# Patient Record
Sex: FEMALE | Race: BLACK OR AFRICAN AMERICAN | Employment: OTHER
[De-identification: names, ages, dates, MRNs, and addresses within clinical notes are randomized per-mention and may not be internally consistent; named-entity substitution may affect disease eponyms.]

---

## 2017-01-16 ENCOUNTER — OFFICE VISIT (OUTPATIENT)
Dept: ORTHOPEDIC SURGERY | Facility: CLINIC | Age: 72
End: 2017-01-16

## 2017-01-16 VITALS — BODY MASS INDEX: 57.66 KG/M2 | HEIGHT: 59 IN | WEIGHT: 286 LBS

## 2017-01-16 DIAGNOSIS — M65.4 DE QUERVAIN'S TENOSYNOVITIS, RIGHT: Primary | ICD-10-CM

## 2017-01-16 PROCEDURE — 99213 OFFICE O/P EST LOW 20 MIN: CPT | Performed by: ORTHOPAEDIC SURGERY

## 2017-01-18 ENCOUNTER — OFFICE VISIT (OUTPATIENT)
Dept: PODIATRY | Facility: CLINIC | Age: 72
End: 2017-01-18

## 2017-01-18 VITALS
HEART RATE: 85 BPM | BODY MASS INDEX: 57.45 KG/M2 | DIASTOLIC BLOOD PRESSURE: 77 MMHG | HEIGHT: 59 IN | WEIGHT: 285 LBS | SYSTOLIC BLOOD PRESSURE: 142 MMHG

## 2017-01-18 DIAGNOSIS — M79.671 PAIN IN BOTH FEET: ICD-10-CM

## 2017-01-18 DIAGNOSIS — E11.9 TYPE 2 DIABETES MELLITUS WITHOUT COMPLICATION, WITHOUT LONG-TERM CURRENT USE OF INSULIN (HCC): Primary | ICD-10-CM

## 2017-01-18 DIAGNOSIS — M20.10 HAV (HALLUX ABDUCTO VALGUS), UNSPECIFIED LATERALITY: ICD-10-CM

## 2017-01-18 DIAGNOSIS — B35.1 ONYCHOMYCOSIS: ICD-10-CM

## 2017-01-18 DIAGNOSIS — M79.672 PAIN IN BOTH FEET: ICD-10-CM

## 2017-01-18 DIAGNOSIS — M20.5X1 HALLUX LIMITUS OF RIGHT FOOT: ICD-10-CM

## 2017-01-18 DIAGNOSIS — M20.5X9 HALLUX LIMITUS, UNSPECIFIED LATERALITY: ICD-10-CM

## 2017-01-18 PROCEDURE — 99213 OFFICE O/P EST LOW 20 MIN: CPT | Performed by: PODIATRIST

## 2017-01-18 PROCEDURE — 11721 DEBRIDE NAIL 6 OR MORE: CPT | Performed by: PODIATRIST

## 2017-02-10 ENCOUNTER — OFFICE VISIT (OUTPATIENT)
Dept: INTERNAL MEDICINE | Facility: CLINIC | Age: 72
End: 2017-02-10

## 2017-02-10 VITALS
HEIGHT: 59 IN | BODY MASS INDEX: 58.06 KG/M2 | SYSTOLIC BLOOD PRESSURE: 148 MMHG | WEIGHT: 288 LBS | DIASTOLIC BLOOD PRESSURE: 84 MMHG | TEMPERATURE: 98.1 F

## 2017-02-10 DIAGNOSIS — E66.01 MORBID OBESITY DUE TO EXCESS CALORIES (HCC): ICD-10-CM

## 2017-02-10 DIAGNOSIS — K21.9 GASTROESOPHAGEAL REFLUX DISEASE, ESOPHAGITIS PRESENCE NOT SPECIFIED: ICD-10-CM

## 2017-02-10 DIAGNOSIS — N18.30 TYPE 2 DIABETES MELLITUS WITH STAGE 3 CHRONIC KIDNEY DISEASE, WITHOUT LONG-TERM CURRENT USE OF INSULIN (HCC): Primary | ICD-10-CM

## 2017-02-10 DIAGNOSIS — E11.22 TYPE 2 DIABETES MELLITUS WITH STAGE 3 CHRONIC KIDNEY DISEASE, WITHOUT LONG-TERM CURRENT USE OF INSULIN (HCC): Primary | ICD-10-CM

## 2017-02-10 DIAGNOSIS — E11.9 TYPE 2 DIABETES MELLITUS WITHOUT COMPLICATION, WITHOUT LONG-TERM CURRENT USE OF INSULIN (HCC): ICD-10-CM

## 2017-02-10 DIAGNOSIS — I10 ESSENTIAL HYPERTENSION: ICD-10-CM

## 2017-02-10 DIAGNOSIS — M10.9 GOUT WITHOUT TOPHUS: ICD-10-CM

## 2017-02-10 DIAGNOSIS — G56.01 CARPAL TUNNEL SYNDROME OF RIGHT WRIST: ICD-10-CM

## 2017-02-10 LAB — HBA1C MFR BLD: 7.2 %

## 2017-02-10 PROCEDURE — 83036 HEMOGLOBIN GLYCOSYLATED A1C: CPT | Performed by: HOSPITALIST

## 2017-02-10 PROCEDURE — 99214 OFFICE O/P EST MOD 30 MIN: CPT | Performed by: HOSPITALIST

## 2017-02-10 RX ORDER — FERROUS SULFATE 325(65) MG
325 TABLET ORAL
Qty: 28 TABLET | Refills: 11 | Status: SHIPPED | OUTPATIENT
Start: 2017-02-10 | End: 2018-02-12 | Stop reason: SDUPTHER

## 2017-02-10 RX ORDER — LISINOPRIL 20 MG/1
20 TABLET ORAL DAILY
Qty: 28 TABLET | Refills: 11 | Status: SHIPPED | OUTPATIENT
Start: 2017-02-10 | End: 2018-02-12 | Stop reason: SDUPTHER

## 2017-02-10 RX ORDER — ASPIRIN 325 MG
325 TABLET, DELAYED RELEASE (ENTERIC COATED) ORAL DAILY
Qty: 28 TABLET | Refills: 11 | Status: SHIPPED | OUTPATIENT
Start: 2017-02-10 | End: 2017-06-02 | Stop reason: ALTCHOICE

## 2017-02-10 RX ORDER — OMEPRAZOLE 40 MG/1
40 CAPSULE, DELAYED RELEASE ORAL DAILY
Qty: 28 CAPSULE | Refills: 11 | Status: SHIPPED | OUTPATIENT
Start: 2017-02-10 | End: 2017-07-10

## 2017-02-10 RX ORDER — ALLOPURINOL 100 MG/1
100 TABLET ORAL EVERY EVENING
Qty: 28 TABLET | Refills: 11 | Status: SHIPPED | OUTPATIENT
Start: 2017-02-10 | End: 2018-02-12 | Stop reason: SDUPTHER

## 2017-02-10 RX ORDER — AMLODIPINE BESYLATE 10 MG/1
10 TABLET ORAL DAILY
Qty: 28 TABLET | Refills: 11 | Status: SHIPPED | OUTPATIENT
Start: 2017-02-10 | End: 2018-02-12 | Stop reason: SDUPTHER

## 2017-02-10 ASSESSMENT — PATIENT HEALTH QUESTIONNAIRE - PHQ9
SUM OF ALL RESPONSES TO PHQ QUESTIONS 1-9: 0
2. FEELING DOWN, DEPRESSED OR HOPELESS: 0
1. LITTLE INTEREST OR PLEASURE IN DOING THINGS: 0
SUM OF ALL RESPONSES TO PHQ9 QUESTIONS 1 & 2: 0

## 2017-02-13 RX ORDER — GABAPENTIN 600 MG/1
TABLET ORAL
Qty: 84 TABLET | Refills: 5 | Status: SHIPPED | OUTPATIENT
Start: 2017-02-13 | End: 2017-07-24 | Stop reason: SDUPTHER

## 2017-02-13 RX ORDER — PRAVASTATIN SODIUM 40 MG
TABLET ORAL
Qty: 28 TABLET | Refills: 5 | Status: SHIPPED | OUTPATIENT
Start: 2017-02-13 | End: 2017-07-24 | Stop reason: SDUPTHER

## 2017-02-13 RX ORDER — BLOOD SUGAR DIAGNOSTIC
STRIP MISCELLANEOUS
Qty: 100 STRIP | Refills: 5 | Status: SHIPPED | OUTPATIENT
Start: 2017-02-13 | End: 2018-08-20 | Stop reason: SDUPTHER

## 2017-02-13 RX ORDER — GLIMEPIRIDE 2 MG/1
TABLET ORAL
Qty: 56 TABLET | Refills: 5 | Status: SHIPPED | OUTPATIENT
Start: 2017-02-13 | End: 2017-07-24 | Stop reason: SDUPTHER

## 2017-02-13 RX ORDER — DEXTROSE 4 G
TABLET,CHEWABLE ORAL
Qty: 100 EACH | Refills: 5 | Status: SHIPPED | OUTPATIENT
Start: 2017-02-13 | End: 2018-08-23

## 2017-02-13 RX ORDER — CARVEDILOL 12.5 MG/1
TABLET ORAL
Qty: 56 TABLET | Refills: 5 | Status: SHIPPED | OUTPATIENT
Start: 2017-02-13 | End: 2017-07-24 | Stop reason: SDUPTHER

## 2017-02-13 RX ORDER — FUROSEMIDE 20 MG/1
TABLET ORAL
Qty: 28 TABLET | Refills: 5 | Status: SHIPPED | OUTPATIENT
Start: 2017-02-13 | End: 2017-07-24 | Stop reason: SDUPTHER

## 2017-02-14 ENCOUNTER — HOSPITAL ENCOUNTER (OUTPATIENT)
Dept: PAIN MANAGEMENT | Age: 72
Discharge: HOME | End: 2017-02-14
Attending: ANESTHESIOLOGY | Admitting: ANESTHESIOLOGY
Payer: MEDICAID

## 2017-02-14 VITALS
TEMPERATURE: 98.3 F | SYSTOLIC BLOOD PRESSURE: 170 MMHG | HEART RATE: 72 BPM | WEIGHT: 288 LBS | BODY MASS INDEX: 58.06 KG/M2 | DIASTOLIC BLOOD PRESSURE: 72 MMHG | RESPIRATION RATE: 16 BRPM | HEIGHT: 59 IN

## 2017-02-14 PROCEDURE — 99214 OFFICE O/P EST MOD 30 MIN: CPT

## 2017-02-14 RX ORDER — OXYCODONE HYDROCHLORIDE 5 MG/1
5 TABLET ORAL EVERY 8 HOURS PRN
Qty: 90 TABLET | Refills: 0 | Status: SHIPPED | OUTPATIENT
Start: 2017-02-16 | End: 2017-03-18

## 2017-02-14 RX ORDER — POLYETHYLENE GLYCOL 3350 17 G/17G
17 POWDER, FOR SOLUTION ORAL DAILY
Qty: 510 G | Refills: 0 | Status: SHIPPED | OUTPATIENT
Start: 2017-02-14 | End: 2017-06-29 | Stop reason: SDUPTHER

## 2017-02-14 ASSESSMENT — PAIN DESCRIPTION - ORIENTATION: ORIENTATION: RIGHT;LEFT

## 2017-02-14 ASSESSMENT — PAIN DESCRIPTION - PROGRESSION: CLINICAL_PROGRESSION: NOT CHANGED

## 2017-02-14 ASSESSMENT — PAIN DESCRIPTION - FREQUENCY: FREQUENCY: INTERMITTENT

## 2017-02-14 ASSESSMENT — PAIN DESCRIPTION - PAIN TYPE: TYPE: CHRONIC PAIN

## 2017-02-14 ASSESSMENT — PAIN DESCRIPTION - ONSET: ONSET: ON-GOING

## 2017-02-14 ASSESSMENT — PAIN SCALES - GENERAL: PAINLEVEL_OUTOF10: 8

## 2017-02-14 ASSESSMENT — PAIN DESCRIPTION - DESCRIPTORS: DESCRIPTORS: ACHING;THROBBING

## 2017-02-15 ENCOUNTER — TELEPHONE (OUTPATIENT)
Dept: INTERNAL MEDICINE | Facility: CLINIC | Age: 72
End: 2017-02-15

## 2017-02-23 ENCOUNTER — HOSPITAL ENCOUNTER (OUTPATIENT)
Dept: NON INVASIVE DIAGNOSTICS | Age: 72
Discharge: HOME | End: 2017-02-23
Payer: MEDICAID

## 2017-02-23 DIAGNOSIS — N18.30 TYPE 2 DIABETES MELLITUS WITH STAGE 3 CHRONIC KIDNEY DISEASE, WITHOUT LONG-TERM CURRENT USE OF INSULIN (HCC): ICD-10-CM

## 2017-02-23 DIAGNOSIS — G56.01 CARPAL TUNNEL SYNDROME OF RIGHT WRIST: ICD-10-CM

## 2017-02-23 DIAGNOSIS — E11.22 TYPE 2 DIABETES MELLITUS WITH STAGE 3 CHRONIC KIDNEY DISEASE, WITHOUT LONG-TERM CURRENT USE OF INSULIN (HCC): ICD-10-CM

## 2017-02-23 DIAGNOSIS — I10 ESSENTIAL HYPERTENSION: ICD-10-CM

## 2017-02-23 DIAGNOSIS — E11.9 TYPE 2 DIABETES MELLITUS WITHOUT COMPLICATION, WITHOUT LONG-TERM CURRENT USE OF INSULIN (HCC): ICD-10-CM

## 2017-02-23 DIAGNOSIS — K21.9 GASTROESOPHAGEAL REFLUX DISEASE, ESOPHAGITIS PRESENCE NOT SPECIFIED: ICD-10-CM

## 2017-02-23 DIAGNOSIS — E66.01 MORBID OBESITY DUE TO EXCESS CALORIES (HCC): ICD-10-CM

## 2017-02-23 DIAGNOSIS — M10.9 GOUT WITHOUT TOPHUS: ICD-10-CM

## 2017-02-23 LAB
LV EF: 55 %
LVEF MODALITY: NORMAL

## 2017-02-23 PROCEDURE — 93005 ELECTROCARDIOGRAM TRACING: CPT

## 2017-02-23 PROCEDURE — 93306 TTE W/DOPPLER COMPLETE: CPT

## 2017-03-03 LAB
EKG ATRIAL RATE: 64 BPM
EKG P AXIS: 45 DEGREES
EKG P-R INTERVAL: 188 MS
EKG Q-T INTERVAL: 424 MS
EKG QRS DURATION: 76 MS
EKG QTC CALCULATION (BAZETT): 437 MS
EKG R AXIS: -20 DEGREES
EKG T AXIS: 8 DEGREES
EKG VENTRICULAR RATE: 64 BPM

## 2017-03-14 ENCOUNTER — HOSPITAL ENCOUNTER (OUTPATIENT)
Dept: PAIN MANAGEMENT | Age: 72
Discharge: HOME | End: 2017-03-14
Payer: MEDICAID

## 2017-03-14 VITALS
SYSTOLIC BLOOD PRESSURE: 137 MMHG | HEART RATE: 53 BPM | TEMPERATURE: 98.4 F | RESPIRATION RATE: 16 BRPM | DIASTOLIC BLOOD PRESSURE: 75 MMHG

## 2017-03-14 DIAGNOSIS — M19.012 PRIMARY OSTEOARTHRITIS OF BOTH SHOULDERS: ICD-10-CM

## 2017-03-14 DIAGNOSIS — M25.511 PAIN OF BOTH SHOULDER JOINTS: ICD-10-CM

## 2017-03-14 DIAGNOSIS — M25.512 PAIN OF BOTH SHOULDER JOINTS: ICD-10-CM

## 2017-03-14 DIAGNOSIS — M17.0 PRIMARY OSTEOARTHRITIS OF BOTH KNEES: Primary | ICD-10-CM

## 2017-03-14 DIAGNOSIS — M25.561 ARTHRALGIA OF BOTH LOWER LEGS: ICD-10-CM

## 2017-03-14 DIAGNOSIS — M19.011 PRIMARY OSTEOARTHRITIS OF BOTH SHOULDERS: ICD-10-CM

## 2017-03-14 DIAGNOSIS — M25.562 ARTHRALGIA OF BOTH LOWER LEGS: ICD-10-CM

## 2017-03-14 PROCEDURE — 99214 OFFICE O/P EST MOD 30 MIN: CPT

## 2017-03-14 RX ORDER — LIDOCAINE 50 MG/G
OINTMENT TOPICAL
Qty: 1 TUBE | Refills: 2 | Status: SHIPPED | OUTPATIENT
Start: 2017-03-14 | End: 2017-07-25 | Stop reason: SDUPTHER

## 2017-03-14 RX ORDER — OXYCODONE HYDROCHLORIDE 5 MG/1
5 TABLET ORAL EVERY 8 HOURS PRN
Qty: 42 TABLET | Refills: 0 | Status: SHIPPED | OUTPATIENT
Start: 2017-03-18 | End: 2017-03-14 | Stop reason: SDUPTHER

## 2017-03-14 RX ORDER — OXYCODONE HYDROCHLORIDE 5 MG/1
5 TABLET ORAL EVERY 8 HOURS PRN
Qty: 42 TABLET | Refills: 0 | Status: SHIPPED | OUTPATIENT
Start: 2017-04-01 | End: 2017-04-13 | Stop reason: SDUPTHER

## 2017-03-14 ASSESSMENT — PAIN DESCRIPTION - ONSET: ONSET: ON-GOING

## 2017-03-14 ASSESSMENT — PAIN DESCRIPTION - DESCRIPTORS: DESCRIPTORS: ACHING;THROBBING

## 2017-03-14 ASSESSMENT — PAIN DESCRIPTION - LOCATION: LOCATION: KNEE;SHOULDER

## 2017-03-14 ASSESSMENT — PAIN DESCRIPTION - PROGRESSION: CLINICAL_PROGRESSION: GRADUALLY WORSENING

## 2017-03-14 ASSESSMENT — PAIN SCALES - GENERAL: PAINLEVEL_OUTOF10: 8

## 2017-03-14 ASSESSMENT — PAIN DESCRIPTION - FREQUENCY: FREQUENCY: INTERMITTENT

## 2017-03-14 ASSESSMENT — PAIN DESCRIPTION - PAIN TYPE: TYPE: CHRONIC PAIN

## 2017-03-14 ASSESSMENT — PAIN DESCRIPTION - ORIENTATION: ORIENTATION: RIGHT;LEFT

## 2017-04-03 ENCOUNTER — OFFICE VISIT (OUTPATIENT)
Dept: ORTHOPEDIC SURGERY | Age: 72
End: 2017-04-03
Payer: MEDICAID

## 2017-04-03 VITALS — HEIGHT: 59 IN | WEIGHT: 289.24 LBS | BODY MASS INDEX: 58.31 KG/M2

## 2017-04-03 DIAGNOSIS — M65.4 DE QUERVAIN'S TENOSYNOVITIS, RIGHT: Primary | ICD-10-CM

## 2017-04-03 PROCEDURE — 20600 DRAIN/INJ JOINT/BURSA W/O US: CPT | Performed by: ORTHOPAEDIC SURGERY

## 2017-04-03 PROCEDURE — 99213 OFFICE O/P EST LOW 20 MIN: CPT | Performed by: ORTHOPAEDIC SURGERY

## 2017-04-03 RX ORDER — LIDOCAINE HYDROCHLORIDE 10 MG/ML
2 INJECTION, SOLUTION EPIDURAL; INFILTRATION; INTRACAUDAL; PERINEURAL ONCE
Status: DISCONTINUED | OUTPATIENT
Start: 2017-04-03 | End: 2017-04-03

## 2017-04-03 RX ORDER — LIDOCAINE HYDROCHLORIDE 10 MG/ML
2 INJECTION, SOLUTION INFILTRATION; PERINEURAL ONCE
Status: COMPLETED | OUTPATIENT
Start: 2017-04-03 | End: 2017-04-03

## 2017-04-03 RX ORDER — METHYLPREDNISOLONE ACETATE 80 MG/ML
80 INJECTION, SUSPENSION INTRA-ARTICULAR; INTRALESIONAL; INTRAMUSCULAR; SOFT TISSUE ONCE
Status: COMPLETED | OUTPATIENT
Start: 2017-04-03 | End: 2017-04-03

## 2017-04-03 RX ORDER — METHYLPREDNISOLONE ACETATE 40 MG/ML
40 INJECTION, SUSPENSION INTRA-ARTICULAR; INTRALESIONAL; INTRAMUSCULAR; SOFT TISSUE ONCE
Status: COMPLETED | OUTPATIENT
Start: 2017-04-03 | End: 2017-04-03

## 2017-04-03 RX ADMIN — LIDOCAINE HYDROCHLORIDE 2 ML: 10 INJECTION, SOLUTION INFILTRATION; PERINEURAL at 16:34

## 2017-04-03 RX ADMIN — METHYLPREDNISOLONE ACETATE 80 MG: 80 INJECTION, SUSPENSION INTRA-ARTICULAR; INTRALESIONAL; INTRAMUSCULAR; SOFT TISSUE at 16:15

## 2017-04-03 RX ADMIN — METHYLPREDNISOLONE ACETATE 40 MG: 40 INJECTION, SUSPENSION INTRA-ARTICULAR; INTRALESIONAL; INTRAMUSCULAR; SOFT TISSUE at 16:32

## 2017-04-13 ENCOUNTER — HOSPITAL ENCOUNTER (OUTPATIENT)
Dept: PAIN MANAGEMENT | Age: 72
Discharge: HOME | End: 2017-04-13
Payer: MEDICAID

## 2017-04-13 VITALS
WEIGHT: 289 LBS | HEIGHT: 59 IN | DIASTOLIC BLOOD PRESSURE: 75 MMHG | HEART RATE: 70 BPM | TEMPERATURE: 98.1 F | BODY MASS INDEX: 58.26 KG/M2 | RESPIRATION RATE: 16 BRPM | SYSTOLIC BLOOD PRESSURE: 148 MMHG

## 2017-04-13 DIAGNOSIS — M17.0 PRIMARY OSTEOARTHRITIS OF BOTH KNEES: Primary | ICD-10-CM

## 2017-04-13 PROCEDURE — 99214 OFFICE O/P EST MOD 30 MIN: CPT

## 2017-04-13 RX ORDER — OXYCODONE HYDROCHLORIDE 5 MG/1
5 TABLET ORAL EVERY 8 HOURS PRN
Qty: 42 TABLET | Refills: 0 | Status: SHIPPED | OUTPATIENT
Start: 2017-04-17 | End: 2017-05-11 | Stop reason: SDUPTHER

## 2017-04-13 ASSESSMENT — PAIN SCALES - GENERAL: PAINLEVEL_OUTOF10: 8

## 2017-04-13 ASSESSMENT — PAIN DESCRIPTION - LOCATION: LOCATION: SHOULDER;KNEE

## 2017-04-13 ASSESSMENT — PAIN DESCRIPTION - ORIENTATION: ORIENTATION: RIGHT;LEFT

## 2017-04-13 ASSESSMENT — ENCOUNTER SYMPTOMS
CONSTIPATION: 0
SHORTNESS OF BREATH: 0
COUGH: 0

## 2017-04-13 ASSESSMENT — PAIN DESCRIPTION - PAIN TYPE: TYPE: CHRONIC PAIN

## 2017-05-10 ENCOUNTER — OFFICE VISIT (OUTPATIENT)
Dept: PODIATRY | Age: 72
End: 2017-05-10
Payer: MEDICAID

## 2017-05-10 VITALS
WEIGHT: 289 LBS | BODY MASS INDEX: 58.26 KG/M2 | DIASTOLIC BLOOD PRESSURE: 82 MMHG | HEART RATE: 75 BPM | SYSTOLIC BLOOD PRESSURE: 155 MMHG | HEIGHT: 59 IN

## 2017-05-10 DIAGNOSIS — B35.1 ONYCHOMYCOSIS: ICD-10-CM

## 2017-05-10 DIAGNOSIS — M79.672 FOOT PAIN, BILATERAL: Primary | ICD-10-CM

## 2017-05-10 DIAGNOSIS — M79.671 FOOT PAIN, BILATERAL: Primary | ICD-10-CM

## 2017-05-10 PROCEDURE — 11721 DEBRIDE NAIL 6 OR MORE: CPT | Performed by: PODIATRIST

## 2017-05-10 RX ORDER — OXYCODONE HYDROCHLORIDE AND ACETAMINOPHEN 5; 325 MG/1; MG/1
1 TABLET ORAL EVERY 4 HOURS PRN
COMMUNITY
End: 2017-06-02 | Stop reason: ALTCHOICE

## 2017-05-11 ENCOUNTER — HOSPITAL ENCOUNTER (OUTPATIENT)
Dept: PAIN MANAGEMENT | Age: 72
Discharge: HOME | End: 2017-05-11
Payer: MEDICAID

## 2017-05-11 VITALS
WEIGHT: 280 LBS | SYSTOLIC BLOOD PRESSURE: 149 MMHG | HEART RATE: 73 BPM | DIASTOLIC BLOOD PRESSURE: 76 MMHG | BODY MASS INDEX: 56.45 KG/M2 | TEMPERATURE: 98.8 F | HEIGHT: 59 IN | RESPIRATION RATE: 16 BRPM

## 2017-05-11 DIAGNOSIS — M25.511 PAIN OF BOTH SHOULDER JOINTS: ICD-10-CM

## 2017-05-11 DIAGNOSIS — M17.0 PRIMARY OSTEOARTHRITIS OF BOTH KNEES: Primary | ICD-10-CM

## 2017-05-11 DIAGNOSIS — M25.512 PAIN OF BOTH SHOULDER JOINTS: ICD-10-CM

## 2017-05-11 PROCEDURE — 99214 OFFICE O/P EST MOD 30 MIN: CPT

## 2017-05-11 RX ORDER — OXYCODONE HYDROCHLORIDE 5 MG/1
5 TABLET ORAL EVERY 8 HOURS PRN
Qty: 42 TABLET | Refills: 0 | Status: SHIPPED | OUTPATIENT
Start: 2017-05-11 | End: 2017-05-24 | Stop reason: SDUPTHER

## 2017-05-11 ASSESSMENT — PAIN DESCRIPTION - ORIENTATION: ORIENTATION: RIGHT;LEFT

## 2017-05-11 ASSESSMENT — PAIN DESCRIPTION - PAIN TYPE: TYPE: CHRONIC PAIN

## 2017-05-11 ASSESSMENT — ENCOUNTER SYMPTOMS
CONSTIPATION: 0
SHORTNESS OF BREATH: 0
COUGH: 0

## 2017-05-11 ASSESSMENT — PAIN DESCRIPTION - FREQUENCY: FREQUENCY: CONTINUOUS

## 2017-05-11 ASSESSMENT — PAIN SCALES - GENERAL: PAINLEVEL_OUTOF10: 5

## 2017-05-11 ASSESSMENT — PAIN DESCRIPTION - PROGRESSION: CLINICAL_PROGRESSION: GRADUALLY WORSENING

## 2017-05-11 ASSESSMENT — PAIN DESCRIPTION - LOCATION: LOCATION: BACK;SHOULDER;KNEE

## 2017-06-02 ENCOUNTER — HOSPITAL ENCOUNTER (OUTPATIENT)
Dept: PAIN MANAGEMENT | Age: 72
Discharge: HOME | End: 2017-06-02
Payer: MEDICAID

## 2017-06-02 VITALS
DIASTOLIC BLOOD PRESSURE: 54 MMHG | WEIGHT: 280 LBS | RESPIRATION RATE: 18 BRPM | TEMPERATURE: 98.6 F | HEIGHT: 59 IN | HEART RATE: 74 BPM | SYSTOLIC BLOOD PRESSURE: 151 MMHG | BODY MASS INDEX: 56.45 KG/M2

## 2017-06-02 DIAGNOSIS — M17.0 PRIMARY OSTEOARTHRITIS OF BOTH KNEES: ICD-10-CM

## 2017-06-02 DIAGNOSIS — M25.562 ARTHRALGIA OF BOTH LOWER LEGS: ICD-10-CM

## 2017-06-02 DIAGNOSIS — M19.011 PRIMARY OSTEOARTHRITIS OF BOTH SHOULDERS: Primary | ICD-10-CM

## 2017-06-02 DIAGNOSIS — M25.561 ARTHRALGIA OF BOTH LOWER LEGS: ICD-10-CM

## 2017-06-02 DIAGNOSIS — M19.012 PRIMARY OSTEOARTHRITIS OF BOTH SHOULDERS: Primary | ICD-10-CM

## 2017-06-02 PROCEDURE — 80307 DRUG TEST PRSMV CHEM ANLYZR: CPT

## 2017-06-02 PROCEDURE — 99215 OFFICE O/P EST HI 40 MIN: CPT

## 2017-06-02 RX ORDER — OXYCODONE HYDROCHLORIDE 5 MG/1
5 TABLET ORAL EVERY 8 HOURS PRN
Qty: 42 TABLET | Refills: 0 | Status: SHIPPED | OUTPATIENT
Start: 2017-06-08 | End: 2017-06-21 | Stop reason: SDUPTHER

## 2017-06-02 ASSESSMENT — ENCOUNTER SYMPTOMS
BLURRED VISION: 0
UNUSUAL HAIR DISTRIBUTION: 0
SUSPICIOUS LESIONS: 0
EYE DISCHARGE: 0

## 2017-06-05 LAB
6-ACETYLMORPHINE, UR: NOT DETECTED
7-AMINOCLONAZEPAM, URINE: NOT DETECTED
ALPHA-OH-ALPRAZ, URINE: NOT DETECTED
ALPRAZOLAM, URINE: NOT DETECTED
AMPHETAMINES, URINE: NOT DETECTED
BARBITURATES, URINE: NOT DETECTED
BENZOYLECGONINE, UR: NOT DETECTED
BUPRENORPHINE URINE: NOT DETECTED
CARISOPRODOL, UR: NOT DETECTED
CLONAZEPAM, URINE: NOT DETECTED
CODEINE, URINE: NOT DETECTED
CREATININE URINE: 128.9 MG/DL (ref 20–400)
DIAZEPAM, URINE: NOT DETECTED
EER PAIN MGT DRUG PANEL, HIGH RES/EMIT U: NORMAL
ETHYL GLUCURONIDE UR: NOT DETECTED
FENTANYL URINE: NOT DETECTED
HYDROCODONE, URINE: NOT DETECTED
HYDROMORPHONE, URINE: NOT DETECTED
LORAZEPAM, URINE: NOT DETECTED
MARIJUANA METAB, UR: NOT DETECTED
MDA, UR: NOT DETECTED
MDEA, EVE, UR: NOT DETECTED
MDMA URINE: NOT DETECTED
MEPERIDINE METAB, UR: NOT DETECTED
METHADONE, URINE: NOT DETECTED
METHAMPHETAMINE, URINE: NOT DETECTED
METHYLPHENIDATE: NOT DETECTED
MIDAZOLAM, URINE: NOT DETECTED
MORPHINE URINE: NOT DETECTED
NORBUPRENORPHINE, URINE: NOT DETECTED
NORDIAZEPAM, URINE: NOT DETECTED
NORFENTANYL, URINE: NOT DETECTED
NORHYDROCODONE, URINE: NOT DETECTED
NOROXYCODONE, URINE: PRESENT
NOROXYMORPHONE, URINE: PRESENT
OXAZEPAM, URINE: NOT DETECTED
OXYCODONE URINE: PRESENT
OXYMORPHONE, URINE: PRESENT
PAIN MGT DRUG PANEL, HI RES, UR: NORMAL
PCP,URINE: NOT DETECTED
PHENTERMINE, UR: NOT DETECTED
PROPOXYPHENE, URINE: NOT DETECTED
TAPENTADOL, URINE: NOT DETECTED
TAPENTADOL-O-SULFATE, URINE: NOT DETECTED
TEMAZEPAM, URINE: NOT DETECTED
TRAMADOL, URINE: NOT DETECTED
ZOLPIDEM, URINE: NOT DETECTED

## 2017-06-10 RX ORDER — ALBUTEROL SULFATE 90 UG/1
2 AEROSOL, METERED RESPIRATORY (INHALATION) EVERY 6 HOURS PRN
Qty: 1 INHALER | Refills: 0 | Status: SHIPPED | OUTPATIENT
Start: 2017-06-10 | End: 2017-09-19 | Stop reason: SDUPTHER

## 2017-06-22 ENCOUNTER — HOSPITAL ENCOUNTER (OUTPATIENT)
Age: 72
Discharge: HOME | End: 2017-06-22
Payer: MEDICAID

## 2017-06-22 ENCOUNTER — HOSPITAL ENCOUNTER (OUTPATIENT)
Dept: GENERAL RADIOLOGY | Age: 72
Discharge: HOME | End: 2017-06-22
Payer: MEDICAID

## 2017-06-22 DIAGNOSIS — M25.562 ARTHRALGIA OF BOTH LOWER LEGS: ICD-10-CM

## 2017-06-22 DIAGNOSIS — M19.011 PRIMARY OSTEOARTHRITIS OF BOTH SHOULDERS: ICD-10-CM

## 2017-06-22 DIAGNOSIS — M79.671 FOOT PAIN, BILATERAL: ICD-10-CM

## 2017-06-22 DIAGNOSIS — M19.012 PRIMARY OSTEOARTHRITIS OF BOTH SHOULDERS: ICD-10-CM

## 2017-06-22 DIAGNOSIS — M17.0 PRIMARY OSTEOARTHRITIS OF BOTH KNEES: ICD-10-CM

## 2017-06-22 DIAGNOSIS — M79.672 FOOT PAIN, BILATERAL: ICD-10-CM

## 2017-06-22 DIAGNOSIS — M25.561 ARTHRALGIA OF BOTH LOWER LEGS: ICD-10-CM

## 2017-06-22 PROCEDURE — 73030 X-RAY EXAM OF SHOULDER: CPT

## 2017-06-22 PROCEDURE — 73630 X-RAY EXAM OF FOOT: CPT

## 2017-06-22 PROCEDURE — 73562 X-RAY EXAM OF KNEE 3: CPT

## 2017-06-29 ENCOUNTER — HOSPITAL ENCOUNTER (OUTPATIENT)
Dept: PAIN MANAGEMENT | Age: 72
Discharge: HOME | End: 2017-06-29
Payer: MEDICAID

## 2017-06-29 VITALS
BODY MASS INDEX: 56.45 KG/M2 | DIASTOLIC BLOOD PRESSURE: 72 MMHG | TEMPERATURE: 98 F | WEIGHT: 280 LBS | HEIGHT: 59 IN | SYSTOLIC BLOOD PRESSURE: 145 MMHG | HEART RATE: 78 BPM | RESPIRATION RATE: 18 BRPM

## 2017-06-29 DIAGNOSIS — M19.012 PRIMARY OSTEOARTHRITIS OF BOTH SHOULDERS: Primary | ICD-10-CM

## 2017-06-29 DIAGNOSIS — M17.0 PRIMARY OSTEOARTHRITIS OF BOTH KNEES: ICD-10-CM

## 2017-06-29 DIAGNOSIS — M19.011 PRIMARY OSTEOARTHRITIS OF BOTH SHOULDERS: Primary | ICD-10-CM

## 2017-06-29 PROCEDURE — 99214 OFFICE O/P EST MOD 30 MIN: CPT

## 2017-06-29 RX ORDER — OXYCODONE HYDROCHLORIDE 5 MG/1
5 TABLET ORAL EVERY 8 HOURS PRN
Qty: 87 TABLET | Refills: 0 | Status: SHIPPED | OUTPATIENT
Start: 2017-07-06 | End: 2017-07-06 | Stop reason: SDUPTHER

## 2017-06-29 RX ORDER — POLYETHYLENE GLYCOL 3350 17 G/17G
17 POWDER, FOR SOLUTION ORAL DAILY
Qty: 510 G | Refills: 2 | Status: SHIPPED | OUTPATIENT
Start: 2017-06-29 | End: 2017-07-29

## 2017-06-29 ASSESSMENT — PAIN DESCRIPTION - DESCRIPTORS: DESCRIPTORS: ACHING;BURNING;SHARP

## 2017-06-29 ASSESSMENT — ENCOUNTER SYMPTOMS
COUGH: 0
SHORTNESS OF BREATH: 0
CONSTIPATION: 1

## 2017-06-29 ASSESSMENT — PAIN SCALES - GENERAL: PAINLEVEL_OUTOF10: 8

## 2017-06-29 ASSESSMENT — PAIN DESCRIPTION - FREQUENCY: FREQUENCY: INTERMITTENT

## 2017-06-29 ASSESSMENT — PAIN DESCRIPTION - PAIN TYPE: TYPE: CHRONIC PAIN

## 2017-06-29 ASSESSMENT — PAIN DESCRIPTION - ONSET: ONSET: ON-GOING

## 2017-06-29 ASSESSMENT — PAIN DESCRIPTION - LOCATION: LOCATION: SHOULDER;KNEE

## 2017-06-29 ASSESSMENT — PAIN DESCRIPTION - PROGRESSION: CLINICAL_PROGRESSION: NOT CHANGED

## 2017-06-29 ASSESSMENT — PAIN DESCRIPTION - ORIENTATION: ORIENTATION: RIGHT;LEFT

## 2017-07-10 ENCOUNTER — HOSPITAL ENCOUNTER (OUTPATIENT)
Age: 72
Setting detail: SPECIMEN
Discharge: HOME | End: 2017-07-10
Payer: MEDICAID

## 2017-07-10 ENCOUNTER — OFFICE VISIT (OUTPATIENT)
Dept: INTERNAL MEDICINE | Age: 72
End: 2017-07-10
Payer: MEDICAID

## 2017-07-10 VITALS
OXYGEN SATURATION: 96 % | HEART RATE: 62 BPM | BODY MASS INDEX: 57.97 KG/M2 | DIASTOLIC BLOOD PRESSURE: 79 MMHG | WEIGHT: 287 LBS | SYSTOLIC BLOOD PRESSURE: 140 MMHG

## 2017-07-10 DIAGNOSIS — Z13.9 SCREENING: ICD-10-CM

## 2017-07-10 DIAGNOSIS — I10 ESSENTIAL HYPERTENSION: Primary | ICD-10-CM

## 2017-07-10 DIAGNOSIS — E11.9 TYPE 2 DIABETES MELLITUS WITHOUT COMPLICATION, WITHOUT LONG-TERM CURRENT USE OF INSULIN (HCC): ICD-10-CM

## 2017-07-10 DIAGNOSIS — E66.01 MORBID OBESITY DUE TO EXCESS CALORIES (HCC): ICD-10-CM

## 2017-07-10 LAB
CREATININE URINE: 75.5 MG/DL (ref 28–217)
MICROALBUMIN/CREAT 24H UR: <12 MG/L
MICROALBUMIN/CREAT UR-RTO: 16 MCG/MG CREAT

## 2017-07-10 PROCEDURE — 99214 OFFICE O/P EST MOD 30 MIN: CPT | Performed by: INTERNAL MEDICINE

## 2017-07-10 RX ORDER — LATANOPROST 50 UG/ML
1 SOLUTION/ DROPS OPHTHALMIC NIGHTLY
COMMUNITY
End: 2018-08-27 | Stop reason: ALTCHOICE

## 2017-07-10 RX ORDER — ASPIRIN 325 MG
325 TABLET ORAL DAILY
COMMUNITY
End: 2018-02-12

## 2017-07-25 RX ORDER — FUROSEMIDE 20 MG/1
TABLET ORAL
Qty: 28 TABLET | Refills: 5 | Status: SHIPPED | OUTPATIENT
Start: 2017-07-25 | End: 2018-01-08 | Stop reason: SDUPTHER

## 2017-07-25 RX ORDER — GLIMEPIRIDE 2 MG/1
TABLET ORAL
Qty: 56 TABLET | Refills: 5 | Status: SHIPPED | OUTPATIENT
Start: 2017-07-25 | End: 2018-01-08 | Stop reason: SDUPTHER

## 2017-07-25 RX ORDER — GABAPENTIN 600 MG/1
TABLET ORAL
Qty: 84 TABLET | Refills: 5 | Status: SHIPPED | OUTPATIENT
Start: 2017-07-25 | End: 2018-01-08 | Stop reason: SDUPTHER

## 2017-07-25 RX ORDER — CARVEDILOL 12.5 MG/1
TABLET ORAL
Qty: 56 TABLET | Refills: 5 | Status: SHIPPED | OUTPATIENT
Start: 2017-07-25 | End: 2018-01-08 | Stop reason: SDUPTHER

## 2017-07-25 RX ORDER — PRAVASTATIN SODIUM 40 MG
TABLET ORAL
Qty: 28 TABLET | Refills: 5 | Status: SHIPPED | OUTPATIENT
Start: 2017-07-25 | End: 2018-01-08 | Stop reason: SDUPTHER

## 2017-07-31 ENCOUNTER — HOSPITAL ENCOUNTER (OUTPATIENT)
Dept: PAIN MANAGEMENT | Age: 72
Discharge: HOME | End: 2017-07-31
Payer: MEDICAID

## 2017-07-31 VITALS
HEIGHT: 59 IN | TEMPERATURE: 98 F | SYSTOLIC BLOOD PRESSURE: 129 MMHG | WEIGHT: 287 LBS | HEART RATE: 74 BPM | DIASTOLIC BLOOD PRESSURE: 72 MMHG | RESPIRATION RATE: 20 BRPM | BODY MASS INDEX: 57.86 KG/M2

## 2017-07-31 PROCEDURE — 99214 OFFICE O/P EST MOD 30 MIN: CPT

## 2017-07-31 RX ORDER — OXYCODONE HYDROCHLORIDE 5 MG/1
5 TABLET ORAL EVERY 8 HOURS PRN
Qty: 90 TABLET | Refills: 0 | Status: SHIPPED | OUTPATIENT
Start: 2017-08-02 | End: 2017-08-30 | Stop reason: SDUPTHER

## 2017-07-31 ASSESSMENT — PAIN SCALES - GENERAL: PAINLEVEL_OUTOF10: 9

## 2017-07-31 ASSESSMENT — PAIN DESCRIPTION - ORIENTATION: ORIENTATION: RIGHT;LEFT

## 2017-07-31 ASSESSMENT — PAIN DESCRIPTION - PAIN TYPE: TYPE: CHRONIC PAIN

## 2017-07-31 ASSESSMENT — PAIN DESCRIPTION - DESCRIPTORS: DESCRIPTORS: ACHING;BURNING;SHARP

## 2017-07-31 ASSESSMENT — PAIN DESCRIPTION - ONSET: ONSET: GRADUAL

## 2017-07-31 ASSESSMENT — PAIN DESCRIPTION - PROGRESSION: CLINICAL_PROGRESSION: NOT CHANGED

## 2017-07-31 ASSESSMENT — PAIN DESCRIPTION - LOCATION: LOCATION: SHOULDER;KNEE

## 2017-07-31 ASSESSMENT — PAIN DESCRIPTION - FREQUENCY: FREQUENCY: INTERMITTENT

## 2017-08-14 ENCOUNTER — HOSPITAL ENCOUNTER (OUTPATIENT)
Age: 72
Discharge: HOME | End: 2017-08-14
Payer: MEDICAID

## 2017-08-14 DIAGNOSIS — N18.30 CHRONIC KIDNEY DISEASE, STAGE 3 (MODERATE): ICD-10-CM

## 2017-08-14 DIAGNOSIS — D50.9 IRON DEFICIENCY ANEMIA, UNSPECIFIED IRON DEFICIENCY ANEMIA TYPE: ICD-10-CM

## 2017-08-14 DIAGNOSIS — E55.9 VITAMIN D DEFICIENCY: ICD-10-CM

## 2017-08-14 LAB
ABSOLUTE EOS #: 0.2 K/UL (ref 0–0.4)
ABSOLUTE LYMPH #: 1.8 K/UL (ref 1–4.8)
ABSOLUTE MONO #: 0.4 K/UL (ref 0.1–1.2)
ALBUMIN SERPL-MCNC: 4.1 G/DL (ref 3.5–5.2)
ANION GAP SERPL CALCULATED.3IONS-SCNC: 15 MMOL/L (ref 9–17)
BASOPHILS # BLD: 1 %
BASOPHILS ABSOLUTE: 0 K/UL (ref 0–0.2)
BUN BLDV-MCNC: 14 MG/DL (ref 8–23)
BUN/CREAT BLD: ABNORMAL (ref 9–20)
CALCIUM SERPL-MCNC: 9.3 MG/DL (ref 8.6–10.4)
CHLORIDE BLD-SCNC: 108 MMOL/L (ref 98–107)
CO2: 24 MMOL/L (ref 20–31)
CREAT SERPL-MCNC: 1.05 MG/DL (ref 0.5–0.9)
CREATININE URINE: 89.2 MG/DL (ref 28–217)
CREATININE URINE: 89.4 MG/DL (ref 28–217)
DIFFERENTIAL TYPE: NORMAL
EOSINOPHILS RELATIVE PERCENT: 4 %
GFR AFRICAN AMERICAN: >60 ML/MIN
GFR NON-AFRICAN AMERICAN: 52 ML/MIN
GFR SERPL CREATININE-BSD FRML MDRD: ABNORMAL ML/MIN/{1.73_M2}
GFR SERPL CREATININE-BSD FRML MDRD: ABNORMAL ML/MIN/{1.73_M2}
GLUCOSE BLD-MCNC: 180 MG/DL (ref 70–99)
HCT VFR BLD CALC: 38.6 % (ref 36–46)
HEMOGLOBIN: 12.7 G/DL (ref 12–16)
LYMPHOCYTES # BLD: 34 %
MCH RBC QN AUTO: 30.9 PG (ref 26–34)
MCHC RBC AUTO-ENTMCNC: 33 G/DL (ref 31–37)
MCV RBC AUTO: 93.4 FL (ref 80–100)
MICROALBUMIN/CREAT 24H UR: <12 MG/L
MICROALBUMIN/CREAT UR-RTO: 13 MCG/MG CREAT
MONOCYTES # BLD: 8 %
PDW BLD-RTO: 14.3 % (ref 12.5–15.4)
PHOSPHORUS: 2.4 MG/DL (ref 2.6–4.5)
PLATELET # BLD: 189 K/UL (ref 140–450)
PLATELET ESTIMATE: NORMAL
PMV BLD AUTO: 10.1 FL (ref 6–12)
POTASSIUM SERPL-SCNC: 4.7 MMOL/L (ref 3.7–5.3)
PTH INTACT: 164.5 PG/ML (ref 15–65)
RBC # BLD: 4.13 M/UL (ref 4–5.2)
RBC # BLD: NORMAL 10*6/UL
SEG NEUTROPHILS: 53 %
SEGMENTED NEUTROPHILS ABSOLUTE COUNT: 2.9 K/UL (ref 1.8–7.7)
SODIUM BLD-SCNC: 147 MMOL/L (ref 135–144)
TOTAL PROTEIN, URINE: 9 MG/DL
VITAMIN D 25-HYDROXY: 23.7 NG/ML (ref 30–100)
WBC # BLD: 5.4 K/UL (ref 3.5–11)
WBC # BLD: NORMAL 10*3/UL

## 2017-08-14 PROCEDURE — 82043 UR ALBUMIN QUANTITATIVE: CPT

## 2017-08-14 PROCEDURE — 82570 ASSAY OF URINE CREATININE: CPT

## 2017-08-14 PROCEDURE — 84100 ASSAY OF PHOSPHORUS: CPT

## 2017-08-14 PROCEDURE — 83970 ASSAY OF PARATHORMONE: CPT

## 2017-08-14 PROCEDURE — 82040 ASSAY OF SERUM ALBUMIN: CPT

## 2017-08-14 PROCEDURE — 82306 VITAMIN D 25 HYDROXY: CPT

## 2017-08-14 PROCEDURE — 84156 ASSAY OF PROTEIN URINE: CPT

## 2017-08-14 PROCEDURE — 36415 COLL VENOUS BLD VENIPUNCTURE: CPT

## 2017-08-14 PROCEDURE — 85025 COMPLETE CBC W/AUTO DIFF WBC: CPT

## 2017-08-14 PROCEDURE — 80048 BASIC METABOLIC PNL TOTAL CA: CPT

## 2017-08-16 ENCOUNTER — OFFICE VISIT (OUTPATIENT)
Dept: PODIATRY | Age: 72
End: 2017-08-16
Payer: MEDICAID

## 2017-08-16 VITALS
BODY MASS INDEX: 55.24 KG/M2 | TEMPERATURE: 98.1 F | HEIGHT: 59 IN | HEART RATE: 77 BPM | DIASTOLIC BLOOD PRESSURE: 87 MMHG | SYSTOLIC BLOOD PRESSURE: 168 MMHG | WEIGHT: 274 LBS

## 2017-08-16 DIAGNOSIS — M79.671 FOOT PAIN, BILATERAL: Primary | ICD-10-CM

## 2017-08-16 DIAGNOSIS — E11.9 TYPE 2 DIABETES MELLITUS WITHOUT COMPLICATION, WITHOUT LONG-TERM CURRENT USE OF INSULIN (HCC): ICD-10-CM

## 2017-08-16 DIAGNOSIS — B35.1 ONYCHOMYCOSIS: ICD-10-CM

## 2017-08-16 DIAGNOSIS — M79.672 FOOT PAIN, BILATERAL: Primary | ICD-10-CM

## 2017-08-16 PROCEDURE — 11721 DEBRIDE NAIL 6 OR MORE: CPT | Performed by: PODIATRIST

## 2017-08-30 ENCOUNTER — HOSPITAL ENCOUNTER (OUTPATIENT)
Dept: PAIN MANAGEMENT | Age: 72
Discharge: HOME | End: 2017-08-30
Payer: MEDICAID

## 2017-08-30 VITALS — SYSTOLIC BLOOD PRESSURE: 134 MMHG | HEART RATE: 61 BPM | TEMPERATURE: 98.1 F | DIASTOLIC BLOOD PRESSURE: 63 MMHG

## 2017-08-30 DIAGNOSIS — M15.9 PRIMARY OSTEOARTHRITIS INVOLVING MULTIPLE JOINTS: Primary | ICD-10-CM

## 2017-08-30 PROCEDURE — 99213 OFFICE O/P EST LOW 20 MIN: CPT

## 2017-08-30 PROCEDURE — 99213 OFFICE O/P EST LOW 20 MIN: CPT | Performed by: NURSE PRACTITIONER

## 2017-08-30 PROCEDURE — 99214 OFFICE O/P EST MOD 30 MIN: CPT

## 2017-08-30 RX ORDER — OXYCODONE HYDROCHLORIDE 5 MG/1
5 TABLET ORAL EVERY 8 HOURS PRN
Qty: 90 TABLET | Refills: 0 | Status: SHIPPED | OUTPATIENT
Start: 2017-09-07 | End: 2017-10-06 | Stop reason: SDUPTHER

## 2017-08-30 ASSESSMENT — PAIN DESCRIPTION - FREQUENCY: FREQUENCY: INTERMITTENT

## 2017-08-30 ASSESSMENT — ENCOUNTER SYMPTOMS
CONSTIPATION: 0
SHORTNESS OF BREATH: 0
COUGH: 0

## 2017-08-30 ASSESSMENT — PAIN DESCRIPTION - DESCRIPTORS: DESCRIPTORS: ACHING;BURNING;SHARP

## 2017-08-30 ASSESSMENT — PAIN SCALES - GENERAL: PAINLEVEL_OUTOF10: 8

## 2017-08-30 ASSESSMENT — PAIN DESCRIPTION - ORIENTATION: ORIENTATION: RIGHT;LEFT

## 2017-08-30 ASSESSMENT — PAIN DESCRIPTION - LOCATION: LOCATION: KNEE;SHOULDER

## 2017-08-30 ASSESSMENT — PAIN DESCRIPTION - PAIN TYPE: TYPE: CHRONIC PAIN

## 2017-09-19 RX ORDER — MULTIVITAMIN WITH FOLIC ACID 400 MCG
TABLET ORAL
Qty: 28 TABLET | Refills: 11 | Status: SHIPPED | OUTPATIENT
Start: 2017-09-19 | End: 2018-08-17 | Stop reason: SDUPTHER

## 2017-10-05 ENCOUNTER — HOSPITAL ENCOUNTER (OUTPATIENT)
Dept: PAIN MANAGEMENT | Age: 72
Discharge: HOME | End: 2017-10-05
Payer: MEDICAID

## 2017-10-06 ENCOUNTER — HOSPITAL ENCOUNTER (OUTPATIENT)
Dept: PAIN MANAGEMENT | Age: 72
Discharge: HOME | End: 2017-10-06
Payer: MEDICAID

## 2017-10-06 VITALS
WEIGHT: 286 LBS | HEIGHT: 63 IN | TEMPERATURE: 98.2 F | BODY MASS INDEX: 50.68 KG/M2 | SYSTOLIC BLOOD PRESSURE: 149 MMHG | DIASTOLIC BLOOD PRESSURE: 78 MMHG | HEART RATE: 77 BPM

## 2017-10-06 DIAGNOSIS — M17.0 PRIMARY OSTEOARTHRITIS OF BOTH KNEES: ICD-10-CM

## 2017-10-06 DIAGNOSIS — Z51.81 MEDICATION MONITORING ENCOUNTER: ICD-10-CM

## 2017-10-06 DIAGNOSIS — M15.9 PRIMARY OSTEOARTHRITIS INVOLVING MULTIPLE JOINTS: Primary | ICD-10-CM

## 2017-10-06 DIAGNOSIS — M25.512 PAIN OF BOTH SHOULDER JOINTS: ICD-10-CM

## 2017-10-06 DIAGNOSIS — M25.511 PAIN OF BOTH SHOULDER JOINTS: ICD-10-CM

## 2017-10-06 PROCEDURE — 99214 OFFICE O/P EST MOD 30 MIN: CPT

## 2017-10-06 PROCEDURE — 99213 OFFICE O/P EST LOW 20 MIN: CPT | Performed by: NURSE PRACTITIONER

## 2017-10-06 RX ORDER — OXYCODONE HYDROCHLORIDE 5 MG/1
5 TABLET ORAL EVERY 8 HOURS PRN
Qty: 90 TABLET | Refills: 0 | Status: SHIPPED | OUTPATIENT
Start: 2017-10-07 | End: 2017-11-03 | Stop reason: SDUPTHER

## 2017-10-06 ASSESSMENT — ENCOUNTER SYMPTOMS
COUGH: 0
CONSTIPATION: 0
SHORTNESS OF BREATH: 0

## 2017-10-06 ASSESSMENT — PAIN DESCRIPTION - PROGRESSION: CLINICAL_PROGRESSION: NOT CHANGED

## 2017-10-06 ASSESSMENT — PAIN DESCRIPTION - LOCATION: LOCATION: KNEE;SHOULDER

## 2017-10-06 ASSESSMENT — PAIN DESCRIPTION - DESCRIPTORS: DESCRIPTORS: ACHING;BURNING;SHARP

## 2017-10-06 ASSESSMENT — PAIN DESCRIPTION - PAIN TYPE: TYPE: CHRONIC PAIN

## 2017-10-06 ASSESSMENT — PAIN DESCRIPTION - ORIENTATION: ORIENTATION: RIGHT;LEFT

## 2017-10-06 ASSESSMENT — PAIN DESCRIPTION - FREQUENCY: FREQUENCY: INTERMITTENT

## 2017-10-06 ASSESSMENT — PAIN SCALES - GENERAL: PAINLEVEL_OUTOF10: 8

## 2017-10-06 ASSESSMENT — PAIN DESCRIPTION - ONSET: ONSET: ON-GOING

## 2017-10-06 NOTE — PROGRESS NOTES
Patient is here today to review medication contract. Chief Complaint:  Shoulder and knee pain    [facility]    Pt  c/o chronic bilat shoulder and knee pain with no known injury. She has been a pt here since 2015 with complaints of arthritic symptoms in the shoulders and knees. She says that she has had this for some time now. She had been evaluated by Dr Corinne Holiday the past, and has had injections at other pain management at [facility]. She says that these did not help her pain. She has had aquatic PT in the past, (which only helped while in the water).  It was recommended in the past that she have shoulder surgery, but she refuses. She also does not want injections or surgery \"I am too old\". We prescribe lidocaine and oxycodone for pain relief.      HPI:   Shoulder Pain    The pain is present in the left shoulder and right shoulder. This is a chronic problem. The current episode started more than 1 year ago. There has been no history of extremity trauma. The problem occurs constantly. The problem has been unchanged. The quality of the pain is described as aching and sharp. The pain is at a severity of 8/10. The pain is moderate. Associated symptoms include an inability to bear weight, a limited range of motion and stiffness. Pertinent negatives include no fever or joint swelling. The symptoms are aggravated by activity and cold. She has tried oral narcotics, OTC ointments and heat for the symptoms. The treatment provided mild relief. Her past medical history is significant for osteoarthritis. Knee Pain    Incident onset: chronic. There was no injury mechanism. The pain is present in the right knee and left knee. The pain is at a severity of 8/10. The pain is moderate. The pain has been constant since onset. Associated symptoms include an inability to bear weight. The symptoms are aggravated by movement. She has tried acetaminophen and non-weight bearing for the symptoms. The treatment provided mild relief. Pill count: appropriate    Morphine equivalent dose as reported on OARRS:22.5    Controlled Substances Monitoring:     Attestation: The Prescription Monitoring Report for this patient was reviewed today. Redd Lei, APRN)  Documentation: Possible medication side effects, risk of tolerance and/or dependence, and alternative treatments discussed., Obtaining appropriate analgesic effect of treatment., No signs of potential drug abuse or diversion identified. Redd Lei, APRN)  Medication Contracts: Existing medication contract. Redd Lei, APRN)  Review of OARRS does not show any aberrant prescription behavior. Medication is helping the patient stay active. Patient denies any side effects and reports adequate analgesia. No sign of misuse/abuse.     Past Medical History:   Diagnosis Date    Anemia, iron deficiency     Asthma     Carpal tunnel syndrome on right 8/29/2016    Chronic kidney disease     GERD (gastroesophageal reflux disease)     Glucose intolerance (impaired glucose tolerance)     Gout     History of anemia     Normocytic    History of corneal abrasion     Left    History of dyspnea     History of pneumonia     Hyperlipidemia     Hypertension     Morbid obesity ([address]     Neck strain     Obstructive sleep apnea     on CPAP    Osteoarthritis     DJD of knees    Pain     bilateral shoulders, Lt hip    Shoulder strain     History of bilateral shoulder strain    Systemic hypertension     TIA (transient ischemic attack)     Type II or unspecified type diabetes mellitus without mention of complication, not stated as uncontrolled     Vitamin D deficiency     Wears dentures     upper and lower dentures    Wears glasses        Past Surgical History:   Procedure Laterality Date    COLONOSCOPY  10/23/15    per Dr. Milly Hood  9/21/15    empi select       Allergies   Allergen Reactions    Nsaids Other (See Comments)     Chronic kidney disease    Proair Respiclick (CALTRATE) 600-400 MG-UNIT TABS per tab, Take 1 tablet by mouth 2 times daily, Disp: , Rfl:     Albuterol (VENTOLIN IN), Inhale 2 puffs into the lungs daily as needed , Disp: , Rfl:     Alcohol Swabs (CVS ALCOHOL PREP SWABS) 70 % PADS, by Does not apply route., Disp: 100 each, Rfl: 11    Current Facility-Administered Medications:     bupivacaine (PF) (MARCAINE) 0.5 % injection 10 mg, 2 mL, Intratendinous, Once, Gerald Lebron DO    Family History   Problem Relation Age of Onset    Asthma Mother     Stroke Sister     Diabetes Sister     Other Neg Hx      Sleep disorders or narcolepsy       Social History     Social History    Marital status: Single     Spouse name: N/A    Number of children: N/A    Years of education: N/A     Occupational History    Not on file. Social History Main Topics    Smoking status: Former Smoker     Packs/day: 1.00     Years: 30.00     Types: Cigarettes     Quit date: 3/6/1990    Smokeless tobacco: Never Used    Alcohol use 0.0 oz/week     0 Standard drinks or equivalent per week      Comment: occassional    Drug use: No    Sexual activity: No     Other Topics Concern    Not on file     Social History Narrative       Review of Systems:  Review of Systems   Constitution: Negative for chills and fever. Cardiovascular: Negative for chest pain. Respiratory: Negative for cough and shortness of breath. Musculoskeletal: Positive for joint pain, muscle weakness, myalgias and stiffness. Gastrointestinal: Negative for constipation. Physical Exam:  BP (!) 149/78  Pulse 77  Temp 98.2 °F (36.8 °C) (Oral)   Ht 5' 3\" (1.6 m)  Wt 286 lb (129.7 kg)  BMI 50.66 kg/m2    Physical Exam   Constitutional: She is oriented to person, place, and time and well-developed, well-nourished, and in no distress. Obese BMI 50.66   Cardiovascular: Normal rate.     Pulmonary/Chest: Effort normal.   Musculoskeletal:        Right shoulder: She exhibits decreased range of motion, tenderness and bony tenderness. Left shoulder: She exhibits decreased range of motion, tenderness and bony tenderness. Right knee: She exhibits normal range of motion and no swelling. Left knee: She exhibits normal range of motion and no swelling. Neurological: She is alert and oriented to person, place, and time. Gait normal.   Skin: Skin is warm and dry. Psychiatric: Affect normal.       Record/Diagnostics Review:    XR left and right shoulder 2017  XR left and right knee 2017    Assessment:  Problem List Items Addressed This Visit     DJD (degenerative joint disease) of knee    Relevant Medications    oxyCODONE (ROXICODONE) 5 MG immediate release tablet (Start on 10/7/2017)    Pain in joint, shoulder region    Primary osteoarthritis involving multiple joints - Primary    Relevant Medications    oxyCODONE (ROXICODONE) 5 MG immediate release tablet (Start on 10/7/2017)      Other Visit Diagnoses     Medication monitoring encounter                Treatment Plan:  DISCUSSION: Treatment options discussed with patient and all questions answered to patient's satisfaction. TREATMENT OPTIONS:     Continue opioid therapy  Contract requirements met. Satisfactory pain management plan. Medication helps with personal goals and self care needs. Patient is stable on current regimen of meds and medication is effectively managing pain, we will continue current medications without changes. As always, we encourage daily stretching and strengthening exercises, and recommend minimizing use of pain medications unless patient cannot get through daily activities due to pain.   Follow up appointment made

## 2017-10-23 ENCOUNTER — OFFICE VISIT (OUTPATIENT)
Dept: INTERNAL MEDICINE | Age: 72
End: 2017-10-23
Payer: MEDICAID

## 2017-10-23 VITALS
OXYGEN SATURATION: 94 % | DIASTOLIC BLOOD PRESSURE: 72 MMHG | BODY MASS INDEX: 51.37 KG/M2 | WEIGHT: 290 LBS | SYSTOLIC BLOOD PRESSURE: 113 MMHG | HEART RATE: 66 BPM

## 2017-10-23 DIAGNOSIS — I10 ESSENTIAL HYPERTENSION: ICD-10-CM

## 2017-10-23 DIAGNOSIS — E11.9 TYPE 2 DIABETES MELLITUS WITHOUT COMPLICATION, WITHOUT LONG-TERM CURRENT USE OF INSULIN (HCC): Primary | ICD-10-CM

## 2017-10-23 DIAGNOSIS — G47.33 OBSTRUCTIVE SLEEP APNEA: ICD-10-CM

## 2017-10-23 DIAGNOSIS — E66.01 MORBID OBESITY (HCC): ICD-10-CM

## 2017-10-23 PROCEDURE — G8427 DOCREV CUR MEDS BY ELIG CLIN: HCPCS | Performed by: INTERNAL MEDICINE

## 2017-10-23 PROCEDURE — 1036F TOBACCO NON-USER: CPT | Performed by: INTERNAL MEDICINE

## 2017-10-23 PROCEDURE — 3017F COLORECTAL CA SCREEN DOC REV: CPT | Performed by: INTERNAL MEDICINE

## 2017-10-23 PROCEDURE — 3045F PR MOST RECENT HEMOGLOBIN A1C LEVEL 7.0-9.0%: CPT | Performed by: INTERNAL MEDICINE

## 2017-10-23 PROCEDURE — 1123F ACP DISCUSS/DSCN MKR DOCD: CPT | Performed by: INTERNAL MEDICINE

## 2017-10-23 PROCEDURE — 83036 HEMOGLOBIN GLYCOSYLATED A1C: CPT | Performed by: INTERNAL MEDICINE

## 2017-10-23 PROCEDURE — 99214 OFFICE O/P EST MOD 30 MIN: CPT | Performed by: INTERNAL MEDICINE

## 2017-10-23 PROCEDURE — 3014F SCREEN MAMMO DOC REV: CPT | Performed by: INTERNAL MEDICINE

## 2017-10-23 PROCEDURE — G8484 FLU IMMUNIZE NO ADMIN: HCPCS | Performed by: INTERNAL MEDICINE

## 2017-10-23 PROCEDURE — 4040F PNEUMOC VAC/ADMIN/RCVD: CPT | Performed by: INTERNAL MEDICINE

## 2017-10-23 PROCEDURE — G8399 PT W/DXA RESULTS DOCUMENT: HCPCS | Performed by: INTERNAL MEDICINE

## 2017-10-23 PROCEDURE — G8417 CALC BMI ABV UP PARAM F/U: HCPCS | Performed by: INTERNAL MEDICINE

## 2017-10-23 PROCEDURE — 1090F PRES/ABSN URINE INCON ASSESS: CPT | Performed by: INTERNAL MEDICINE

## 2017-10-23 ASSESSMENT — ENCOUNTER SYMPTOMS
CONSTIPATION: 1
COUGH: 1
ALLERGIC/IMMUNOLOGIC NEGATIVE: 1
EYE ITCHING: 1

## 2017-10-23 NOTE — PROGRESS NOTES
risk (1 of 2 - PCV13) 06/21/2012    Diabetic retinal exam  03/01/2017    Lipid screen  04/01/2017    Flu vaccine (1) 09/01/2017    Breast cancer screen  12/04/2017    Diabetic hemoglobin A1C test  02/10/2018    Diabetic foot exam  07/10/2018    Diabetic microalbuminuria test  08/14/2018    Colon cancer screen colonoscopy  10/23/2025    DTaP/Tdap/Td vaccine (2 - Td) 04/01/2026    Zostavax vaccine  Addressed    DEXA (modify frequency per FRAX score)  Completed         Date of patient's visit: 10/23/2017  Patient's Name:  Erica Gonzalez                   YOB: 1945        PCP:  Julio Gallagher MD    Erica Gonzalez is a 81 y.o. female who presents for   Chief Complaint   Patient presents with    Diabetes    and follow up of chronic medical problems. Patient Active Problem List   Diagnosis    Obstructive sleep apnea    Hypertension    Hyperlipidemia    Gout    Asthma    Morbid obesity ([address]    Vitamin D deficiency    GERD (gastroesophageal reflux disease)    TIA (transient ischemic attack)    Chronic kidney disease    Anemia, iron deficiency    DJD (degenerative joint disease) of knee    Osteoarthritis of shoulder    Pain of both shoulder joints    Primary osteoarthritis involving multiple joints    Pain in joint, lower leg    DM2 (diabetes mellitus, type 2) ([address]    Adjustment disorder with mixed anxiety and depressed mood    Bilateral leg edema    Extensor tenosynovitis of right wrist    Carpal tunnel syndrome on right    Medication monitoring encounter    Primary osteoarthritis of both knees       HISTORY OF PRESENT ILLNESS:    History was obtained from the patient. Treatment Adherence:   Medication compliance:  compliant all of the time  Diet compliance:  compliant all of the time  Weight trend: increasing  Current exercise: no regular exercise  Barriers: none    Diabetes Mellitus Type 2: Current symptoms/problems include none.     Home blood sugar records: patient BEDTIME 28 tablet 5    glimepiride (AMARYL) 2 MG tablet TAKE 1 TABLET TWICE DAILY(AM/PM) 56 tablet 5    carvedilol (COREG) 12.5 MG tablet TAKE 1 TABLET TWICE DAILY(AM/PM) 56 tablet 5    furosemide (LASIX) 20 MG tablet TAKE 1 TABLET EVERY MORNING 28 tablet 5    lidocaine (XYLOCAINE) 5 % ointment Apply topically as needed to  Both knees  Four times a day. - May dispense  Lidocaine Ointment/ Cream  4 % Strength 1 Tube 2    aspirin 325 MG tablet Take 325 mg by mouth daily      latanoprost (XALATAN) 0.005 % ophthalmic solution 1 drop nightly      ACCU-CHEK GREG PLUS strip TEST BLOOD SUGAR 3 TIMES DAILY AS DIRECTED 100 strip 5    GNP Lancets Super Thin MISC CHECK BLOOD SUGAR 3 TIMESDAILY AS DIRECTED 100 each 5    amLODIPine (NORVASC) 10 MG tablet Take 1 tablet by mouth daily 28 tablet 11    lisinopril (PRINIVIL;ZESTRIL) 20 MG tablet Take 1 tablet by mouth daily 28 tablet 11    ferrous sulfate 325 (65 FE) MG tablet Take 1 tablet by mouth Daily with supper 28 tablet 11    allopurinol (ZYLOPRIM) 100 MG tablet Take 1 tablet by mouth every evening 28 tablet 11    calcium carbonate-vitamin D (CALTRATE) 600-400 MG-UNIT TABS per tab Take 1 tablet by mouth 2 times daily      Albuterol (VENTOLIN IN) Inhale 2 puffs into the lungs daily as needed       Alcohol Swabs (CVS ALCOHOL PREP SWABS) 70 % PADS by Does not apply route.  100 each 11     Current Facility-Administered Medications   Medication Dose Route Frequency Provider Last Rate Last Dose    bupivacaine (PF) (MARCAINE) 0.5 % injection 10 mg  2 mL Intratendinous Once Lajoyce Saltness, DO           Patient Care Team:  Adele Dominguez MD as PCP - General (Internal Medicine)  Adele Dominguez MD as Referring Physician (Internal Medicine)  Adele Dominguez MD as Referring Physician (Internal Medicine)  Tiffany Thornton MD as Consulting Physician (Pulmonology)    PAST MEDICAL AND SURGICAL HISTORY:      Past Medical History:   Diagnosis Date    Anemia, iron deficiency     Asthma     Carpal tunnel syndrome on right 8/29/2016    Chronic kidney disease     GERD (gastroesophageal reflux disease)     Glucose intolerance (impaired glucose tolerance)     Gout     History of anemia     Normocytic    History of corneal abrasion     Left    History of dyspnea     History of pneumonia     Hyperlipidemia     Hypertension     Morbid obesity ([address]     Neck strain     Obstructive sleep apnea     on CPAP    Osteoarthritis     DJD of knees    Pain     bilateral shoulders, Lt hip    Shoulder strain     History of bilateral shoulder strain    Systemic hypertension     TIA (transient ischemic attack)     Type II or unspecified type diabetes mellitus without mention of complication, not stated as uncontrolled     Vitamin D deficiency     Wears dentures     upper and lower dentures    Wears glasses      Past Surgical History:   Procedure Laterality Date    COLONOSCOPY  10/23/15    per Dr. Tez Carbajal  9/21/15    empi select       SOCIAL HISTORY      Social History   Substance Use Topics    Smoking status: Former Smoker     Packs/day: 1.00     Years: 30.00     Types: Cigarettes     Quit date: 3/6/1990    Smokeless tobacco: Never Used    Alcohol use 0.0 oz/week      Comment: occassional     Gregory Barnhart  reports that she quit smoking about 27 years ago. Her smoking use included Cigarettes. She has a 30.00 pack-year smoking history. She has never used smokeless tobacco.    FAMILY HISTORY:      Family History   Problem Relation Age of Onset   26 [facility] Asthma Mother     Stroke Sister     Diabetes Sister     Other Neg Hx      Sleep disorders or narcolepsy       REVIEW OF SYSTEMS:    Review of Systems   Constitutional: Negative. HENT: Negative. Eyes: Positive for itching. Respiratory: Positive for cough. Cardiovascular: Positive for leg swelling. Gastrointestinal: Positive for constipation. Endocrine: Negative. Genitourinary: Negative. Musculoskeletal: Negative. Date    ALT 9 08/09/2015    AST 19 08/09/2015    PROT 8.0 08/09/2015    BILITOT 0.32 08/09/2015    BILIDIR <0.08 03/30/2015    LABALBU 4.1 08/14/2017    LABALBU 4.2 05/29/2012     Urine Analysis: No results found for: 269[address] Rm [place]:      Health Maintenance Due   Topic Date Due    Diabetic retinal exam  03/01/2017    Lipid screen  04/01/2017       ASSESSMENT AND PLAN:      1. Type 2 diabetes mellitus without complication, without long-term current use of insulin (HCC) - improved A1C 6.6 today POCT glycosylated hemoglobin (Hb A1C)    Lipid, Fasting   2. Obstructive sleep apnea  Adherent with CPAP   3. Essential hypertension  controlled   4. Morbid obesity ([address]  The patient is asked to make an attempt to improve diet and exercise patterns to aid in medical management of this problem. rtc in 6 months       FOLLOW UP:   1. Elen Garcia received counseling on the following healthy behaviors: nutrition and exercise    2. Reviewed prior labs and health maintenance. 3.  Discussed use, benefit, and side effects of prescribed medications. Barriers to medication compliance addressed. All patient questions answered. Pt voiced understanding. 4.  Continue current medications, diet and exercise. No orders of the defined types were placed in this encounter. Completed Refills               Requested Prescriptions      No prescriptions requested or ordered in this encounter       5. Patient given educational materials - see patient instructions    6. Was a self-tracking handout given in paper form or via [employer]? Yes  If yes, see orders or list here. Orders Placed This Encounter   Procedures    Lipid, Fasting     Standing Status:   Future     Standing Expiration Date:   10/23/2018    POCT glycosylated hemoglobin (Hb A1C)       Return in about 6 months (around 4/23/2018). Patient voiced understanding and agreed to treatment plan.      This note is created with the assistance of a speech-recognition program. While intending to generate a document that actually reflects the content of the visit, the document can still have some mistakes which may not have been identified and corrected by editing.     Dr Tyra Howell MD, [address]  Associate , Department of Internal Medicine  [address] [place] [address]  Attending [address] [place] [address], [address] [place] [address]                   10/23/2017, 10:31 AM

## 2017-10-24 LAB — HBA1C MFR BLD: 6.6 %

## 2017-11-03 ENCOUNTER — HOSPITAL ENCOUNTER (OUTPATIENT)
Dept: PAIN MANAGEMENT | Age: 72
Discharge: HOME | End: 2017-11-03
Payer: MEDICAID

## 2017-11-03 VITALS
BODY MASS INDEX: 58.26 KG/M2 | SYSTOLIC BLOOD PRESSURE: 128 MMHG | HEART RATE: 73 BPM | HEIGHT: 59 IN | DIASTOLIC BLOOD PRESSURE: 72 MMHG | TEMPERATURE: 98.7 F | RESPIRATION RATE: 18 BRPM | WEIGHT: 289 LBS

## 2017-11-03 DIAGNOSIS — M17.0 PRIMARY OSTEOARTHRITIS OF BOTH KNEES: Primary | ICD-10-CM

## 2017-11-03 DIAGNOSIS — M15.9 PRIMARY OSTEOARTHRITIS INVOLVING MULTIPLE JOINTS: ICD-10-CM

## 2017-11-03 DIAGNOSIS — M19.012 PRIMARY OSTEOARTHRITIS OF BOTH SHOULDERS: ICD-10-CM

## 2017-11-03 DIAGNOSIS — Z51.81 MEDICATION MONITORING ENCOUNTER: ICD-10-CM

## 2017-11-03 DIAGNOSIS — M19.011 PRIMARY OSTEOARTHRITIS OF BOTH SHOULDERS: ICD-10-CM

## 2017-11-03 PROCEDURE — 99214 OFFICE O/P EST MOD 30 MIN: CPT

## 2017-11-03 PROCEDURE — 99213 OFFICE O/P EST LOW 20 MIN: CPT | Performed by: NURSE PRACTITIONER

## 2017-11-03 RX ORDER — OXYCODONE HYDROCHLORIDE 5 MG/1
5 TABLET ORAL EVERY 8 HOURS PRN
Qty: 90 TABLET | Refills: 0 | Status: SHIPPED | OUTPATIENT
Start: 2017-11-08 | End: 2017-12-01 | Stop reason: SDUPTHER

## 2017-11-03 RX ORDER — POLYETHYLENE GLYCOL 3350 17 G/17G
17 POWDER, FOR SOLUTION ORAL DAILY
Qty: 510 G | Refills: 0 | Status: SHIPPED | OUTPATIENT
Start: 2017-11-03 | End: 2018-09-18 | Stop reason: SDUPTHER

## 2017-11-03 RX ORDER — LIDOCAINE 50 MG/G
OINTMENT TOPICAL
Qty: 1 TUBE | Refills: 2 | Status: SHIPPED | OUTPATIENT
Start: 2017-11-03 | End: 2018-01-05 | Stop reason: SDUPTHER

## 2017-11-03 ASSESSMENT — ENCOUNTER SYMPTOMS
SHORTNESS OF BREATH: 0
COUGH: 0
CONSTIPATION: 0

## 2017-11-03 NOTE — PROGRESS NOTES
Patient is here today to review medication contract. Chief Complaint:  Shoulder and knee pain    [facility]    Pt  c/o chronic bilat shoulder and knee pain with no known injury. She has been a pt here since 2015 with complaints of arthritic symptoms in the shoulders and knees. She says that she has had this for some time now. She had been evaluated by Dr Berny Willoughby the past, and has had injections at other pain management at [facility]. She says that these did not help her pain. She has had aquatic PT in the past, (which only helped while in the water).  It was recommended in the past that she have shoulder surgery, but she refuses. She also does not want injections or surgery \"I am too old\". We prescribe lidocaine and oxycodone for pain relief    HPI:   Knee Pain    Incident onset: progressive knee pain. Incident location: knee pain per pt report since 1989. There was no injury mechanism. The pain is present in the left knee and right knee (bilateral shoulders pain). The quality of the pain is described as aching, burning, shooting and stabbing. The pain is at a severity of 8/10. The pain is severe. The pain has been worsening since onset. Associated symptoms include a loss of motion, numbness and tingling. She reports no foreign bodies present. The symptoms are aggravated by movement and weight bearing. She has tried NSAIDs, heat, acetaminophen, non-weight bearing, rest and elevation (hot shower ) for the symptoms. The treatment provided mild relief. Patient denies any new neurological symptoms. No bowel or bladder incontinence, no weakness, and no falling. Pill count: appropriate    Morphine equivalent dose as reported on OARRS:22.5    Controlled Substances Monitoring:     Attestation: The Prescription Monitoring Report for this patient was reviewed today. DAVID Kennedy)  Documentation: Possible medication side effects, risk of tolerance and/or dependence, and alternative treatments discussed. , Obtaining appropriate analgesic effect of treatment., No signs of potential drug abuse or diversion identified., Existing medication contract. DAVID Miller)Review of OARRS does not show any aberrant prescription behavior. Medication is helping the patient stay active. Patient denies any side effects and reports adequate analgesia. No sign of misuse/abuse. Past Medical History:   Diagnosis Date    Anemia, iron deficiency     Asthma     Carpal tunnel syndrome on right 8/29/2016    Chronic kidney disease     GERD (gastroesophageal reflux disease)     Glucose intolerance (impaired glucose tolerance)     Gout     History of anemia     Normocytic    History of corneal abrasion     Left    History of dyspnea     History of pneumonia     Hyperlipidemia     Hypertension     Morbid obesity ([address]     Neck strain     Obstructive sleep apnea     on CPAP    Osteoarthritis     DJD of knees    Pain     bilateral shoulders, Lt hip    Shoulder strain     History of bilateral shoulder strain    Systemic hypertension     TIA (transient ischemic attack)     Type II or unspecified type diabetes mellitus without mention of complication, not stated as uncontrolled     Vitamin D deficiency     Wears dentures     upper and lower dentures    Wears glasses        Past Surgical History:   Procedure Laterality Date    COLONOSCOPY  10/23/15    per Dr. Tanisha Jimenez  9/21/15    empi select       Allergies   Allergen Reactions    Nsaids Other (See Comments)     Chronic kidney disease    Proair Respiclick [Albuterol Sulfate]          Current Outpatient Prescriptions:     oxyCODONE (ROXICODONE) 5 MG immediate release tablet, Take 1 tablet by mouth every 8 hours as needed for Pain .  Earliest Fill Date: 10/7/17, Disp: 90 tablet, Rfl: 0    VENTOLIN  (90 Base) MCG/ACT inhaler, INHALE 2 PUFFS INTO THE LUNGS EVERY 6 HOURS AS NEEDED FOR WHEEZING, Disp: 1 Inhaler, Rfl: 0    Multiple Vitamin (MULTIVITAMIN) tablet, TAKE 1 TABLET EVERY MORNING, Disp: 28 tablet, Rfl: 11    gabapentin (NEURONTIN) 600 MG tablet, TAKE 1 TABLET 3 TIMES A DAY (AM PM & HS), Disp: 84 tablet, Rfl: 5    pravastatin (PRAVACHOL) 40 MG tablet, TAKE 1 TABLET AT BEDTIME, Disp: 28 tablet, Rfl: 5    glimepiride (AMARYL) 2 MG tablet, TAKE 1 TABLET TWICE DAILY(AM/PM), Disp: 56 tablet, Rfl: 5    carvedilol (COREG) 12.5 MG tablet, TAKE 1 TABLET TWICE DAILY(AM/PM), Disp: 56 tablet, Rfl: 5    furosemide (LASIX) 20 MG tablet, TAKE 1 TABLET EVERY MORNING, Disp: 28 tablet, Rfl: 5    lidocaine (XYLOCAINE) 5 % ointment, Apply topically as needed to  Both knees  Four times a day. - May dispense  Lidocaine Ointment/ Cream  4 % Strength, Disp: 1 Tube, Rfl: 2    aspirin 325 MG tablet, Take 325 mg by mouth daily, Disp: , Rfl:     latanoprost (XALATAN) 0.005 % ophthalmic solution, 1 drop nightly, Disp: , Rfl:     ACCU-CHEK GREG PLUS strip, TEST BLOOD SUGAR 3 TIMES DAILY AS DIRECTED, Disp: 100 strip, Rfl: 5    GNP Lancets Super Thin MISC, CHECK BLOOD SUGAR 3 TIMESDAILY AS DIRECTED, Disp: 100 each, Rfl: 5    amLODIPine (NORVASC) 10 MG tablet, Take 1 tablet by mouth daily, Disp: 28 tablet, Rfl: 11    lisinopril (PRINIVIL;ZESTRIL) 20 MG tablet, Take 1 tablet by mouth daily, Disp: 28 tablet, Rfl: 11    ferrous sulfate 325 (65 FE) MG tablet, Take 1 tablet by mouth Daily with supper, Disp: 28 tablet, Rfl: 11    allopurinol (ZYLOPRIM) 100 MG tablet, Take 1 tablet by mouth every evening, Disp: 28 tablet, Rfl: 11    calcium carbonate-vitamin D (CALTRATE) 600-400 MG-UNIT TABS per tab, Take 1 tablet by mouth 2 times daily, Disp: , Rfl:     Alcohol Swabs (CVS ALCOHOL PREP SWABS) 70 % PADS, by Does not apply route., Disp: 100 each, Rfl: 11    Current Facility-Administered Medications:     bupivacaine (PF) (MARCAINE) 0.5 % injection 10 mg, 2 mL, Intratendinous, Once, Sullivan Hilton, DO    Family History   Problem Relation Age of Onset    Asthma 2017    Assessment:  Problem List Items Addressed This Visit     DJD (degenerative joint disease) of knee - Primary    Relevant Medications    oxyCODONE (ROXICODONE) 5 MG immediate release tablet (Start on 11/8/2017)    Osteoarthritis of shoulder    Relevant Medications    oxyCODONE (ROXICODONE) 5 MG immediate release tablet (Start on 11/8/2017)    Primary osteoarthritis involving multiple joints    Relevant Medications    oxyCODONE (ROXICODONE) 5 MG immediate release tablet (Start on 11/8/2017)    Medication monitoring encounter      Other Visit Diagnoses    None. Treatment Plan:  DISCUSSION: Treatment options discussed with patient and all questions answered to patient's satisfaction. TREATMENT OPTIONS:     Continue opioid therapy. Script written for oxycodone  Contract requirements met. Satisfactory pain management plan. Medication helps with personal goals and self care needs. Patient is stable on current regimen of meds and medication is effectively managing pain, we will continue current medications without changes. As always, we encourage daily stretching and strengthening exercises, and recommend minimizing use of pain medications unless patient cannot get through daily activities due to pain.   Follow up appointment made for 4 weeks

## 2017-12-01 ENCOUNTER — HOSPITAL ENCOUNTER (OUTPATIENT)
Dept: PAIN MANAGEMENT | Age: 72
Discharge: HOME | End: 2017-12-01
Payer: MEDICAID

## 2017-12-01 VITALS
SYSTOLIC BLOOD PRESSURE: 147 MMHG | RESPIRATION RATE: 18 BRPM | BODY MASS INDEX: 58.26 KG/M2 | WEIGHT: 289 LBS | TEMPERATURE: 98.2 F | HEART RATE: 79 BPM | HEIGHT: 59 IN | DIASTOLIC BLOOD PRESSURE: 67 MMHG

## 2017-12-01 PROCEDURE — 99214 OFFICE O/P EST MOD 30 MIN: CPT

## 2017-12-01 PROCEDURE — 80307 DRUG TEST PRSMV CHEM ANLYZR: CPT

## 2017-12-01 PROCEDURE — 99214 OFFICE O/P EST MOD 30 MIN: CPT | Performed by: PAIN MEDICINE

## 2017-12-01 RX ORDER — OXYCODONE HYDROCHLORIDE 5 MG/1
5 TABLET ORAL EVERY 8 HOURS PRN
Qty: 90 TABLET | Refills: 0 | Status: SHIPPED | OUTPATIENT
Start: 2017-12-08 | End: 2018-01-05 | Stop reason: SDUPTHER

## 2017-12-01 ASSESSMENT — ENCOUNTER SYMPTOMS
HEMOPTYSIS: 0
EYE DISCHARGE: 0
SUSPICIOUS LESIONS: 0

## 2017-12-01 NOTE — PROGRESS NOTES
bilateral shoulder strain    Systemic hypertension     TIA (transient ischemic attack)     Type II or unspecified type diabetes mellitus without mention of complication, not stated as uncontrolled     Vitamin D deficiency     Wears dentures     upper and lower dentures    Wears glasses        Past Surgical History:   Procedure Laterality Date    COLONOSCOPY  10/23/15    per Dr. Breanna Garland  9/21/15    empi select       Allergies   Allergen Reactions    Nsaids Other (See Comments)     Chronic kidney disease    Proair Respiclick [Albuterol Sulfate]          Current Outpatient Prescriptions:     oxyCODONE (ROXICODONE) 5 MG immediate release tablet, Take 1 tablet by mouth every 8 hours as needed for Pain . Earliest Fill Date: 11/8/17, Disp: 90 tablet, Rfl: 0    lidocaine (XYLOCAINE) 5 % ointment, Apply topically as needed to  Both knees  Four times a day. - May dispense  Lidocaine Ointment/ Cream  4 % Strength, Disp: 1 Tube, Rfl: 2    polyethylene glycol (MIRALAX) powder, Take 17 g by mouth daily, Disp: 510 g, Rfl: 0    VENTOLIN  (90 Base) MCG/ACT inhaler, INHALE 2 PUFFS INTO THE LUNGS EVERY 6 HOURS AS NEEDED FOR WHEEZING, Disp: 1 Inhaler, Rfl: 0    Multiple Vitamin (MULTIVITAMIN) tablet, TAKE 1 TABLET EVERY MORNING, Disp: 28 tablet, Rfl: 11    gabapentin (NEURONTIN) 600 MG tablet, TAKE 1 TABLET 3 TIMES A DAY (AM PM & HS), Disp: 84 tablet, Rfl: 5    pravastatin (PRAVACHOL) 40 MG tablet, TAKE 1 TABLET AT BEDTIME, Disp: 28 tablet, Rfl: 5    glimepiride (AMARYL) 2 MG tablet, TAKE 1 TABLET TWICE DAILY(AM/PM), Disp: 56 tablet, Rfl: 5    carvedilol (COREG) 12.5 MG tablet, TAKE 1 TABLET TWICE DAILY(AM/PM), Disp: 56 tablet, Rfl: 5    furosemide (LASIX) 20 MG tablet, TAKE 1 TABLET EVERY MORNING, Disp: 28 tablet, Rfl: 5    aspirin 325 MG tablet, Take 325 mg by mouth daily, Disp: , Rfl:     latanoprost (XALATAN) 0.005 % ophthalmic solution, 1 drop nightly, Disp: , Rfl:     ACCU-CHEK GREG PLUS strip, TEST BLOOD SUGAR 3 TIMES DAILY AS DIRECTED, Disp: 100 strip, Rfl: 5    GNP Lancets Super Thin MISC, CHECK BLOOD SUGAR 3 TIMESDAILY AS DIRECTED, Disp: 100 each, Rfl: 5    amLODIPine (NORVASC) 10 MG tablet, Take 1 tablet by mouth daily, Disp: 28 tablet, Rfl: 11    lisinopril (PRINIVIL;ZESTRIL) 20 MG tablet, Take 1 tablet by mouth daily, Disp: 28 tablet, Rfl: 11    ferrous sulfate 325 (65 FE) MG tablet, Take 1 tablet by mouth Daily with supper, Disp: 28 tablet, Rfl: 11    allopurinol (ZYLOPRIM) 100 MG tablet, Take 1 tablet by mouth every evening, Disp: 28 tablet, Rfl: 11    calcium carbonate-vitamin D (CALTRATE) 600-400 MG-UNIT TABS per tab, Take 1 tablet by mouth 2 times daily, Disp: , Rfl:     Alcohol Swabs ([employer] ALCOHOL PREP SWABS) 70 % PADS, by Does not apply route., Disp: 100 each, Rfl: 11    Current Facility-Administered Medications:     bupivacaine (PF) (MARCAINE) 0.5 % injection 10 mg, 2 mL, Intratendinous, Once, Oswald Astorga,     Family History   Problem Relation Age of Onset    Asthma Mother     Stroke Sister     Diabetes Sister     Other Neg Hx      Sleep disorders or narcolepsy       Social History     Social History    Marital status: Single     Spouse name: N/A    Number of children: N/A    Years of education: N/A     Occupational History    Not on file. Social History Main Topics    Smoking status: Former Smoker     Packs/day: 1.00     Years: 30.00     Types: Cigarettes     Quit date: 3/6/1990    Smokeless tobacco: Never Used    Alcohol use 0.0 oz/week      Comment: occassional    Drug use: No    Sexual activity: No     Other Topics Concern    Not on file     Social History Narrative    No narrative on file       Review of Systems:  Review of Systems   Constitution: Negative for chills. Eyes: Negative for discharge. Respiratory: Negative for hemoptysis. Skin: Negative for suspicious lesions.        Physical Exam:  BP (!) 147/67   Pulse 79   Temp 98.2 °F (36.8 °C) (Oral)   Resp 18   Ht 4' 11\" (1.499 m)   Wt 289 lb (131.1 kg)   BMI 58.37 kg/m²     Physical Exam   Constitutional: She is well-developed, well-nourished, and in no distress. Nursing note and vitals reviewed. Record/Diagnostics Review:    As above, I did review the imaging    Assessment:  Bilateral shoulder pain  Bilateral knee pain  Chronic opioid therapy      Treatment Plan:  DISCUSSION: Treatment options discussed with patient and all questions answered to patient's satisfaction. Discussed the rules and regulations surrounding prescription of opioids and compliance at length. Failure to follow the rules and regulation will result in tapering and discontinuation of medications. Also discussed at length safety and security of RX and medications. Due to the high risk nature of this patient's pain medication close monitoring is required. Medication risk and benefits discussed. OARRS Review: Reviewed and acceptable for medications prescribed. TREATMENT OPTIONS:     Continue current medication management, has been stable and compliant. Imaging with severe degenerative changes. Helps with the quality of life. But feels it is not as effective as it once was. We'll try compounding cream.  Affected area. Also discussed dry needling with physical therapy. Discussed repeat joint injections or possible Synvisc injections in the knee. May also benefit from diagnostic genicular blocks in the future. At this point she would like to hold off on this. She is not interested in surgical re-evaluation.         Laith Khoury M.D.

## 2017-12-05 LAB
6-ACETYLMORPHINE, UR: NOT DETECTED
7-AMINOCLONAZEPAM, URINE: NOT DETECTED
ALPHA-OH-ALPRAZ, URINE: NOT DETECTED
ALPRAZOLAM, URINE: NOT DETECTED
AMPHETAMINES, URINE: NOT DETECTED
BARBITURATES, URINE: NOT DETECTED
BENZOYLECGONINE, UR: NOT DETECTED
BUPRENORPHINE URINE: NOT DETECTED
CARISOPRODOL, UR: NOT DETECTED
CLONAZEPAM, URINE: NOT DETECTED
CODEINE, URINE: NOT DETECTED
CREATININE URINE: 201.3 MG/DL (ref 20–400)
DIAZEPAM, URINE: NOT DETECTED
EER PAIN MGT DRUG PANEL, HIGH RES/EMIT U: NORMAL
ETHYL GLUCURONIDE UR: NOT DETECTED
FENTANYL URINE: NOT DETECTED
HYDROCODONE, URINE: NOT DETECTED
HYDROMORPHONE, URINE: NOT DETECTED
LORAZEPAM, URINE: NOT DETECTED
MARIJUANA METAB, UR: NOT DETECTED
MDA, UR: NOT DETECTED
MDEA, EVE, UR: NOT DETECTED
MDMA URINE: NOT DETECTED
MEPERIDINE METAB, UR: NOT DETECTED
METHADONE, URINE: NOT DETECTED
METHAMPHETAMINE, URINE: NOT DETECTED
METHYLPHENIDATE: NOT DETECTED
MIDAZOLAM, URINE: NOT DETECTED
MORPHINE URINE: NOT DETECTED
NORBUPRENORPHINE, URINE: NOT DETECTED
NORDIAZEPAM, URINE: NOT DETECTED
NORFENTANYL, URINE: NOT DETECTED
NORHYDROCODONE, URINE: NOT DETECTED
NOROXYCODONE, URINE: PRESENT
NOROXYMORPHONE, URINE: NOT DETECTED
OXAZEPAM, URINE: NOT DETECTED
OXYCODONE URINE: PRESENT
OXYMORPHONE, URINE: PRESENT
PAIN MGT DRUG PANEL, HI RES, UR: NORMAL
PCP,URINE: NOT DETECTED
PHENTERMINE, UR: NOT DETECTED
PROPOXYPHENE, URINE: NOT DETECTED
TAPENTADOL, URINE: NOT DETECTED
TAPENTADOL-O-SULFATE, URINE: NOT DETECTED
TEMAZEPAM, URINE: NOT DETECTED
TRAMADOL, URINE: NOT DETECTED
ZOLPIDEM, URINE: NOT DETECTED

## 2018-01-05 ENCOUNTER — HOSPITAL ENCOUNTER (OUTPATIENT)
Dept: PAIN MANAGEMENT | Age: 73
Discharge: HOME | End: 2018-01-05
Payer: MEDICAID

## 2018-01-05 VITALS
TEMPERATURE: 98.6 F | DIASTOLIC BLOOD PRESSURE: 58 MMHG | RESPIRATION RATE: 18 BRPM | HEART RATE: 65 BPM | SYSTOLIC BLOOD PRESSURE: 153 MMHG

## 2018-01-05 DIAGNOSIS — M25.512 PAIN OF BOTH SHOULDER JOINTS: ICD-10-CM

## 2018-01-05 DIAGNOSIS — M17.0 PRIMARY OSTEOARTHRITIS OF BOTH KNEES: Primary | ICD-10-CM

## 2018-01-05 DIAGNOSIS — M25.511 PAIN OF BOTH SHOULDER JOINTS: ICD-10-CM

## 2018-01-05 DIAGNOSIS — Z51.81 MEDICATION MONITORING ENCOUNTER: ICD-10-CM

## 2018-01-05 PROCEDURE — 99213 OFFICE O/P EST LOW 20 MIN: CPT | Performed by: NURSE PRACTITIONER

## 2018-01-05 PROCEDURE — 99214 OFFICE O/P EST MOD 30 MIN: CPT

## 2018-01-05 RX ORDER — OXYCODONE HYDROCHLORIDE 5 MG/1
5 TABLET ORAL EVERY 8 HOURS PRN
Qty: 90 TABLET | Refills: 0 | Status: SHIPPED | OUTPATIENT
Start: 2018-01-08 | End: 2018-02-08 | Stop reason: SDUPTHER

## 2018-01-05 RX ORDER — LIDOCAINE 50 MG/G
OINTMENT TOPICAL
Qty: 1 TUBE | Refills: 2 | Status: SHIPPED | OUTPATIENT
Start: 2018-01-05 | End: 2018-01-05 | Stop reason: ALTCHOICE

## 2018-01-05 ASSESSMENT — ENCOUNTER SYMPTOMS
COUGH: 0
SHORTNESS OF BREATH: 0
CONSTIPATION: 0

## 2018-01-05 NOTE — PROGRESS NOTES
mild relief. Patient denies any new neurological symptoms. No bowel or bladder incontinence, no weakness, and no falling. Pill count: appropriate     Morphine equivalent: 22.5    Controlled Substances Monitoring:     Attestation: The Prescription Monitoring Report for this patient was reviewed today. (Jenelle Dudley CNP)  Documentation: Possible medication side effects, risk of tolerance and/or dependence, and alternative treatments discussed., No signs of potential drug abuse or diversion identified.  Jenelle Dudley CNP)  Medication Contracts: Existing medication contract. (Jenelle Dudley CNP)    Past Medical History:   Diagnosis Date    Anemia, iron deficiency     Asthma     Carpal tunnel syndrome on right 8/29/2016    Chronic kidney disease     GERD (gastroesophageal reflux disease)     Glucose intolerance (impaired glucose tolerance)     Gout     History of anemia     Normocytic    History of corneal abrasion     Left    History of dyspnea     History of pneumonia     Hyperlipidemia     Hypertension     Morbid obesity ([address]     Neck strain     Obstructive sleep apnea     on CPAP    Osteoarthritis     DJD of knees    Pain     bilateral shoulders, Lt hip    Shoulder strain     History of bilateral shoulder strain    Systemic hypertension     TIA (transient ischemic attack)     Type II or unspecified type diabetes mellitus without mention of complication, not stated as uncontrolled     Vitamin D deficiency     Wears dentures     upper and lower dentures    Wears glasses        Past Surgical History:   Procedure Laterality Date    COLONOSCOPY  10/23/15    per Dr. Concetta Alcala  9/21/15    empi select       Allergies   Allergen Reactions    Nsaids Other (See Comments)     Chronic kidney disease    Proair Respiclick [Albuterol Sulfate]          Current Outpatient Prescriptions:     oxyCODONE (ROXICODONE) 5 MG immediate release tablet, Take 1 tablet motion. Tenderness found. Neurological: She is alert and oriented to person, place, and time. Skin: Skin is warm and dry. Psychiatric: Affect normal.       Record/Diagnostics Review:    XR shoulders 2017 -     Right shoulder:     1. Severe degenerative changes and remodeling of the right glenohumeral  joint.  1.1 cm possible large chronically fractured osteophyte versus loose  body projecting over the axillary pouch.  Diffuse osteopenia. 2. No acute fracture or gross dislocation. Left shoulder:     1. Severe degenerative changes and remodeling of the left glenohumeral joint. Diffuse osteopenia. 2. No acute fracture or gross dislocation. XR knees 2017 -     Right knee:     1. Tricompartmental osteoarthrosis.  Severe narrowing of the medial  compartment.  Diffuse osteopenia.  Probable underlying CPPD. 2. No acute osseous abnormality. Left knee:     1. Small joint effusion. 2. Tricompartmental osteoarthrosis with moderate to severe narrowing of the  medial compartment.  Diffuse osteopenia.  Suspected CPPD. 3. No acute osseous abnormality. Assessment:  Problem List Items Addressed This Visit     Pain of both shoulder joints    Medication monitoring encounter    Primary osteoarthritis of both knees - Primary    Relevant Medications    oxyCODONE (ROXICODONE) 5 MG immediate release tablet (Start on 1/8/2018)    lidocaine (XYLOCAINE) 5 % ointment      Other Visit Diagnoses    None. Treatment Plan:  DISCUSSION: Treatment options discussed with patient and all questions answered to patient's satisfaction. TREATMENT OPTIONS:   Continue current medication  Will start compounding cream today  Not interested in joint injections or diagnostic genicular block  Contract compliance requirements are met. Satisfactory pain management plan. Medications help with personal goals and self-care needs. Follow up appointment scheduled.

## 2018-01-08 RX ORDER — GABAPENTIN 600 MG/1
TABLET ORAL
Qty: 84 TABLET | Refills: 5 | Status: SHIPPED | OUTPATIENT
Start: 2018-01-08 | End: 2018-03-26

## 2018-01-08 RX ORDER — PRAVASTATIN SODIUM 40 MG
TABLET ORAL
Qty: 28 TABLET | Refills: 5 | Status: SHIPPED | OUTPATIENT
Start: 2018-01-08 | End: 2018-06-25 | Stop reason: SDUPTHER

## 2018-01-08 RX ORDER — GLIMEPIRIDE 2 MG/1
TABLET ORAL
Qty: 56 TABLET | Refills: 5 | Status: SHIPPED | OUTPATIENT
Start: 2018-01-08 | End: 2018-06-25 | Stop reason: SDUPTHER

## 2018-01-08 RX ORDER — FUROSEMIDE 20 MG/1
TABLET ORAL
Qty: 28 TABLET | Refills: 5 | Status: SHIPPED | OUTPATIENT
Start: 2018-01-08 | End: 2018-06-25 | Stop reason: SDUPTHER

## 2018-01-08 RX ORDER — CARVEDILOL 12.5 MG/1
TABLET ORAL
Qty: 56 TABLET | Refills: 5 | Status: SHIPPED | OUTPATIENT
Start: 2018-01-08 | End: 2018-06-25 | Stop reason: SDUPTHER

## 2018-02-05 ENCOUNTER — HOSPITAL ENCOUNTER (OUTPATIENT)
Dept: PAIN MANAGEMENT | Age: 73
Discharge: HOME | End: 2018-02-05
Payer: MEDICAID

## 2018-02-08 ENCOUNTER — HOSPITAL ENCOUNTER (OUTPATIENT)
Dept: PAIN MANAGEMENT | Age: 73
Discharge: HOME | End: 2018-02-08
Payer: MEDICAID

## 2018-02-08 VITALS
HEIGHT: 59 IN | BODY MASS INDEX: 57.45 KG/M2 | HEART RATE: 69 BPM | RESPIRATION RATE: 22 BRPM | TEMPERATURE: 98.3 F | WEIGHT: 285 LBS | SYSTOLIC BLOOD PRESSURE: 131 MMHG | DIASTOLIC BLOOD PRESSURE: 68 MMHG

## 2018-02-08 DIAGNOSIS — M19.012 PRIMARY OSTEOARTHRITIS OF BOTH SHOULDERS: Primary | ICD-10-CM

## 2018-02-08 DIAGNOSIS — Z51.81 MEDICATION MONITORING ENCOUNTER: ICD-10-CM

## 2018-02-08 DIAGNOSIS — M25.512 PAIN OF BOTH SHOULDER JOINTS: ICD-10-CM

## 2018-02-08 DIAGNOSIS — M25.511 PAIN OF BOTH SHOULDER JOINTS: ICD-10-CM

## 2018-02-08 DIAGNOSIS — M19.011 PRIMARY OSTEOARTHRITIS OF BOTH SHOULDERS: Primary | ICD-10-CM

## 2018-02-08 DIAGNOSIS — M17.0 PRIMARY OSTEOARTHRITIS OF BOTH KNEES: ICD-10-CM

## 2018-02-08 PROCEDURE — 99214 OFFICE O/P EST MOD 30 MIN: CPT

## 2018-02-08 PROCEDURE — 99213 OFFICE O/P EST LOW 20 MIN: CPT | Performed by: NURSE PRACTITIONER

## 2018-02-08 RX ORDER — OXYCODONE HYDROCHLORIDE 5 MG/1
5 TABLET ORAL EVERY 8 HOURS PRN
Qty: 90 TABLET | Refills: 0 | Status: SHIPPED | OUTPATIENT
Start: 2018-02-08 | End: 2018-03-08 | Stop reason: SDUPTHER

## 2018-02-08 ASSESSMENT — ENCOUNTER SYMPTOMS
SHORTNESS OF BREATH: 0
COUGH: 0
CONSTIPATION: 0

## 2018-02-08 NOTE — PROGRESS NOTES
Patient is here today to review medication contract. Chief Complaint:  bilat knee and shoulder pain    [facility]    Pt  c/o chronic bilat shoulder and knee pain with no known injury. She has been a pt here since 2015 with complaints of arthritic symptoms in the shoulders and knees. She says that she has had this for some time now. She had been evaluated by Dr Shabnam Kaplan the past, and has had injections at other pain management at [facility]. She says that these did not help her pain. She has had aquatic PT in the past, (which only helped while in the water).  It was recommended in the past that she have shoulder surgery, but she refuses. She also does not want injections or surgery \"I am too old\". We prescribe oxycodone and compounding cream for pain relief - which is helping with her pain. Diagnostic genicular block and synvisc discussed at previous appointments - patient is not interested. HPI: Knee Pain    The incident occurred more than 1 week ago. There was no injury mechanism (progressive pain). The pain is present in the left knee and right knee (bilateral shoulders ). The quality of the pain is described as aching, burning, cramping, shooting and stabbing (varies depending on weather and activity ). The pain is at a severity of 6/10. The pain is severe. The pain has been constant since onset. Associated symptoms include an inability to bear weight, a loss of motion and muscle weakness. Associated symptoms comments: Has to walk with cane and can only go short distances. . The symptoms are aggravated by movement and weight bearing. She has tried elevation, rest, non-weight bearing and heat (compound cream ) for the symptoms. The treatment provided mild relief. Shoulder Pain    The pain is present in the right shoulder and left shoulder. This is a chronic problem. The problem occurs constantly. The problem has been unchanged. The pain is at a severity of 6/10.  Associated symptoms include an inability to bear dentures     upper and lower dentures    Wears glasses        Past Surgical History:   Procedure Laterality Date    COLONOSCOPY  10/23/15    per Dr. Geura Luke  9/21/15    empi select       Allergies   Allergen Reactions    Nsaids Other (See Comments)     Chronic kidney disease    Proair Respiclick [Albuterol Sulfate]          Current Outpatient Prescriptions:     furosemide (LASIX) 20 MG tablet, TAKE 1 TABLET EVERY MORNING, Disp: 28 tablet, Rfl: 5    carvedilol (COREG) 12.5 MG tablet, TAKE 1 TABLET TWICE DAILY(AM/PM), Disp: 56 tablet, Rfl: 5    gabapentin (NEURONTIN) 600 MG tablet, TAKE 1 TABLET 3 TIMES A DAY (AM PM & HS), Disp: 84 tablet, Rfl: 5    glimepiride (AMARYL) 2 MG tablet, TAKE 1 TABLET TWICE DAILY(AM/PM), Disp: 56 tablet, Rfl: 5    pravastatin (PRAVACHOL) 40 MG tablet, TAKE 1 TABLET AT BEDTIME, Disp: 28 tablet, Rfl: 5    VENTOLIN  (90 Base) MCG/ACT inhaler, INHALE 2 PUFFS INTO THE LUNGS EVERY 6 HOURS AS NEEDED FOR WHEEZING, Disp: 1 Inhaler, Rfl: 0    Multiple Vitamin (MULTIVITAMIN) tablet, TAKE 1 TABLET EVERY MORNING, Disp: 28 tablet, Rfl: 11    aspirin 325 MG tablet, Take 325 mg by mouth daily, Disp: , Rfl:     latanoprost (XALATAN) 0.005 % ophthalmic solution, 1 drop nightly, Disp: , Rfl:     ACCU-CHEK GREG PLUS strip, TEST BLOOD SUGAR 3 TIMES DAILY AS DIRECTED, Disp: 100 strip, Rfl: 5    GNP Lancets Super Thin MISC, CHECK BLOOD SUGAR 3 TIMESDAILY AS DIRECTED, Disp: 100 each, Rfl: 5    amLODIPine (NORVASC) 10 MG tablet, Take 1 tablet by mouth daily, Disp: 28 tablet, Rfl: 11    lisinopril (PRINIVIL;ZESTRIL) 20 MG tablet, Take 1 tablet by mouth daily, Disp: 28 tablet, Rfl: 11    ferrous sulfate 325 (65 FE) MG tablet, Take 1 tablet by mouth Daily with supper, Disp: 28 tablet, Rfl: 11    allopurinol (ZYLOPRIM) 100 MG tablet, Take 1 tablet by mouth every evening, Disp: 28 tablet, Rfl: 11    calcium carbonate-vitamin D (CALTRATE) 600-400 MG-UNIT TABS per tab, Take 1

## 2018-02-12 DIAGNOSIS — E11.9 TYPE 2 DIABETES MELLITUS WITHOUT COMPLICATION, WITHOUT LONG-TERM CURRENT USE OF INSULIN (HCC): ICD-10-CM

## 2018-02-12 DIAGNOSIS — K21.9 GASTROESOPHAGEAL REFLUX DISEASE, ESOPHAGITIS PRESENCE NOT SPECIFIED: ICD-10-CM

## 2018-02-12 DIAGNOSIS — I10 ESSENTIAL HYPERTENSION: ICD-10-CM

## 2018-02-12 DIAGNOSIS — M10.9 GOUT WITHOUT TOPHUS: ICD-10-CM

## 2018-02-12 RX ORDER — FERROUS SULFATE 325(65) MG
TABLET ORAL
Qty: 28 TABLET | Refills: 11 | Status: SHIPPED | OUTPATIENT
Start: 2018-02-12 | End: 2019-01-07 | Stop reason: SDUPTHER

## 2018-02-12 RX ORDER — ALLOPURINOL 100 MG/1
TABLET ORAL
Qty: 28 TABLET | Refills: 11 | Status: SHIPPED | OUTPATIENT
Start: 2018-02-12 | End: 2019-01-07 | Stop reason: SDUPTHER

## 2018-02-12 RX ORDER — OMEPRAZOLE 40 MG/1
CAPSULE, DELAYED RELEASE ORAL
Qty: 28 CAPSULE | Refills: 11 | Status: SHIPPED | OUTPATIENT
Start: 2018-02-12 | End: 2018-03-26

## 2018-02-12 RX ORDER — ASPIRIN 325 MG
TABLET, DELAYED RELEASE (ENTERIC COATED) ORAL
Qty: 28 TABLET | Refills: 11 | Status: SHIPPED | OUTPATIENT
Start: 2018-02-12 | End: 2019-01-18 | Stop reason: SDUPTHER

## 2018-02-12 RX ORDER — AMLODIPINE BESYLATE 10 MG/1
TABLET ORAL
Qty: 28 TABLET | Refills: 11 | Status: SHIPPED | OUTPATIENT
Start: 2018-02-12 | End: 2019-01-07 | Stop reason: SDUPTHER

## 2018-02-12 RX ORDER — LISINOPRIL 20 MG/1
TABLET ORAL
Qty: 28 TABLET | Refills: 11 | Status: SHIPPED | OUTPATIENT
Start: 2018-02-12 | End: 2019-01-07 | Stop reason: SDUPTHER

## 2018-02-12 NOTE — TELEPHONE ENCOUNTER
Next Visit Date:  Future Appointments  Date Time Provider Husam Bowden   3/7/2018 2:40 PM Shad Pendleton CNP [facility]   4/26/2018 12:40 PM Yamil Metzger MD [id number]     Refill request for prilosec, lisinopril, amlodipine   Health Maintenance   Topic Date Due    Hepatitis C screen  1945    Pneumococcal low/med risk (1 of 2 - PCV13) 06/21/2012    Diabetic retinal exam  03/01/2017    Lipid screen  04/01/2017    Flu vaccine (1) 09/01/2017    Breast cancer screen  12/04/2017    Diabetic foot exam  07/10/2018    Diabetic microalbuminuria test  08/14/2018    Potassium monitoring  08/14/2018    Creatinine monitoring  08/14/2018    A1C test (Diabetic or Prediabetic)  10/24/2018    Colon cancer screen colonoscopy  10/23/2025    DTaP/Tdap/Td vaccine (2 - Td) 04/01/2026    Zostavax vaccine  Addressed    DEXA (modify frequency per FRAX score)  Completed       Hemoglobin A1C (%)   Date Value   10/24/2017 6.6   02/10/2017 7.2   04/01/2016 6.5             ( goal A1C is < 7)   Microalb/Crt.  Ratio (mcg/mg creat)   Date Value   08/14/2017 13     LDL Cholesterol (mg/dL)   Date Value   04/01/2016 101       (goal LDL is <100)   AST (U/L)   Date Value   08/09/2015 19     ALT (U/L)   Date Value   08/09/2015 9     BUN (mg/dL)   Date Value   08/14/2017 14     BP Readings from Last 3 Encounters:   02/08/18 131/68   01/05/18 (!) 153/58   12/01/17 (!) 147/67          (goal 120/80)    All Future Testing planned in [facility]  Lab Frequency Next Occurrence   Hemoglobin A1C Once 73/70/1716   Basic Metabolic Panel Once 09/79/7391   Uric Acid Once 06/26/2018   Hepatitis C Antibody Once 19/82/0554   Basic Metabolic Panel Once 58/27/8699   CBC Auto Differential Once 01/15/2018   Creatinine, Random Urine Once 01/15/2018   Protein, urine, random Once 01/15/2018   Lipid, Fasting Once 04/30/2018               Patient Active Problem List:     Obstructive sleep apnea     Hypertension     Hyperlipidemia Gout     Asthma     Morbid obesity ([facility] [address]     Vitamin D deficiency     GERD (gastroesophageal reflux disease)     TIA (transient ischemic attack)     Chronic kidney disease     Anemia, iron deficiency     DJD (degenerative joint disease) of knee     Primary osteoarthritis of both shoulders     Pain of both shoulder joints     Primary osteoarthritis involving multiple joints     Pain in joint, lower leg     DM2 (diabetes mellitus, type 2) ([facility]     Adjustment disorder with mixed anxiety and depressed mood     Bilateral leg edema     Extensor tenosynovitis of right wrist     Carpal tunnel syndrome on right     Medication monitoring encounter     Primary osteoarthritis of both knees

## 2018-02-12 NOTE — TELEPHONE ENCOUNTER
Morbid obesity ([facility] [address]     Vitamin D deficiency     GERD (gastroesophageal reflux disease)     TIA (transient ischemic attack)     Chronic kidney disease     Anemia, iron deficiency     DJD (degenerative joint disease) of knee     Primary osteoarthritis of both shoulders     Pain of both shoulder joints     Primary osteoarthritis involving multiple joints     Pain in joint, lower leg     DM2 (diabetes mellitus, type 2) ([facility])     Adjustment disorder with mixed anxiety and depressed mood     Bilateral leg edema     Extensor tenosynovitis of right wrist     Carpal tunnel syndrome on right     Medication monitoring encounter     Primary osteoarthritis of both knees

## 2018-03-08 ENCOUNTER — APPOINTMENT (OUTPATIENT)
Dept: GENERAL RADIOLOGY | Age: 73
End: 2018-03-08
Payer: MEDICAID

## 2018-03-08 ENCOUNTER — HOSPITAL ENCOUNTER (OUTPATIENT)
Dept: PAIN MANAGEMENT | Age: 73
Discharge: HOME | End: 2018-03-08
Payer: MEDICAID

## 2018-03-08 ENCOUNTER — HOSPITAL ENCOUNTER (EMERGENCY)
Age: 73
Discharge: HOME | End: 2018-03-08
Attending: EMERGENCY MEDICINE
Payer: MEDICAID

## 2018-03-08 VITALS
WEIGHT: 283 LBS | RESPIRATION RATE: 9 BRPM | DIASTOLIC BLOOD PRESSURE: 91 MMHG | BODY MASS INDEX: 57.05 KG/M2 | OXYGEN SATURATION: 97 % | HEART RATE: 69 BPM | HEIGHT: 59 IN | SYSTOLIC BLOOD PRESSURE: 144 MMHG | TEMPERATURE: 98.1 F

## 2018-03-08 VITALS
TEMPERATURE: 98.2 F | HEART RATE: 74 BPM | SYSTOLIC BLOOD PRESSURE: 177 MMHG | DIASTOLIC BLOOD PRESSURE: 84 MMHG | RESPIRATION RATE: 18 BRPM

## 2018-03-08 DIAGNOSIS — Z51.81 MEDICATION MONITORING ENCOUNTER: ICD-10-CM

## 2018-03-08 DIAGNOSIS — R42 DIZZINESS: ICD-10-CM

## 2018-03-08 DIAGNOSIS — M25.511 RIGHT SHOULDER PAIN, UNSPECIFIED CHRONICITY: Primary | ICD-10-CM

## 2018-03-08 DIAGNOSIS — M17.0 PRIMARY OSTEOARTHRITIS OF BOTH KNEES: ICD-10-CM

## 2018-03-08 DIAGNOSIS — M19.012 PRIMARY OSTEOARTHRITIS OF BOTH SHOULDERS: Primary | ICD-10-CM

## 2018-03-08 DIAGNOSIS — M19.011 PRIMARY OSTEOARTHRITIS OF BOTH SHOULDERS: Primary | ICD-10-CM

## 2018-03-08 LAB
EKG ATRIAL RATE: 88 BPM
EKG P AXIS: 69 DEGREES
EKG P-R INTERVAL: 194 MS
EKG Q-T INTERVAL: 334 MS
EKG QRS DURATION: 50 MS
EKG QTC CALCULATION (BAZETT): 404 MS
EKG R AXIS: -21 DEGREES
EKG T AXIS: 42 DEGREES
EKG VENTRICULAR RATE: 88 BPM
POC TROPONIN I: 0 NG/ML (ref 0–0.1)
POC TROPONIN INTERP: NORMAL

## 2018-03-08 PROCEDURE — 72040 X-RAY EXAM NECK SPINE 2-3 VW: CPT

## 2018-03-08 PROCEDURE — 99213 OFFICE O/P EST LOW 20 MIN: CPT | Performed by: NURSE PRACTITIONER

## 2018-03-08 PROCEDURE — 99284 EMERGENCY DEPT VISIT MOD MDM: CPT

## 2018-03-08 PROCEDURE — 93005 ELECTROCARDIOGRAM TRACING: CPT

## 2018-03-08 PROCEDURE — 99214 OFFICE O/P EST MOD 30 MIN: CPT

## 2018-03-08 PROCEDURE — 84484 ASSAY OF TROPONIN QUANT: CPT

## 2018-03-08 PROCEDURE — 6360000002 HC RX W HCPCS: Performed by: STUDENT IN AN ORGANIZED HEALTH CARE EDUCATION/TRAINING PROGRAM

## 2018-03-08 PROCEDURE — 96372 THER/PROPH/DIAG INJ SC/IM: CPT

## 2018-03-08 RX ORDER — OXYCODONE HYDROCHLORIDE 5 MG/1
5 TABLET ORAL EVERY 8 HOURS PRN
Qty: 90 TABLET | Refills: 0 | Status: SHIPPED | OUTPATIENT
Start: 2018-03-13 | End: 2018-04-09 | Stop reason: SDUPTHER

## 2018-03-08 RX ORDER — ORPHENADRINE CITRATE 30 MG/ML
60 INJECTION INTRAMUSCULAR; INTRAVENOUS ONCE
Status: COMPLETED | OUTPATIENT
Start: 2018-03-08 | End: 2018-03-08

## 2018-03-08 RX ADMIN — ORPHENADRINE CITRATE 60 MG: 30 INJECTION INTRAMUSCULAR; INTRAVENOUS at 17:26

## 2018-03-08 ASSESSMENT — ENCOUNTER SYMPTOMS
ABDOMINAL PAIN: 0
PHOTOPHOBIA: 0
SHORTNESS OF BREATH: 0
CONSTIPATION: 0
COUGH: 0
BACK PAIN: 1
SHORTNESS OF BREATH: 0

## 2018-03-08 ASSESSMENT — PAIN SCALES - GENERAL: PAINLEVEL_OUTOF10: 7

## 2018-03-08 NOTE — ED PROVIDER NOTES
I performed a history and physical examination of the patient and discussed management with the resident. I reviewed the residents note and agree with the documented findings and plan of care. Any areas of disagreement are noted on the chart. I was personally present for the key portions of any procedures. I have documented in the chart those procedures where I was not present during the key portions. I have reviewed the emergency nurses triage note. I agree with the chief complaint, past medical history, past surgical history, allergies, medications, social and family history as documented unless otherwise noted below. Documentation of the HPI, Physical Exam and Medical Decision Making performed by medical students or scribes is based on my personal performance of the HPI, PE and MDM. For Phys Assistant/ Nurse Practitioner cases/documentation I have personally evaluated this patient and have completed at least one if not all key elements of the E/M (history, physical exam, and MDM). I find the patient's history and physical exam are consistent with the NP/PA documentation. I agree with the care provided, treatment rendered, disposition and followup plan. Additional findings are as noted. Doug Martini. Darryn Emery MD  Attending Emergency  Physician          EKG Interpretation    Interpreted by me    Rhythm: normal sinus   Rate: normal  Axis: normal  Ectopy: none  Conduction: normal  ST Segments: no acute change  T Waves: no acute change  Q Waves: q waves noted in leads 3 and avf. Poor R-wave progression. Clinical Impression: no acute changes. Findings as noted above. No significant morphologic changes noted when compared with prior tracing dated February 23, 2017. Arsenio Gilman MD  03/08/18 [id number]    TWO PRESENTING COMPLAINTS:  1. PAIN IN RIGHT TRAPEZIUS/SHOULDER/NECK FOR PAST 3-4D., WORSE RECENTLY. NOTED SX UPON AWAKENING SEV DAYS AGO. NO TRAUMA OR OBVIOUS INCITING ACTIVITY RECALLED.  NO NUMBNESS,

## 2018-03-08 NOTE — ED PROVIDER NOTES
History of corneal abrasion; History of dyspnea; History of pneumonia; Hyperlipidemia; Hypertension; Morbid obesity ([address] Neck strain; Obstructive sleep apnea; Osteoarthritis; Pain; Shoulder strain; Systemic hypertension; TIA (transient ischemic attack); Type II or unspecified type diabetes mellitus without mention of complication, not stated as uncontrolled; Vitamin D deficiency; Wears dentures; and Wears glasses. has a past surgical history that includes Nerve Block (9/21/15) and Colonoscopy (10/23/15). Social History     Social History    Marital status: Single     Spouse name: N/A    Number of children: N/A    Years of education: N/A     Occupational History    Not on file. Social History Main Topics    Smoking status: Former Smoker     Packs/day: 1.00     Years: 30.00     Types: Cigarettes     Quit date: 3/6/1990    Smokeless tobacco: Never Used    Alcohol use 0.0 oz/week      Comment: occassional    Drug use: No    Sexual activity: No     Other Topics Concern    Not on file     Social History Narrative    No narrative on file       Family History   Problem Relation Age of Onset    Asthma Mother     Stroke Sister     Diabetes Sister     Other Neg Hx      Sleep disorders or narcolepsy       Allergies:  Nsaids and Proair respiclick [albuterol sulfate]    Home Medications:  Prior to Admission medications    Medication Sig Start Date End Date Taking? Authorizing Provider   oxyCODONE (ROXICODONE) 5 MG immediate release tablet Take 1 tablet by mouth every 8 hours as needed for Pain for up to 30 days.  3/13/18 4/12/18  Christine Lev APRRYNE   ferrous sulfate 325 (65 Fe) MG tablet TAKE 1 TABLET BY MOUTH DAILY IN THE EVENING 2/12/18   Janeen Talley MD   aspirin 325 MG EC tablet TAKE 1 TABLET BY MOUTH DAILY IN THE EVENING 2/12/18   Janeen Talley MD   omeprazole (PRILOSEC) 40 MG delayed release capsule TAKE 1 CAPSULE BY MOUTH DAILY IN THE MORNING 2/12/18   Janeen Talley MD   lisinopril (PRINIVIL;ZESTRIL) 20 MG tablet TAKE 1 TABLET BY MOUTH DAILY IN THE MORNING 2/12/18   Isis Joe MD   amLODIPine (NORVASC) 10 MG tablet TAKE 1 TABLET BY MOUTH DAILY IN THE MORNING 2/12/18   Isis Joe MD   allopurinol (ZYLOPRIM) 100 MG tablet TAKE 1 TABLET BY MOUTH ATBEDTIME 2/12/18   Isis Joe MD   furosemide (LASIX) 20 MG tablet TAKE 1 TABLET EVERY MORNING 1/8/18   Isis Joe MD   carvedilol (COREG) 12.5 MG tablet TAKE 1 TABLET TWICE DAILY(AM/PM) 1/8/18   Isis Joe MD   gabapentin (NEURONTIN) 600 MG tablet TAKE 1 TABLET 3 TIMES A DAY (AM PM & HS) 1/8/18 3/8/18  Isis Joe MD   glimepiride (AMARYL) 2 MG tablet TAKE 1 TABLET TWICE DAILY(AM/PM) 1/8/18   Isis Joe MD   pravastatin (PRAVACHOL) 40 MG tablet TAKE 1 TABLET AT BEDTIME 1/8/18   Isis Joe MD   VENTOLIN  (90 Base) MCG/ACT inhaler INHALE 2 PUFFS INTO THE LUNGS EVERY 6 HOURS AS NEEDED FOR WHEEZING 9/21/17   Tierra Altman MD   Multiple Vitamin (MULTIVITAMIN) tablet TAKE 1 TABLET EVERY MORNING 9/19/17   Isis Joe MD   latanoprost (XALATAN) 0.005 % ophthalmic solution 1 drop nightly    Historical Provider, MD   ACCU-CHEK GREG PLUS strip TEST BLOOD SUGAR 3 TIMES DAILY AS DIRECTED 2/13/17   Benito Khalil MD   GNP Lancets Super Thin MISC CHECK BLOOD SUGAR 3 TIMESDAILY AS DIRECTED 2/13/17   Benito Khalil MD   calcium carbonate-vitamin D (CALTRATE) 600-400 MG-UNIT TABS per tab Take 1 tablet by mouth 2 times daily    Historical Provider, MD   Alcohol Swabs ([facility] ALCOHOL PREP SWABS) 70 % PADS by Does not apply route. 5/2/13   Xochitl Stevens MD       REVIEW OF SYSTEMS    (2-9 systems for level 4, 10 or more for level 5)      Review of Systems   Constitutional: Negative for chills and fever. HENT: Negative. Eyes: Negative for photophobia and visual disturbance. Respiratory: Negative for shortness of breath. Cardiovascular: Negative for chest pain, palpitations and leg swelling.    Gastrointestinal: encounter of 03/08/18   POCT troponin   Result Value Ref Range    POC Troponin I 0.00 0.00 - 0.10 ng/mL    POC Troponin Interp       The Troponin-I (POC) results cannot be compared to the Troponin-T results. IMPRESSION: 45-year-old female presenting with acute on chronic right shoulder pain, no history of trauma or injury. Pain worse with movement of the right upper extremity. On physical exam patient appears well-groomed, no significant tenderness over the point prominence shoulder, no edema, overlying skin abnormality. Mild hypertonic trapezius on right with tenderness to palpation. Her lungs are clear to auscultation bilaterally, patient is afebrile, is not tachycardic or tachypneic. Patient neurovascular intact right upper extremity, with normal  strength, capillary refill normal.  Neck with full range of motion, no midline tenderness. Patient not acutely dizzy, movement of position and head without reproduction of dizziness. RADIOLOGY:  Xr Cervical Spine (2-3 Views)    Result Date: 3/8/2018  EXAMINATION: FOUR VIEWS OF THE CERVICAL SPINE, 3/8/2018 5:46 pm COMPARISON: None HISTORY: ORDERING SYSTEM PROVIDED HISTORY: Neck pain, right sided TECHNOLOGIST PROVIDED HISTORY: Reason for exam:->Neck pain, right sided FINDINGS: No prevertebral soft tissue swelling. No subluxation. Moderate-severe multilevel degenerative changes are seen. Cervical spine degenerative changes. EKG  EKG Interpretation    Interpreted by me    Rhythm: normal sinus   Rate: normal  Axis: normal  Ectopy: none  Conduction: normal  ST Segments: no acute change  T Waves: no acute change    Clinical Impression: no acute changes     All EKG's are interpreted by the Emergency Department Physician who either signs or Co-signs this chart in the absence of a cardiologist.    EMERGENCY DEPARTMENT COURSE:  7:11 PM patient was updated on results of lab work and imaging prior to discharge.   Patient with severe osteoarthritis in

## 2018-03-08 NOTE — PROGRESS NOTES
 Neck strain     Obstructive sleep apnea     on CPAP    Osteoarthritis     DJD of knees    Pain     bilateral shoulders, Lt hip    Shoulder strain     History of bilateral shoulder strain    Systemic hypertension     TIA (transient ischemic attack)     Type II or unspecified type diabetes mellitus without mention of complication, not stated as uncontrolled     Vitamin D deficiency     Wears dentures     upper and lower dentures    Wears glasses        Past Surgical History:   Procedure Laterality Date    COLONOSCOPY  10/23/15    per Dr. Urmila Carmichael  9/21/15    empi select       Allergies   Allergen Reactions    Nsaids Other (See Comments)     Chronic kidney disease    Proair Respiclick [Albuterol Sulfate]          Current Outpatient Prescriptions:     ferrous sulfate 325 (65 Fe) MG tablet, TAKE 1 TABLET BY MOUTH DAILY IN THE EVENING, Disp: 28 tablet, Rfl: 11    aspirin 325 MG EC tablet, TAKE 1 TABLET BY MOUTH DAILY IN THE EVENING, Disp: 28 tablet, Rfl: 11    omeprazole (PRILOSEC) 40 MG delayed release capsule, TAKE 1 CAPSULE BY MOUTH DAILY IN THE MORNING, Disp: 28 capsule, Rfl: 11    lisinopril (PRINIVIL;ZESTRIL) 20 MG tablet, TAKE 1 TABLET BY MOUTH DAILY IN THE MORNING, Disp: 28 tablet, Rfl: 11    amLODIPine (NORVASC) 10 MG tablet, TAKE 1 TABLET BY MOUTH DAILY IN THE MORNING, Disp: 28 tablet, Rfl: 11    allopurinol (ZYLOPRIM) 100 MG tablet, TAKE 1 TABLET BY MOUTH ATBEDTIME, Disp: 28 tablet, Rfl: 11    oxyCODONE (ROXICODONE) 5 MG immediate release tablet, Take 1 tablet by mouth every 8 hours as needed for Pain for up to 30 days. , Disp: 90 tablet, Rfl: 0    furosemide (LASIX) 20 MG tablet, TAKE 1 TABLET EVERY MORNING, Disp: 28 tablet, Rfl: 5    carvedilol (COREG) 12.5 MG tablet, TAKE 1 TABLET TWICE DAILY(AM/PM), Disp: 56 tablet, Rfl: 5    gabapentin (NEURONTIN) 600 MG tablet, TAKE 1 TABLET 3 TIMES A DAY (AM PM & HS), Disp: 84 tablet, Rfl: 5    glimepiride (AMARYL) 2 MG tablet, shortness of breath. Musculoskeletal: Positive for back pain, joint pain, muscle weakness, myalgias and stiffness. Gastrointestinal: Negative for constipation. Neurological: Positive for dizziness and numbness. Physical Exam:  BP (!) 177/84   Pulse 74   Temp 98.2 °F (36.8 °C) (Oral)   Resp 18     Physical Exam   Constitutional: She is oriented to person, place, and time and well-developed, well-nourished, and in no distress. Cardiovascular: Normal rate. Pulmonary/Chest: Effort normal.   Musculoskeletal:        Right shoulder: She exhibits decreased range of motion and tenderness. Left shoulder: She exhibits decreased range of motion. Cervical back: She exhibits decreased range of motion and tenderness. Back:         Arms:  Neurological: She is alert and oriented to person, place, and time. Gait normal.   Skin: Skin is warm and dry. Psychiatric: Affect normal.       Record/Diagnostics Review:    Last gómez Dec  and was appropriate  XR shoulders 2017 -      Right shoulder:     1. Severe degenerative changes and remodeling of the right glenohumeral  joint.  1.1 cm possible large chronically fractured osteophyte versus loose  body projecting over the axillary pouch.  Diffuse osteopenia. 2. No acute fracture or gross dislocation.     Left shoulder:     1. Severe degenerative changes and remodeling of the left glenohumeral joint. Diffuse osteopenia. 2. No acute fracture or gross dislocation.     XR knees 2017 -      Right knee:     1. Tricompartmental osteoarthrosis.  Severe narrowing of the medial  compartment.  Diffuse osteopenia.  Probable underlying CPPD. 2. No acute osseous abnormality.     Left knee:     1. Small joint effusion. 2. Tricompartmental osteoarthrosis with moderate to severe narrowing of the  medial compartment.  Diffuse osteopenia.  Suspected CPPD.   3. No acute osseous abnormality.          Assessment:  Problem List Items Addressed This Visit     Primary

## 2018-03-26 ENCOUNTER — OFFICE VISIT (OUTPATIENT)
Dept: INTERNAL MEDICINE | Age: 73
End: 2018-03-26
Payer: MEDICAID

## 2018-03-26 VITALS
HEART RATE: 79 BPM | WEIGHT: 286 LBS | HEIGHT: 59 IN | BODY MASS INDEX: 57.66 KG/M2 | OXYGEN SATURATION: 93 % | DIASTOLIC BLOOD PRESSURE: 97 MMHG | SYSTOLIC BLOOD PRESSURE: 167 MMHG

## 2018-03-26 DIAGNOSIS — Z23 NEED FOR PROPHYLACTIC VACCINATION AND INOCULATION AGAINST VARICELLA: ICD-10-CM

## 2018-03-26 DIAGNOSIS — Z91.81 AT HIGH RISK FOR FALLS: Primary | ICD-10-CM

## 2018-03-26 DIAGNOSIS — E78.5 HYPERLIPIDEMIA, UNSPECIFIED HYPERLIPIDEMIA TYPE: ICD-10-CM

## 2018-03-26 DIAGNOSIS — G47.33 OBSTRUCTIVE SLEEP APNEA: ICD-10-CM

## 2018-03-26 DIAGNOSIS — E11.9 TYPE 2 DIABETES MELLITUS WITHOUT COMPLICATION, WITHOUT LONG-TERM CURRENT USE OF INSULIN (HCC): ICD-10-CM

## 2018-03-26 DIAGNOSIS — Z13.220 SCREENING FOR HYPERLIPIDEMIA: ICD-10-CM

## 2018-03-26 DIAGNOSIS — Z12.31 ENCOUNTER FOR SCREENING MAMMOGRAM FOR BREAST CANCER: ICD-10-CM

## 2018-03-26 DIAGNOSIS — H81.10 BENIGN PAROXYSMAL POSITIONAL VERTIGO, UNSPECIFIED LATERALITY: ICD-10-CM

## 2018-03-26 DIAGNOSIS — I10 ESSENTIAL HYPERTENSION: ICD-10-CM

## 2018-03-26 DIAGNOSIS — S16.1XXS STRAIN OF NECK MUSCLE, SEQUELA: ICD-10-CM

## 2018-03-26 LAB — HBA1C MFR BLD: 7.2 %

## 2018-03-26 PROCEDURE — 3045F PR MOST RECENT HEMOGLOBIN A1C LEVEL 7.0-9.0%: CPT | Performed by: INTERNAL MEDICINE

## 2018-03-26 PROCEDURE — G8427 DOCREV CUR MEDS BY ELIG CLIN: HCPCS | Performed by: INTERNAL MEDICINE

## 2018-03-26 PROCEDURE — 1036F TOBACCO NON-USER: CPT | Performed by: INTERNAL MEDICINE

## 2018-03-26 PROCEDURE — 99214 OFFICE O/P EST MOD 30 MIN: CPT | Performed by: INTERNAL MEDICINE

## 2018-03-26 PROCEDURE — 1090F PRES/ABSN URINE INCON ASSESS: CPT | Performed by: INTERNAL MEDICINE

## 2018-03-26 PROCEDURE — 1123F ACP DISCUSS/DSCN MKR DOCD: CPT | Performed by: INTERNAL MEDICINE

## 2018-03-26 PROCEDURE — 4040F PNEUMOC VAC/ADMIN/RCVD: CPT | Performed by: INTERNAL MEDICINE

## 2018-03-26 PROCEDURE — G8417 CALC BMI ABV UP PARAM F/U: HCPCS | Performed by: INTERNAL MEDICINE

## 2018-03-26 PROCEDURE — G8399 PT W/DXA RESULTS DOCUMENT: HCPCS | Performed by: INTERNAL MEDICINE

## 2018-03-26 PROCEDURE — 83036 HEMOGLOBIN GLYCOSYLATED A1C: CPT | Performed by: INTERNAL MEDICINE

## 2018-03-26 PROCEDURE — 3014F SCREEN MAMMO DOC REV: CPT | Performed by: INTERNAL MEDICINE

## 2018-03-26 PROCEDURE — G8484 FLU IMMUNIZE NO ADMIN: HCPCS | Performed by: INTERNAL MEDICINE

## 2018-03-26 PROCEDURE — 3017F COLORECTAL CA SCREEN DOC REV: CPT | Performed by: INTERNAL MEDICINE

## 2018-03-26 RX ORDER — TRAMADOL HYDROCHLORIDE 50 MG/1
50 TABLET ORAL NIGHTLY PRN
Qty: 10 TABLET | Refills: 0 | Status: SHIPPED | OUTPATIENT
Start: 2018-03-26 | End: 2018-04-05

## 2018-03-26 ASSESSMENT — PATIENT HEALTH QUESTIONNAIRE - PHQ9
SUM OF ALL RESPONSES TO PHQ QUESTIONS 1-9: 0
1. LITTLE INTEREST OR PLEASURE IN DOING THINGS: 0
SUM OF ALL RESPONSES TO PHQ QUESTIONS 1-9: 0
SUM OF ALL RESPONSES TO PHQ9 QUESTIONS 1 & 2: 0
1. LITTLE INTEREST OR PLEASURE IN DOING THINGS: 0
1. LITTLE INTEREST OR PLEASURE IN DOING THINGS: 0
SUM OF ALL RESPONSES TO PHQ9 QUESTIONS 1 & 2: 0
2. FEELING DOWN, DEPRESSED OR HOPELESS: 0
SUM OF ALL RESPONSES TO PHQ9 QUESTIONS 1 & 2: 0
SUM OF ALL RESPONSES TO PHQ QUESTIONS 1-9: 0
1. LITTLE INTEREST OR PLEASURE IN DOING THINGS: 0
SUM OF ALL RESPONSES TO PHQ9 QUESTIONS 1 & 2: 0
2. FEELING DOWN, DEPRESSED OR HOPELESS: 0
SUM OF ALL RESPONSES TO PHQ QUESTIONS 1-9: 0

## 2018-03-26 ASSESSMENT — ENCOUNTER SYMPTOMS
SHORTNESS OF BREATH: 1
GASTROINTESTINAL NEGATIVE: 1
ALLERGIC/IMMUNOLOGIC NEGATIVE: 1
BACK PAIN: 1

## 2018-03-26 NOTE — PROGRESS NOTES
medication as prescribed and following a low salt diet    Hyperlipidemia- takes pravastatin and is denying any myalgia or gi upset    Gout- no flare up- takes allopurinol    Asthma- controlled on inhaler therapy    Morbid obesity ([address]- unchanged    Vitamin D deficiency-     GERD (gastroesophageal reflux disease)- takes prilosec and no symptoms reported. She has not ever attempted to wean off it    TIA (transient ischemic attack) on ASA and statin therapy    Chronic kidney disease    Anemia, iron deficiency    DJD (degenerative joint disease) of knee    Primary osteoarthritis of both shoulders    Pain of both shoulder joints    Primary osteoarthritis involving multiple joints    Pain in joint, lower leg    DM2 (diabetes mellitus, type 2) ([address]- controlled on glimiperide    Adjustment disorder with mixed anxiety and depressed mood    Bilateral leg edema    Extensor tenosynovitis of right wrist    Carpal tunnel syndrome on right    Medication monitoring encounter    Primary osteoarthritis of both knees       Patient's allergies, medications, past medical, surgical, social and family histories were reviewed and updated as appropriate. ALLERGIES      Allergies   Allergen Reactions    Nsaids Other (See Comments)     Chronic kidney disease    Proair Respiclick [Albuterol Sulfate]          MEDICATIONS:      Current Outpatient Prescriptions   Medication Sig Dispense Refill    oxyCODONE (ROXICODONE) 5 MG immediate release tablet Take 1 tablet by mouth every 8 hours as needed for Pain for up to 30 days.  90 tablet 0    ferrous sulfate 325 (65 Fe) MG tablet TAKE 1 TABLET BY MOUTH DAILY IN THE EVENING 28 tablet 11    aspirin 325 MG EC tablet TAKE 1 TABLET BY MOUTH DAILY IN THE EVENING 28 tablet 11    omeprazole (PRILOSEC) 40 MG delayed release capsule TAKE 1 CAPSULE BY MOUTH DAILY IN THE MORNING 28 capsule 11    lisinopril (PRINIVIL;ZESTRIL) 20 MG tablet TAKE 1 TABLET BY MOUTH DAILY IN THE MORNING Placed This Encounter   Medications    zoster recombinant adjuvanted vaccine (SHINGRIX) 50 MCG SUSR injection     Sig: Inject 0.5 mLs into the muscle once for 1 dose     Dispense:  1 each     Refill:  0    traMADol (ULTRAM) 50 MG tablet     Sig: Take 1 tablet by mouth nightly as needed for Pain for up to 10 days. Dispense:  10 tablet     Refill:  0          Completed Refills               Requested Prescriptions     Signed Prescriptions Disp Refills    zoster recombinant adjuvanted vaccine (SHINGRIX) 50 MCG SUSR injection 1 each 0     Sig: Inject 0.5 mLs into the muscle once for 1 dose    traMADol (ULTRAM) 50 MG tablet 10 tablet 0     Sig: Take 1 tablet by mouth nightly as needed for Pain for up to 10 days. 5. Patient given educational materials - see patient instructions    6. Was a self-tracking handout given in paper form or via [employer]? Yes  If yes, see orders or list here. Orders Placed This Encounter   Procedures    BEATRIZ Digital Screen Bilateral [id number]     Standing Status:   Future     Standing Expiration Date:   3/26/2019     Order Specific Question:   Reason for exam:     Answer:   screening    Lipid, Fasting     Standing Status:   Future     Standing Expiration Date:   3/26/2019    Comprehensive Metabolic Panel     Please get this labwork done before your next visit. Standing Status:   Future     Standing Expiration Date:   3/25/2019    POCT glycosylated hemoglobin (Hb A1C)       Return in about 2 weeks (around 4/9/2018). Patient voiced understanding and agreed to treatment plan. This note is created with the assistance of a speech-recognition program. While intending to generate a document that actually reflects the content of the visit, the document can still have some mistakes which may not have been identified and corrected by editing.     Dr Venkat Dumont MD, [address] [place] [address]  Associate , Department of Internal Medicine  [address] [place] [address]  Attending

## 2018-03-26 NOTE — PATIENT INSTRUCTIONS
Patient Education        High Blood Pressure: Care Instructions  Your Care Instructions    If your blood pressure is usually above 140/90, you have high blood pressure, or hypertension. That means the top number is 140 or higher or the bottom number is 90 or higher, or both. Despite what a lot of people think, high blood pressure usually doesn't cause headaches or make you feel dizzy or lightheaded. It usually has no symptoms. But it does increase your risk for heart attack, stroke, and kidney or eye damage. The higher your blood pressure, the more your risk increases. Your doctor will give you a goal for your blood pressure. Your goal will be based on your health and your age. An example of a goal is to keep your blood pressure below 140/90. Lifestyle changes, such as eating healthy and being active, are always important to help lower blood pressure. You might also take medicine to reach your blood pressure goal.  Follow-up care is a key part of your treatment and safety. Be sure to make and go to all appointments, and call your doctor if you are having problems. It's also a good idea to know your test results and keep a list of the medicines you take. How can you care for yourself at home? Medical treatment  · If you stop taking your medicine, your blood pressure will go back up. You may take one or more types of medicine to lower your blood pressure. Be safe with medicines. Take your medicine exactly as prescribed. Call your doctor if you think you are having a problem with your medicine. · Talk to your doctor before you start taking aspirin every day. Aspirin can help certain people lower their risk of a heart attack or stroke. But taking aspirin isn't right for everyone, because it can cause serious bleeding. · See your doctor regularly. You may need to see the doctor more often at first or until your blood pressure comes down.   · If you are taking blood pressure medicine, talk to your doctor before you take decongestants or anti-inflammatory medicine, such as ibuprofen. Some of these medicines can raise blood pressure. · Learn how to check your blood pressure at home. Lifestyle changes  · Stay at a healthy weight. This is especially important if you put on weight around the waist. Losing even 10 pounds can help you lower your blood pressure. · If your doctor recommends it, get more exercise. Walking is a good choice. Bit by bit, increase the amount you walk every day. Try for at least 30 minutes on most days of the week. You also may want to swim, bike, or do other activities. · Avoid or limit alcohol. Talk to your doctor about whether you can drink any alcohol. · Try to limit how much sodium you eat to less than 2,300 milligrams (mg) a day. Your doctor may ask you to try to eat less than 1,500 mg a day. · Eat plenty of fruits (such as bananas and oranges), vegetables, legumes, whole grains, and low-fat dairy products. · Lower the amount of saturated fat in your diet. Saturated fat is found in animal products such as milk, cheese, and meat. Limiting these foods may help you lose weight and also lower your risk for heart disease. · Do not smoke. Smoking increases your risk for heart attack and stroke. If you need help quitting, talk to your doctor about stop-smoking programs and medicines. These can increase your chances of quitting for good. When should you call for help? Call 911 anytime you think you may need emergency care. This may mean having symptoms that suggest that your blood pressure is causing a serious heart or blood vessel problem. Your blood pressure may be over 180/110. ? For example, call 911 if:  ? · You have symptoms of a heart attack. These may include:  ¨ Chest pain or pressure, or a strange feeling in the chest.  ¨ Sweating. ¨ Shortness of breath. ¨ Nausea or vomiting.   ¨ Pain, pressure, or a strange feeling in the back, neck, jaw, or upper belly or in one or both shoulders or information. Patient Education        Vertigo: Care Instructions  Your Care Instructions    Vertigo is the feeling that you or your surroundings are moving when there is no actual movement. It is often described as a feeling of spinning, whirling, falling, or tilting. Vertigo may make you vomit or feel nauseated. You may have trouble standing or walking and may lose your balance. Vertigo is often related to an inner ear problem, but it can have other more serious causes. If vertigo continues, you may need more tests to find its cause. Follow-up care is a key part of your treatment and safety. Be sure to make and go to all appointments, and call your doctor if you are having problems. It's also a good idea to know your test results and keep a list of the medicines you take. How can you care for yourself at home? · Do not lie flat on your back. Prop yourself up slightly. This may reduce the spinning feeling. Keep your eyes open. · Move slowly so that you do not fall. · If your doctor recommends medicine, take it exactly as directed. · Do not drive while you are having vertigo. Certain exercises, called Yin-Daroff exercises, can help decrease vertigo. To do Yin-Daroff exercises:  · Sit on the edge of a bed or sofa and quickly lie down on the side that causes the worst vertigo. Lie on your side with your ear down. · Stay in this position for at least 30 seconds or until the vertigo goes away. · Sit up. If this causes vertigo, wait for it to stop. · Repeat the procedure on the other side. · Repeat this 10 times. Do these exercises 2 times a day until the vertigo is gone. When should you call for help? Call 911 anytime you think you may need emergency care. For example, call if:  ? · You passed out (lost consciousness). ? · You have symptoms of a stroke.  These may include:  ¨ Sudden numbness, tingling, weakness, or loss of movement in your face, arm, or leg, especially on only one side of your [email or web address] and sign in to your [employer] account. Enter  in the [facility] box to learn more about \"Benign Paroxysmal Positional Vertigo (BPPV): Care Instructions. \"     If you do not have an account, please click on the \"Sign Up Now\" link. Current as of: May 12, 2017  Content Version: 11.5  © 5987-9073 [employer]. Care instructions adapted under license by [facility] ([facility]). If you have questions about a medical condition or this instruction, always ask your healthcare professional. [place] [address] any warranty or liability for your use of this information. Patient Education        Epley Maneuver for Vertigo: Exercises  Your Care Instructions  The Epley Maneuver is a series of movements your doctor may use to treat your vertigo. Here are the steps for the exercises. Your doctor or physical therapist will guide you through the movements. A single 10- to 15-minute session often is all that's needed. Crystal debris (canaliths) cause the vertigo. When your head is moved into different positions, the debris moves freely. This may cause your symptoms to stop. How to do the exercises  Step 1    1. You will sit on the doctor's exam table. Your legs will be out in front of you. The doctor or physical therapist will turn your head so that it is skilled nursing between looking straight ahead and looking to the side that causes the worst vertigo. 2. Without changing your head position, he or she will guide you back quickly. Your shoulders will be on the table. Your head will hang over the edge of the table. At this point, the side of your head that is causing the worst vertigo will face the floor. You'll stay in this position for 30 seconds or until your symptoms stop. Step 2    1. Then, the doctor or physical therapist will turn your head to the other side. You don't need to lift your head. The other side of your head will face the floor.  You will stay in this position for 30 seconds or until your symptoms stop. Step 3    1. The doctor or physical therapist will help you roll your body in the same direction that your head is facing. You will lie on your side. (For example, if you are looking to your right, you will roll onto your right side.) The side that causes the worst symptoms should be facing up. You'll stay in this position for another 30 seconds or until your symptoms stop. Step 4    1. The doctor or physical therapist will then help you to sit back up. Your legs will hang off the table on the same side that you were facing. Follow-up care is a key part of your treatment and safety. Be sure to make and go to all appointments, and call your doctor if you are having problems. It's also a good idea to know your test results and keep a list of the medicines you take. Where can you learn more? Go to https://[email or web address].[email or web address] org and sign in to your [employer] account. Enter U288 in the [employer] box to learn more about \"Epley Maneuver for Vertigo: Exercises. \"     If you do not have an account, please click on the \"Sign Up Now\" link. Current as of: May 12, 2017  Content Version: 11.5  © 4646-1700 [employer]. Care instructions adapted under license by [facility] ([facility]). If you have questions about a medical condition or this instruction, always ask your healthcare professional. [place] [address] any warranty or liability for your use of this information. Patient Education        Neck Strain or Sprain: Rehab Exercises  Your Care Instructions  Here are some examples of typical rehabilitation exercises for your condition. Start each exercise slowly. Ease off the exercise if you start to have pain. Your doctor or physical therapist will tell you when you can start these exercises and which ones will work best for you. How to do the exercises  Neck rotation    3.  Sit in a firm chair, or stand up

## 2018-04-09 ENCOUNTER — HOSPITAL ENCOUNTER (OUTPATIENT)
Dept: PAIN MANAGEMENT | Age: 73
Discharge: HOME | End: 2018-04-09
Payer: MEDICAID

## 2018-04-09 ENCOUNTER — HOSPITAL ENCOUNTER (OUTPATIENT)
Age: 73
Discharge: HOME | End: 2018-04-09
Payer: MEDICAID

## 2018-04-09 VITALS
DIASTOLIC BLOOD PRESSURE: 87 MMHG | TEMPERATURE: 97.6 F | SYSTOLIC BLOOD PRESSURE: 147 MMHG | HEIGHT: 59 IN | WEIGHT: 286 LBS | BODY MASS INDEX: 57.66 KG/M2 | RESPIRATION RATE: 18 BRPM | HEART RATE: 75 BPM

## 2018-04-09 DIAGNOSIS — Z13.220 SCREENING FOR HYPERLIPIDEMIA: ICD-10-CM

## 2018-04-09 DIAGNOSIS — Z51.81 MEDICATION MONITORING ENCOUNTER: ICD-10-CM

## 2018-04-09 DIAGNOSIS — I10 ESSENTIAL HYPERTENSION: ICD-10-CM

## 2018-04-09 DIAGNOSIS — M19.011 PRIMARY OSTEOARTHRITIS OF BOTH SHOULDERS: ICD-10-CM

## 2018-04-09 DIAGNOSIS — M17.0 PRIMARY OSTEOARTHRITIS OF BOTH KNEES: ICD-10-CM

## 2018-04-09 DIAGNOSIS — M19.012 PRIMARY OSTEOARTHRITIS OF BOTH SHOULDERS: ICD-10-CM

## 2018-04-09 DIAGNOSIS — E11.9 TYPE 2 DIABETES MELLITUS WITHOUT COMPLICATION, WITHOUT LONG-TERM CURRENT USE OF INSULIN (HCC): ICD-10-CM

## 2018-04-09 DIAGNOSIS — M15.9 PRIMARY OSTEOARTHRITIS INVOLVING MULTIPLE JOINTS: Primary | ICD-10-CM

## 2018-04-09 LAB
ALBUMIN SERPL-MCNC: 4.1 G/DL (ref 3.5–5.2)
ALBUMIN/GLOBULIN RATIO: 1.1 (ref 1–2.5)
ALP BLD-CCNC: 134 U/L (ref 35–104)
ALT SERPL-CCNC: 11 U/L (ref 5–33)
ANION GAP SERPL CALCULATED.3IONS-SCNC: 10 MMOL/L (ref 9–17)
AST SERPL-CCNC: 15 U/L
BILIRUB SERPL-MCNC: 0.42 MG/DL (ref 0.3–1.2)
BUN BLDV-MCNC: 17 MG/DL (ref 8–23)
BUN/CREAT BLD: ABNORMAL (ref 9–20)
CALCIUM SERPL-MCNC: 9.6 MG/DL (ref 8.6–10.4)
CHLORIDE BLD-SCNC: 102 MMOL/L (ref 98–107)
CHOLESTEROL, FASTING: 196 MG/DL
CHOLESTEROL/HDL RATIO: 2.4
CO2: 27 MMOL/L (ref 20–31)
CREAT SERPL-MCNC: 0.95 MG/DL (ref 0.5–0.9)
GFR AFRICAN AMERICAN: >60 ML/MIN
GFR NON-AFRICAN AMERICAN: 58 ML/MIN
GFR SERPL CREATININE-BSD FRML MDRD: ABNORMAL ML/MIN/{1.73_M2}
GFR SERPL CREATININE-BSD FRML MDRD: ABNORMAL ML/MIN/{1.73_M2}
GLUCOSE BLD-MCNC: 184 MG/DL (ref 70–99)
HDLC SERPL-MCNC: 82 MG/DL
LDL CHOLESTEROL: 97 MG/DL (ref 0–130)
POTASSIUM SERPL-SCNC: 4.1 MMOL/L (ref 3.7–5.3)
SODIUM BLD-SCNC: 139 MMOL/L (ref 135–144)
TOTAL PROTEIN: 7.7 G/DL (ref 6.4–8.3)
TRIGLYCERIDE, FASTING: 83 MG/DL
VLDLC SERPL CALC-MCNC: NORMAL MG/DL (ref 1–30)

## 2018-04-09 PROCEDURE — 36415 COLL VENOUS BLD VENIPUNCTURE: CPT

## 2018-04-09 PROCEDURE — 99214 OFFICE O/P EST MOD 30 MIN: CPT

## 2018-04-09 PROCEDURE — 99214 OFFICE O/P EST MOD 30 MIN: CPT | Performed by: NURSE PRACTITIONER

## 2018-04-09 PROCEDURE — 80053 COMPREHEN METABOLIC PANEL: CPT

## 2018-04-09 PROCEDURE — 80061 LIPID PANEL: CPT

## 2018-04-09 RX ORDER — OXYCODONE HYDROCHLORIDE 5 MG/1
5 TABLET ORAL EVERY 8 HOURS PRN
Qty: 90 TABLET | Refills: 0 | Status: SHIPPED | OUTPATIENT
Start: 2018-04-13 | End: 2018-05-07 | Stop reason: SDUPTHER

## 2018-04-09 ASSESSMENT — ENCOUNTER SYMPTOMS
SHORTNESS OF BREATH: 0
BACK PAIN: 1
COUGH: 0
CONSTIPATION: 0

## 2018-04-23 ENCOUNTER — HOSPITAL ENCOUNTER (OUTPATIENT)
Dept: MAMMOGRAPHY | Age: 73
Discharge: HOME | End: 2018-04-25
Payer: MEDICAID

## 2018-04-23 DIAGNOSIS — Z12.31 ENCOUNTER FOR SCREENING MAMMOGRAM FOR BREAST CANCER: ICD-10-CM

## 2018-04-23 PROCEDURE — 77063 BREAST TOMOSYNTHESIS BI: CPT

## 2018-04-26 ENCOUNTER — OFFICE VISIT (OUTPATIENT)
Dept: INTERNAL MEDICINE | Age: 73
End: 2018-04-26
Payer: MEDICAID

## 2018-04-26 VITALS
OXYGEN SATURATION: 94 % | SYSTOLIC BLOOD PRESSURE: 154 MMHG | DIASTOLIC BLOOD PRESSURE: 84 MMHG | BODY MASS INDEX: 57.16 KG/M2 | HEART RATE: 66 BPM | WEIGHT: 283 LBS

## 2018-04-26 DIAGNOSIS — E66.01 MORBID OBESITY (HCC): ICD-10-CM

## 2018-04-26 DIAGNOSIS — I10 ESSENTIAL HYPERTENSION: ICD-10-CM

## 2018-04-26 DIAGNOSIS — E11.9 TYPE 2 DIABETES MELLITUS WITHOUT COMPLICATION, WITHOUT LONG-TERM CURRENT USE OF INSULIN (HCC): ICD-10-CM

## 2018-04-26 DIAGNOSIS — J45.20 MILD INTERMITTENT ASTHMA, UNSPECIFIED WHETHER COMPLICATED: ICD-10-CM

## 2018-04-26 DIAGNOSIS — Z23 NEED FOR PROPHYLACTIC VACCINATION AND INOCULATION AGAINST VARICELLA: Primary | ICD-10-CM

## 2018-04-26 DIAGNOSIS — G47.33 OBSTRUCTIVE SLEEP APNEA: ICD-10-CM

## 2018-04-26 PROCEDURE — G8399 PT W/DXA RESULTS DOCUMENT: HCPCS | Performed by: INTERNAL MEDICINE

## 2018-04-26 PROCEDURE — G8427 DOCREV CUR MEDS BY ELIG CLIN: HCPCS | Performed by: INTERNAL MEDICINE

## 2018-04-26 PROCEDURE — 1036F TOBACCO NON-USER: CPT | Performed by: INTERNAL MEDICINE

## 2018-04-26 PROCEDURE — 3017F COLORECTAL CA SCREEN DOC REV: CPT | Performed by: INTERNAL MEDICINE

## 2018-04-26 PROCEDURE — G8417 CALC BMI ABV UP PARAM F/U: HCPCS | Performed by: INTERNAL MEDICINE

## 2018-04-26 PROCEDURE — 2022F DILAT RTA XM EVC RTNOPTHY: CPT | Performed by: INTERNAL MEDICINE

## 2018-04-26 PROCEDURE — 1090F PRES/ABSN URINE INCON ASSESS: CPT | Performed by: INTERNAL MEDICINE

## 2018-04-26 PROCEDURE — 4040F PNEUMOC VAC/ADMIN/RCVD: CPT | Performed by: INTERNAL MEDICINE

## 2018-04-26 PROCEDURE — 99214 OFFICE O/P EST MOD 30 MIN: CPT | Performed by: INTERNAL MEDICINE

## 2018-04-26 PROCEDURE — 3045F PR MOST RECENT HEMOGLOBIN A1C LEVEL 7.0-9.0%: CPT | Performed by: INTERNAL MEDICINE

## 2018-04-26 PROCEDURE — 1123F ACP DISCUSS/DSCN MKR DOCD: CPT | Performed by: INTERNAL MEDICINE

## 2018-04-26 RX ORDER — CHLORAL HYDRATE 500 MG
1000 CAPSULE ORAL DAILY
Qty: 90 CAPSULE | Refills: 3 | Status: SHIPPED | OUTPATIENT
Start: 2018-04-26 | End: 2018-08-27

## 2018-04-26 RX ORDER — GABAPENTIN 600 MG/1
TABLET ORAL
COMMUNITY
Start: 2018-04-02 | End: 2018-04-26

## 2018-04-26 ASSESSMENT — ENCOUNTER SYMPTOMS
EYES NEGATIVE: 1
ALLERGIC/IMMUNOLOGIC NEGATIVE: 1
RESPIRATORY NEGATIVE: 1
BACK PAIN: 1
GASTROINTESTINAL NEGATIVE: 1

## 2018-05-07 ENCOUNTER — HOSPITAL ENCOUNTER (OUTPATIENT)
Dept: PAIN MANAGEMENT | Age: 73
Discharge: HOME | End: 2018-05-07
Payer: MEDICAID

## 2018-05-07 VITALS
TEMPERATURE: 98 F | SYSTOLIC BLOOD PRESSURE: 154 MMHG | BODY MASS INDEX: 57.05 KG/M2 | RESPIRATION RATE: 18 BRPM | HEART RATE: 74 BPM | WEIGHT: 283 LBS | DIASTOLIC BLOOD PRESSURE: 84 MMHG | HEIGHT: 59 IN

## 2018-05-07 DIAGNOSIS — M17.0 PRIMARY OSTEOARTHRITIS OF BOTH KNEES: Primary | ICD-10-CM

## 2018-05-07 DIAGNOSIS — M19.012 PRIMARY OSTEOARTHRITIS OF BOTH SHOULDERS: ICD-10-CM

## 2018-05-07 DIAGNOSIS — Z51.81 MEDICATION MONITORING ENCOUNTER: ICD-10-CM

## 2018-05-07 DIAGNOSIS — M15.9 PRIMARY OSTEOARTHRITIS INVOLVING MULTIPLE JOINTS: ICD-10-CM

## 2018-05-07 DIAGNOSIS — M19.011 PRIMARY OSTEOARTHRITIS OF BOTH SHOULDERS: ICD-10-CM

## 2018-05-07 PROCEDURE — 99213 OFFICE O/P EST LOW 20 MIN: CPT | Performed by: NURSE PRACTITIONER

## 2018-05-07 PROCEDURE — 99214 OFFICE O/P EST MOD 30 MIN: CPT

## 2018-05-07 RX ORDER — OXYCODONE HYDROCHLORIDE 5 MG/1
5 TABLET ORAL EVERY 8 HOURS PRN
Qty: 87 TABLET | Refills: 0 | Status: SHIPPED | OUTPATIENT
Start: 2018-05-12 | End: 2018-06-21 | Stop reason: SDUPTHER

## 2018-06-01 ENCOUNTER — TELEPHONE (OUTPATIENT)
Dept: INTERNAL MEDICINE | Age: 73
End: 2018-06-01

## 2018-06-21 ENCOUNTER — HOSPITAL ENCOUNTER (OUTPATIENT)
Dept: PAIN MANAGEMENT | Age: 73
Discharge: HOME | End: 2018-06-21
Payer: MEDICAID

## 2018-06-21 VITALS
TEMPERATURE: 97.8 F | HEIGHT: 59 IN | HEART RATE: 77 BPM | BODY MASS INDEX: 57.05 KG/M2 | RESPIRATION RATE: 20 BRPM | WEIGHT: 283 LBS | DIASTOLIC BLOOD PRESSURE: 65 MMHG | SYSTOLIC BLOOD PRESSURE: 137 MMHG

## 2018-06-21 DIAGNOSIS — M19.012 PRIMARY OSTEOARTHRITIS OF BOTH SHOULDERS: Primary | ICD-10-CM

## 2018-06-21 DIAGNOSIS — M17.0 PRIMARY OSTEOARTHRITIS OF BOTH KNEES: ICD-10-CM

## 2018-06-21 DIAGNOSIS — Z51.81 MEDICATION MONITORING ENCOUNTER: ICD-10-CM

## 2018-06-21 DIAGNOSIS — M19.011 PRIMARY OSTEOARTHRITIS OF BOTH SHOULDERS: Primary | ICD-10-CM

## 2018-06-21 PROCEDURE — 99214 OFFICE O/P EST MOD 30 MIN: CPT

## 2018-06-21 PROCEDURE — 99213 OFFICE O/P EST LOW 20 MIN: CPT | Performed by: NURSE PRACTITIONER

## 2018-06-21 RX ORDER — OXYCODONE HYDROCHLORIDE 5 MG/1
5 TABLET ORAL EVERY 8 HOURS PRN
Qty: 87 TABLET | Refills: 0 | Status: SHIPPED | OUTPATIENT
Start: 2018-06-21 | End: 2018-07-17 | Stop reason: SDUPTHER

## 2018-06-21 ASSESSMENT — ENCOUNTER SYMPTOMS
CONSTIPATION: 0
COUGH: 0
SHORTNESS OF BREATH: 0

## 2018-06-25 RX ORDER — CARVEDILOL 12.5 MG/1
TABLET ORAL
Qty: 56 TABLET | Refills: 5 | Status: SHIPPED | OUTPATIENT
Start: 2018-06-25 | End: 2018-12-10 | Stop reason: SDUPTHER

## 2018-06-25 RX ORDER — GLIMEPIRIDE 2 MG/1
TABLET ORAL
Qty: 56 TABLET | Refills: 5 | Status: SHIPPED | OUTPATIENT
Start: 2018-06-25 | End: 2018-12-10 | Stop reason: SDUPTHER

## 2018-06-25 RX ORDER — PRAVASTATIN SODIUM 40 MG
TABLET ORAL
Qty: 28 TABLET | Refills: 5 | Status: SHIPPED | OUTPATIENT
Start: 2018-06-25 | End: 2018-12-10 | Stop reason: SDUPTHER

## 2018-06-25 RX ORDER — FUROSEMIDE 20 MG/1
TABLET ORAL
Qty: 28 TABLET | Refills: 5 | Status: SHIPPED | OUTPATIENT
Start: 2018-06-25 | End: 2018-12-10 | Stop reason: SDUPTHER

## 2018-06-25 RX ORDER — GABAPENTIN 600 MG/1
TABLET ORAL
Qty: 84 TABLET | Refills: 5 | Status: SHIPPED | OUTPATIENT
Start: 2018-06-25 | End: 2018-07-11 | Stop reason: ALTCHOICE

## 2018-07-11 ENCOUNTER — OFFICE VISIT (OUTPATIENT)
Dept: INTERNAL MEDICINE | Age: 73
End: 2018-07-11
Payer: MEDICAID

## 2018-07-11 VITALS
SYSTOLIC BLOOD PRESSURE: 138 MMHG | DIASTOLIC BLOOD PRESSURE: 79 MMHG | HEART RATE: 85 BPM | BODY MASS INDEX: 55.64 KG/M2 | WEIGHT: 276 LBS | HEIGHT: 59 IN | RESPIRATION RATE: 12 BRPM | OXYGEN SATURATION: 96 %

## 2018-07-11 DIAGNOSIS — E11.9 TYPE 2 DIABETES MELLITUS WITHOUT COMPLICATION, WITHOUT LONG-TERM CURRENT USE OF INSULIN (HCC): ICD-10-CM

## 2018-07-11 DIAGNOSIS — R42 POSTURAL DIZZINESS: Primary | ICD-10-CM

## 2018-07-11 DIAGNOSIS — J45.20 MILD INTERMITTENT ASTHMA WITHOUT COMPLICATION: ICD-10-CM

## 2018-07-11 DIAGNOSIS — I10 ESSENTIAL HYPERTENSION: ICD-10-CM

## 2018-07-11 DIAGNOSIS — E66.01 MORBID OBESITY (HCC): ICD-10-CM

## 2018-07-11 DIAGNOSIS — G47.33 OBSTRUCTIVE SLEEP APNEA: ICD-10-CM

## 2018-07-11 LAB — HBA1C MFR BLD: 7.6 %

## 2018-07-11 PROCEDURE — 1090F PRES/ABSN URINE INCON ASSESS: CPT | Performed by: FAMILY MEDICINE

## 2018-07-11 PROCEDURE — G8399 PT W/DXA RESULTS DOCUMENT: HCPCS | Performed by: FAMILY MEDICINE

## 2018-07-11 PROCEDURE — 3017F COLORECTAL CA SCREEN DOC REV: CPT | Performed by: FAMILY MEDICINE

## 2018-07-11 PROCEDURE — 99214 OFFICE O/P EST MOD 30 MIN: CPT | Performed by: FAMILY MEDICINE

## 2018-07-11 PROCEDURE — 93880 EXTRACRANIAL BILAT STUDY: CPT | Performed by: FAMILY MEDICINE

## 2018-07-11 PROCEDURE — 83036 HEMOGLOBIN GLYCOSYLATED A1C: CPT | Performed by: FAMILY MEDICINE

## 2018-07-11 PROCEDURE — 4040F PNEUMOC VAC/ADMIN/RCVD: CPT | Performed by: FAMILY MEDICINE

## 2018-07-11 PROCEDURE — 1101F PT FALLS ASSESS-DOCD LE1/YR: CPT | Performed by: FAMILY MEDICINE

## 2018-07-11 PROCEDURE — G8427 DOCREV CUR MEDS BY ELIG CLIN: HCPCS | Performed by: FAMILY MEDICINE

## 2018-07-11 PROCEDURE — G8417 CALC BMI ABV UP PARAM F/U: HCPCS | Performed by: FAMILY MEDICINE

## 2018-07-11 PROCEDURE — 3045F PR MOST RECENT HEMOGLOBIN A1C LEVEL 7.0-9.0%: CPT | Performed by: FAMILY MEDICINE

## 2018-07-11 PROCEDURE — 2022F DILAT RTA XM EVC RTNOPTHY: CPT | Performed by: FAMILY MEDICINE

## 2018-07-11 PROCEDURE — 1123F ACP DISCUSS/DSCN MKR DOCD: CPT | Performed by: FAMILY MEDICINE

## 2018-07-11 PROCEDURE — 1036F TOBACCO NON-USER: CPT | Performed by: FAMILY MEDICINE

## 2018-07-11 RX ORDER — ALBUTEROL SULFATE 90 UG/1
2 AEROSOL, METERED RESPIRATORY (INHALATION) EVERY 6 HOURS PRN
Qty: 1 INHALER | Refills: 2 | Status: SHIPPED | OUTPATIENT
Start: 2018-07-11 | End: 2020-01-01

## 2018-07-11 ASSESSMENT — ENCOUNTER SYMPTOMS
BACK PAIN: 1
SHORTNESS OF BREATH: 1
CONSTIPATION: 0
ABDOMINAL PAIN: 0
NAUSEA: 1
DIARRHEA: 0

## 2018-07-11 NOTE — PROGRESS NOTES
[facility] PHYSICIANS  [facility] INTERNAL MEDICINE [address]  [address]   [address]  [address]  Dept: [phone]  Dept Fax: [phone]    Anju Xiong is a 72 y.o. female who presents today for her medical conditions/complaints as noted below. Anju Xiong is c/o of Dizziness (Started in May 2018 getting worse)      HPI:     She is here for follow-up of her medical problems. Patient states since May she hasn't been getting episodes of increasing shortness of breath. Also she has been getting dizzy and lightheaded with change of position, especially with sitting down and standing up. She has difficulty in ambulating. No chest pain. Patient states her blood sugars are fluctuating. HbA1c is 7.6. Patient has not been to her pulmonary physician for more than a year. She has not been using her CPAP because she has not been able to find it since she moved. She sees a pain management doctor. Blood pressure is stable. Postural blood pressures as noted. Weight is down about 7 pounds. Nonsmoker since the 90s. Denies caffeinated drinks.           Social History   Substance Use Topics    Smoking status: Former Smoker     Packs/day: 1.00     Years: 30.00     Types: Cigarettes     Quit date: 3/6/1990    Smokeless tobacco: Never Used    Alcohol use 0.0 oz/week      Comment: occassional      Current Outpatient Prescriptions   Medication Sig Dispense Refill    albuterol sulfate HFA (VENTOLIN HFA) 108 (90 Base) MCG/ACT inhaler Inhale 2 puffs into the lungs every 6 hours as needed for Wheezing 1 Inhaler 2    glimepiride (AMARYL) 2 MG tablet TAKE 1 TABLET TWICE DAILY(AM/PM) 56 tablet 5    carvedilol (COREG) 12.5 MG tablet TAKE 1 TABLET TWICE DAILY(AM/PM) 56 tablet 5    pravastatin (PRAVACHOL) 40 MG tablet TAKE 1 TABLET AT BEDTIME 28 tablet 5    furosemide (LASIX) 20 MG tablet TAKE 1 TABLET EVERY MORNING 28 tablet 5    oxyCODONE (ROXICODONE) 5 MG immediate release tablet Take 1 tablet by mouth every 8 hours as needed for Pain for up to 30 days. . 87 tablet 0    Omega-3 Fatty Acids (FISH OIL) 1000 MG CAPS Take 1 capsule by mouth daily 90 capsule 3    ferrous sulfate 325 (65 Fe) MG tablet TAKE 1 TABLET BY MOUTH DAILY IN THE EVENING 28 tablet 11    aspirin 325 MG EC tablet TAKE 1 TABLET BY MOUTH DAILY IN THE EVENING 28 tablet 11    lisinopril (PRINIVIL;ZESTRIL) 20 MG tablet TAKE 1 TABLET BY MOUTH DAILY IN THE MORNING 28 tablet 11    amLODIPine (NORVASC) 10 MG tablet TAKE 1 TABLET BY MOUTH DAILY IN THE MORNING 28 tablet 11    allopurinol (ZYLOPRIM) 100 MG tablet TAKE 1 TABLET BY MOUTH ATBEDTIME 28 tablet 11    Multiple Vitamin (MULTIVITAMIN) tablet TAKE 1 TABLET EVERY MORNING 28 tablet 11    latanoprost (XALATAN) 0.005 % ophthalmic solution 1 drop nightly      ACCU-CHEK GREG PLUS strip TEST BLOOD SUGAR 3 TIMES DAILY AS DIRECTED 100 strip 5    GNP Lancets Super Thin MISC CHECK BLOOD SUGAR 3 TIMESDAILY AS DIRECTED 100 each 5    calcium carbonate-vitamin D (CALTRATE) 600-400 MG-UNIT TABS per tab Take 1 tablet by mouth 2 times daily      Alcohol Swabs (CVS ALCOHOL PREP SWABS) 70 % PADS by Does not apply route. 100 each 11     Current Facility-Administered Medications   Medication Dose Route Frequency Provider Last Rate Last Dose    bupivacaine (PF) (MARCAINE) 0.5 % injection 10 mg  2 mL Intratendinous Once Threasa Bang, DO         Allergies   Allergen Reactions    Nsaids Other (See Comments)     Chronic kidney disease    Proair Respiclick [Albuterol Sulfate]          Subjective:      Review of Systems   Constitutional: Positive for fatigue. HENT: Negative for congestion, hearing loss and tinnitus. Respiratory: Positive for shortness of breath. Cardiovascular: Positive for leg swelling. Gastrointestinal: Positive for nausea. Negative for abdominal pain, constipation and diarrhea. Endocrine: Positive for polydipsia and polyuria.    Genitourinary: Negative for difficulty dizziness  CBC Auto Differential    Comprehensive Metabolic Panel    EKG 12 Lead    ECHO Complete 2D W Doppler W Color    12783 - MD Duplex Scan Extracranial, Renny   2. Type 2 diabetes mellitus without complication, without long-term current use of insulin (HCC)  POCT glycosylated hemoglobin (Hb A1C)   3. Obstructive sleep apnea     4. Essential hypertension     5. Mild intermittent asthma without complication  albuterol sulfate HFA (VENTOLIN HFA) 108 (90 Base) MCG/ACT inhaler   6. Morbid obesity ([address]         Plan:      Return in about 1 month (around 8/11/2018). Orders Placed This Encounter   Procedures    CBC Auto Differential     Standing Status:   Future     Standing Expiration Date:   7/11/2019    Comprehensive Metabolic Panel     Standing Status:   Future     Standing Expiration Date:   7/11/2019    POCT glycosylated hemoglobin (Hb A1C)    EKG 12 Lead     Standing Status:   Future     Standing Expiration Date:   7/11/2019     Order Specific Question:   Reason for Exam?     Answer:   Dizziness     Order Specific Question:   Reason for Exam?     Answer:   Shortness of Breath    ECHO Complete 2D W Doppler W Color     Standing Status:   Future     Standing Expiration Date:   7/11/2019     Order Specific Question:   Reason for exam:     Answer:   Dyspnea on exertion,Postural dizziness. DM2.    46963 - MD Duplex Scan Extracranial, Renny     Orders Placed This Encounter   Medications    albuterol sulfate HFA (VENTOLIN HFA) 108 (90 Base) MCG/ACT inhaler     Sig: Inhale 2 puffs into the lungs every 6 hours as needed for Wheezing     Dispense:  1 Inhaler     Refill:  2     Please have patient call our office for an appointment. Findings and/or pathophysiology discussed with patient. Plan of treatment discussed. Patient's medical problems were discussed with her. Findings were explained. Chart was evaluated. Available lab work and tests results were discussed.   Her medications are

## 2018-07-11 NOTE — PATIENT INSTRUCTIONS
Patient Education        Preventing Falls: Care Instructions  Your Care Instructions    Getting around your home safely can be a challenge if you have injuries or health problems that make it easy for you to fall. Loose rugs and furniture in walkways are among the dangers for many older people who have problems walking or who have poor eyesight. People who have conditions such as arthritis, osteoporosis, or dementia also have to be careful not to fall. You can make your home safer with a few simple measures. Follow-up care is a key part of your treatment and safety. Be sure to make and go to all appointments, and call your doctor if you are having problems. It's also a good idea to know your test results and keep a list of the medicines you take. How can you care for yourself at home? Taking care of yourself  · You may get dizzy if you do not drink enough water. To prevent dehydration, drink plenty of fluids, enough so that your urine is light yellow or clear like water. Choose water and other caffeine-free clear liquids. If you have kidney, heart, or liver disease and have to limit fluids, talk with your doctor before you increase the amount of fluids you drink. · Exercise regularly to improve your strength, muscle tone, and balance. Walk if you can. Swimming may be a good choice if you cannot walk easily. · Have your vision and hearing checked each year or any time you notice a change. If you have trouble seeing and hearing, you might not be able to avoid objects and could lose your balance. · Know the side effects of the medicines you take. Ask your doctor or pharmacist whether the medicines you take can affect your balance. Sleeping pills or sedatives can affect your balance. · Limit the amount of alcohol you drink. Alcohol can impair your balance and other senses. · Ask your doctor whether calluses or corns on your feet need to be removed.  If you wear loose-fitting shoes because of calluses or corns, that will allow you to sit while showering. · Get into a tub or shower by putting the weaker leg in first. Get out of a tub or shower with your strong side first.  · Repair loose toilet seats and consider installing a raised toilet seat to make getting on and off the toilet easier. · Keep your bathroom door unlocked while you are in the shower. Where can you learn more? Go to [email or web address] and sign in to your [employer] account. Enter [phone] [id number] [phone] in the Medefy box to learn more about \"Preventing Falls: Care Instructions. \"     If you do not have an account, please click on the \"Sign Up Now\" link. Current as of: May 12, 2017  Content Version: 11.6  © 2435-2853 [employer]. Care instructions adapted under license by [facility] ([facility]). If you have questions about a medical condition or this instruction, always ask your healthcare professional. [address] any warranty or liability for your use of this information.

## 2018-07-11 NOTE — PROGRESS NOTES
Visit Information    Have you changed or started any medications since your last visit including any over-the-counter medicines, vitamins, or herbal medicines? no   Are you having any side effects from any of your medications? -  no  Have you stopped taking any of your medications? Is so, why? -  no    Have you seen any other physician or provider since your last visit? No  Have you had any other diagnostic tests since your last visit? No  Have you been seen in the emergency room and/or had an admission to a hospital since we last saw you? No  Have you had your routine dental cleaning in the past 6 months? no    Have you activated your 3[employer]der account? If not, what are your barriers?  Yes     Patient Care Team:  Sarah Beth Younger MD as PCP - General (Internal Medicine)  Sarah Beth Younger MD as Referring Physician (Internal Medicine)  Sarah Beth Younger MD as Referring Physician (Internal Medicine)  Binta Sorto MD as Consulting Physician (Pulmonology)    Medical History Review  Past Medical, Family, and Social History reviewed and does contribute to the patient presenting condition    Health Maintenance   Topic Date Due    Diabetic retinal exam  03/01/2017    Diabetic foot exam  07/10/2018    Flu vaccine (1) 01/20/2020 (Originally 9/1/2018)    Pneumococcal low/med risk (1 of 2 - PCV13) 02/20/2020 (Originally 6/21/2012)    Hepatitis C screen  02/20/2018 (Originally 1945)    Diabetic microalbuminuria test  08/14/2018    Shingles Vaccine (2 of 2 - 2 Dose Series) 09/29/2018    A1C test (Diabetic or Prediabetic)  03/26/2019    Lipid screen  04/09/2019    Potassium monitoring  04/09/2019    Creatinine monitoring  04/09/2019    Breast cancer screen  04/23/2020    Colon cancer screen colonoscopy  10/23/2025    DTaP/Tdap/Td vaccine (2 - Td) 04/01/2026    DEXA (modify frequency per FRAX score)  Completed

## 2018-07-17 ENCOUNTER — HOSPITAL ENCOUNTER (OUTPATIENT)
Age: 73
Discharge: HOME | End: 2018-07-17
Payer: MEDICAID

## 2018-07-17 ENCOUNTER — HOSPITAL ENCOUNTER (OUTPATIENT)
Dept: PAIN MANAGEMENT | Age: 73
Discharge: HOME | End: 2018-07-17
Payer: MEDICAID

## 2018-07-17 VITALS
HEIGHT: 59 IN | SYSTOLIC BLOOD PRESSURE: 129 MMHG | TEMPERATURE: 98.6 F | BODY MASS INDEX: 57.45 KG/M2 | WEIGHT: 285 LBS | DIASTOLIC BLOOD PRESSURE: 62 MMHG | HEART RATE: 85 BPM | RESPIRATION RATE: 14 BRPM

## 2018-07-17 DIAGNOSIS — M25.512 PAIN OF BOTH SHOULDER JOINTS: ICD-10-CM

## 2018-07-17 DIAGNOSIS — M19.011 PRIMARY OSTEOARTHRITIS OF BOTH SHOULDERS: ICD-10-CM

## 2018-07-17 DIAGNOSIS — M25.511 PAIN OF BOTH SHOULDER JOINTS: ICD-10-CM

## 2018-07-17 DIAGNOSIS — M17.0 PRIMARY OSTEOARTHRITIS OF BOTH KNEES: ICD-10-CM

## 2018-07-17 DIAGNOSIS — Z51.81 MEDICATION MONITORING ENCOUNTER: Primary | ICD-10-CM

## 2018-07-17 DIAGNOSIS — Z79.891 CHRONIC USE OF OPIATE DRUGS THERAPEUTIC PURPOSES: Chronic | ICD-10-CM

## 2018-07-17 DIAGNOSIS — E66.01 MORBID OBESITY WITH BMI OF 50.0-59.9, ADULT (HCC): Chronic | ICD-10-CM

## 2018-07-17 DIAGNOSIS — M19.012 PRIMARY OSTEOARTHRITIS OF BOTH SHOULDERS: ICD-10-CM

## 2018-07-17 DIAGNOSIS — R42 POSTURAL DIZZINESS: ICD-10-CM

## 2018-07-17 LAB
ABSOLUTE EOS #: 0.3 K/UL (ref 0–0.44)
ABSOLUTE IMMATURE GRANULOCYTE: <0.03 K/UL (ref 0–0.3)
ABSOLUTE LYMPH #: 1.86 K/UL (ref 1.1–3.7)
ABSOLUTE MONO #: 0.44 K/UL (ref 0.1–1.2)
ALBUMIN SERPL-MCNC: 3.8 G/DL (ref 3.5–5.2)
ALBUMIN/GLOBULIN RATIO: 1.2 (ref 1–2.5)
ALP BLD-CCNC: 117 U/L (ref 35–104)
ALT SERPL-CCNC: 9 U/L (ref 5–33)
ANION GAP SERPL CALCULATED.3IONS-SCNC: 12 MMOL/L (ref 9–17)
AST SERPL-CCNC: 15 U/L
BASOPHILS # BLD: 1 % (ref 0–2)
BASOPHILS ABSOLUTE: 0.04 K/UL (ref 0–0.2)
BILIRUB SERPL-MCNC: 0.23 MG/DL (ref 0.3–1.2)
BUN BLDV-MCNC: 32 MG/DL (ref 8–23)
BUN/CREAT BLD: ABNORMAL (ref 9–20)
CALCIUM SERPL-MCNC: 8.8 MG/DL (ref 8.6–10.4)
CHLORIDE BLD-SCNC: 106 MMOL/L (ref 98–107)
CO2: 22 MMOL/L (ref 20–31)
CREAT SERPL-MCNC: 1.63 MG/DL (ref 0.5–0.9)
DIFFERENTIAL TYPE: ABNORMAL
EOSINOPHILS RELATIVE PERCENT: 5 % (ref 1–4)
GFR AFRICAN AMERICAN: 38 ML/MIN
GFR NON-AFRICAN AMERICAN: 31 ML/MIN
GFR SERPL CREATININE-BSD FRML MDRD: ABNORMAL ML/MIN/{1.73_M2}
GFR SERPL CREATININE-BSD FRML MDRD: ABNORMAL ML/MIN/{1.73_M2}
GLUCOSE BLD-MCNC: 163 MG/DL (ref 70–99)
HCT VFR BLD CALC: 35.4 % (ref 36.3–47.1)
HEMOGLOBIN: 11.4 G/DL (ref 11.9–15.1)
IMMATURE GRANULOCYTES: 0 %
LYMPHOCYTES # BLD: 33 % (ref 24–43)
MCH RBC QN AUTO: 30.9 PG (ref 25.2–33.5)
MCHC RBC AUTO-ENTMCNC: 32.2 G/DL (ref 28.4–34.8)
MCV RBC AUTO: 95.9 FL (ref 82.6–102.9)
MONOCYTES # BLD: 8 % (ref 3–12)
NRBC AUTOMATED: 0 PER 100 WBC
PDW BLD-RTO: 14 % (ref 11.8–14.4)
PLATELET # BLD: 194 K/UL (ref 138–453)
PLATELET ESTIMATE: ABNORMAL
PMV BLD AUTO: 11.1 FL (ref 8.1–13.5)
POTASSIUM SERPL-SCNC: 4.8 MMOL/L (ref 3.7–5.3)
RBC # BLD: 3.69 M/UL (ref 3.95–5.11)
RBC # BLD: ABNORMAL 10*6/UL
SEG NEUTROPHILS: 53 % (ref 36–65)
SEGMENTED NEUTROPHILS ABSOLUTE COUNT: 2.93 K/UL (ref 1.5–8.1)
SODIUM BLD-SCNC: 140 MMOL/L (ref 135–144)
TOTAL PROTEIN: 7.1 G/DL (ref 6.4–8.3)
WBC # BLD: 5.6 K/UL (ref 3.5–11.3)
WBC # BLD: ABNORMAL 10*3/UL

## 2018-07-17 PROCEDURE — 99215 OFFICE O/P EST HI 40 MIN: CPT

## 2018-07-17 PROCEDURE — 36415 COLL VENOUS BLD VENIPUNCTURE: CPT

## 2018-07-17 PROCEDURE — 80053 COMPREHEN METABOLIC PANEL: CPT

## 2018-07-17 PROCEDURE — 85025 COMPLETE CBC W/AUTO DIFF WBC: CPT

## 2018-07-17 PROCEDURE — 99214 OFFICE O/P EST MOD 30 MIN: CPT | Performed by: ANESTHESIOLOGY

## 2018-07-17 PROCEDURE — 80307 DRUG TEST PRSMV CHEM ANLYZR: CPT

## 2018-07-17 RX ORDER — OXYCODONE HYDROCHLORIDE 5 MG/1
5 TABLET ORAL EVERY 8 HOURS PRN
Qty: 90 TABLET | Refills: 0 | Status: SHIPPED | OUTPATIENT
Start: 2018-07-20 | End: 2018-08-21 | Stop reason: SDUPTHER

## 2018-07-17 RX ORDER — OXYCODONE HYDROCHLORIDE 5 MG/1
5 CAPSULE ORAL 3 TIMES DAILY PRN
COMMUNITY
End: 2018-08-07 | Stop reason: HOSPADM

## 2018-07-17 ASSESSMENT — ENCOUNTER SYMPTOMS
HEARTBURN: 0
COUGH: 0

## 2018-07-17 ASSESSMENT — PAIN SCALES - GENERAL: PAINLEVEL_OUTOF10: 8

## 2018-07-17 ASSESSMENT — PAIN DESCRIPTION - ORIENTATION: ORIENTATION: LEFT;RIGHT

## 2018-07-17 ASSESSMENT — PAIN DESCRIPTION - LOCATION: LOCATION: KNEE;SHOULDER

## 2018-07-17 ASSESSMENT — PAIN DESCRIPTION - DESCRIPTORS: DESCRIPTORS: ACHING;BURNING;CONSTANT;STABBING

## 2018-07-17 ASSESSMENT — PAIN DESCRIPTION - FREQUENCY: FREQUENCY: CONTINUOUS

## 2018-07-17 ASSESSMENT — PAIN DESCRIPTION - PAIN TYPE: TYPE: CHRONIC PAIN

## 2018-07-17 NOTE — PROGRESS NOTES
[facility]  Patient Pain Assessment  / Follow Up contract update  No att. providers found    Primary Care Physician: Joseluis Weiss MD    Chief complaint:   Chief Complaint   Patient presents with    Knee Pain   . HISTORY OF PRESENT ILLNESS:  Jett Montes is 72 y.o. female with    - Chronic bilateral shoulder pain onset several years ago progressively worsened over years describes the pain as a constant aching throbbing pain sensation with average intensity 7-8/10. Recent imaging if shown severe glenohumeral joint arthritis. In past she had shoulder injection which have been helpful. Knee Pain        Chronic knee pain affecting both knees right equals left onset many years ago symptoms progressively worsened over time diagnosed with tricompartmental knee osteoarthritis in past had knee injections which have been helpful    - Opioid medication management  Currently taking oxycodone 5 mg 3 times a day for pain management, denies any side effect, takes the medication as prescribed, no sign or symptoms of any aberrancy  Patient finds the medication little bit helpful to manage her daily pain but her average pain score is 8/10. Current Pain Assessment  Pain Assessment  Pain Assessment: 0-10  Pain Level: 8  Pain Type: Chronic pain  Pain Location: Knee, Shoulder  Pain Orientation: Left, Right  Pain Descriptors: Aching, Burning, Constant, Stabbing  Pain Frequency: Continuous  Pain Intervention(s): Medication (see eMar), Cold applied, Heat applied, Rest        Associated Symptoms  1 Associated symptoms: weakness  2. Red Flags:    Chills No              Weight Loss :No              Loss of Bladder Control :No              Loss of Bowel Control: No  3. BMI  57.7      Previous management history:  1. Previous diagnostic workup: X-ray of knee 6/2017 and shoulder 6/2017   2.  Previous non interventional treatments tried:                  Physical Therapy: Yes years ago Chiropractic Treatments: No  3. Previous Medications tried:  - NSAID's : Yes  - Neurontin: Yes took off didn't help   - Lyrica :No  - Trycyclic antidepressant: no  - Cymbalta: No  - Opioids (Ultram / Vicodin / Percocet / Morphine / Dilaudid / Oramorph/ Fentanyl etc.):Yes  4. Previous Interventional pain procedures tried: years ago to knees  didn't help   5. Legal Issues related to pain complaint:NA  6. Last UDS :today   7. Was it as anticipated?:No          Past Medical History      Diagnosis Date    Anemia, iron deficiency     Asthma     Carpal tunnel syndrome on right 8/29/2016    Chronic kidney disease     GERD (gastroesophageal reflux disease)     Glucose intolerance (impaired glucose tolerance)     Gout     History of anemia     Normocytic    History of corneal abrasion     Left    History of dyspnea     History of pneumonia     Hyperlipidemia     Hypertension     Morbid obesity ([address]     Neck strain     Obstructive sleep apnea     on CPAP    Osteoarthritis     DJD of knees    Pain     bilateral shoulders, Lt hip    Shoulder strain     History of bilateral shoulder strain    Systemic hypertension     TIA (transient ischemic attack)     Type II or unspecified type diabetes mellitus without mention of complication, not stated as uncontrolled     Vitamin D deficiency     Wears dentures     upper and lower dentures    Wears glasses        Surgical History  Past Surgical History:   Procedure Laterality Date    COLONOSCOPY  10/23/15    per Dr. Geoffrey Kaur  9/21/15    empi select       Medications  Current Outpatient Prescriptions   Medication Sig Dispense Refill    [START ON 7/20/2018] oxyCODONE (ROXICODONE) 5 MG immediate release tablet Take 1 tablet by mouth every 8 hours as needed for Pain for up to 30 days. Lisa Palacio Date: 7/20/18 90 tablet 0    oxyCODONE 5 MG capsule Take 5 mg by mouth 3 times daily as needed for Pain. .      albuterol sulfate HFA (VENTOLIN HFA) 108 (90 Base) MCG/ACT inhaler Inhale 2 puffs into the lungs every 6 hours as needed for Wheezing 1 Inhaler 2    glimepiride (AMARYL) 2 MG tablet TAKE 1 TABLET TWICE DAILY(AM/PM) 56 tablet 5    carvedilol (COREG) 12.5 MG tablet TAKE 1 TABLET TWICE DAILY(AM/PM) 56 tablet 5    pravastatin (PRAVACHOL) 40 MG tablet TAKE 1 TABLET AT BEDTIME 28 tablet 5    furosemide (LASIX) 20 MG tablet TAKE 1 TABLET EVERY MORNING 28 tablet 5    Omega-3 Fatty Acids (FISH OIL) 1000 MG CAPS Take 1 capsule by mouth daily 90 capsule 3    ferrous sulfate 325 (65 Fe) MG tablet TAKE 1 TABLET BY MOUTH DAILY IN THE EVENING 28 tablet 11    aspirin 325 MG EC tablet TAKE 1 TABLET BY MOUTH DAILY IN THE EVENING 28 tablet 11    lisinopril (PRINIVIL;ZESTRIL) 20 MG tablet TAKE 1 TABLET BY MOUTH DAILY IN THE MORNING 28 tablet 11    amLODIPine (NORVASC) 10 MG tablet TAKE 1 TABLET BY MOUTH DAILY IN THE MORNING 28 tablet 11    allopurinol (ZYLOPRIM) 100 MG tablet TAKE 1 TABLET BY MOUTH ATBEDTIME 28 tablet 11    Multiple Vitamin (MULTIVITAMIN) tablet TAKE 1 TABLET EVERY MORNING 28 tablet 11    latanoprost (XALATAN) 0.005 % ophthalmic solution 1 drop nightly      ACCU-CHEK GREG PLUS strip TEST BLOOD SUGAR 3 TIMES DAILY AS DIRECTED 100 strip 5    GNP Lancets Super Thin MISC CHECK BLOOD SUGAR 3 TIMESDAILY AS DIRECTED 100 each 5    calcium carbonate-vitamin D (CALTRATE) 600-400 MG-UNIT TABS per tab Take 1 tablet by mouth 2 times daily      Alcohol Swabs (CVS ALCOHOL PREP SWABS) 70 % PADS by Does not apply route. 100 each 11     Current Facility-Administered Medications   Medication Dose Route Frequency Provider Last Rate Last Dose    bupivacaine (PF) (MARCAINE) 0.5 % injection 10 mg  2 mL Intratendinous Once Delfino Gobble, DO           Allergies  Nsaids and Proair respiclick [albuterol sulfate]    Family History  family history includes Asthma in her mother; Diabetes in her sister; Stroke in her sister.       Social History  Social History

## 2018-07-23 LAB
6-ACETYLMORPHINE, UR: NOT DETECTED
7-AMINOCLONAZEPAM, URINE: NOT DETECTED
ALPHA-OH-ALPRAZ, URINE: NOT DETECTED
ALPRAZOLAM, URINE: NOT DETECTED
AMPHETAMINES, URINE: NOT DETECTED
BARBITURATES, URINE: NOT DETECTED
BENZOYLECGONINE, UR: NOT DETECTED
BUPRENORPHINE URINE: NOT DETECTED
CARISOPRODOL, UR: NOT DETECTED
CLONAZEPAM, URINE: NOT DETECTED
CODEINE, URINE: NOT DETECTED
CREATININE URINE: 247.5 MG/DL (ref 20–400)
DIAZEPAM, URINE: NOT DETECTED
EER PAIN MGT DRUG PANEL, HIGH RES/EMIT U: NORMAL
ETHYL GLUCURONIDE UR: NOT DETECTED
FENTANYL URINE: NOT DETECTED
HYDROCODONE, URINE: NOT DETECTED
HYDROMORPHONE, URINE: NOT DETECTED
LORAZEPAM, URINE: NOT DETECTED
MARIJUANA METAB, UR: NOT DETECTED
MDA, UR: NOT DETECTED
MDEA, EVE, UR: NOT DETECTED
MDMA URINE: NOT DETECTED
MEPERIDINE METAB, UR: NOT DETECTED
METHADONE, URINE: NOT DETECTED
METHAMPHETAMINE, URINE: NOT DETECTED
METHYLPHENIDATE: NOT DETECTED
MIDAZOLAM, URINE: NOT DETECTED
MORPHINE URINE: NOT DETECTED
NORBUPRENORPHINE, URINE: NOT DETECTED
NORDIAZEPAM, URINE: NOT DETECTED
NORFENTANYL, URINE: NOT DETECTED
NORHYDROCODONE, URINE: NOT DETECTED
NOROXYCODONE, URINE: PRESENT
NOROXYMORPHONE, URINE: PRESENT
OXAZEPAM, URINE: NOT DETECTED
OXYCODONE URINE: PRESENT
OXYMORPHONE, URINE: PRESENT
PAIN MGT DRUG PANEL, HI RES, UR: NORMAL
PCP,URINE: NOT DETECTED
PHENTERMINE, UR: NOT DETECTED
PROPOXYPHENE, URINE: NOT DETECTED
TAPENTADOL, URINE: NOT DETECTED
TAPENTADOL-O-SULFATE, URINE: NOT DETECTED
TEMAZEPAM, URINE: NOT DETECTED
TRAMADOL, URINE: NOT DETECTED
ZOLPIDEM, URINE: NOT DETECTED

## 2018-08-02 ENCOUNTER — TELEPHONE (OUTPATIENT)
Dept: INTERNAL MEDICINE | Age: 73
End: 2018-08-02

## 2018-08-02 DIAGNOSIS — E11.9 TYPE 2 DIABETES MELLITUS WITHOUT COMPLICATION, WITHOUT LONG-TERM CURRENT USE OF INSULIN (HCC): Primary | ICD-10-CM

## 2018-08-02 NOTE — TELEPHONE ENCOUNTER
PT stated that she lost her glucometer and would like to know if another one can be called in to her pharmacy on file.  Please advise!!!

## 2018-08-07 ENCOUNTER — HOSPITAL ENCOUNTER (OUTPATIENT)
Dept: PAIN MANAGEMENT | Age: 73
Discharge: HOME | End: 2018-08-07
Payer: MEDICAID

## 2018-08-07 VITALS
TEMPERATURE: 98 F | SYSTOLIC BLOOD PRESSURE: 151 MMHG | HEART RATE: 69 BPM | WEIGHT: 285 LBS | RESPIRATION RATE: 16 BRPM | BODY MASS INDEX: 57.45 KG/M2 | HEIGHT: 59 IN | DIASTOLIC BLOOD PRESSURE: 80 MMHG | OXYGEN SATURATION: 94 %

## 2018-08-07 DIAGNOSIS — M75.51 BURSITIS OF BOTH SHOULDERS: ICD-10-CM

## 2018-08-07 DIAGNOSIS — Z79.891 CHRONIC USE OF OPIATE DRUGS THERAPEUTIC PURPOSES: ICD-10-CM

## 2018-08-07 DIAGNOSIS — M75.52 BURSITIS OF BOTH SHOULDERS: ICD-10-CM

## 2018-08-07 DIAGNOSIS — E66.01 MORBID OBESITY WITH BMI OF 50.0-59.9, ADULT (HCC): ICD-10-CM

## 2018-08-07 LAB — GLUCOSE BLD-MCNC: 200 MG/DL (ref 65–105)

## 2018-08-07 PROCEDURE — 20610 DRAIN/INJ JOINT/BURSA W/O US: CPT | Performed by: ANESTHESIOLOGY

## 2018-08-07 PROCEDURE — 6360000002 HC RX W HCPCS: Performed by: ANESTHESIOLOGY

## 2018-08-07 PROCEDURE — 77002 NEEDLE LOCALIZATION BY XRAY: CPT | Performed by: ANESTHESIOLOGY

## 2018-08-07 PROCEDURE — 82947 ASSAY GLUCOSE BLOOD QUANT: CPT

## 2018-08-07 PROCEDURE — 77002 NEEDLE LOCALIZATION BY XRAY: CPT

## 2018-08-07 PROCEDURE — 20610 DRAIN/INJ JOINT/BURSA W/O US: CPT

## 2018-08-07 PROCEDURE — 99152 MOD SED SAME PHYS/QHP 5/>YRS: CPT | Performed by: ANESTHESIOLOGY

## 2018-08-07 RX ORDER — FENTANYL CITRATE 50 UG/ML
INJECTION, SOLUTION INTRAMUSCULAR; INTRAVENOUS
Status: COMPLETED | OUTPATIENT
Start: 2018-08-07 | End: 2018-08-07

## 2018-08-07 RX ADMIN — FENTANYL CITRATE 25 MCG: 50 INJECTION, SOLUTION INTRAMUSCULAR; INTRAVENOUS at 12:00

## 2018-08-07 ASSESSMENT — PAIN SCALES - GENERAL: PAINLEVEL_OUTOF10: 0

## 2018-08-07 ASSESSMENT — PAIN DESCRIPTION - DESCRIPTORS: DESCRIPTORS: ACHING;CONSTANT

## 2018-08-07 ASSESSMENT — PAIN - FUNCTIONAL ASSESSMENT: PAIN_FUNCTIONAL_ASSESSMENT: 0-10

## 2018-08-07 NOTE — H&P
Office note dated July 17, 2018 in [facility] with all required elements of H&P    Patient seen preop, chart reviewed, AAO x 3, in NAD, VSS,. No changes in medical history since last evaluation. Risk / Benefits explained to patient, patient agree to proceed with plan.   ASA 3  MP 3

## 2018-08-15 ENCOUNTER — HOSPITAL ENCOUNTER (OUTPATIENT)
Age: 73
Discharge: HOME | End: 2018-08-15
Payer: MEDICAID

## 2018-08-15 ENCOUNTER — OFFICE VISIT (OUTPATIENT)
Dept: INTERNAL MEDICINE | Age: 73
End: 2018-08-15
Payer: MEDICAID

## 2018-08-15 VITALS
HEIGHT: 59 IN | SYSTOLIC BLOOD PRESSURE: 151 MMHG | WEIGHT: 271 LBS | HEART RATE: 76 BPM | DIASTOLIC BLOOD PRESSURE: 81 MMHG | OXYGEN SATURATION: 93 % | BODY MASS INDEX: 54.63 KG/M2

## 2018-08-15 DIAGNOSIS — J45.20 MILD INTERMITTENT ASTHMA WITHOUT COMPLICATION: ICD-10-CM

## 2018-08-15 DIAGNOSIS — M19.012 PRIMARY OSTEOARTHRITIS OF BOTH SHOULDERS: ICD-10-CM

## 2018-08-15 DIAGNOSIS — R42 POSTURAL DIZZINESS: ICD-10-CM

## 2018-08-15 DIAGNOSIS — N18.30 STAGE 3 CHRONIC KIDNEY DISEASE (HCC): ICD-10-CM

## 2018-08-15 DIAGNOSIS — D50.9 IRON DEFICIENCY ANEMIA, UNSPECIFIED IRON DEFICIENCY ANEMIA TYPE: ICD-10-CM

## 2018-08-15 DIAGNOSIS — M19.011 PRIMARY OSTEOARTHRITIS OF BOTH SHOULDERS: ICD-10-CM

## 2018-08-15 DIAGNOSIS — E11.9 TYPE 2 DIABETES MELLITUS WITHOUT COMPLICATION, WITHOUT LONG-TERM CURRENT USE OF INSULIN (HCC): Primary | ICD-10-CM

## 2018-08-15 DIAGNOSIS — M17.0 PRIMARY OSTEOARTHRITIS OF BOTH KNEES: ICD-10-CM

## 2018-08-15 LAB
EKG ATRIAL RATE: 62 BPM
EKG P AXIS: 49 DEGREES
EKG P-R INTERVAL: 140 MS
EKG Q-T INTERVAL: 440 MS
EKG QRS DURATION: 80 MS
EKG QTC CALCULATION (BAZETT): 446 MS
EKG R AXIS: -18 DEGREES
EKG T AXIS: 19 DEGREES
EKG VENTRICULAR RATE: 62 BPM

## 2018-08-15 PROCEDURE — G8417 CALC BMI ABV UP PARAM F/U: HCPCS | Performed by: FAMILY MEDICINE

## 2018-08-15 PROCEDURE — 2022F DILAT RTA XM EVC RTNOPTHY: CPT | Performed by: FAMILY MEDICINE

## 2018-08-15 PROCEDURE — G8399 PT W/DXA RESULTS DOCUMENT: HCPCS | Performed by: FAMILY MEDICINE

## 2018-08-15 PROCEDURE — 1101F PT FALLS ASSESS-DOCD LE1/YR: CPT | Performed by: FAMILY MEDICINE

## 2018-08-15 PROCEDURE — 1036F TOBACCO NON-USER: CPT | Performed by: FAMILY MEDICINE

## 2018-08-15 PROCEDURE — 3045F PR MOST RECENT HEMOGLOBIN A1C LEVEL 7.0-9.0%: CPT | Performed by: FAMILY MEDICINE

## 2018-08-15 PROCEDURE — 99213 OFFICE O/P EST LOW 20 MIN: CPT | Performed by: FAMILY MEDICINE

## 2018-08-15 PROCEDURE — 1123F ACP DISCUSS/DSCN MKR DOCD: CPT | Performed by: FAMILY MEDICINE

## 2018-08-15 PROCEDURE — G8427 DOCREV CUR MEDS BY ELIG CLIN: HCPCS | Performed by: FAMILY MEDICINE

## 2018-08-15 PROCEDURE — 3017F COLORECTAL CA SCREEN DOC REV: CPT | Performed by: FAMILY MEDICINE

## 2018-08-15 PROCEDURE — 4040F PNEUMOC VAC/ADMIN/RCVD: CPT | Performed by: FAMILY MEDICINE

## 2018-08-15 PROCEDURE — 93005 ELECTROCARDIOGRAM TRACING: CPT

## 2018-08-15 PROCEDURE — 1090F PRES/ABSN URINE INCON ASSESS: CPT | Performed by: FAMILY MEDICINE

## 2018-08-15 ASSESSMENT — ENCOUNTER SYMPTOMS
BACK PAIN: 0
ABDOMINAL PAIN: 0
TROUBLE SWALLOWING: 0
NAUSEA: 0
CHEST TIGHTNESS: 0
COUGH: 0
SORE THROAT: 0
CONSTIPATION: 0
RHINORRHEA: 0
DIARRHEA: 0
SHORTNESS OF BREATH: 0

## 2018-08-15 NOTE — PROGRESS NOTES
TAKE 1 TABLET BY MOUTH ATBEDTIME 28 tablet 11    Multiple Vitamin (MULTIVITAMIN) tablet TAKE 1 TABLET EVERY MORNING 28 tablet 11    latanoprost (XALATAN) 0.005 % ophthalmic solution 1 drop nightly      ACCU-CHEK GREG PLUS strip TEST BLOOD SUGAR 3 TIMES DAILY AS DIRECTED 100 strip 5    GNP Lancets Super Thin MISC CHECK BLOOD SUGAR 3 TIMESDAILY AS DIRECTED 100 each 5    calcium carbonate-vitamin D (CALTRATE) 600-400 MG-UNIT TABS per tab Take 1 tablet by mouth 2 times daily      Alcohol Swabs (CVS ALCOHOL PREP SWABS) 70 % PADS by Does not apply route. 100 each 11     Current Facility-Administered Medications   Medication Dose Route Frequency Provider Last Rate Last Dose    bupivacaine (PF) (MARCAINE) 0.5 % injection 10 mg  2 mL Intratendinous Once Uriel Santinoata, DO         Allergies   Allergen Reactions    Nsaids Other (See Comments)     Chronic kidney disease    Proair Respiclick [Albuterol Sulfate]          Subjective:      Review of Systems   Constitutional: Positive for fatigue. Negative for chills, diaphoresis and fever. HENT: Negative for congestion, ear pain, rhinorrhea, sore throat and trouble swallowing. Eyes: Negative for visual disturbance. Respiratory: Negative for cough, chest tightness and shortness of breath. Cardiovascular: Negative for chest pain, palpitations and leg swelling. Gastrointestinal: Negative for abdominal pain, constipation, diarrhea and nausea. Endocrine: Negative for polydipsia and polyuria. Genitourinary: Negative for difficulty urinating, dysuria and hematuria. Musculoskeletal: Positive for arthralgias and gait problem. Negative for back pain and neck pain. Skin: Negative for rash. Neurological: Positive for dizziness and headaches. Negative for light-headedness. Psychiatric/Behavioral: Positive for sleep disturbance. Negative for dysphoric mood. The patient is not nervous/anxious.         Objective:     Physical Exam   Constitutional: She is Stage 3 chronic kidney disease     6. Primary osteoarthritis of both shoulders         Plan:      Return in about 2 months (around 10/15/2018). Orders Placed This Encounter   Procedures    Microalbumin, Ur     Standing Status:   Future     Standing Expiration Date:   8/15/2019    DME Order for Glucometer as OP     You must complete the order parameters below and add the medical necessity documentation for this DME in a separate note. Home blood glucose monitor    Diagnosis: Uncontrolled DM      Testing frequency: Check blood sugars and record one times daily    Duration: purchase     Orders Placed This Encounter   Medications    metFORMIN (GLUCOPHAGE) 500 MG tablet     Sig: Take 1 tablet by mouth 2 times daily (with meals)     Dispense:  60 tablet     Refill:  3         Findings and/or pathophysiology discussed with patient. Plan of treatment discussed. Patient's medical problems were discussed with her. Findings were explained. Chart was evaluated. Available lab work and tests results were discussed. Her medications were reviewed. Lab work shows increasing serum creatinine level. Patient advised to call her nephrologist for her appointment. Also patient lost her CPAP machine when she moved to her new residence. Patient has appointment with her pulmonary physician. She will need further evaluation of her sleep problem. Her lab work was discussed with her. EKG report was noted. Patient advised to get her 2-D echo done. Patient will follow-up with her pain management. Health maintenance was discussed. Weight management was discussed. Diet and activity were discussed. 1.  Nasir Haas received counseling on the following healthy behaviors: nutrition and exercise  2. Patient given educational materials - see patient instructions  3. Was a self-tracking handout given in paper form or via 24 Quant? No  If yes, see orders or list here.   4.  Discussed use, benefit, and side effects of prescribed medications. Barriers to medication compliance addressed. All patient questions answered. Pt voiced understanding. 5.  Reviewed prior labs and health maintenance  6. Continue current medications, diet and exercise.     Completed Refills   Requested Prescriptions     Signed Prescriptions Disp Refills    metFORMIN (GLUCOPHAGE) 500 MG tablet 60 tablet 3     Sig: Take 1 tablet by mouth 2 times daily (with meals)     Electronically signed by Becky Francisco MD on 8/15/2018 at 12:56 PM

## 2018-08-15 NOTE — PATIENT INSTRUCTIONS
irregular heartbeat.     · You have symptoms of a stroke. These may include:  ¨ Sudden numbness, tingling, weakness, or loss of movement in your face, arm, or leg, especially on only one side of your body. ¨ Sudden vision changes. ¨ Sudden trouble speaking. ¨ Sudden confusion or trouble understanding simple statements. ¨ Sudden problems with walking or balance. ¨ A sudden, severe headache that is different from past headaches.     · You have severe back or belly pain.    Do not wait until your blood pressure comes down on its own. Get help right away.   Call your doctor now or seek immediate care if:    · Your blood pressure is much higher than normal (such as 180/110 or higher), but you don't have symptoms.     · You think high blood pressure is causing symptoms, such as:  ¨ Severe headache. ¨ Blurry vision.    Watch closely for changes in your health, and be sure to contact your doctor if:    · Your blood pressure measures 140/90 or higher at least 2 times. That means the top number is 140 or higher or the bottom number is 90 or higher, or both.     · You think you may be having side effects from your blood pressure medicine.     · Your blood pressure is usually normal, but it goes above normal at least 2 times. Where can you learn more? Go to https://Velo Media.Way2Pay. org and sign in to your [employer] account. Enter G503 in the MakeGamesWithUs box to learn more about \"High Blood Pressure: Care Instructions. \"     If you do not have an account, please click on the \"Sign Up Now\" link. Current as of: December 6, 2017  Content Version: 11.7  © 6605-8861 [employer]. Care instructions adapted under license by [facility] ([facility]). If you have questions about a medical condition or this instruction, always ask your healthcare professional. [place] [address] any warranty or liability for your use of this information.

## 2018-08-20 RX ORDER — MULTIVITAMIN WITH FOLIC ACID 400 MCG
TABLET ORAL
Qty: 28 TABLET | Refills: 11 | Status: SHIPPED | OUTPATIENT
Start: 2018-08-20 | End: 2019-07-22 | Stop reason: SDUPTHER

## 2018-08-20 NOTE — TELEPHONE ENCOUNTER
Health Maintenance   Topic Date Due    Diabetic retinal exam  03/01/2017    Diabetic foot exam  07/10/2018    Flu vaccine (1) 01/20/2020 (Originally 9/1/2018)    Pneumococcal low/med risk (1 of 2 - PCV13) 02/20/2020 (Originally 6/21/2012)    Hepatitis C screen  02/20/2020 (Originally 1945)    Shingles Vaccine (2 of 2 - 2 Dose Series) 09/29/2018    Lipid screen  04/09/2019    A1C test (Diabetic or Prediabetic)  07/11/2019    Potassium monitoring  07/17/2019    Creatinine monitoring  07/17/2019    Breast cancer screen  04/23/2020    Colon cancer screen colonoscopy  10/23/2025    DTaP/Tdap/Td vaccine (2 - Td) 04/01/2026    DEXA (modify frequency per FRAX score)  Completed             (applicable per patient's age: Cancer Screenings, Depression Screening, Fall Risk Screening, Immunizations)    Hemoglobin A1C (%)   Date Value   07/11/2018 7.6   03/26/2018 7.2   10/24/2017 6.6     Microalb/Crt.  Ratio (mcg/mg creat)   Date Value   08/14/2017 13     LDL Cholesterol (mg/dL)   Date Value   04/09/2018 97     AST (U/L)   Date Value   07/17/2018 15     ALT (U/L)   Date Value   07/17/2018 9     BUN (mg/dL)   Date Value   07/17/2018 32 (H)      (goal A1C is < 7)   (goal LDL is <100) need 30-50% reduction from baseline     BP Readings from Last 3 Encounters:   08/15/18 (!) 151/81   08/07/18 (!) 151/80   07/17/18 129/62    (goal /80)      All Future Testing planned in [facility]:  Lab Frequency Next Occurrence   ECHO Complete 2D W Doppler W Color Once 01/11/2019   Pain Management Drug Screen Once 07/17/2018   IR ARTHR/ASP/INJ MAJOR JT/BURSA W US Once 07/17/2018   CA ARTHROCENTESIS ASPIR&/INJ MAJOR JT/BURSA W/O US Once 07/18/2018   Microalbumin, Ur Once 09/14/2018       Next Visit Date:  Future Appointments  Date Time Provider Husam Bowden   8/21/2018 1:00 PM Desma Lofts, APRN - CNP [facility]   8/23/2018 10:15 AM Binta Sorto MD Resp Spec [id number]   8/27/2018 1:20 PM Viviana Hector

## 2018-08-21 ENCOUNTER — HOSPITAL ENCOUNTER (OUTPATIENT)
Dept: PAIN MANAGEMENT | Age: 73
Discharge: HOME | End: 2018-08-21
Payer: MEDICAID

## 2018-08-21 VITALS
RESPIRATION RATE: 14 BRPM | HEART RATE: 90 BPM | TEMPERATURE: 98.4 F | SYSTOLIC BLOOD PRESSURE: 190 MMHG | DIASTOLIC BLOOD PRESSURE: 83 MMHG

## 2018-08-21 DIAGNOSIS — M17.0 PRIMARY OSTEOARTHRITIS OF BOTH KNEES: ICD-10-CM

## 2018-08-21 DIAGNOSIS — M19.011 PRIMARY OSTEOARTHRITIS OF BOTH SHOULDERS: Primary | Chronic | ICD-10-CM

## 2018-08-21 DIAGNOSIS — Z79.891 CHRONIC USE OF OPIATE DRUGS THERAPEUTIC PURPOSES: Chronic | ICD-10-CM

## 2018-08-21 DIAGNOSIS — M19.012 PRIMARY OSTEOARTHRITIS OF BOTH SHOULDERS: Primary | Chronic | ICD-10-CM

## 2018-08-21 DIAGNOSIS — Z51.81 MEDICATION MONITORING ENCOUNTER: ICD-10-CM

## 2018-08-21 PROCEDURE — 99213 OFFICE O/P EST LOW 20 MIN: CPT | Performed by: NURSE PRACTITIONER

## 2018-08-21 PROCEDURE — 99214 OFFICE O/P EST MOD 30 MIN: CPT

## 2018-08-21 RX ORDER — OXYCODONE HYDROCHLORIDE 5 MG/1
5 TABLET ORAL EVERY 8 HOURS PRN
Qty: 90 TABLET | Refills: 0 | Status: SHIPPED | OUTPATIENT
Start: 2018-08-21 | End: 2018-09-18 | Stop reason: SDUPTHER

## 2018-08-21 ASSESSMENT — ENCOUNTER SYMPTOMS
BACK PAIN: 1
SHORTNESS OF BREATH: 0
COUGH: 0
CONSTIPATION: 0

## 2018-08-21 NOTE — PROGRESS NOTES
relief. Pill count: due to fill    Morphine equivalent dose as reported on OARRS:22.5    Attestation: The Prescription Monitoring Report for this patient was reviewed today. DAVID Chow CNP)  Documentation: Possible medication side effects, risk of tolerance/dependence & alternative treatments discussed., Obtaining appropriate analgesic effect of treatment., No signs of potential drug abuse or diversion identified. DAVID Chow - CNP)  Medication Contracts: Existing medication contract. DAVID Chow CNP)  Review of OARRS does not show any aberrant prescription behavior. Medication is helping the patient stay active. Patient denies any side effects and reports adequate analgesia. No sign of misuse/abuse.     Past Medical History:   Diagnosis Date    Anemia, iron deficiency     Asthma     Carpal tunnel syndrome on right 8/29/2016    Chronic kidney disease     GERD (gastroesophageal reflux disease)     Glucose intolerance (impaired glucose tolerance)     Gout     History of anemia     Normocytic    History of corneal abrasion     Left    History of dyspnea     History of pneumonia     Hyperlipidemia     Hypertension     Morbid obesity ([address]     Neck strain     Obstructive sleep apnea     on CPAP    Osteoarthritis     DJD of knees    Pain     bilateral shoulders, Lt hip    Shoulder strain     History of bilateral shoulder strain    Systemic hypertension     TIA (transient ischemic attack)     Type II or unspecified type diabetes mellitus without mention of complication, not stated as uncontrolled     Vitamin D deficiency     Wears dentures     upper and lower dentures    Wears glasses        Past Surgical History:   Procedure Laterality Date    COLONOSCOPY  10/23/15    per Dr. Marlon García  9/21/15    empi select    NERVE BLOCK Bilateral 08/07/2018    diamond shoulder injection, decadron 10 mg, lido 1%, isovue m-200, naropin 0.5%       Allergies   Allergen Reactions    Nsaids Other (See Comments)     Chronic kidney disease    Proair Respiclick [Albuterol Sulfate]          Current Outpatient Prescriptions:     Multiple Vitamin (MULTIVITAMIN) tablet, TAKE 1 TABLET EVERY MORNING, Disp: 28 tablet, Rfl: 11    blood glucose test strips (ACCU-CHEK GREG PLUS) strip, TEST BLOOD SUGAR 3 TIMES DAILY AS DIRECTED, Disp: 100 strip, Rfl: 5    metFORMIN (GLUCOPHAGE) 500 MG tablet, Take 1 tablet by mouth 2 times daily (with meals), Disp: 60 tablet, Rfl: 3    albuterol sulfate HFA (VENTOLIN HFA) 108 (90 Base) MCG/ACT inhaler, Inhale 2 puffs into the lungs every 6 hours as needed for Wheezing, Disp: 1 Inhaler, Rfl: 2    glimepiride (AMARYL) 2 MG tablet, TAKE 1 TABLET TWICE DAILY(AM/PM), Disp: 56 tablet, Rfl: 5    carvedilol (COREG) 12.5 MG tablet, TAKE 1 TABLET TWICE DAILY(AM/PM), Disp: 56 tablet, Rfl: 5    pravastatin (PRAVACHOL) 40 MG tablet, TAKE 1 TABLET AT BEDTIME, Disp: 28 tablet, Rfl: 5    furosemide (LASIX) 20 MG tablet, TAKE 1 TABLET EVERY MORNING, Disp: 28 tablet, Rfl: 5    Omega-3 Fatty Acids (FISH OIL) 1000 MG CAPS, Take 1 capsule by mouth daily, Disp: 90 capsule, Rfl: 3    ferrous sulfate 325 (65 Fe) MG tablet, TAKE 1 TABLET BY MOUTH DAILY IN THE EVENING, Disp: 28 tablet, Rfl: 11    aspirin 325 MG EC tablet, TAKE 1 TABLET BY MOUTH DAILY IN THE EVENING, Disp: 28 tablet, Rfl: 11    lisinopril (PRINIVIL;ZESTRIL) 20 MG tablet, TAKE 1 TABLET BY MOUTH DAILY IN THE MORNING, Disp: 28 tablet, Rfl: 11    amLODIPine (NORVASC) 10 MG tablet, TAKE 1 TABLET BY MOUTH DAILY IN THE MORNING, Disp: 28 tablet, Rfl: 11    allopurinol (ZYLOPRIM) 100 MG tablet, TAKE 1 TABLET BY MOUTH ATBEDTIME, Disp: 28 tablet, Rfl: 11    latanoprost (XALATAN) 0.005 % ophthalmic solution, 1 drop nightly, Disp: , Rfl:     GNP Lancets Super Thin MISC, CHECK BLOOD SUGAR 3 TIMESDAILY AS DIRECTED, Disp: 100 each, Rfl: 5    calcium carbonate-vitamin D (CALTRATE) 600-400 MG-UNIT TABS per tab, Take 1 normal.       Record/Diagnostics Review:    Last gómez Britt Mcgrath was appropriate    Cervical XR 3/2018:  FINDINGS:   No prevertebral soft tissue swelling.       No subluxation.  Moderate-severe multilevel degenerative changes are seen.           Impression   Cervical spine degenerative changes         XR shoulders 2017 -      Right shoulder:     1. Severe degenerative changes and remodeling of the right glenohumeral  joint.  1.1 cm possible large chronically fractured osteophyte versus loose  body projecting over the axillary pouch.  Diffuse osteopenia. 2. No acute fracture or gross dislocation.     Left shoulder:     1. Severe degenerative changes and remodeling of the left glenohumeral joint. Diffuse osteopenia. 2. No acute fracture or gross dislocation.     XR knees 2017 -      Right knee:     1. Tricompartmental osteoarthrosis.  Severe narrowing of the medial  compartment.  Diffuse osteopenia.  Probable underlying CPPD. 2. No acute osseous abnormality.     Left knee:     1. Small joint effusion. 2. Tricompartmental osteoarthrosis with moderate to severe narrowing of the  medial compartment.  Diffuse osteopenia.  Suspected CPPD. 3. No acute osseous abnormality      Assessment:  Problem List Items Addressed This Visit     Primary osteoarthritis of both shoulders - Primary (Chronic)    Chronic use of opiate drugs therapeutic purposes (Chronic)    Medication monitoring encounter    Primary osteoarthritis of both knees            Treatment Plan:  DISCUSSION: Treatment options discussed with patient and all questions answered to patient's satisfaction. Patient relates current medications are helping the pain. Patient reports taking pain medications as prescribed, denies obtaining medications from different sources and denies use of illegal drugs. Patient denies side effects from medications like nausea, vomiting, constipation or drowsiness.  Patient reports current activities of daily living are possible due to

## 2018-08-23 ENCOUNTER — OFFICE VISIT (OUTPATIENT)
Dept: PULMONOLOGY | Age: 73
End: 2018-08-23
Payer: MEDICAID

## 2018-08-23 VITALS
SYSTOLIC BLOOD PRESSURE: 136 MMHG | DIASTOLIC BLOOD PRESSURE: 82 MMHG | WEIGHT: 273 LBS | HEART RATE: 63 BPM | BODY MASS INDEX: 55.04 KG/M2 | RESPIRATION RATE: 14 BRPM | HEIGHT: 59 IN | TEMPERATURE: 98.2 F | OXYGEN SATURATION: 95 %

## 2018-08-23 DIAGNOSIS — G47.33 OSA ON CPAP: Primary | ICD-10-CM

## 2018-08-23 DIAGNOSIS — E11.9 TYPE 2 DIABETES MELLITUS WITHOUT COMPLICATION, WITHOUT LONG-TERM CURRENT USE OF INSULIN (HCC): ICD-10-CM

## 2018-08-23 DIAGNOSIS — E66.01 MORBID OBESITY (HCC): ICD-10-CM

## 2018-08-23 DIAGNOSIS — I10 ESSENTIAL HYPERTENSION: ICD-10-CM

## 2018-08-23 DIAGNOSIS — Z99.89 OSA ON CPAP: Primary | ICD-10-CM

## 2018-08-23 PROCEDURE — G8427 DOCREV CUR MEDS BY ELIG CLIN: HCPCS | Performed by: INTERNAL MEDICINE

## 2018-08-23 PROCEDURE — G8399 PT W/DXA RESULTS DOCUMENT: HCPCS | Performed by: INTERNAL MEDICINE

## 2018-08-23 PROCEDURE — 1036F TOBACCO NON-USER: CPT | Performed by: INTERNAL MEDICINE

## 2018-08-23 PROCEDURE — 99213 OFFICE O/P EST LOW 20 MIN: CPT | Performed by: INTERNAL MEDICINE

## 2018-08-23 PROCEDURE — 4040F PNEUMOC VAC/ADMIN/RCVD: CPT | Performed by: INTERNAL MEDICINE

## 2018-08-23 PROCEDURE — 1090F PRES/ABSN URINE INCON ASSESS: CPT | Performed by: INTERNAL MEDICINE

## 2018-08-23 PROCEDURE — 1123F ACP DISCUSS/DSCN MKR DOCD: CPT | Performed by: INTERNAL MEDICINE

## 2018-08-23 PROCEDURE — 3045F PR MOST RECENT HEMOGLOBIN A1C LEVEL 7.0-9.0%: CPT | Performed by: INTERNAL MEDICINE

## 2018-08-23 PROCEDURE — G8417 CALC BMI ABV UP PARAM F/U: HCPCS | Performed by: INTERNAL MEDICINE

## 2018-08-23 PROCEDURE — 1101F PT FALLS ASSESS-DOCD LE1/YR: CPT | Performed by: INTERNAL MEDICINE

## 2018-08-23 PROCEDURE — 3017F COLORECTAL CA SCREEN DOC REV: CPT | Performed by: INTERNAL MEDICINE

## 2018-08-23 PROCEDURE — 2022F DILAT RTA XM EVC RTNOPTHY: CPT | Performed by: INTERNAL MEDICINE

## 2018-08-23 ASSESSMENT — SLEEP AND FATIGUE QUESTIONNAIRES
HOW LIKELY ARE YOU TO NOD OFF OR FALL ASLEEP WHEN YOU ARE A PASSENGER IN A CAR FOR AN HOUR WITHOUT A BREAK: 0
ESS TOTAL SCORE: 4
HOW LIKELY ARE YOU TO NOD OFF OR FALL ASLEEP WHILE SITTING INACTIVE IN A PUBLIC PLACE: 0
HOW LIKELY ARE YOU TO NOD OFF OR FALL ASLEEP WHILE LYING DOWN TO REST IN THE AFTERNOON WHEN CIRCUMSTANCES PERMIT: 1
HOW LIKELY ARE YOU TO NOD OFF OR FALL ASLEEP WHILE SITTING AND READING: 0
HOW LIKELY ARE YOU TO NOD OFF OR FALL ASLEEP WHILE SITTING QUIETLY AFTER LUNCH WITHOUT ALCOHOL: 0
HOW LIKELY ARE YOU TO NOD OFF OR FALL ASLEEP WHILE WATCHING TV: 3
HOW LIKELY ARE YOU TO NOD OFF OR FALL ASLEEP WHILE SITTING AND TALKING TO SOMEONE: 0
HOW LIKELY ARE YOU TO NOD OFF OR FALL ASLEEP IN A CAR, WHILE STOPPED FOR A FEW MINUTES IN TRAFFIC: 0

## 2018-08-23 NOTE — PROGRESS NOTES
morbidly obese. General ROS: negative for - chills, fatigue, fever or weight loss  ENT ROS: negative for - headaches, oral lesions or sore throat  Cardiovascular ROS: no chest pain , orthopnea or pnd   Gastrointestinal ROS: no abdominal pain, change in bowel habits, or black or bloody stools  Skin - no rash   Neuro - no blurry vision , no loc . No focal weakness   msk - no jt tenderness or swelling    Vascular - no claudication , rest completed and negative   Lymphatic - complete and negative   Hematology - oncology - complete and negative   Allergy immunology - complete and negative    no burning or hematuria       LUNG CANCER SCREENING     1. CRITERIA MET    []     CT ORDERED  []      2. CRITERIA NOT MET   [x]      3. REFUSED                    []        REASON CRITERIA NOT MET     1. SMOKING LESS THAN 30 PY  []      2. AGE LESS THAN 55 or GREATER 77 YEARS  []      3. QUIT SMOKING 15 YEARS OR GREATER   [x]      4. RECENT CT WITH IN 11 MONTHS    []      5. LIFE EXPECTANCY < 5 YEARS   []      6.  SIGNS  AND SYMPTOMS OF LUNG CANCER   []           Immunization   Immunization History   Administered Date(s) Administered    Tdap (Boostrix, Adacel) 04/01/2016    Zoster Subunit (Shingrix) 03/29/2018        Pneumococcal Vaccine     [] Up to date    [x] Indicated   [] Refused  [] Contraindicated       Influenza Vaccine   [] Up to date    [x] Indicated   [] Refused  [] Contraindicated       PAST MEDICAL HISTORY:         Diagnosis Date    Anemia, iron deficiency     Asthma     Carpal tunnel syndrome on right 8/29/2016    Chronic kidney disease     GERD (gastroesophageal reflux disease)     Glucose intolerance (impaired glucose tolerance)     Gout     History of anemia     Normocytic    History of corneal abrasion     Left    History of dyspnea     History of pneumonia     Hyperlipidemia     Hypertension     Morbid obesity ([address]     Neck strain     Obstructive sleep apnea     on CPAP    Osteoarthritis DJD of knees    Pain     bilateral shoulders, Lt hip    Shoulder strain     History of bilateral shoulder strain    Systemic hypertension     TIA (transient ischemic attack)     Type II or unspecified type diabetes mellitus without mention of complication, not stated as uncontrolled     Vitamin D deficiency     Wears dentures     upper and lower dentures    Wears glasses        Family History:   Family History   Problem Relation Age of Onset    Asthma Mother     Stroke Sister     Diabetes Sister     Other Neg Hx         Sleep disorders or narcolepsy       SURGICAL HISTORY:   Past Surgical History:   Procedure Laterality Date    COLONOSCOPY  10/23/15    per Dr. Luna Arteaga  9/21/15    empi select    NERVE BLOCK Bilateral 08/07/2018    diamond shoulder injection, decadron 10 mg, lido 1%, isovue m-200, naropin 0.5%              Not in a hospital admission. Allergies   Allergen Reactions    Nsaids Other (See Comments)     Chronic kidney disease    Proair Respiclick [Albuterol Sulfate]      History   Smoking Status    Former Smoker    Packs/day: 1.00    Years: 30.00    Types: Cigarettes    Quit date: 3/6/1990   Smokeless Tobacco    Never Used     Prior to Admission medications    Medication Sig Start Date End Date Taking? Authorizing Provider   Potassium (POTASSIMIN PO) Take by mouth   Yes Historical Provider, MD   oxyCODONE (ROXICODONE) 5 MG immediate release tablet Take 1 tablet by mouth every 8 hours as needed for Pain for up to 30 days. . 8/21/18 9/20/18 Yes DAVID Amador - CNP   Multiple Vitamin (MULTIVITAMIN) tablet TAKE 1 TABLET EVERY MORNING 8/20/18  Yes Felipe Alejo MD   metFORMIN (GLUCOPHAGE) 500 MG tablet Take 1 tablet by mouth 2 times daily (with meals) 8/15/18  Yes Mena Del Rosario MD   albuterol sulfate HFA (VENTOLIN HFA) 108 (90 Base) MCG/ACT inhaler Inhale 2 puffs into the lungs every 6 hours as needed for Wheezing 7/11/18  Yes Mena Del Rosario MD   glimepiride (AMARYL) 2 MG tablet TAKE 1 TABLET TWICE DAILY(AM/PM) 6/25/18  Yes Felipe Alejo MD   carvedilol (COREG) 12.5 MG tablet TAKE 1 TABLET TWICE DAILY(AM/PM) 6/25/18  Yes Felipe Alejo MD   pravastatin (PRAVACHOL) 40 MG tablet TAKE 1 TABLET AT BEDTIME 6/25/18  Yes Felipe Alejo MD   furosemide (LASIX) 20 MG tablet TAKE 1 TABLET EVERY MORNING 6/25/18  Yes Felipe Alejo MD   Omega-3 Fatty Acids (FISH OIL) 1000 MG CAPS Take 1 capsule by mouth daily 4/26/18  Yes Felipe Alejo MD   ferrous sulfate 325 (65 Fe) MG tablet TAKE 1 TABLET BY MOUTH DAILY IN THE EVENING 2/12/18  Yes Felipe Alejo MD   aspirin 325 MG EC tablet TAKE 1 TABLET BY MOUTH DAILY IN THE EVENING 2/12/18  Yes Felipe Alejo MD   lisinopril (PRINIVIL;ZESTRIL) 20 MG tablet TAKE 1 TABLET BY MOUTH DAILY IN THE MORNING 2/12/18  Yes Felipe Alejo MD   amLODIPine (NORVASC) 10 MG tablet TAKE 1 TABLET BY MOUTH DAILY IN THE MORNING 2/12/18  Yes Felipe Alejo MD   allopurinol (ZYLOPRIM) 100 MG tablet TAKE 1 TABLET BY MOUTH ATBEDTIME 2/12/18  Yes Felipe Alejo MD   latanoprost (XALATAN) 0.005 % ophthalmic solution 1 drop nightly   Yes Historical Provider, MD   calcium carbonate-vitamin D (CALTRATE) 600-400 MG-UNIT TABS per tab Take 1 tablet by mouth 2 times daily   Yes Historical Provider, MD         Physical Exam  General Appearance:    Alert, cooperative, no distress, appears stated age, Morbid obesity    , I.e. eye examination Head:    Normocephalic, without obvious abnormality, atraumatic   No Mohit's syndrome. No jaundice. On eye examination     Nose no polyps. No sinus tenderness      Throat unremarkable        :    Neck:   Supple, symmetrical, trachea midline, no adenopathy;     thyroid:  no enlargement/tenderness/nodules; no carotid    bruit or JVD. Short obese    Back:     Symmetric, no curvature, ROM normal, no CVA tenderness   Lungs:       Normal shape and moments. Percussion note normal resonant breathing were versicolor.   No rales, rhonchi are audible

## 2018-08-27 ENCOUNTER — OFFICE VISIT (OUTPATIENT)
Dept: INTERNAL MEDICINE | Age: 73
End: 2018-08-27
Payer: MEDICAID

## 2018-08-27 VITALS
DIASTOLIC BLOOD PRESSURE: 77 MMHG | HEIGHT: 59 IN | HEART RATE: 62 BPM | WEIGHT: 272.8 LBS | RESPIRATION RATE: 12 BRPM | SYSTOLIC BLOOD PRESSURE: 137 MMHG | BODY MASS INDEX: 55 KG/M2 | OXYGEN SATURATION: 97 %

## 2018-08-27 DIAGNOSIS — E66.01 MORBID OBESITY (HCC): ICD-10-CM

## 2018-08-27 DIAGNOSIS — I10 ESSENTIAL HYPERTENSION: ICD-10-CM

## 2018-08-27 DIAGNOSIS — G47.33 OBSTRUCTIVE SLEEP APNEA: ICD-10-CM

## 2018-08-27 DIAGNOSIS — E11.9 TYPE 2 DIABETES MELLITUS WITHOUT COMPLICATION, WITHOUT LONG-TERM CURRENT USE OF INSULIN (HCC): Primary | ICD-10-CM

## 2018-08-27 DIAGNOSIS — N18.30 STAGE 3 CHRONIC KIDNEY DISEASE (HCC): ICD-10-CM

## 2018-08-27 PROBLEM — H25.10 NUCLEAR SENILE CATARACT: Status: ACTIVE | Noted: 2017-05-15

## 2018-08-27 PROCEDURE — 2022F DILAT RTA XM EVC RTNOPTHY: CPT | Performed by: INTERNAL MEDICINE

## 2018-08-27 PROCEDURE — 1036F TOBACCO NON-USER: CPT | Performed by: INTERNAL MEDICINE

## 2018-08-27 PROCEDURE — 1090F PRES/ABSN URINE INCON ASSESS: CPT | Performed by: INTERNAL MEDICINE

## 2018-08-27 PROCEDURE — G8399 PT W/DXA RESULTS DOCUMENT: HCPCS | Performed by: INTERNAL MEDICINE

## 2018-08-27 PROCEDURE — G8427 DOCREV CUR MEDS BY ELIG CLIN: HCPCS | Performed by: INTERNAL MEDICINE

## 2018-08-27 PROCEDURE — 3017F COLORECTAL CA SCREEN DOC REV: CPT | Performed by: INTERNAL MEDICINE

## 2018-08-27 PROCEDURE — 3045F PR MOST RECENT HEMOGLOBIN A1C LEVEL 7.0-9.0%: CPT | Performed by: INTERNAL MEDICINE

## 2018-08-27 PROCEDURE — G8417 CALC BMI ABV UP PARAM F/U: HCPCS | Performed by: INTERNAL MEDICINE

## 2018-08-27 PROCEDURE — 4040F PNEUMOC VAC/ADMIN/RCVD: CPT | Performed by: INTERNAL MEDICINE

## 2018-08-27 PROCEDURE — 1101F PT FALLS ASSESS-DOCD LE1/YR: CPT | Performed by: INTERNAL MEDICINE

## 2018-08-27 PROCEDURE — 99214 OFFICE O/P EST MOD 30 MIN: CPT | Performed by: INTERNAL MEDICINE

## 2018-08-27 PROCEDURE — 1123F ACP DISCUSS/DSCN MKR DOCD: CPT | Performed by: INTERNAL MEDICINE

## 2018-08-27 ASSESSMENT — ENCOUNTER SYMPTOMS
CONSTIPATION: 1
EYES NEGATIVE: 1
DIARRHEA: 1
NAUSEA: 1
WHEEZING: 1
BACK PAIN: 1
ALLERGIC/IMMUNOLOGIC NEGATIVE: 1

## 2018-08-27 NOTE — PROGRESS NOTES
[facility]   Progress Note      Visit Information    Have you changed or started any medications since your last visit including any over-the-counter medicines, vitamins, or herbal medicines? no   Are you having any side effects from any of your medications? -  no  Have you stopped taking any of your medications? Is so, why? -  no    Have you seen any other physician or provider since your last visit? Yes - Records Obtained  Have you had any other diagnostic tests since your last visit? Yes - Records Obtained  Have you been seen in the emergency room and/or had an admission to a hospital since we last saw you? No  Have you had your routine dental cleaning in the past 6 months? yes -     Have you activated your [employer] account? If not, what are your barriers?  Yes     Patient Care Team:  Hever Ervin MD as PCP - General (Internal Medicine)  Hever Ervin MD as Referring Physician (Internal Medicine)  Hever Ervin MD as Referring Physician (Internal Medicine)  Kevin Bates MD as Consulting Physician (Pulmonology)    Medical History Review  Past Medical, Family, and Social History reviewed and does contribute to the patient presenting condition    Health Maintenance   Topic Date Due    Diabetic retinal exam  03/01/2017    Diabetic foot exam  07/10/2018    Flu vaccine (1) 01/20/2020 (Originally 9/1/2018)    Pneumococcal low/med risk (1 of 2 - PCV13) 02/20/2020 (Originally 6/21/2012)    Hepatitis C screen  02/20/2020 (Originally 1945)    Shingles Vaccine (2 of 2 - 2 Dose Series) 09/29/2018    Lipid screen  04/09/2019    A1C test (Diabetic or Prediabetic)  07/11/2019    Potassium monitoring  07/17/2019    Creatinine monitoring  07/17/2019    Breast cancer screen  04/23/2020    Colon cancer screen colonoscopy  10/23/2025    DTaP/Tdap/Td vaccine (2 - Td) 04/01/2026    DEXA (modify frequency per FRAX score)  Completed       Date of patient's visit: 8/27/2018  Patient's Name:  Junior Toure                   YOB: 1945        PCP:  Maicol Velez MD    Junior Toure is a 72 y.o. female who presents for No chief complaint on file. and follow up of chronic medical problems. Patient Active Problem List   Diagnosis    Obstructive sleep apnea    Hypertension    Hyperlipidemia    Gout    Asthma    Morbid obesity ([address]    Vitamin D deficiency    GERD (gastroesophageal reflux disease)    TIA (transient ischemic attack)    Stage 3 chronic kidney disease    Anemia, iron deficiency    DJD (degenerative joint disease) of knee    Primary osteoarthritis of both shoulders    Pain of both shoulder joints    Primary osteoarthritis involving multiple joints    Pain in joint, lower leg    Type 2 diabetes mellitus without complication, without long-term current use of insulin ([facility])    Adjustment disorder with mixed anxiety and depressed mood    Bilateral leg edema    Extensor tenosynovitis of right wrist    Carpal tunnel syndrome on right    Medication monitoring encounter    Primary osteoarthritis of both knees    Postural dizziness    Mild intermittent asthma without complication    Morbid obesity with BMI of 50.0-59.9, adult ([facility] [address]    Chronic use of opiate drugs therapeutic purposes       HISTORY OF PRESENT ILLNESS:    History was obtained from the patient. Here for a routine follow up. Her diabetes is well controlled with A1C of 7.6 but she does report symptoms of neuropathy. Taking her meds as prescribed. Denies hypoglycemia. Renal function elevated on last labwork. Has CKD stage 3. No SOB complaints. Arthritic pain is stable  Her Bp is well controlled  She is scheduled for new sleep study  Patient's allergies, medications, past medical, surgical, social and family histories were reviewed and updated as appropriate.     ALLERGIES      Allergies   Allergen Reactions    Nsaids Other (See Comments)     Chronic kidney disease    Proair Respiclick [Albuterol Sulfate]          MEDICATIONS:      Current Outpatient Prescriptions   Medication Sig Dispense Refill    Potassium (POTASSIMIN PO) Take by mouth      oxyCODONE (ROXICODONE) 5 MG immediate release tablet Take 1 tablet by mouth every 8 hours as needed for Pain for up to 30 days. . 90 tablet 0    Multiple Vitamin (MULTIVITAMIN) tablet TAKE 1 TABLET EVERY MORNING 28 tablet 11    metFORMIN (GLUCOPHAGE) 500 MG tablet Take 1 tablet by mouth 2 times daily (with meals) 60 tablet 3    albuterol sulfate HFA (VENTOLIN HFA) 108 (90 Base) MCG/ACT inhaler Inhale 2 puffs into the lungs every 6 hours as needed for Wheezing 1 Inhaler 2    glimepiride (AMARYL) 2 MG tablet TAKE 1 TABLET TWICE DAILY(AM/PM) 56 tablet 5    carvedilol (COREG) 12.5 MG tablet TAKE 1 TABLET TWICE DAILY(AM/PM) 56 tablet 5    pravastatin (PRAVACHOL) 40 MG tablet TAKE 1 TABLET AT BEDTIME 28 tablet 5    furosemide (LASIX) 20 MG tablet TAKE 1 TABLET EVERY MORNING 28 tablet 5    Omega-3 Fatty Acids (FISH OIL) 1000 MG CAPS Take 1 capsule by mouth daily 90 capsule 3    ferrous sulfate 325 (65 Fe) MG tablet TAKE 1 TABLET BY MOUTH DAILY IN THE EVENING 28 tablet 11    aspirin 325 MG EC tablet TAKE 1 TABLET BY MOUTH DAILY IN THE EVENING 28 tablet 11    lisinopril (PRINIVIL;ZESTRIL) 20 MG tablet TAKE 1 TABLET BY MOUTH DAILY IN THE MORNING 28 tablet 11    amLODIPine (NORVASC) 10 MG tablet TAKE 1 TABLET BY MOUTH DAILY IN THE MORNING 28 tablet 11    allopurinol (ZYLOPRIM) 100 MG tablet TAKE 1 TABLET BY MOUTH ATBEDTIME 28 tablet 11    latanoprost (XALATAN) 0.005 % ophthalmic solution 1 drop nightly      calcium carbonate-vitamin D (CALTRATE) 600-400 MG-UNIT TABS per tab Take 1 tablet by mouth 2 times daily       Current Facility-Administered Medications   Medication Dose Route Frequency Provider Last Rate Last Dose    bupivacaine (PF) (MARCAINE) 0.5 % injection 10 mg  2 mL Intratendinous Once [facility] History   Problem Relation Age of Onset   [place] Asthma Mother     Stroke Sister     Diabetes Sister     Other Neg Hx         Sleep disorders or narcolepsy       REVIEW OF SYSTEMS:    Review of Systems   HENT: Negative. Eyes: Negative. Respiratory: Positive for wheezing. Gastrointestinal: Positive for constipation, diarrhea and nausea. Endocrine: Negative. Genitourinary: Negative. Musculoskeletal: Positive for arthralgias, back pain, neck pain and neck stiffness. Knee Pain-Bilateral    Skin: Negative. Allergic/Immunologic: Negative. Neurological: Positive for dizziness, weakness, light-headedness and headaches. Hematological: Negative. Psychiatric/Behavioral: The patient is nervous/anxious.           Diabetic Foot Exam    Pulses:  normal,   Edema: negative  Skin: normal    Sensory exam  Monofilament Sensation:  abnormal - reduced sensation below toe  (minimum of 5 random plantar locations tested, avoid callous areas - > 1 area with absence of sensation is + for neuropathy)      Plus one of the following  Pinprick:  Impaired  Proprioception:  Intact  Vibration (128 Hz):  N/A      PHYSICAL EXAM:      Vitals:    08/27/18 1324   BP: 137/77   Pulse: 62   Resp: 12   SpO2: 97%     BP Readings from Last 3 Encounters:   08/27/18 137/77   08/23/18 136/82   08/21/18 (!) 190/83       Physical Examination: General appearance - overweight  Mental status - alert, oriented to person, place, and time  Eyes - pupils equal and reactive, extraocular eye movements intact  Chest - clear to auscultation, no wheezes, rales or rhonchi, symmetric air entry  Heart - normal rate and regular rhythm  Abdomen - soft, nontender, nondistended, no masses or organomegaly  bowel sounds normal  Back exam - full range of motion, no tenderness, palpable spasm or pain on motion  Neurological - alert, oriented, normal speech, no focal findings or movement disorder noted  Musculoskeletal - no joint tenderness, deformity or

## 2018-09-06 ENCOUNTER — HOSPITAL ENCOUNTER (OUTPATIENT)
Age: 73
Discharge: HOME | End: 2018-09-06
Payer: MEDICAID

## 2018-09-06 ENCOUNTER — HOSPITAL ENCOUNTER (OUTPATIENT)
Dept: NON INVASIVE DIAGNOSTICS | Age: 73
Discharge: HOME | End: 2018-09-06
Payer: MEDICAID

## 2018-09-06 DIAGNOSIS — E11.9 TYPE 2 DIABETES MELLITUS WITHOUT COMPLICATION, WITHOUT LONG-TERM CURRENT USE OF INSULIN (HCC): ICD-10-CM

## 2018-09-06 DIAGNOSIS — R42 POSTURAL DIZZINESS: ICD-10-CM

## 2018-09-06 DIAGNOSIS — N18.30 STAGE 3 CHRONIC KIDNEY DISEASE (HCC): ICD-10-CM

## 2018-09-06 LAB
ANION GAP SERPL CALCULATED.3IONS-SCNC: 15 MMOL/L (ref 9–17)
BUN BLDV-MCNC: 17 MG/DL (ref 8–23)
BUN/CREAT BLD: 15 (ref 9–20)
CALCIUM SERPL-MCNC: 9.5 MG/DL (ref 8.6–10.4)
CHLORIDE BLD-SCNC: 103 MMOL/L (ref 98–107)
CO2: 21 MMOL/L (ref 20–31)
CREAT SERPL-MCNC: 1.16 MG/DL (ref 0.5–0.9)
CREATININE URINE: 239.1 MG/DL (ref 28–217)
GFR AFRICAN AMERICAN: 56 ML/MIN
GFR NON-AFRICAN AMERICAN: 46 ML/MIN
GFR SERPL CREATININE-BSD FRML MDRD: ABNORMAL ML/MIN/{1.73_M2}
GFR SERPL CREATININE-BSD FRML MDRD: ABNORMAL ML/MIN/{1.73_M2}
GLUCOSE BLD-MCNC: 242 MG/DL (ref 70–99)
LV EF: 65 %
LVEF MODALITY: NORMAL
MICROALBUMIN/CREAT 24H UR: 45 MG/L
MICROALBUMIN/CREAT UR-RTO: 19 MCG/MG CREAT
POTASSIUM SERPL-SCNC: 4.4 MMOL/L (ref 3.7–5.3)
SODIUM BLD-SCNC: 139 MMOL/L (ref 135–144)

## 2018-09-06 PROCEDURE — 82043 UR ALBUMIN QUANTITATIVE: CPT

## 2018-09-06 PROCEDURE — 80048 BASIC METABOLIC PNL TOTAL CA: CPT

## 2018-09-06 PROCEDURE — 36415 COLL VENOUS BLD VENIPUNCTURE: CPT

## 2018-09-06 PROCEDURE — 93306 TTE W/DOPPLER COMPLETE: CPT

## 2018-09-06 PROCEDURE — 82570 ASSAY OF URINE CREATININE: CPT

## 2018-09-18 ENCOUNTER — HOSPITAL ENCOUNTER (OUTPATIENT)
Dept: PAIN MANAGEMENT | Age: 73
Discharge: HOME | End: 2018-09-18
Payer: MEDICAID

## 2018-09-18 DIAGNOSIS — M15.9 PRIMARY OSTEOARTHRITIS INVOLVING MULTIPLE JOINTS: Primary | ICD-10-CM

## 2018-09-18 DIAGNOSIS — M19.011 PRIMARY OSTEOARTHRITIS OF BOTH SHOULDERS: ICD-10-CM

## 2018-09-18 DIAGNOSIS — Z79.891 CHRONIC USE OF OPIATE DRUGS THERAPEUTIC PURPOSES: ICD-10-CM

## 2018-09-18 DIAGNOSIS — M25.511 PAIN OF BOTH SHOULDER JOINTS: ICD-10-CM

## 2018-09-18 DIAGNOSIS — E66.01 MORBID OBESITY WITH BMI OF 50.0-59.9, ADULT (HCC): ICD-10-CM

## 2018-09-18 DIAGNOSIS — M17.0 PRIMARY OSTEOARTHRITIS OF BOTH KNEES: ICD-10-CM

## 2018-09-18 DIAGNOSIS — M19.012 PRIMARY OSTEOARTHRITIS OF BOTH SHOULDERS: ICD-10-CM

## 2018-09-18 DIAGNOSIS — Z51.81 MEDICATION MONITORING ENCOUNTER: ICD-10-CM

## 2018-09-18 DIAGNOSIS — M25.512 PAIN OF BOTH SHOULDER JOINTS: ICD-10-CM

## 2018-09-18 PROCEDURE — 99213 OFFICE O/P EST LOW 20 MIN: CPT | Performed by: NURSE PRACTITIONER

## 2018-09-18 PROCEDURE — 99214 OFFICE O/P EST MOD 30 MIN: CPT

## 2018-09-18 PROCEDURE — 99213 OFFICE O/P EST LOW 20 MIN: CPT

## 2018-09-18 RX ORDER — OXYCODONE HYDROCHLORIDE 5 MG/1
5 TABLET ORAL EVERY 8 HOURS PRN
Qty: 90 TABLET | Refills: 0 | Status: SHIPPED | OUTPATIENT
Start: 2018-09-20 | End: 2018-10-19 | Stop reason: SDUPTHER

## 2018-09-18 RX ORDER — POLYETHYLENE GLYCOL 3350 17 G/17G
17 POWDER, FOR SOLUTION ORAL DAILY
Qty: 510 G | Refills: 0 | Status: SHIPPED | OUTPATIENT
Start: 2018-09-18 | End: 2019-03-14 | Stop reason: SDUPTHER

## 2018-09-18 ASSESSMENT — ENCOUNTER SYMPTOMS
CONSTIPATION: 0
BACK PAIN: 1
COUGH: 0
SHORTNESS OF BREATH: 0

## 2018-09-18 NOTE — PROGRESS NOTES
incontinence, no weakness, and no falling. Pill count: appropriate    Morphine equivalent: 22.5    Attestation: The Prescription Monitoring Report for this patient was reviewed today. (Vincent Hernandez, APRN - CNP)  Documentation: Possible medication side effects, risk of tolerance/dependence & alternative treatments discussed., No signs of potential drug abuse or diversion identified. (Vincent Hernandez, APRN - CNP)      Past Medical History:   Diagnosis Date    Anemia, iron deficiency     Asthma     Carpal tunnel syndrome on right 8/29/2016    Chronic kidney disease     GERD (gastroesophageal reflux disease)     Glucose intolerance (impaired glucose tolerance)     Gout     History of anemia     Normocytic    History of corneal abrasion     Left    History of dyspnea     History of pneumonia     Hyperlipidemia     Hypertension     Morbid obesity ([address]     Neck strain     Obstructive sleep apnea     on CPAP    Osteoarthritis     DJD of knees    Pain     bilateral shoulders, Lt hip    Shoulder strain     History of bilateral shoulder strain    Systemic hypertension     TIA (transient ischemic attack)     Type II or unspecified type diabetes mellitus without mention of complication, not stated as uncontrolled     Vitamin D deficiency     Wears dentures     upper and lower dentures    Wears glasses        Past Surgical History:   Procedure Laterality Date    COLONOSCOPY  10/23/15    per Dr. Eva Wilcox  9/21/15    empi select    NERVE BLOCK Bilateral 08/07/2018    diamond shoulder injection, decadron 10 mg, lido 1%, isovue m-200, naropin 0.5%       Allergies   Allergen Reactions    Nsaids Other (See Comments)     Chronic kidney disease    Proair Respiclick [Albuterol Sulfate]          Current Outpatient Prescriptions:     oxyCODONE (ROXICODONE) 5 MG immediate release tablet, Take 1 tablet by mouth every 8 hours as needed for Pain for up to 30 days. ., Disp: 90 tablet, Rfl: 0    Multiple Vitamin (MULTIVITAMIN) tablet, TAKE 1 TABLET EVERY MORNING, Disp: 28 tablet, Rfl: 11    metFORMIN (GLUCOPHAGE) 500 MG tablet, Take 1 tablet by mouth 2 times daily (with meals), Disp: 60 tablet, Rfl: 3    albuterol sulfate HFA (VENTOLIN HFA) 108 (90 Base) MCG/ACT inhaler, Inhale 2 puffs into the lungs every 6 hours as needed for Wheezing, Disp: 1 Inhaler, Rfl: 2    glimepiride (AMARYL) 2 MG tablet, TAKE 1 TABLET TWICE DAILY(AM/PM), Disp: 56 tablet, Rfl: 5    carvedilol (COREG) 12.5 MG tablet, TAKE 1 TABLET TWICE DAILY(AM/PM), Disp: 56 tablet, Rfl: 5    pravastatin (PRAVACHOL) 40 MG tablet, TAKE 1 TABLET AT BEDTIME, Disp: 28 tablet, Rfl: 5    furosemide (LASIX) 20 MG tablet, TAKE 1 TABLET EVERY MORNING, Disp: 28 tablet, Rfl: 5    ferrous sulfate 325 (65 Fe) MG tablet, TAKE 1 TABLET BY MOUTH DAILY IN THE EVENING, Disp: 28 tablet, Rfl: 11    aspirin 325 MG EC tablet, TAKE 1 TABLET BY MOUTH DAILY IN THE EVENING, Disp: 28 tablet, Rfl: 11    lisinopril (PRINIVIL;ZESTRIL) 20 MG tablet, TAKE 1 TABLET BY MOUTH DAILY IN THE MORNING, Disp: 28 tablet, Rfl: 11    amLODIPine (NORVASC) 10 MG tablet, TAKE 1 TABLET BY MOUTH DAILY IN THE MORNING, Disp: 28 tablet, Rfl: 11    allopurinol (ZYLOPRIM) 100 MG tablet, TAKE 1 TABLET BY MOUTH ATBEDTIME, Disp: 28 tablet, Rfl: 11    calcium carbonate-vitamin D (CALTRATE) 600-400 MG-UNIT TABS per tab, Take 1 tablet by mouth 2 times daily, Disp: , Rfl:     Current Facility-Administered Medications:     bupivacaine (PF) (MARCAINE) 0.5 % injection 10 mg, 2 mL, Intratendinous, Once, Rosealee Lax, DO    Family History   Problem Relation Age of Onset    Asthma Mother     Stroke Sister     Diabetes Sister     Other Neg Hx         Sleep disorders or narcolepsy       Social History     Social History    Marital status: Single     Spouse name: N/A    Number of children: N/A    Years of education: N/A     Occupational History    Not on file.      Social History Main Topics    Smoking status: Former Smoker     Packs/day: 1.00     Years: 30.00     Types: Cigarettes     Quit date: 3/6/1990    Smokeless tobacco: Never Used    Alcohol use No      Comment: occassional    Drug use: No    Sexual activity: No     Other Topics Concern    Not on file     Social History Narrative    No narrative on file       Review of Systems:  Review of Systems   Constitution: Negative for chills and fever. Cardiovascular: Negative for chest pain and irregular heartbeat. Respiratory: Negative for cough and shortness of breath. Musculoskeletal: Positive for back pain, joint pain, joint swelling and stiffness. Gastrointestinal: Negative for constipation. Neurological: Negative for disturbances in coordination and loss of balance. Physical Exam:  T 98.6  /73  P 71  R 18    Physical Exam   Constitutional: She is oriented to person, place, and time and well-developed, well-nourished, and in no distress. HENT:   Head: Normocephalic. Eyes: EOM are normal.   Neck: Normal range of motion. Pulmonary/Chest: Effort normal.   Musculoskeletal: Normal range of motion. Right shoulder: She exhibits tenderness. Left shoulder: She exhibits tenderness. Right hip: She exhibits tenderness. Left hip: She exhibits tenderness. Right knee: Tenderness found. Left knee: Tenderness found. Lumbar back: She exhibits tenderness and pain. Neurological: She is alert and oriented to person, place, and time. Skin: Skin is warm and dry. Psychiatric: Affect normal.       Record/Diagnostics Review:    XR 2017 Right shoulder:      1. Severe degenerative changes and remodeling of the right glenohumeral  joint.  1.1 cm possible large chronically fractured osteophyte versus loose  body projecting over the axillary pouch.  Diffuse osteopenia. 2. No acute fracture or gross dislocation. Left shoulder:     1.  Severe degenerative changes and remodeling

## 2018-09-18 NOTE — PLAN OF CARE
Anju Xiong was seen for  a follow-up evaluation to determine level of care and assess for mental health concerns. 1. Level of Care will  remain at low (green) due to overall history of compliance and no reported history of substance use or significant mental health concerns   2. She should be scheduled for their next followup appointment in 12 months. Should any concerns arise  (short pill counts, problematic urine screen, reported mental health concerns) patient should be scheduled for a sooner check-in. Patient was informed they can request a sooner appointment if needed to which they expressed understanding. Edward Peters

## 2018-09-18 NOTE — BH NOTE
Saint Schiff was seen today for a follow-up appointment as part of [facility] for [facility]. The focus of today's session was an evaluation of mood disorder symptoms, potential for suicide and potential for chemical dependency or abuse. Diagnosis code:     Psych code: F45.42    FINDINGS:     1) The patient arrived on time for appointment and was cooperative and good eye contact. Overall affect was euthymic. The patient did demonstrate pain behaviors during the session. She was A & O x3.      2) Patient reports current pain level to be 8/10 pain . Patient reports usual pain level is 8/10. Pain is exacerbated on an ascending scale of 0-10, by physicial activity. and standing She states pain is relieved by : heat. Injections here helpful for about two weeks. 3) Patient states current mood is:  euthymic. Patient reported feelings of helplessness, hopelessness, and worthlessness \"once in a while\". Patient denied current suicidal thoughts. She does not report issues related to sleep. The patient does not report issues related to appetite. Further stated that they receive social support from family and that they find enjoyment in reading, cooking. Pain medication is  helpful. Patient reports functioning is not improved by medication. 4) Current Medications:   Prior to Admission medications    Medication Sig Start Date End Date Taking? Authorizing Provider   oxyCODONE (ROXICODONE) 5 MG immediate release tablet Take 1 tablet by mouth every 8 hours as needed for Pain for up to 30 days. . 8/21/18 9/20/18  DAVID Magdaleno - CNP   Multiple Vitamin (MULTIVITAMIN) tablet TAKE 1 TABLET EVERY MORNING 8/20/18   Flaquita Pope MD   metFORMIN (GLUCOPHAGE) 500 MG tablet Take 1 tablet by mouth 2 times daily (with meals) 8/15/18   Sharlee Lesch, MD   albuterol sulfate HFA (VENTOLIN HFA) 108 (90 Base) MCG/ACT inhaler Inhale 2 puffs into the lungs every 6 hours as needed for Wheezing 7/11/18   Chel Roca MD   glimepiride (AMARYL) 2 MG tablet TAKE 1 TABLET TWICE DAILY(AM/PM) 6/25/18   Nael Kendall MD   carvedilol (COREG) 12.5 MG tablet TAKE 1 TABLET TWICE DAILY(AM/PM) 6/25/18   Nael Kendall MD   pravastatin (PRAVACHOL) 40 MG tablet TAKE 1 TABLET AT BEDTIME 6/25/18   Nael Kendall MD   furosemide (LASIX) 20 MG tablet TAKE 1 TABLET EVERY MORNING 6/25/18   Nael Kendall MD   ferrous sulfate 325 (65 Fe) MG tablet TAKE 1 TABLET BY MOUTH DAILY IN THE EVENING 2/12/18   Nael Kendall MD   aspirin 325 MG EC tablet TAKE 1 TABLET BY MOUTH DAILY IN THE EVENING 2/12/18   Nael Kendall MD   lisinopril (PRINIVIL;ZESTRIL) 20 MG tablet TAKE 1 TABLET BY MOUTH DAILY IN THE MORNING 2/12/18   Nael Kendall MD   amLODIPine (NORVASC) 10 MG tablet TAKE 1 TABLET BY MOUTH DAILY IN THE MORNING 2/12/18   Nael Kendall MD   allopurinol (ZYLOPRIM) 100 MG tablet TAKE 1 TABLET BY MOUTH ATBEDTIME 2/12/18   Nael Kendall MD   calcium carbonate-vitamin D (CALTRATE) 600-400 MG-UNIT TABS per tab Take 1 tablet by mouth 2 times daily    Historical Provider, MD       5) Other substance Use:  Reported today: Described alcohol consumption as occurring maybe once per year. Reported that last alcohol consumption was over a year ago. Patient denied use of nicotine products, current use is: none. Caffeine use is 3+ cups per day. Patient denied current illicit drug use. Patient denied history of alcohol or drug-related issues. Patient denied history of alcohol or drug-related treatment. The patient states that they do not run out of pain medication early at the end of the month. Patient does not report a desire to take medication in higher doses or more frequently than prescribed, compulsive use, loss of control, and continued use despite harm. Review of file for past 12 months shows appropriate pill counts and REILLY.   She did have an outside fill in April 2018, which she stated she did not realize was inappropriate (and she had extra of our pills that month to offset). 6) She reports that they do not currently engage in counseling services. Further they denied a history of mental health treatment and denied a history of psychiatric hospitalization. 7) Given the information at hand and it should be noted that this evaluation is based only on the patient's self-report. She will be assigned at a care level of low (green) due to overall history of compliance and no reported history of substance use or significant mental health concerns     It should be noted that the above is provided by Pt. Self report. RECOMMENDATIONS:      1)  She should be scheduled for their next followup appointment in 12 months. Should any concerns arise  (short pill counts, problematic urine screen, reported mental health concerns) patient should be scheduled for a sooner check-in. Patient was informed they can request a sooner appointment if needed to which they expressed understanding. .                   Electronically signed by Tiff Kramer, PhD on 9/18/2018 at 10:31 AM

## 2018-10-19 ENCOUNTER — HOSPITAL ENCOUNTER (OUTPATIENT)
Dept: PAIN MANAGEMENT | Age: 73
Discharge: HOME | End: 2018-10-19
Payer: MEDICAID

## 2018-10-19 VITALS
TEMPERATURE: 98.5 F | SYSTOLIC BLOOD PRESSURE: 165 MMHG | HEART RATE: 81 BPM | DIASTOLIC BLOOD PRESSURE: 89 MMHG | RESPIRATION RATE: 18 BRPM

## 2018-10-19 DIAGNOSIS — M19.012 PRIMARY OSTEOARTHRITIS OF BOTH SHOULDERS: Chronic | ICD-10-CM

## 2018-10-19 DIAGNOSIS — E66.01 MORBID OBESITY WITH BMI OF 50.0-59.9, ADULT (HCC): Chronic | ICD-10-CM

## 2018-10-19 DIAGNOSIS — M15.9 PRIMARY OSTEOARTHRITIS INVOLVING MULTIPLE JOINTS: ICD-10-CM

## 2018-10-19 DIAGNOSIS — Z79.891 CHRONIC USE OF OPIATE DRUGS THERAPEUTIC PURPOSES: Chronic | ICD-10-CM

## 2018-10-19 DIAGNOSIS — M25.511 PAIN OF BOTH SHOULDER JOINTS: ICD-10-CM

## 2018-10-19 DIAGNOSIS — Z51.81 MEDICATION MONITORING ENCOUNTER: ICD-10-CM

## 2018-10-19 DIAGNOSIS — M19.011 PRIMARY OSTEOARTHRITIS OF BOTH SHOULDERS: Chronic | ICD-10-CM

## 2018-10-19 DIAGNOSIS — M17.0 PRIMARY OSTEOARTHRITIS OF BOTH KNEES: Primary | ICD-10-CM

## 2018-10-19 DIAGNOSIS — M25.512 PAIN OF BOTH SHOULDER JOINTS: ICD-10-CM

## 2018-10-19 PROCEDURE — 99213 OFFICE O/P EST LOW 20 MIN: CPT | Performed by: NURSE PRACTITIONER

## 2018-10-19 PROCEDURE — 99214 OFFICE O/P EST MOD 30 MIN: CPT

## 2018-10-19 RX ORDER — OXYCODONE HYDROCHLORIDE 5 MG/1
5 TABLET ORAL EVERY 8 HOURS PRN
Qty: 90 TABLET | Refills: 0 | Status: SHIPPED | OUTPATIENT
Start: 2018-10-19 | End: 2018-11-16 | Stop reason: SDUPTHER

## 2018-10-19 ASSESSMENT — PAIN DESCRIPTION - ORIENTATION: ORIENTATION: RIGHT;LEFT

## 2018-10-19 ASSESSMENT — PAIN SCALES - GENERAL: PAINLEVEL_OUTOF10: 8

## 2018-10-19 ASSESSMENT — ENCOUNTER SYMPTOMS
COUGH: 0
CONSTIPATION: 0
SHORTNESS OF BREATH: 0
BACK PAIN: 1

## 2018-10-19 ASSESSMENT — PAIN DESCRIPTION - ONSET: ONSET: ON-GOING

## 2018-10-19 ASSESSMENT — PAIN DESCRIPTION - LOCATION: LOCATION: SHOULDER;KNEE

## 2018-10-19 ASSESSMENT — PAIN DESCRIPTION - FREQUENCY: FREQUENCY: CONTINUOUS

## 2018-10-19 ASSESSMENT — PAIN DESCRIPTION - PROGRESSION: CLINICAL_PROGRESSION: NOT CHANGED

## 2018-10-19 ASSESSMENT — PAIN DESCRIPTION - DESCRIPTORS: DESCRIPTORS: CONSTANT;ACHING

## 2018-10-19 ASSESSMENT — PAIN DESCRIPTION - PAIN TYPE: TYPE: CHRONIC PAIN

## 2018-10-19 NOTE — PROGRESS NOTES
[place] or bladder incontinence, no weakness, and no falling. Pill count: appropriate    Morphine equivalent: 22.5    Controlled Substances Monitoring:     RX Monitoring 10/19/2018   Attestation The Prescription Monitoring Report for this patient was reviewed today. Documentation Possible medication side effects, risk of tolerance/dependence & alternative treatments discussed. ;No signs of potential drug abuse or diversion identified. Medication Contracts Existing medication contract.        Past Medical History:   Diagnosis Date    Anemia, iron deficiency     Asthma     Carpal tunnel syndrome on right 8/29/2016    Chronic kidney disease     GERD (gastroesophageal reflux disease)     Glucose intolerance (impaired glucose tolerance)     Gout     History of anemia     Normocytic    History of corneal abrasion     Left    History of dyspnea     History of pneumonia     Hyperlipidemia     Hypertension     Morbid obesity ([address]     Neck strain     Obstructive sleep apnea     on CPAP    Osteoarthritis     DJD of knees    Pain     bilateral shoulders, Lt hip    Shoulder strain     History of bilateral shoulder strain    Systemic hypertension     TIA (transient ischemic attack)     Type II or unspecified type diabetes mellitus without mention of complication, not stated as uncontrolled     Vitamin D deficiency     Wears dentures     upper and lower dentures    Wears glasses        Past Surgical History:   Procedure Laterality Date    COLONOSCOPY  10/23/15    per Dr. Vanna Mason  9/21/15    empi select    NERVE BLOCK Bilateral 08/07/2018    diamond shoulder injection, decadron 10 mg, lido 1%, isovue m-200, naropin 0.5%       Allergies   Allergen Reactions    Nsaids Other (See Comments)     Chronic kidney disease    Proair Respiclick [Albuterol Sulfate]          Current Outpatient Prescriptions:     oxyCODONE (ROXICODONE) 5 MG immediate release tablet, Take 1 tablet by mouth every 8

## 2018-11-16 ENCOUNTER — HOSPITAL ENCOUNTER (OUTPATIENT)
Dept: PAIN MANAGEMENT | Age: 73
Discharge: HOME | End: 2018-11-16
Payer: MEDICAID

## 2018-11-16 ENCOUNTER — TELEPHONE (OUTPATIENT)
Dept: PRIMARY CARE CLINIC | Age: 73
End: 2018-11-16

## 2018-11-16 VITALS
SYSTOLIC BLOOD PRESSURE: 146 MMHG | HEART RATE: 87 BPM | DIASTOLIC BLOOD PRESSURE: 85 MMHG | RESPIRATION RATE: 20 BRPM | TEMPERATURE: 98.9 F

## 2018-11-16 DIAGNOSIS — M19.012 PRIMARY OSTEOARTHRITIS OF BOTH SHOULDERS: Chronic | ICD-10-CM

## 2018-11-16 DIAGNOSIS — M17.0 PRIMARY OSTEOARTHRITIS OF BOTH KNEES: ICD-10-CM

## 2018-11-16 DIAGNOSIS — Z51.81 MEDICATION MONITORING ENCOUNTER: Primary | ICD-10-CM

## 2018-11-16 DIAGNOSIS — E11.9 TYPE 2 DIABETES MELLITUS WITHOUT COMPLICATION, WITHOUT LONG-TERM CURRENT USE OF INSULIN (HCC): ICD-10-CM

## 2018-11-16 DIAGNOSIS — M19.011 PRIMARY OSTEOARTHRITIS OF BOTH SHOULDERS: Chronic | ICD-10-CM

## 2018-11-16 DIAGNOSIS — Z79.891 CHRONIC USE OF OPIATE DRUGS THERAPEUTIC PURPOSES: Chronic | ICD-10-CM

## 2018-11-16 PROCEDURE — 99214 OFFICE O/P EST MOD 30 MIN: CPT

## 2018-11-16 PROCEDURE — 99213 OFFICE O/P EST LOW 20 MIN: CPT | Performed by: NURSE PRACTITIONER

## 2018-11-16 RX ORDER — OXYCODONE HYDROCHLORIDE 5 MG/1
5 TABLET ORAL EVERY 8 HOURS PRN
Qty: 90 TABLET | Refills: 0 | Status: SHIPPED | OUTPATIENT
Start: 2018-11-21 | End: 2018-12-19 | Stop reason: SDUPTHER

## 2018-11-16 ASSESSMENT — PAIN DESCRIPTION - LOCATION: LOCATION: SHOULDER;KNEE

## 2018-11-16 ASSESSMENT — PAIN DESCRIPTION - PROGRESSION: CLINICAL_PROGRESSION: NOT CHANGED

## 2018-11-16 ASSESSMENT — PAIN DESCRIPTION - ONSET: ONSET: ON-GOING

## 2018-11-16 ASSESSMENT — PAIN DESCRIPTION - PAIN TYPE: TYPE: CHRONIC PAIN

## 2018-11-16 ASSESSMENT — ENCOUNTER SYMPTOMS
SHORTNESS OF BREATH: 0
CONSTIPATION: 0
COUGH: 0
BACK PAIN: 1

## 2018-11-16 ASSESSMENT — PAIN SCALES - GENERAL: PAINLEVEL_OUTOF10: 8

## 2018-11-16 ASSESSMENT — PAIN DESCRIPTION - DESCRIPTORS: DESCRIPTORS: ACHING;CONSTANT

## 2018-11-16 ASSESSMENT — PAIN DESCRIPTION - FREQUENCY: FREQUENCY: CONTINUOUS

## 2018-11-16 ASSESSMENT — PAIN DESCRIPTION - ORIENTATION: ORIENTATION: RIGHT;LEFT

## 2018-11-16 NOTE — PROGRESS NOTES
carvedilol (COREG) 12.5 MG tablet, TAKE 1 TABLET TWICE DAILY(AM/PM), Disp: 56 tablet, Rfl: 5    pravastatin (PRAVACHOL) 40 MG tablet, TAKE 1 TABLET AT BEDTIME, Disp: 28 tablet, Rfl: 5    furosemide (LASIX) 20 MG tablet, TAKE 1 TABLET EVERY MORNING, Disp: 28 tablet, Rfl: 5    ferrous sulfate 325 (65 Fe) MG tablet, TAKE 1 TABLET BY MOUTH DAILY IN THE EVENING, Disp: 28 tablet, Rfl: 11    aspirin 325 MG EC tablet, TAKE 1 TABLET BY MOUTH DAILY IN THE EVENING, Disp: 28 tablet, Rfl: 11    lisinopril (PRINIVIL;ZESTRIL) 20 MG tablet, TAKE 1 TABLET BY MOUTH DAILY IN THE MORNING, Disp: 28 tablet, Rfl: 11    amLODIPine (NORVASC) 10 MG tablet, TAKE 1 TABLET BY MOUTH DAILY IN THE MORNING, Disp: 28 tablet, Rfl: 11    allopurinol (ZYLOPRIM) 100 MG tablet, TAKE 1 TABLET BY MOUTH ATBEDTIME, Disp: 28 tablet, Rfl: 11    calcium carbonate-vitamin D (CALTRATE) 600-400 MG-UNIT TABS per tab, Take 1 tablet by mouth 2 times daily, Disp: , Rfl:     Current Facility-Administered Medications:     bupivacaine (PF) (MARCAINE) 0.5 % injection 10 mg, 2 mL, Intratendinous, Once, Diamond Glassing, DO    Family History   Problem Relation Age of Onset    Asthma Mother     Stroke Sister     Diabetes Sister     Other Neg Hx         Sleep disorders or narcolepsy       Social History     Social History    Marital status: Single     Spouse name: N/A    Number of children: N/A    Years of education: N/A     Occupational History    Not on file. Social History Main Topics    Smoking status: Former Smoker     Packs/day: 1.00     Years: 30.00     Types: Cigarettes     Quit date: 3/6/1990    Smokeless tobacco: Never Used    Alcohol use No      Comment: occassional    Drug use: No    Sexual activity: No     Other Topics Concern    Not on file     Social History Narrative    No narrative on file       Review of Systems:  Review of Systems   Constitution: Negative for chills and fever.    Cardiovascular: Negative for chest pain and

## 2018-11-29 ENCOUNTER — TELEPHONE (OUTPATIENT)
Dept: PULMONOLOGY | Age: 73
End: 2018-11-29

## 2018-11-29 DIAGNOSIS — G47.33 OBSTRUCTIVE SLEEP APNEA: Primary | ICD-10-CM

## 2018-12-10 NOTE — TELEPHONE ENCOUNTER
without long-term current use of insulin (HCC)     Adjustment disorder with mixed anxiety and depressed mood     Bilateral leg edema     Extensor tenosynovitis of right wrist     Carpal tunnel syndrome on right     Medication monitoring encounter     Primary osteoarthritis of both knees     Postural dizziness     Mild intermittent asthma without complication     Morbid obesity with BMI of 50.0-59.9, adult (HCC)     Chronic use of opiate drugs therapeutic purposes     Nuclear senile cataract

## 2018-12-11 ENCOUNTER — HOSPITAL ENCOUNTER (OUTPATIENT)
Dept: SLEEP CENTER | Age: 73
Discharge: HOME | End: 2018-12-13
Payer: MEDICAID

## 2018-12-11 DIAGNOSIS — G47.33 OBSTRUCTIVE SLEEP APNEA: ICD-10-CM

## 2018-12-11 PROCEDURE — 95811 POLYSOM 6/>YRS CPAP 4/> PARM: CPT

## 2018-12-11 RX ORDER — CARVEDILOL 12.5 MG/1
TABLET ORAL
Qty: 56 TABLET | Refills: 5 | Status: SHIPPED | OUTPATIENT
Start: 2018-12-11 | End: 2019-05-28 | Stop reason: SDUPTHER

## 2018-12-11 RX ORDER — FUROSEMIDE 20 MG/1
TABLET ORAL
Qty: 28 TABLET | Refills: 5 | Status: SHIPPED | OUTPATIENT
Start: 2018-12-11 | End: 2019-05-28 | Stop reason: SDUPTHER

## 2018-12-11 RX ORDER — PRAVASTATIN SODIUM 40 MG
TABLET ORAL
Qty: 28 TABLET | Refills: 5 | Status: SHIPPED | OUTPATIENT
Start: 2018-12-11 | End: 2019-05-28 | Stop reason: SDUPTHER

## 2018-12-11 RX ORDER — GABAPENTIN 600 MG/1
600 TABLET ORAL 3 TIMES DAILY
Qty: 84 TABLET | Refills: 5 | Status: SHIPPED | OUTPATIENT
Start: 2018-12-11 | End: 2019-02-04 | Stop reason: ALTCHOICE

## 2018-12-11 RX ORDER — GLIMEPIRIDE 2 MG/1
TABLET ORAL
Qty: 56 TABLET | Refills: 5 | Status: SHIPPED | OUTPATIENT
Start: 2018-12-11 | End: 2019-02-04

## 2018-12-12 VITALS — RESPIRATION RATE: 16 BRPM | HEIGHT: 59 IN | WEIGHT: 273 LBS | HEART RATE: 74 BPM | BODY MASS INDEX: 55.04 KG/M2

## 2018-12-19 ENCOUNTER — HOSPITAL ENCOUNTER (OUTPATIENT)
Dept: PAIN MANAGEMENT | Age: 73
Discharge: HOME | End: 2018-12-19
Payer: MEDICAID

## 2018-12-19 VITALS
TEMPERATURE: 98 F | RESPIRATION RATE: 14 BRPM | DIASTOLIC BLOOD PRESSURE: 75 MMHG | SYSTOLIC BLOOD PRESSURE: 174 MMHG | HEART RATE: 75 BPM

## 2018-12-19 DIAGNOSIS — M25.511 PAIN OF BOTH SHOULDER JOINTS: ICD-10-CM

## 2018-12-19 DIAGNOSIS — M17.0 PRIMARY OSTEOARTHRITIS OF BOTH KNEES: ICD-10-CM

## 2018-12-19 DIAGNOSIS — M19.011 PRIMARY OSTEOARTHRITIS OF BOTH SHOULDERS: Chronic | ICD-10-CM

## 2018-12-19 DIAGNOSIS — M25.512 PAIN OF BOTH SHOULDER JOINTS: ICD-10-CM

## 2018-12-19 DIAGNOSIS — M19.012 PRIMARY OSTEOARTHRITIS OF BOTH SHOULDERS: Chronic | ICD-10-CM

## 2018-12-19 DIAGNOSIS — M15.9 PRIMARY OSTEOARTHRITIS INVOLVING MULTIPLE JOINTS: ICD-10-CM

## 2018-12-19 DIAGNOSIS — Z51.81 MEDICATION MONITORING ENCOUNTER: ICD-10-CM

## 2018-12-19 DIAGNOSIS — Z79.891 CHRONIC USE OF OPIATE DRUGS THERAPEUTIC PURPOSES: Primary | Chronic | ICD-10-CM

## 2018-12-19 PROCEDURE — 99213 OFFICE O/P EST LOW 20 MIN: CPT | Performed by: NURSE PRACTITIONER

## 2018-12-19 PROCEDURE — 99214 OFFICE O/P EST MOD 30 MIN: CPT

## 2018-12-19 RX ORDER — OXYCODONE HYDROCHLORIDE 5 MG/1
5 TABLET ORAL EVERY 8 HOURS PRN
Qty: 90 TABLET | Refills: 0 | Status: SHIPPED | OUTPATIENT
Start: 2018-12-21 | End: 2019-01-17 | Stop reason: SDUPTHER

## 2018-12-19 ASSESSMENT — ENCOUNTER SYMPTOMS
SHORTNESS OF BREATH: 0
COUGH: 0
CONSTIPATION: 0

## 2018-12-19 NOTE — PROGRESS NOTES
Patient is here today to review medication contract. Chief Complaint: joint pain    PMH: Pt c/o chronic bilat shoulder and knee pain with no known injury. She has been a pt here since 2015 with complaints of arthritic symptoms in the shoulders and knees. She says that she has had this for some time now. She had been evaluated by Dr Ute Land the past, and has had injections at other pain management at [facility]. She says that these did not help her pain. She has had aquatic PT in the past, (which only helped while in the water).  It was recommended in the past that she have shoulder surgery, but she is not interested. We prescribe oxycodone and compounding cream for pain relief - which is helping with her pain. She had bilat shoulder injections recently and reports 80-90% relief of pain and and increased ROM. Knee Pain    The incident occurred more than 1 week ago. There was no injury mechanism. The pain is present in the left knee and right knee. The quality of the pain is described as aching. The pain is at a severity of 8/10. The pain is mild. Pertinent negatives include no inability to bear weight. She reports no foreign bodies present. The symptoms are aggravated by movement. She has tried ice and elevation for the symptoms. The treatment provided mild relief. Shoulder Pain    The pain is present in the left shoulder and right shoulder. This is a chronic problem. The current episode started more than 1 year ago. There has been no history of extremity trauma. The problem occurs constantly. The problem has been gradually worsening. The quality of the pain is described as sharp and aching. The pain is at a severity of 9/10. The pain is severe. Pertinent negatives include no fever or inability to bear weight. The symptoms are aggravated by activity and standing. She has tried NSAIDS, cold and heat for the symptoms. The treatment provided mild relief.  Her past medical history is significant for furosemide (LASIX) 20 MG tablet, TAKE 1 TABLET EVERY MORNING, Disp: 28 tablet, Rfl: 5    pravastatin (PRAVACHOL) 40 MG tablet, TAKE 1 TABLET AT BEDTIME, Disp: 28 tablet, Rfl: 5    glimepiride (AMARYL) 2 MG tablet, TAKE 1 TABLET TWICE DAILY(AM/PM), Disp: 56 tablet, Rfl: 5    carvedilol (COREG) 12.5 MG tablet, TAKE 1 TABLET TWICE DAILY(AM/PM), Disp: 56 tablet, Rfl: 5    gabapentin (NEURONTIN) 600 MG tablet, Take 1 tablet by mouth 3 times daily for 30 days. ., Disp: 84 tablet, Rfl: 5    metFORMIN (GLUCOPHAGE) 500 MG tablet, Take 1 tablet by mouth 2 times daily (with meals), Disp: 60 tablet, Rfl: 3    oxyCODONE (ROXICODONE) 5 MG immediate release tablet, Take 1 tablet by mouth every 8 hours as needed for Pain for up to 30 days. ., Disp: 90 tablet, Rfl: 0    Multiple Vitamin (MULTIVITAMIN) tablet, TAKE 1 TABLET EVERY MORNING, Disp: 28 tablet, Rfl: 11    albuterol sulfate HFA (VENTOLIN HFA) 108 (90 Base) MCG/ACT inhaler, Inhale 2 puffs into the lungs every 6 hours as needed for Wheezing, Disp: 1 Inhaler, Rfl: 2    ferrous sulfate 325 (65 Fe) MG tablet, TAKE 1 TABLET BY MOUTH DAILY IN THE EVENING, Disp: 28 tablet, Rfl: 11    aspirin 325 MG EC tablet, TAKE 1 TABLET BY MOUTH DAILY IN THE EVENING, Disp: 28 tablet, Rfl: 11    lisinopril (PRINIVIL;ZESTRIL) 20 MG tablet, TAKE 1 TABLET BY MOUTH DAILY IN THE MORNING, Disp: 28 tablet, Rfl: 11    amLODIPine (NORVASC) 10 MG tablet, TAKE 1 TABLET BY MOUTH DAILY IN THE MORNING, Disp: 28 tablet, Rfl: 11    allopurinol (ZYLOPRIM) 100 MG tablet, TAKE 1 TABLET BY MOUTH ATBEDTIME, Disp: 28 tablet, Rfl: 11    calcium carbonate-vitamin D (CALTRATE) 600-400 MG-UNIT TABS per tab, Take 1 tablet by mouth 2 times daily, Disp: , Rfl:     Current Facility-Administered Medications:     bupivacaine (PF) (MARCAINE) 0.5 % injection 10 mg, 2 mL, Intratendinous, Once, Garcia Apo, DO    Family History   Problem Relation Age of Onset    Asthma Mother     Stroke Sister     Diabetes

## 2019-01-07 DIAGNOSIS — I10 ESSENTIAL HYPERTENSION: ICD-10-CM

## 2019-01-07 DIAGNOSIS — M10.9 GOUT WITHOUT TOPHUS: ICD-10-CM

## 2019-01-08 RX ORDER — ASPIRIN 81 MG/1
TABLET ORAL
Qty: 28 TABLET | Refills: 11 | Status: SHIPPED | OUTPATIENT
Start: 2019-01-08 | End: 2019-12-17 | Stop reason: SDUPTHER

## 2019-01-08 RX ORDER — LISINOPRIL 20 MG/1
TABLET ORAL
Qty: 28 TABLET | Refills: 11 | Status: SHIPPED | OUTPATIENT
Start: 2019-01-08 | End: 2019-12-17 | Stop reason: SDUPTHER

## 2019-01-08 RX ORDER — OMEPRAZOLE 40 MG/1
CAPSULE, DELAYED RELEASE ORAL
Qty: 28 CAPSULE | Refills: 11 | Status: SHIPPED | OUTPATIENT
Start: 2019-01-08 | End: 2019-12-17 | Stop reason: SDUPTHER

## 2019-01-08 RX ORDER — FERROUS SULFATE 325(65) MG
TABLET ORAL
Qty: 28 TABLET | Refills: 11 | Status: SHIPPED | OUTPATIENT
Start: 2019-01-08 | End: 2019-12-17 | Stop reason: SDUPTHER

## 2019-01-08 RX ORDER — AMLODIPINE BESYLATE 10 MG/1
TABLET ORAL
Qty: 28 TABLET | Refills: 11 | Status: SHIPPED | OUTPATIENT
Start: 2019-01-08 | End: 2019-12-17 | Stop reason: SDUPTHER

## 2019-01-08 RX ORDER — ALLOPURINOL 100 MG/1
TABLET ORAL
Qty: 28 TABLET | Refills: 11 | Status: SHIPPED | OUTPATIENT
Start: 2019-01-08 | End: 2019-12-17 | Stop reason: SDUPTHER

## 2019-01-17 ENCOUNTER — HOSPITAL ENCOUNTER (OUTPATIENT)
Dept: PAIN MANAGEMENT | Age: 74
Discharge: HOME | End: 2019-01-17
Payer: MEDICAID

## 2019-01-17 VITALS
SYSTOLIC BLOOD PRESSURE: 166 MMHG | HEIGHT: 59 IN | WEIGHT: 285 LBS | BODY MASS INDEX: 57.45 KG/M2 | RESPIRATION RATE: 18 BRPM | TEMPERATURE: 98.2 F | DIASTOLIC BLOOD PRESSURE: 96 MMHG | HEART RATE: 85 BPM

## 2019-01-17 DIAGNOSIS — M19.012 PRIMARY OSTEOARTHRITIS OF BOTH SHOULDERS: ICD-10-CM

## 2019-01-17 DIAGNOSIS — M19.011 PRIMARY OSTEOARTHRITIS OF BOTH SHOULDERS: ICD-10-CM

## 2019-01-17 DIAGNOSIS — Z79.891 CHRONIC USE OF OPIATE DRUGS THERAPEUTIC PURPOSES: ICD-10-CM

## 2019-01-17 DIAGNOSIS — E66.01 MORBID OBESITY WITH BMI OF 50.0-59.9, ADULT (HCC): ICD-10-CM

## 2019-01-17 DIAGNOSIS — M17.0 PRIMARY OSTEOARTHRITIS OF BOTH KNEES: ICD-10-CM

## 2019-01-17 DIAGNOSIS — M15.9 PRIMARY OSTEOARTHRITIS INVOLVING MULTIPLE JOINTS: Primary | ICD-10-CM

## 2019-01-17 PROCEDURE — 99213 OFFICE O/P EST LOW 20 MIN: CPT | Performed by: NURSE PRACTITIONER

## 2019-01-17 PROCEDURE — 99214 OFFICE O/P EST MOD 30 MIN: CPT

## 2019-01-17 RX ORDER — OXYCODONE HYDROCHLORIDE 5 MG/1
5 TABLET ORAL EVERY 8 HOURS PRN
Qty: 84 TABLET | Refills: 0 | Status: SHIPPED | OUTPATIENT
Start: 2019-01-20 | End: 2019-02-12 | Stop reason: SDUPTHER

## 2019-01-17 ASSESSMENT — ENCOUNTER SYMPTOMS
CONSTIPATION: 0
COUGH: 0
SHORTNESS OF BREATH: 0

## 2019-01-18 ENCOUNTER — OFFICE VISIT (OUTPATIENT)
Dept: PULMONOLOGY | Age: 74
End: 2019-01-18
Payer: MEDICAID

## 2019-01-18 VITALS
BODY MASS INDEX: 55.96 KG/M2 | HEIGHT: 59 IN | WEIGHT: 277.6 LBS | HEART RATE: 83 BPM | DIASTOLIC BLOOD PRESSURE: 86 MMHG | OXYGEN SATURATION: 96 % | SYSTOLIC BLOOD PRESSURE: 133 MMHG | RESPIRATION RATE: 14 BRPM

## 2019-01-18 DIAGNOSIS — E66.01 MORBID OBESITY WITH BMI OF 50.0-59.9, ADULT (HCC): ICD-10-CM

## 2019-01-18 DIAGNOSIS — G45.9 TIA (TRANSIENT ISCHEMIC ATTACK): ICD-10-CM

## 2019-01-18 DIAGNOSIS — I10 ESSENTIAL HYPERTENSION: ICD-10-CM

## 2019-01-18 DIAGNOSIS — R60.9 EDEMA, UNSPECIFIED TYPE: ICD-10-CM

## 2019-01-18 DIAGNOSIS — G47.33 OBSTRUCTIVE SLEEP APNEA: Primary | ICD-10-CM

## 2019-01-18 DIAGNOSIS — K21.00 GASTROESOPHAGEAL REFLUX DISEASE WITH ESOPHAGITIS: ICD-10-CM

## 2019-01-18 DIAGNOSIS — E66.01 MORBID OBESITY (HCC): ICD-10-CM

## 2019-01-18 DIAGNOSIS — E11.9 TYPE 2 DIABETES MELLITUS WITHOUT COMPLICATION, WITHOUT LONG-TERM CURRENT USE OF INSULIN (HCC): ICD-10-CM

## 2019-01-18 PROCEDURE — G8427 DOCREV CUR MEDS BY ELIG CLIN: HCPCS | Performed by: INTERNAL MEDICINE

## 2019-01-18 PROCEDURE — 4040F PNEUMOC VAC/ADMIN/RCVD: CPT | Performed by: INTERNAL MEDICINE

## 2019-01-18 PROCEDURE — 1123F ACP DISCUSS/DSCN MKR DOCD: CPT | Performed by: INTERNAL MEDICINE

## 2019-01-18 PROCEDURE — 1090F PRES/ABSN URINE INCON ASSESS: CPT | Performed by: INTERNAL MEDICINE

## 2019-01-18 PROCEDURE — 2022F DILAT RTA XM EVC RTNOPTHY: CPT | Performed by: INTERNAL MEDICINE

## 2019-01-18 PROCEDURE — 99214 OFFICE O/P EST MOD 30 MIN: CPT | Performed by: INTERNAL MEDICINE

## 2019-01-18 PROCEDURE — 1101F PT FALLS ASSESS-DOCD LE1/YR: CPT | Performed by: INTERNAL MEDICINE

## 2019-01-18 PROCEDURE — G8399 PT W/DXA RESULTS DOCUMENT: HCPCS | Performed by: INTERNAL MEDICINE

## 2019-01-18 PROCEDURE — G8484 FLU IMMUNIZE NO ADMIN: HCPCS | Performed by: INTERNAL MEDICINE

## 2019-01-18 PROCEDURE — 3017F COLORECTAL CA SCREEN DOC REV: CPT | Performed by: INTERNAL MEDICINE

## 2019-01-18 PROCEDURE — G8417 CALC BMI ABV UP PARAM F/U: HCPCS | Performed by: INTERNAL MEDICINE

## 2019-01-18 PROCEDURE — 1036F TOBACCO NON-USER: CPT | Performed by: INTERNAL MEDICINE

## 2019-01-18 PROCEDURE — 3046F HEMOGLOBIN A1C LEVEL >9.0%: CPT | Performed by: INTERNAL MEDICINE

## 2019-01-18 ASSESSMENT — SLEEP AND FATIGUE QUESTIONNAIRES
HOW LIKELY ARE YOU TO NOD OFF OR FALL ASLEEP WHEN YOU ARE A PASSENGER IN A CAR FOR AN HOUR WITHOUT A BREAK: 0
HOW LIKELY ARE YOU TO NOD OFF OR FALL ASLEEP WHILE SITTING INACTIVE IN A PUBLIC PLACE: 0
HOW LIKELY ARE YOU TO NOD OFF OR FALL ASLEEP WHILE SITTING AND READING: 2
HOW LIKELY ARE YOU TO NOD OFF OR FALL ASLEEP WHILE SITTING QUIETLY AFTER LUNCH WITHOUT ALCOHOL: 0
ESS TOTAL SCORE: 7
HOW LIKELY ARE YOU TO NOD OFF OR FALL ASLEEP WHILE WATCHING TV: 2
HOW LIKELY ARE YOU TO NOD OFF OR FALL ASLEEP WHILE LYING DOWN TO REST IN THE AFTERNOON WHEN CIRCUMSTANCES PERMIT: 3
HOW LIKELY ARE YOU TO NOD OFF OR FALL ASLEEP WHILE SITTING AND TALKING TO SOMEONE: 0
HOW LIKELY ARE YOU TO NOD OFF OR FALL ASLEEP IN A CAR, WHILE STOPPED FOR A FEW MINUTES IN TRAFFIC: 0

## 2019-01-23 LAB — STATUS: NORMAL

## 2019-02-04 ENCOUNTER — HOSPITAL ENCOUNTER (OUTPATIENT)
Age: 74
Setting detail: SPECIMEN
Discharge: HOME | End: 2019-02-04
Payer: MEDICAID

## 2019-02-04 ENCOUNTER — OFFICE VISIT (OUTPATIENT)
Dept: INTERNAL MEDICINE | Age: 74
End: 2019-02-04
Payer: MEDICAID

## 2019-02-04 VITALS
HEART RATE: 82 BPM | BODY MASS INDEX: 55.52 KG/M2 | SYSTOLIC BLOOD PRESSURE: 133 MMHG | WEIGHT: 275.4 LBS | DIASTOLIC BLOOD PRESSURE: 70 MMHG | HEIGHT: 59 IN

## 2019-02-04 DIAGNOSIS — E11.9 TYPE 2 DIABETES MELLITUS WITHOUT COMPLICATION, WITHOUT LONG-TERM CURRENT USE OF INSULIN (HCC): Primary | ICD-10-CM

## 2019-02-04 DIAGNOSIS — N64.4 BREAST PAIN IN FEMALE: ICD-10-CM

## 2019-02-04 DIAGNOSIS — N89.8 VAGINAL DISCHARGE: ICD-10-CM

## 2019-02-04 DIAGNOSIS — E66.01 MORBID OBESITY WITH BMI OF 50.0-59.9, ADULT (HCC): Chronic | ICD-10-CM

## 2019-02-04 DIAGNOSIS — I10 ESSENTIAL HYPERTENSION: ICD-10-CM

## 2019-02-04 LAB — HBA1C MFR BLD: 6.3 %

## 2019-02-04 PROCEDURE — 1036F TOBACCO NON-USER: CPT | Performed by: INTERNAL MEDICINE

## 2019-02-04 PROCEDURE — 99211 OFF/OP EST MAY X REQ PHY/QHP: CPT | Performed by: INTERNAL MEDICINE

## 2019-02-04 PROCEDURE — 83036 HEMOGLOBIN GLYCOSYLATED A1C: CPT | Performed by: INTERNAL MEDICINE

## 2019-02-04 PROCEDURE — G8417 CALC BMI ABV UP PARAM F/U: HCPCS | Performed by: INTERNAL MEDICINE

## 2019-02-04 PROCEDURE — G8427 DOCREV CUR MEDS BY ELIG CLIN: HCPCS | Performed by: INTERNAL MEDICINE

## 2019-02-04 PROCEDURE — G8399 PT W/DXA RESULTS DOCUMENT: HCPCS | Performed by: INTERNAL MEDICINE

## 2019-02-04 PROCEDURE — G8484 FLU IMMUNIZE NO ADMIN: HCPCS | Performed by: INTERNAL MEDICINE

## 2019-02-04 PROCEDURE — 99214 OFFICE O/P EST MOD 30 MIN: CPT | Performed by: INTERNAL MEDICINE

## 2019-02-04 PROCEDURE — 1123F ACP DISCUSS/DSCN MKR DOCD: CPT | Performed by: INTERNAL MEDICINE

## 2019-02-04 PROCEDURE — 3017F COLORECTAL CA SCREEN DOC REV: CPT | Performed by: INTERNAL MEDICINE

## 2019-02-04 PROCEDURE — 1090F PRES/ABSN URINE INCON ASSESS: CPT | Performed by: INTERNAL MEDICINE

## 2019-02-04 PROCEDURE — 4040F PNEUMOC VAC/ADMIN/RCVD: CPT | Performed by: INTERNAL MEDICINE

## 2019-02-04 PROCEDURE — 2022F DILAT RTA XM EVC RTNOPTHY: CPT | Performed by: INTERNAL MEDICINE

## 2019-02-04 PROCEDURE — 1101F PT FALLS ASSESS-DOCD LE1/YR: CPT | Performed by: INTERNAL MEDICINE

## 2019-02-04 PROCEDURE — 3044F HG A1C LEVEL LT 7.0%: CPT | Performed by: INTERNAL MEDICINE

## 2019-02-04 RX ORDER — BLOOD SUGAR DIAGNOSTIC
STRIP MISCELLANEOUS
COMMUNITY
Start: 2019-01-21 | End: 2019-05-16 | Stop reason: SDUPTHER

## 2019-02-04 ASSESSMENT — ENCOUNTER SYMPTOMS
EYE DISCHARGE: 1
ALLERGIC/IMMUNOLOGIC NEGATIVE: 1
WHEEZING: 1
NAUSEA: 1

## 2019-02-06 LAB
C TRACH DNA GENITAL QL NAA+PROBE: NEGATIVE
N. GONORRHOEAE DNA: NEGATIVE

## 2019-02-12 ENCOUNTER — HOSPITAL ENCOUNTER (OUTPATIENT)
Dept: PAIN MANAGEMENT | Age: 74
Discharge: HOME | End: 2019-02-12
Payer: MEDICAID

## 2019-02-12 VITALS
WEIGHT: 275 LBS | OXYGEN SATURATION: 98 % | HEIGHT: 59 IN | TEMPERATURE: 98.2 F | BODY MASS INDEX: 55.44 KG/M2 | RESPIRATION RATE: 16 BRPM | HEART RATE: 72 BPM | DIASTOLIC BLOOD PRESSURE: 66 MMHG | SYSTOLIC BLOOD PRESSURE: 145 MMHG

## 2019-02-12 DIAGNOSIS — E66.01 MORBID OBESITY (HCC): ICD-10-CM

## 2019-02-12 DIAGNOSIS — M19.012 PRIMARY OSTEOARTHRITIS OF BOTH SHOULDERS: Chronic | ICD-10-CM

## 2019-02-12 DIAGNOSIS — G47.33 OBSTRUCTIVE SLEEP APNEA: Primary | ICD-10-CM

## 2019-02-12 DIAGNOSIS — E66.01 MORBID OBESITY WITH BMI OF 50.0-59.9, ADULT (HCC): Chronic | ICD-10-CM

## 2019-02-12 DIAGNOSIS — M19.011 PRIMARY OSTEOARTHRITIS OF BOTH SHOULDERS: Chronic | ICD-10-CM

## 2019-02-12 DIAGNOSIS — M17.0 PRIMARY OSTEOARTHRITIS OF BOTH KNEES: ICD-10-CM

## 2019-02-12 DIAGNOSIS — Z79.891 CHRONIC USE OF OPIATE DRUGS THERAPEUTIC PURPOSES: Chronic | ICD-10-CM

## 2019-02-12 PROCEDURE — 99214 OFFICE O/P EST MOD 30 MIN: CPT | Performed by: ANESTHESIOLOGY

## 2019-02-12 PROCEDURE — 99214 OFFICE O/P EST MOD 30 MIN: CPT

## 2019-02-12 RX ORDER — CHLORAL HYDRATE 500 MG
1 CAPSULE ORAL DAILY
COMMUNITY
Start: 2019-02-05 | End: 2019-04-01 | Stop reason: SDUPTHER

## 2019-02-12 RX ORDER — OXYCODONE HYDROCHLORIDE 5 MG/1
5 TABLET ORAL EVERY 8 HOURS PRN
Qty: 90 TABLET | Refills: 0 | Status: SHIPPED | OUTPATIENT
Start: 2019-02-18 | End: 2019-03-14 | Stop reason: SDUPTHER

## 2019-02-12 RX ORDER — GLIMEPIRIDE 2 MG/1
2 TABLET ORAL
COMMUNITY
End: 2019-05-30 | Stop reason: ALTCHOICE

## 2019-02-12 ASSESSMENT — PAIN DESCRIPTION - DESCRIPTORS: DESCRIPTORS: ACHING;CONSTANT

## 2019-02-12 ASSESSMENT — PAIN DESCRIPTION - ORIENTATION: ORIENTATION: RIGHT;LEFT

## 2019-02-12 ASSESSMENT — ENCOUNTER SYMPTOMS
GASTROINTESTINAL NEGATIVE: 1
EYES NEGATIVE: 1

## 2019-02-12 ASSESSMENT — PAIN DESCRIPTION - PAIN TYPE: TYPE: CHRONIC PAIN

## 2019-02-12 ASSESSMENT — PAIN DESCRIPTION - FREQUENCY: FREQUENCY: CONTINUOUS

## 2019-02-12 ASSESSMENT — PAIN SCALES - GENERAL: PAINLEVEL_OUTOF10: 8

## 2019-02-12 ASSESSMENT — PAIN DESCRIPTION - ONSET: ONSET: ON-GOING

## 2019-02-12 ASSESSMENT — PAIN DESCRIPTION - PROGRESSION: CLINICAL_PROGRESSION: NOT CHANGED

## 2019-02-12 ASSESSMENT — PAIN DESCRIPTION - LOCATION: LOCATION: KNEE;SHOULDER

## 2019-02-20 ENCOUNTER — HOSPITAL ENCOUNTER (OUTPATIENT)
Dept: ULTRASOUND IMAGING | Age: 74
End: 2019-02-20
Payer: MEDICAID

## 2019-02-20 ENCOUNTER — HOSPITAL ENCOUNTER (OUTPATIENT)
Dept: MAMMOGRAPHY | Age: 74
Discharge: HOME | End: 2019-02-22
Payer: MEDICAID

## 2019-02-20 DIAGNOSIS — N64.4 BREAST PAIN IN FEMALE: ICD-10-CM

## 2019-02-20 PROCEDURE — G0279 TOMOSYNTHESIS, MAMMO: HCPCS

## 2019-03-04 RX ORDER — LANCETS 30 GAUGE
EACH MISCELLANEOUS
Qty: 100 EACH | Refills: 2 | Status: SHIPPED | OUTPATIENT
Start: 2019-03-04 | End: 2019-06-24 | Stop reason: SDUPTHER

## 2019-03-14 ENCOUNTER — HOSPITAL ENCOUNTER (OUTPATIENT)
Dept: PAIN MANAGEMENT | Age: 74
Discharge: HOME | End: 2019-03-14
Payer: MEDICAID

## 2019-03-14 VITALS
SYSTOLIC BLOOD PRESSURE: 164 MMHG | DIASTOLIC BLOOD PRESSURE: 81 MMHG | RESPIRATION RATE: 18 BRPM | TEMPERATURE: 98.4 F | HEART RATE: 73 BPM

## 2019-03-14 DIAGNOSIS — M19.012 PRIMARY OSTEOARTHRITIS OF BOTH SHOULDERS: Primary | Chronic | ICD-10-CM

## 2019-03-14 DIAGNOSIS — Z51.81 MEDICATION MONITORING ENCOUNTER: ICD-10-CM

## 2019-03-14 DIAGNOSIS — M19.011 PRIMARY OSTEOARTHRITIS OF BOTH SHOULDERS: Primary | Chronic | ICD-10-CM

## 2019-03-14 DIAGNOSIS — M15.9 PRIMARY OSTEOARTHRITIS INVOLVING MULTIPLE JOINTS: ICD-10-CM

## 2019-03-14 DIAGNOSIS — Z79.891 CHRONIC USE OF OPIATE DRUGS THERAPEUTIC PURPOSES: Chronic | ICD-10-CM

## 2019-03-14 DIAGNOSIS — M17.0 PRIMARY OSTEOARTHRITIS OF BOTH KNEES: ICD-10-CM

## 2019-03-14 PROCEDURE — 99215 OFFICE O/P EST HI 40 MIN: CPT

## 2019-03-14 PROCEDURE — 80307 DRUG TEST PRSMV CHEM ANLYZR: CPT

## 2019-03-14 PROCEDURE — 99214 OFFICE O/P EST MOD 30 MIN: CPT | Performed by: NURSE PRACTITIONER

## 2019-03-14 RX ORDER — POLYETHYLENE GLYCOL 3350 17 G/17G
17 POWDER, FOR SOLUTION ORAL DAILY
Qty: 510 G | Refills: 2 | Status: SHIPPED | OUTPATIENT
Start: 2019-03-14 | End: 2019-06-14 | Stop reason: SDUPTHER

## 2019-03-14 RX ORDER — OXYCODONE HYDROCHLORIDE 5 MG/1
5 TABLET ORAL EVERY 8 HOURS PRN
Qty: 90 TABLET | Refills: 0 | Status: SHIPPED | OUTPATIENT
Start: 2019-03-20 | End: 2019-04-16 | Stop reason: SDUPTHER

## 2019-03-14 ASSESSMENT — PAIN DESCRIPTION - FREQUENCY: FREQUENCY: CONTINUOUS

## 2019-03-14 ASSESSMENT — PAIN - FUNCTIONAL ASSESSMENT: PAIN_FUNCTIONAL_ASSESSMENT: ACTIVITIES ARE NOT PREVENTED

## 2019-03-14 ASSESSMENT — PAIN DESCRIPTION - LOCATION: LOCATION: SHOULDER;KNEE

## 2019-03-14 ASSESSMENT — PAIN DESCRIPTION - ONSET: ONSET: ON-GOING

## 2019-03-14 ASSESSMENT — PAIN SCALES - GENERAL: PAINLEVEL_OUTOF10: 8

## 2019-03-14 ASSESSMENT — PAIN DESCRIPTION - PROGRESSION: CLINICAL_PROGRESSION: NOT CHANGED

## 2019-03-14 ASSESSMENT — PAIN DESCRIPTION - PAIN TYPE: TYPE: CHRONIC PAIN

## 2019-03-14 ASSESSMENT — PAIN DESCRIPTION - ORIENTATION: ORIENTATION: RIGHT;LEFT

## 2019-03-14 ASSESSMENT — PAIN DESCRIPTION - DESCRIPTORS: DESCRIPTORS: ACHING;CONSTANT

## 2019-03-14 ASSESSMENT — ENCOUNTER SYMPTOMS: BACK PAIN: 1

## 2019-03-18 LAB
6-ACETYLMORPHINE, UR: NOT DETECTED
7-AMINOCLONAZEPAM, URINE: NOT DETECTED
ALPHA-OH-ALPRAZ, URINE: NOT DETECTED
ALPRAZOLAM, URINE: NOT DETECTED
AMPHETAMINES, URINE: NOT DETECTED
BARBITURATES, URINE: NOT DETECTED
BENZOYLECGONINE, UR: NOT DETECTED
BUPRENORPHINE URINE: NOT DETECTED
CARISOPRODOL, UR: NOT DETECTED
CLONAZEPAM, URINE: NOT DETECTED
CODEINE, URINE: NOT DETECTED
CREATININE URINE: 140.6 MG/DL (ref 20–400)
DIAZEPAM, URINE: NOT DETECTED
EER PAIN MGT DRUG PANEL, HIGH RES/EMIT U: NORMAL
ETHYL GLUCURONIDE UR: NOT DETECTED
FENTANYL URINE: NOT DETECTED
HYDROCODONE, URINE: NOT DETECTED
HYDROMORPHONE, URINE: NOT DETECTED
LORAZEPAM, URINE: NOT DETECTED
MARIJUANA METAB, UR: NOT DETECTED
MDA, UR: NOT DETECTED
MDEA, EVE, UR: NOT DETECTED
MDMA URINE: NOT DETECTED
MEPERIDINE METAB, UR: NOT DETECTED
METHADONE, URINE: NOT DETECTED
METHAMPHETAMINE, URINE: NOT DETECTED
METHYLPHENIDATE: NOT DETECTED
MIDAZOLAM, URINE: NOT DETECTED
MORPHINE URINE: NOT DETECTED
NORBUPRENORPHINE, URINE: NOT DETECTED
NORDIAZEPAM, URINE: NOT DETECTED
NORFENTANYL, URINE: NOT DETECTED
NORHYDROCODONE, URINE: NOT DETECTED
NOROXYCODONE, URINE: PRESENT
NOROXYMORPHONE, URINE: PRESENT
OXAZEPAM, URINE: NOT DETECTED
OXYCODONE URINE: PRESENT
OXYMORPHONE, URINE: NOT DETECTED
PAIN MGT DRUG PANEL, HI RES, UR: NORMAL
PCP,URINE: NOT DETECTED
PHENTERMINE, UR: NOT DETECTED
PROPOXYPHENE, URINE: NOT DETECTED
TAPENTADOL, URINE: NOT DETECTED
TAPENTADOL-O-SULFATE, URINE: NOT DETECTED
TEMAZEPAM, URINE: NOT DETECTED
TRAMADOL, URINE: NOT DETECTED
ZOLPIDEM, URINE: NOT DETECTED

## 2019-03-25 ENCOUNTER — HOSPITAL ENCOUNTER (OUTPATIENT)
Dept: PHYSICAL THERAPY | Facility: CLINIC | Age: 74
Setting detail: THERAPIES SERIES
Discharge: HOME | End: 2019-03-25
Payer: MEDICAID

## 2019-03-29 ENCOUNTER — HOSPITAL ENCOUNTER (OUTPATIENT)
Dept: PHYSICAL THERAPY | Facility: CLINIC | Age: 74
Setting detail: THERAPIES SERIES
Discharge: HOME | End: 2019-03-29
Payer: MEDICAID

## 2019-03-29 PROCEDURE — 97110 THERAPEUTIC EXERCISES: CPT

## 2019-03-29 PROCEDURE — 97161 PT EVAL LOW COMPLEX 20 MIN: CPT

## 2019-04-01 ENCOUNTER — HOSPITAL ENCOUNTER (OUTPATIENT)
Age: 74
Discharge: HOME | End: 2019-04-01
Payer: MEDICAID

## 2019-04-01 DIAGNOSIS — N18.30 CKD (CHRONIC KIDNEY DISEASE), STAGE III (HCC): ICD-10-CM

## 2019-04-01 LAB
ANION GAP SERPL CALCULATED.3IONS-SCNC: 15 MMOL/L (ref 9–17)
BUN BLDV-MCNC: 15 MG/DL (ref 8–23)
BUN/CREAT BLD: ABNORMAL (ref 9–20)
CALCIUM SERPL-MCNC: 9 MG/DL (ref 8.6–10.4)
CHLORIDE BLD-SCNC: 104 MMOL/L (ref 98–107)
CO2: 23 MMOL/L (ref 20–31)
CREAT SERPL-MCNC: 1.06 MG/DL (ref 0.5–0.9)
GFR AFRICAN AMERICAN: >60 ML/MIN
GFR NON-AFRICAN AMERICAN: 51 ML/MIN
GFR SERPL CREATININE-BSD FRML MDRD: ABNORMAL ML/MIN/{1.73_M2}
GFR SERPL CREATININE-BSD FRML MDRD: ABNORMAL ML/MIN/{1.73_M2}
GLUCOSE BLD-MCNC: 256 MG/DL (ref 70–99)
POTASSIUM SERPL-SCNC: 3.9 MMOL/L (ref 3.7–5.3)
SODIUM BLD-SCNC: 142 MMOL/L (ref 135–144)

## 2019-04-01 PROCEDURE — 36415 COLL VENOUS BLD VENIPUNCTURE: CPT

## 2019-04-01 PROCEDURE — 80048 BASIC METABOLIC PNL TOTAL CA: CPT

## 2019-04-01 RX ORDER — OMEGA-3 FATTY ACIDS/FISH OIL 300-500 MG
CAPSULE ORAL
Qty: 90 CAPSULE | Refills: 3 | Status: SHIPPED | OUTPATIENT
Start: 2019-04-01 | End: 2020-03-10

## 2019-04-01 NOTE — TELEPHONE ENCOUNTER
Escribe request for fishoil . Please escribe if appropriate      Next Visit Date:  8/5/2019     Health Maintenance   Topic Date Due    Pneumococcal 65+ years Vaccine (1 of 2 - PCV13) 06/21/2012    Diabetic retinal exam  03/01/2017    Shingles Vaccine (2 of 2) 05/24/2018    Lipid screen  04/09/2019    Flu vaccine (Season Ended) 01/20/2020 (Originally 9/1/2019)    Hepatitis C screen  02/20/2018 (Originally 1945)    Diabetic foot exam  08/27/2019    Potassium monitoring  09/06/2019    Creatinine monitoring  09/06/2019    A1C test (Diabetic or Prediabetic)  02/04/2020    Breast cancer screen  02/20/2021    Colon cancer screen colonoscopy  10/23/2025    DTaP/Tdap/Td vaccine (2 - Td) 04/01/2026    DEXA (modify frequency per FRAX score)  Completed       Hemoglobin A1C (%)   Date Value   02/04/2019 6.3   07/11/2018 7.6   03/26/2018 7.2             ( goal A1C is < 7)   Microalb/Crt.  Ratio (mcg/mg creat)   Date Value   09/06/2018 19     LDL Cholesterol (mg/dL)   Date Value   04/09/2018 97       (goal LDL is <100)   AST (U/L)   Date Value   07/17/2018 15     ALT (U/L)   Date Value   07/17/2018 9     BUN (mg/dL)   Date Value   04/01/2019 15     BP Readings from Last 3 Encounters:   04/01/19 (!) 140/80   03/14/19 (!) 164/81   02/12/19 (!) 145/66          (goal 120/80)          Patient Active Problem List:     Obstructive sleep apnea     Hypertension     Hyperlipidemia     Gout     Asthma     Vitamin D deficiency     GERD (gastroesophageal reflux disease)     TIA (transient ischemic attack)     Stage 3 chronic kidney disease (HCC)     Anemia, iron deficiency     DJD (degenerative joint disease) of knee     Primary osteoarthritis of both shoulders     Pain of both shoulder joints     Primary osteoarthritis involving multiple joints     Pain in joint, lower leg     Type 2 diabetes mellitus without complication, without long-term current use of insulin (HCC)     Adjustment disorder with mixed anxiety and depressed mood     Bilateral leg edema     Extensor tenosynovitis of right wrist     Carpal tunnel syndrome on right     Medication monitoring encounter     Primary osteoarthritis of both knees     Postural dizziness     Mild intermittent asthma without complication     Morbid obesity with BMI of 50.0-59.9, adult (HCC)     Chronic use of opiate drugs therapeutic purposes     Nuclear senile cataract

## 2019-04-03 ENCOUNTER — HOSPITAL ENCOUNTER (OUTPATIENT)
Dept: PHYSICAL THERAPY | Facility: CLINIC | Age: 74
Setting detail: THERAPIES SERIES
Discharge: HOME | End: 2019-04-03
Payer: MEDICAID

## 2019-04-03 PROCEDURE — 97110 THERAPEUTIC EXERCISES: CPT

## 2019-04-03 NOTE — FLOWSHEET NOTE
[] [facility]) [facility] &  Therapy  [address]  [phone]  F: [phone] [] [address]  [address]   [address]  P: [phone]  F: [phone] [] [place]  [address] [facility]  P: [phone]  F: [phone] [] [address]  [facility]   [address]  [place]: [phone]  F: [phone]     Physical Therapy Daily Treatment Note    Date:  4/3/2019  Patient Name:  Andrea Cazares    :  1945  MRN: [id number]  Physician: DELMI Monge                                     Insurance: [employer] Diagnosis: Primary osteoarthritis of both shoulders; primary osteoarthritis involving multiple join               Rehab Codes: M25.511, M25.522, M25.611, M25.622, R29.3, M25.561, M25.562, M25.661, M25.662, R53.1, R26.9  Onset Date:                                   Next 's appt: 19      Visit# / total visits:   Cancels/No Shows: 0/0    Subjective:    Pain:  [x] Yes  [] No Location: bilat shoulders/knees  Pain Rating: (0-10 scale) 8/10 knees 9/10 shoulders  Pain altered Tx:  [x] No  [] Yes  Action:  Comments:  Pt arrives noting no new changes since evaluation. Objective:  Modalities:   Exercises: All exercises completed bilaterally.    Exercise Reps/ Time Weight/ Level Comments   scifit 5 min  UE/LE use, slow pace         SHOULDER      Shoulder PROM 15min  Difficulty relaxing   Upper trap stretch 2x30\" ea       Scapular retraction 10x5\"       Table slide stretch 10x ea   Flex (add abd, scaption later)   Shoulder rolls  20x       Cane chest press 10x     Cane flexion 10x  Pain free range   Cane Protraction 10x            KNEES      LAQs 10x3\"  seated   Heel slides 15x ea  Seated pillow case under foot   Hip add 20x5\"  seated   Hip abd 20x  red seated HS curls 10x red seated                     Other:     Specific Instructions for next treatment:          Treatment Charges: Mins Units   []  Modalities     [x]  Ther Exercise 40 3   []  Manual Therapy     []  Ther Activities     []  Aquatics     []  Vasocompression     []  Other     Total Treatment time 40 3       Assessment: [x] Progressing toward goals. Pt with difficulty relaxing during PROM making it difficulty to complete in a pain free range despite multiple cues. Fatigues quickly during exercises requiring frequent rest breaks. [] No change. [] Other:      STG: (to be met in 12 treatments)  1. ? Pain: No greater than 2/10 pain at rest and 4/10 pain with use of BUEs/BLEs for ADLs  2. ? ROM:  a. Bilateral knee flexion AROM to 110deg without pain at end range to improve flexibility required for squatting and bending when standing  b. Bilateral shoulder PROM to the following degrees to improve joint mobility:  i. Shoulder flex/abd to 150deg  ii. Shoulder IR/ER to 60deg and 50deg respectively  3. ? Strength:   a. 5-/5 MMT with no pain reported for bilateral shoulder abd/flex for improved overhead strength  b. 5/5 MMT in bilateral knee flex/ext with no pain reported for improved weightbearing support to joints  4. ? Function:   a. Pt will be able to tolerate standing for up to 25 minutes to complete housework with only minimal pain in B knees reported  b. Pt will be able to wash hair independently without much compensation to improve ease of ADLs  5. Independent with Home Exercise Programs  6. Demonstrate Knowledge of fall prevention        Patient goals: to feel better        Pt. Education:  [x] Yes  [] No  [x] Reviewed Prior HEP/Ed  Method of Education: [x] Verbal  [] Demo  [] Written  Comprehension of Education:  [x] Verbalizes understanding. [] Demonstrates understanding. [x] Needs review. [] Demonstrates/verbalizes HEP/Ed previously given. Plan: [x] Continue per plan of care.    [] Other:       Time In:440           Time Out: 5:35    Electronically signed by:  Walter Calle PTA

## 2019-04-05 ENCOUNTER — HOSPITAL ENCOUNTER (OUTPATIENT)
Dept: PHYSICAL THERAPY | Facility: CLINIC | Age: 74
Setting detail: THERAPIES SERIES
Discharge: HOME | End: 2019-04-05
Payer: MEDICAID

## 2019-04-05 PROCEDURE — 97110 THERAPEUTIC EXERCISES: CPT

## 2019-04-05 NOTE — FLOWSHEET NOTE
ea   Flex (add abd, scaption later)   Shoulder rolls  20x       Cane chest press 10x     Cane flexion 10x  Pain free range   Cane Protraction 10x            KNEES   ALL except HS curls performed in supine on 4/5:   LAQs 15x3\"  seated   Heel slides 2x12 ea  seated   Hip add 20x5\"  seated   Hip abd 2x20 red seated   HS curls 10x red seated                     Other:     Specific Instructions for next treatment: Manual therapy          Treatment Charges: Mins Units   []  Modalities     [x]  Ther Exercise 48 4   [x]  Manual Therapy 6 --   []  Ther Activities     []  Aquatics     []  Vasocompression     []  Other     Total Treatment time 54 4       Assessment: [x] Progressing toward goals. Pt still very challenged with LE strengthening/ROM program- able to increase some exercise repetitions, no resistance progression. Again difficulty with relaxation during PROM despite various holds and osscilation/relaxation techniques prior to movement - muscle guarding limiting achievement of increasing ROM during session. Limited tolerance to manual therapy. Painful snapping present with flex/ER on R shoulder and with ER on L shoulder with progressive ROM - did not push through this. More therapeutic rest needed this date. [] No change. [x] Other: Minimal PROM improvement noted with repeated PROM this session. STG: (to be met in 12 treatments)  1. ? Pain: No greater than 2/10 pain at rest and 4/10 pain with use of BUEs/BLEs for ADLs  2. ? ROM:  a. Bilateral knee flexion AROM to 110deg without pain at end range to improve flexibility required for squatting and bending when standing  b. Bilateral shoulder PROM to the following degrees to improve joint mobility:  i. Shoulder flex/abd to 150deg  ii.  Shoulder IR/ER to 60deg and 50deg respectively  3. ? Strength:   a. 5-/5 MMT with no pain reported for bilateral shoulder abd/flex for improved overhead strength  b. 5/5 MMT in bilateral knee flex/ext with no pain reported for improved weightbearing support to joints  4. ? Function:   a. Pt will be able to tolerate standing for up to 25 minutes to complete housework with only minimal pain in B knees reported  b. Pt will be able to wash hair independently without much compensation to improve ease of ADLs  5. Independent with Home Exercise Programs  6. Demonstrate Knowledge of fall prevention        Patient goals: to feel better        Pt. Education:  [x] Yes  [] No  [x] Reviewed Prior HEP/Ed  Method of Education: [x] Verbal  [] Demo  [] Written  Comprehension of Education:  [x] Verbalizes understanding. [] Demonstrates understanding. [x] Needs review. [] Demonstrates/verbalizes HEP/Ed previously given. Plan: [x] Continue per plan of care.    [] Other:      Time In: 1:05pm         Time Out: 2:07pm    Electronically signed by:  Edvin Penn PT

## 2019-04-08 ENCOUNTER — HOSPITAL ENCOUNTER (OUTPATIENT)
Dept: PHYSICAL THERAPY | Facility: CLINIC | Age: 74
Setting detail: THERAPIES SERIES
Discharge: HOME | End: 2019-04-08
Payer: MEDICAID

## 2019-04-10 ENCOUNTER — HOSPITAL ENCOUNTER (OUTPATIENT)
Dept: PHYSICAL THERAPY | Facility: CLINIC | Age: 74
Setting detail: THERAPIES SERIES
Discharge: HOME | End: 2019-04-10
Payer: MEDICAID

## 2019-04-10 PROCEDURE — 97140 MANUAL THERAPY 1/> REGIONS: CPT

## 2019-04-10 PROCEDURE — 97110 THERAPEUTIC EXERCISES: CPT

## 2019-04-15 ENCOUNTER — HOSPITAL ENCOUNTER (OUTPATIENT)
Dept: PHYSICAL THERAPY | Facility: CLINIC | Age: 74
Setting detail: THERAPIES SERIES
Discharge: HOME | End: 2019-04-15
Payer: MEDICAID

## 2019-04-15 PROCEDURE — 97140 MANUAL THERAPY 1/> REGIONS: CPT

## 2019-04-15 PROCEDURE — 97110 THERAPEUTIC EXERCISES: CPT

## 2019-04-15 NOTE — FLOWSHEET NOTE
[] [address]  Outpatient Rehabilitation &  Therapy  [address]  [phone]  F: [phone] [x] [address]  [facility]   [address]  P: [phone]  F: [phone] [] [address]  Therapy  [address]  P: [phone]  F: [phone] [] [address]  [facility]   [address]  [facility] [phone]  F: [phone]     Physical Therapy Daily Treatment Note    Date:  4/15/2019  Patient Name:  Bing Hilton    :  1945  MRN: [id number]  Physician: DELMI Angel                                     Insurance: [employer] Diagnosis: Primary osteoarthritis of both shoulders; primary osteoarthritis involving multiple join               Rehab Codes: M25.511, M25.522, M25.611, M25.622, R29.3, M25.561, M25.562, M25.661, M25.662, R53.1, R26.9  Onset Date:                                   Next 's appt: 19  Visit# / total visits:   Cancels/No Shows: 0/0    Subjective:    Pain:  [x] Yes  [] No Location: bilat shoulders/knees  Pain Rating: (0-10 scale) 8/10 knees and shoulders  Pain altered Tx:  [] No  [x] Yes  Action: Slow progression through exercises d/t pain  Comments: Pt reports she's doing better than last session; B knee pain worse than shoulders. Objective:  Modalities:   Exercises: All exercises completed bilaterally.  Bolded completed 04/15/19     Exercise Reps/ Time Weight/ Level Comments   scifit 6 min L1 UE/LE use, slow pace         SHOULDER      Manual therapy 4 min total  AP mobs, inferior glides, UT TPR   Shoulder PROM 23 min  Improved tolerance on 4/15 so spent more time on this    Upper trap stretch 2x30\" ea       Scapular retraction 10x5\"       Table slide stretch 10x ea   Flex (add abd, scaption later)   Shoulder rolls  20x       Cane chest press 10x Cane flexion 10x  Pain free range   Cane Protraction 10x            KNEES      LAQs 2x15 ea  seated   Heel slides 2x15  ea  seated   Hip add 10x10\"  Seated; increased hold time/decreased reps on 4/15   Hip abd 2x20 red Supine   HS curls 10x red seated         Myofacial roller 8 min total  Added 4/15: to belly of quad and distal quad, peripatellar areas         Other: Slow progression through exercises d/t pain       Specific Instructions for next treatment: progressing sets of AROM      Treatment Charges: Mins Units   []  Modalities     [x]  Ther Exercise 47 3   [x]  Manual Therapy 12 1   []  Ther Activities     []  Aquatics     []  Vasocompression     []  Other     Total Treatment time 59 4       Assessment: [x] Progressing toward goals. D/t more pain in B knees this date, focused on this for treatment this date. Able to progress AROM sets for both knee ext/flexion this date with improved tolerance. Pt reports no change in symptoms, though experiences 2 painful pops in L knee with LAQs. Improved tolerance to PROM in bilateral shoulders; still significantly limited with empty endfeel (pain) - spent more time on this to attempt to increase joint mobility; 5-10deg improvement in all planes with repetition. D/t improved tolerance this date, will consider progressing sets/reps for mobility. [] No change. [x] Other: Attempted patellar mobs to see if this would improve popping reported \"behind knee cap\" per pt - very limited mobility and tender, with muscle guarding noted. Implemented myofacial roller release to this area as well as quad muscle belly bilat with little to no change in guarding noted.        STG: (to be met in 12 treatments)  1. ? Pain: No greater than 2/10 pain at rest and 4/10 pain with use of BUEs/BLEs for ADLs  2. ? ROM:  a. Bilateral knee flexion AROM to 110deg without pain at end range to improve flexibility required for squatting and bending when standing  b. Bilateral shoulder PROM to the following degrees to improve joint mobility:  i. Shoulder flex/abd to 150deg  ii. Shoulder IR/ER to 60deg and 50deg respectively  3. ? Strength:   a. 5-/5 MMT with no pain reported for bilateral shoulder abd/flex for improved overhead strength  b. 5/5 MMT in bilateral knee flex/ext with no pain reported for improved weightbearing support to joints  4. ? Function:   a. Pt will be able to tolerate standing for up to 25 minutes to complete housework with only minimal pain in B knees reported  b. Pt will be able to wash hair independently without much compensation to improve ease of ADLs  5. Independent with Home Exercise Programs  6. Demonstrate Knowledge of fall prevention        Patient goals: to feel better        Pt. Education:  [x] Yes  [] No  [x] Reviewed Prior HEP/Ed  Method of Education: [x] Verbal  [] Demo  [] Written  Comprehension of Education:  [x] Verbalizes understanding. [] Demonstrates understanding. [x] Needs review. [] Demonstrates/verbalizes HEP/Ed previously given. Plan: [x] Continue per plan of care.    [] Other:      Time In: 12:00pm     Time Out: 1:05 pm    Electronically signed by:  Kristin Nowak, PT

## 2019-04-16 ENCOUNTER — HOSPITAL ENCOUNTER (OUTPATIENT)
Dept: PAIN MANAGEMENT | Age: 74
Discharge: HOME | End: 2019-04-16
Payer: MEDICAID

## 2019-04-16 VITALS
HEART RATE: 68 BPM | RESPIRATION RATE: 14 BRPM | SYSTOLIC BLOOD PRESSURE: 143 MMHG | DIASTOLIC BLOOD PRESSURE: 83 MMHG | TEMPERATURE: 98.1 F

## 2019-04-16 DIAGNOSIS — M17.0 PRIMARY OSTEOARTHRITIS OF BOTH KNEES: ICD-10-CM

## 2019-04-16 DIAGNOSIS — E66.01 MORBID OBESITY (HCC): ICD-10-CM

## 2019-04-16 DIAGNOSIS — Z79.891 CHRONIC USE OF OPIATE DRUGS THERAPEUTIC PURPOSES: Chronic | ICD-10-CM

## 2019-04-16 DIAGNOSIS — M19.012 PRIMARY OSTEOARTHRITIS OF BOTH SHOULDERS: Primary | Chronic | ICD-10-CM

## 2019-04-16 DIAGNOSIS — M25.512 PAIN OF BOTH SHOULDER JOINTS: ICD-10-CM

## 2019-04-16 DIAGNOSIS — M19.011 PRIMARY OSTEOARTHRITIS OF BOTH SHOULDERS: Primary | Chronic | ICD-10-CM

## 2019-04-16 DIAGNOSIS — E66.01 MORBID OBESITY WITH BMI OF 50.0-59.9, ADULT (HCC): Chronic | ICD-10-CM

## 2019-04-16 DIAGNOSIS — M25.511 PAIN OF BOTH SHOULDER JOINTS: ICD-10-CM

## 2019-04-16 DIAGNOSIS — Z51.81 MEDICATION MONITORING ENCOUNTER: ICD-10-CM

## 2019-04-16 DIAGNOSIS — M15.9 PRIMARY OSTEOARTHRITIS INVOLVING MULTIPLE JOINTS: ICD-10-CM

## 2019-04-16 PROCEDURE — 99213 OFFICE O/P EST LOW 20 MIN: CPT | Performed by: NURSE PRACTITIONER

## 2019-04-16 PROCEDURE — 99214 OFFICE O/P EST MOD 30 MIN: CPT

## 2019-04-16 RX ORDER — OXYCODONE HYDROCHLORIDE 5 MG/1
5 TABLET ORAL EVERY 8 HOURS PRN
Qty: 90 TABLET | Refills: 0 | Status: SHIPPED | OUTPATIENT
Start: 2019-04-19 | End: 2019-05-17 | Stop reason: SDUPTHER

## 2019-04-16 ASSESSMENT — ENCOUNTER SYMPTOMS
CONSTIPATION: 0
COUGH: 0
BACK PAIN: 1
SHORTNESS OF BREATH: 0

## 2019-04-16 NOTE — PROGRESS NOTES
Patient is here today to review medication contract. Chief Complaint: joint pain    PMH: Pt c/o chronic bilat shoulder and knee pain with no known injury. She has been a pt here since 2015 with complaints of arthritic symptoms in the shoulders and knees. She says that she has had this for some time now. She had been evaluated by Dr Angela Haque the past, and has had injections at other pain management at [facility]. She says that these did not help her pain. She has had aquatic PT in the past, (which only helped while in the water).  It was recommended in the past that she have shoulder surgery, but she is not interested. We prescribe oxycodone and compounding cream for pain relief - which is helping with her pain. She had bilat shoulder injections  and reported 80-90% relief of pain for only about 2 weeks and and increased ROM but now feels pain is baseline but not ready to repeat at this time. At recent visit Dr Wilmer Clayton suggested pt find other options to supplement medication for pain relief. Pt started PT and states she has not noticed improvement in fives sessions but plans to complete them. Knee Pain    The incident occurred more than 1 week ago. There was no injury mechanism. The pain is present in the left knee and right knee. The quality of the pain is described as aching. The pain is at a severity of 8/10. The pain is mild. The pain has been constant since onset. Associated symptoms include muscle weakness. She reports no foreign bodies present. She has tried non-weight bearing, NSAIDs, ice and heat for the symptoms. The treatment provided mild relief. Shoulder Pain    The pain is present in the left shoulder and right shoulder. This is a chronic problem. The current episode started more than 1 year ago. There has been no history of extremity trauma. The problem occurs constantly. The problem has been gradually worsening. The quality of the pain is described as aching, pounding and sharp.  The pain is moderate. Associated symptoms include stiffness. Pertinent negatives include no fever. She has tried oral narcotics, NSAIDS, cold and heat for the symptoms. The treatment provided mild relief. Her past medical history is significant for osteoarthritis. Patient denies any new neurological symptoms. No bowel or bladder incontinence, no weakness, and no falling. Pill count: appropriate    Morphine equivalent: 22.5    Controlled Substances Monitoring:     RX Monitoring 4/16/2019   Attestation The Prescription Monitoring Report for this patient was reviewed today. Chronic Pain Routine Monitoring Possible medication side effects, risk of tolerance/dependence & alternative treatments discussed. ;Obtaining appropriate analgesic effect of treatment. ;No signs of potential drug abuse or diversion identified: otherwise, see note documentation   Chronic Pain > 80 MEDD -       Past Medical History:   Diagnosis Date    Anemia, iron deficiency     Asthma     Carpal tunnel syndrome on right 8/29/2016    Chronic kidney disease     Edema     unspecified type    GERD (gastroesophageal reflux disease)     Glucose intolerance (impaired glucose tolerance)     Gout     History of anemia     Normocytic    History of corneal abrasion     Left    History of dyspnea     History of pneumonia     Hyperlipidemia     Hypertension     Morbid obesity (HCC)     BMI of 50.0-59.9, adult    Neck strain     Obstructive sleep apnea     on CPAP    Osteoarthritis     DJD of knees    Pain     bilateral shoulders, Lt hip    Shoulder strain     History of bilateral shoulder strain    Systemic hypertension     TIA (transient ischemic attack)     Type II or unspecified type diabetes mellitus without mention of complication, not stated as uncontrolled     Vitamin D deficiency     Wears dentures     upper and lower dentures    Wears glasses        Past Surgical History:   Procedure Laterality Date    COLONOSCOPY of current or ex partner: Not on file     Emotionally abused: Not on file     Physically abused: Not on file     Forced sexual activity: Not on file   Other Topics Concern    Not on file   Social History Narrative    Not on file       Review of Systems:  Review of Systems   Constitution: Negative for chills and fever. Cardiovascular: Negative for chest pain and irregular heartbeat. Respiratory: Negative for cough and shortness of breath. Musculoskeletal: Positive for back pain, joint pain and stiffness. Gastrointestinal: Negative for constipation. Neurological: Negative for disturbances in coordination and loss of balance. Physical Exam:  BP (!) 143/83   Pulse 68   Temp 98.1 °F (36.7 °C)   Resp 14     Physical Exam   Constitutional: She is oriented to person, place, and time. HENT:   Head: Normocephalic. Eyes: EOM are normal.   Neck: Normal range of motion. Pulmonary/Chest: Effort normal.   Musculoskeletal: Normal range of motion. Right shoulder: She exhibits tenderness. Left shoulder: She exhibits tenderness. Right hip: She exhibits tenderness. Left hip: She exhibits tenderness. Neurological: She is alert and oriented to person, place, and time. Skin: Skin is warm and dry. Record/Diagnostics Review:    Last gómez 3/2019 and was appropriate     XR 2017     Right knee:     1. Tricompartmental osteoarthrosis.  Severe narrowing of the medial  compartment.  Diffuse osteopenia.  Probable underlying CPPD. 2. No acute osseous abnormality. Left knee:     1. Small joint effusion. 2. Tricompartmental osteoarthrosis with moderate to severe narrowing of the  medial compartment.  Diffuse osteopenia.  Suspected CPPD.   3. No acute osseous abnormality        Assessment:  Problem List Items Addressed This Visit     Primary osteoarthritis of both shoulders - Primary (Chronic)    Morbid obesity with BMI of 50.0-59.9, adult (HCC) (Chronic)    Chronic use of opiate drugs

## 2019-04-17 ENCOUNTER — HOSPITAL ENCOUNTER (OUTPATIENT)
Dept: PHYSICAL THERAPY | Facility: CLINIC | Age: 74
Setting detail: THERAPIES SERIES
Discharge: HOME | End: 2019-04-17
Payer: MEDICAID

## 2019-04-17 PROCEDURE — 97110 THERAPEUTIC EXERCISES: CPT

## 2019-04-17 NOTE — FLOWSHEET NOTE
AP mobs, inferior glides, UT TPR   Shoulder PROM bilat 35 min  Increased time spent d/t improvements made and improved tolerance  - SLOW PROM to avoid muscle guarding resulting in pain, lalitha. eccentric   Upper trap stretch 2x30\" ea       Scapular retraction 10x5\"       Table slide stretch 10x ea   Flex (add abd, scaption later)   Shoulder rolls  20x       Cane chest press 10x     Cane flexion 10x  Pain free range   Cane Protraction 10x            KNEES      LAQs 2x15 ea  seated   Heel slides 2x15  ea  seated   Hip add 10x10\"  Seated; increased hold time/decreased reps on 4/15   Hip abd 2x20 blueberry Seated; progressed resistance on 4/17   HS curls 10x red seated         Myofacial roller 8 min total  Added 4/15: to belly of quad and distal quad, peripatellar areas         Other: Slow progression through exercises; increased time in shoulder ROM to obtain measurements       Specific Instructions for next treatment: AAROM with cane, wall slides; reinstate scap training; sit to stands      Treatment Charges: Mins Units   []  Modalities     [x]  Ther Exercise 58 4   [x]  Manual Therapy 4 --   []  Ther Activities     []  Aquatics     []  Vasocompression     []  Other     Total Treatment time 62 4       Assessment: [x] Progressing toward goals. Pt able to tolerate increased time on stepper warm up this date - notes no painful popping when on this machine as compared to seated exercise. Still significant painful popping/catching with LLE LAQs this date. Limited knee strength/ROM progressions this date to assess for improved pt tolerance. Re: B shoulders, pt demos significantly improved PROM by ~40deg in flex/abd bilat; still with pain, but benefitted from additional time spent in PROM of bilat shoulders in last session, so focused on this again with slow PROM to avoid muscle guarding. [] No change.        [x] Other: Pt reports she may be leaving for [place] with her son for the rest of the month beginning on Friday, but isn't sure yet. Will keep next week's appointments and instructed pt to call ASAP to indicate if she won't be coming next week. STG: (to be met in 12 treatments) - Preliminary assessment on 4/17 by primary therapist:  1. ? Pain: No greater than 2/10 pain at rest and 4/10 pain with use of BUEs/BLEs for ADLs - NOT MET, still high pain levels at rest (7-8/10 typically)  2. ? ROM:  a. Bilateral knee flexion AROM to 110deg without pain at end range to improve flexibility required for squatting and bending when standing - No change, knee flexion PROM near same to eval (~100deg bilat passive, 90-95deg active)  b. Bilateral shoulder PROM to the following degrees to improve joint mobility:  i. Shoulder flex/abd to 150deg - Making progress, improvement to 135 bilat  ii. Shoulder IR/ER to 60deg and 50deg respectively - Making progress, MET with ER, R shoulder IR still lacking  3. ? Strength:   a. 5-/5 MMT with no pain reported for bilateral shoulder abd/flex for improved overhead strength  b. 5/5 MMT in bilateral knee flex/ext with no pain reported for improved weightbearing support to joints   4. ? Function:   a. Pt will be able to tolerate standing for up to 25 minutes to complete housework with only minimal pain in B knees reported - Notes she can stand about 15-20 min  b. Pt will be able to wash hair independently without much compensation to improve ease of ADLs - NOT MET  5. Independent with Home Exercise Programs   6. Demonstrate Knowledge of fall prevention        Patient goals: to feel better        Pt. Education:  [x] Yes  [] No  [x] Reviewed Prior HEP/Ed  Method of Education: [x] Verbal  [] Demo  [] Written  Comprehension of Education:  [x] Verbalizes understanding. [] Demonstrates understanding. [x] Needs review. [] Demonstrates/verbalizes HEP/Ed previously given. Plan: [x] Continue per plan of care.    [] Other:      Time In: 12:06pm     Time Out: 1:18 pm    Electronically signed by:  Tia High

## 2019-04-22 ENCOUNTER — HOSPITAL ENCOUNTER (OUTPATIENT)
Dept: PHYSICAL THERAPY | Facility: CLINIC | Age: 74
Setting detail: THERAPIES SERIES
Discharge: HOME | End: 2019-04-22
Payer: MEDICAID

## 2019-04-22 NOTE — FLOWSHEET NOTE
[] [facility] [place] [place]  [address]    [phone]  F: [phone]   [x] [address] [facility] [address]   [address]  P: [phone]  F: [phone]  [] [place]  [address] [facility]  P: [phone]  F: [phone]   [] [address]  [facility] [address]   P: [phone]  F: [phone]  [] [place] [address] [facility] [address]  [place]: [phone]   F: [phone]     Physical Therapy Cancel/No Show note    Date: 2019  Patient: Stephanie Lubin  : 1945  MRN: [id number]    Cancels/No Shows to date:     For today's appointment patient:    [x]  Cancelled    [] Rescheduled appointment    [] No-show     Reason given by patient:    []  Patient ill    []  Conflicting appointment    [] No transportation      [] Conflict with work    [x] No reason given    [] Weather related    [] Other:      Comments:        [x] Next appointment was confirmed    Electronically signed by: Shasta George PTA

## 2019-04-24 ENCOUNTER — HOSPITAL ENCOUNTER (OUTPATIENT)
Dept: PHYSICAL THERAPY | Facility: CLINIC | Age: 74
Setting detail: THERAPIES SERIES
Discharge: HOME | End: 2019-04-24
Payer: MEDICAID

## 2019-04-24 PROCEDURE — 97140 MANUAL THERAPY 1/> REGIONS: CPT

## 2019-04-24 PROCEDURE — 97110 THERAPEUTIC EXERCISES: CPT

## 2019-04-24 NOTE — FLOWSHEET NOTE
[] [facility]) [facility] &  Therapy  [address]  [phone]  F: [phone] [x] [address]  [place] [address]   [address]  P: [phone]  F: [phone] [] [address]  [address] [facility]  P: [phone]  F: [phone] [] [address]  [facility]   [address]  [place]: [phone]  F: [phone]     Physical Therapy Daily Treatment Note    Date:  2019  Patient Name:  Brooklyn Shah    :  1945  MRN: [id number]  Physician: DELMI Wiseman                                     Insurance: [employer] Diagnosis: Primary osteoarthritis of both shoulders; primary osteoarthritis involving multiple join               Rehab Codes: M25.511, M25.522, M25.611, M25.622, R29.3, M25.561, M25.562, M25.661, M25.662, R53.1, R26.9  Onset Date:                                   Next 's appt: 19  Visit# / total visits:   Cancels/No Shows: 0/0    Subjective:    Pain:  [x] Yes  [] No Location: bilat shoulders/knees  Pain Rating: (0-10 scale) 9/10 knees and shoulders L side , 8/10 knees and shoulders R side. Pain altered Tx:  [] No  [x] Yes  Action: Slow progression through exercises d/t pain  Comments: Pt reports hurting to bad to come in to previous appointment. Popping in the knees. Pt reports mid way through out treatment that her pain was so high this morning she thought about going to the ER. Reports that L side is worse. Objective:  L knee ROM: 1-90deg AROM (100deg KF PROM)  R knee ROM: 1-95deg AROM (101deg KF PROM)    L shoulder ROM:  - flex: 135deg  - abd: 135deg  - ER: 65 deg  - IR: 53 deg    R shoulder ROM:  - flex: 130deg  - abd: 130deg  - ER: 68 deg  - IR: 45 deg    Modalities:   Exercises: All exercises completed bilaterally.  Bolded completed and 4/10 pain with use of BUEs/BLEs for ADLs - NOT MET, still high pain levels at rest (7-8/10 typically)  2. ? ROM:  a. Bilateral knee flexion AROM to 110deg without pain at end range to improve flexibility required for squatting and bending when standing - No change, knee flexion PROM near same to eval (~100deg bilat passive, 90-95deg active)  b. Bilateral shoulder PROM to the following degrees to improve joint mobility:  i. Shoulder flex/abd to 150deg - Making progress, improvement to 135 bilat  ii. Shoulder IR/ER to 60deg and 50deg respectively - Making progress, MET with ER, R shoulder IR still lacking  3. ? Strength:   a. 5-/5 MMT with no pain reported for bilateral shoulder abd/flex for improved overhead strength  b. 5/5 MMT in bilateral knee flex/ext with no pain reported for improved weightbearing support to joints   4. ? Function:   a. Pt will be able to tolerate standing for up to 25 minutes to complete housework with only minimal pain in B knees reported - Notes she can stand about 15-20 min  b. Pt will be able to wash hair independently without much compensation to improve ease of ADLs - NOT MET  5. Independent with Home Exercise Programs   6. Demonstrate Knowledge of fall prevention        Patient goals: to feel better        Pt. Education:  [x] Yes  [] No  [x] Reviewed Prior HEP/Ed  Method of Education: [x] Verbal  [] Demo  [] Written  Comprehension of Education:  [x] Verbalizes understanding. [] Demonstrates understanding. [x] Needs review. [] Demonstrates/verbalizes HEP/Ed previously given. Plan: [x] Continue per plan of care.    [] Other:      Time In: 2:00 pm     Time Out: 3:02  pm    Electronically signed by:  Kim Ashley PTA

## 2019-04-30 ENCOUNTER — HOSPITAL ENCOUNTER (OUTPATIENT)
Dept: PHYSICAL THERAPY | Facility: CLINIC | Age: 74
Setting detail: THERAPIES SERIES
Discharge: HOME | End: 2019-04-30
Payer: MEDICAID

## 2019-04-30 PROCEDURE — 97110 THERAPEUTIC EXERCISES: CPT

## 2019-04-30 NOTE — FLOWSHEET NOTE
[] [address]  Outpatient Rehabilitation &  Therapy  [address]  [phone]  F: [phone] [x] [address]  [facility]   [address]  P: [phone]  F: [phone] [] [address]  Therapy  [address]  P: [phone]  F: [phone] [] [address]  [facility]   [address]  [place]: [phone]  F: [phone]     Physical Therapy Daily Treatment Note    Date:  2019  Patient Name:  Isabel Schmitz    :  1945  MRN: [id number]  Physician: DELMI Day                                     Insurance: [employer] Diagnosis: Primary osteoarthritis of both shoulders; primary osteoarthritis involving multiple join               Rehab Codes: M25.511, M25.522, M25.611, M25.622, R29.3, M25.561, M25.562, M25.661, M25.662, R53.1, R26.9  Onset Date:                                   Next 's appt: 19  Visit# / total visits: 812  Cancels/No Shows: 0/0    Subjective:    Pain:  [x] Yes  [] No Location: bilat shoulders/knees  Pain Rating: (0-10 scale) 8/10 L knee   7.5/10 right knee and both shoulders  Pain altered Tx:  [] No  [x] Yes  Action: Slow progression through exercises d/t pain  Comments: Pt arrives stating she is better today than she was last treatment. Reports that L knee is still more painful. Reports exercises are harder here than at home. Objective:  L knee ROM: 1-90deg AROM (100deg KF PROM)  R knee ROM: 1-95deg AROM (101deg KF PROM)    L shoulder ROM:  - flex: 135deg  - abd: 135deg  - ER: 65 deg  - IR: 53 deg    R shoulder ROM:  - flex: 130deg  - abd: 130deg  - ER: 68 deg  - IR: 45 deg    Modalities:   Exercises: All exercises completed bilaterally.  Bolded completed 19     Exercise Reps/ Time Weight/ Level Comments   scifit 10 min L1 UE/LE use, slow

## 2019-05-01 NOTE — TELEPHONE ENCOUNTER
E-scribing request for metFORMIN . Pt has future appt. Health Maintenance   Topic Date Due    Pneumococcal 65+ years Vaccine (1 of 2 - PCV13) 06/21/2012    Diabetic retinal exam  03/01/2017    Shingles Vaccine (2 of 2) 05/24/2018    Lipid screen  04/09/2019    Flu vaccine (Season Ended) 01/20/2020 (Originally 9/1/2019)    Hepatitis C screen  02/20/2020 (Originally 1945)    Diabetic foot exam  08/27/2019    A1C test (Diabetic or Prediabetic)  02/04/2020    Potassium monitoring  04/01/2020    Creatinine monitoring  04/01/2020    Breast cancer screen  02/20/2021    Colon cancer screen colonoscopy  10/23/2025    DTaP/Tdap/Td vaccine (2 - Td) 04/01/2026    DEXA (modify frequency per FRAX score)  Completed             (applicable per patient's age: Cancer Screenings, Depression Screening, Fall Risk Screening, Immunizations)    Hemoglobin A1C (%)   Date Value   02/04/2019 6.3   07/11/2018 7.6   03/26/2018 7.2     Microalb/Crt.  Ratio (mcg/mg creat)   Date Value   09/06/2018 19     LDL Cholesterol (mg/dL)   Date Value   04/09/2018 97     AST (U/L)   Date Value   07/17/2018 15     ALT (U/L)   Date Value   07/17/2018 9     BUN (mg/dL)   Date Value   04/01/2019 15      (goal A1C is < 7)   (goal LDL is <100) need 30-50% reduction from baseline     BP Readings from Last 3 Encounters:   04/16/19 (!) 143/83   04/01/19 (!) 140/80   03/14/19 (!) 164/81    (goal /80)      All Future Testing planned in [facility]:  Lab Frequency Next Occurrence   Pain Management Drug Screen Once 07/17/2018   IR ARTHR/ASP/INJ MAJOR JT/BURSA W US Once 07/17/2018   Sleep Study with PAP Titration Once 08/30/2018   CBC Auto Differential Once 03/25/2019   Albumin Once 88/10/4977   Basic Metabolic Panel Once 44/53/0443   Protein, urine, random Once 03/25/2019   Creatinine, Random Urine Once 03/25/2019   Microalbumin / Creatinine Urine Ratio Once 03/25/2019   PTH, Intact Once 03/25/2019   Phosphorus Once 03/25/2019   Vitamin D 25 Hydroxy Once 03/25/2019   Chlamydia Trachomatis & Neisseria gonorrhoeae (GC) by amplified detection Once 08/05/2019   Pain Management Drug Screen Once 03/14/2019   CBC Once 07/01/2019   Protein / Creatinine Ratio, Urine Once 76/99/2552   Basic Metabolic Panel Every 12 Weeks 9/13/2019       Next Visit Date:  Future Appointments   Date Time Provider Husam Dennise   5/3/2019  2:45 PM Charley Blank, PT [facility]   5/6/2019  2:00 PM Charley Blank, PT [facility]   5/8/2019  1:45 PM Rosenda Janine, PTA [facility]   5/13/2019  2:00 PM Olga Pratt, PTA [facility]   5/15/2019  2:00 PM Charley Blank, PT [facility]   5/17/2019 11:00 AM Leandra Priest, APRN - CNP [facility]   5/31/2019 10:00 AM Kendra Cisneros MD Resp Spec [facility]   7/1/2019 11:50 AM Heidi Lee MD AFL Neph Mario None   8/8/2019 12:40 PM José Zelaya MD [address] [place] [address] [address] [facility]            Patient Active Problem List:     Obstructive sleep apnea     Hypertension     Hyperlipidemia     Gout     Asthma     Morbid obesity ([facility] [address]     Vitamin D deficiency     GERD (gastroesophageal reflux disease)     TIA (transient ischemic attack)     Stage 3 chronic kidney disease (HCC)     Anemia, iron deficiency     DJD (degenerative joint disease) of knee     Primary osteoarthritis of both shoulders     Pain of both shoulder joints     Primary osteoarthritis involving multiple joints     Pain in joint, lower leg     Type 2 diabetes mellitus without complication, without long-term current use of insulin (HCC)     Adjustment disorder with mixed anxiety and depressed mood     Bilateral leg edema     Extensor tenosynovitis of right wrist     Carpal tunnel syndrome on right     Medication monitoring encounter     Primary osteoarthritis of both knees     Postural dizziness     Mild intermittent asthma without complication     Morbid obesity with BMI of 50.0-59.9, adult ([facility] [address]     Chronic use of opiate drugs therapeutic purposes     Nuclear senile cataract

## 2019-05-03 ENCOUNTER — HOSPITAL ENCOUNTER (OUTPATIENT)
Dept: PHYSICAL THERAPY | Facility: CLINIC | Age: 74
Setting detail: THERAPIES SERIES
Discharge: HOME | End: 2019-05-03
Payer: MEDICAID

## 2019-05-06 ENCOUNTER — HOSPITAL ENCOUNTER (OUTPATIENT)
Dept: PHYSICAL THERAPY | Facility: CLINIC | Age: 74
Setting detail: THERAPIES SERIES
Discharge: HOME | End: 2019-05-06
Payer: MEDICAID

## 2019-05-06 PROCEDURE — 97140 MANUAL THERAPY 1/> REGIONS: CPT

## 2019-05-06 PROCEDURE — 97110 THERAPEUTIC EXERCISES: CPT

## 2019-05-06 NOTE — FLOWSHEET NOTE
[] [address]  Outpatient Rehabilitation &  Therapy  [address]  [phone]  F: [phone] [x] [address] [facility] 36   [address]  P: [phone]  F: [phone] [] [place]  [address] [facility]  P: [phone]  F: [phone] [] [address]  [facility]   [address]  [place]: [phone]  F: [phone]     Physical Therapy Daily Treatment Note    Date:  2019  Patient Name:  Zofia Sheikh    :  1945  MRN: [id number]  Physician: DELMI Hankins                                     Insurance: [employer] Diagnosis: Primary osteoarthritis of both shoulders; primary osteoarthritis involving multiple join               Rehab Codes: M25.511, M25.522, M25.611, M25.622, R29.3, M25.561, M25.562, M25.661, M25.662, R53.1, R26.9  Onset Date:                                   Next 's appt: 19  Visit# / total visits:   Cancels/No Shows: 1/0    Subjective:    Pain:  [x] Yes  [] No Location: bilat shoulders/knees  Pain Rating: (0-10 scale) 8/10 both shoulders and knees  Pain altered Tx:  [] No  [x] Yes  Action: Slow progression through exercises d/t pain  Comments: Pt arrives reporting she had increased pain after last session, and is still very painful in bilateral shoulders/knees. Objective:  On 19: ROM            A/P ROM A/P STRENGTH  STRENGTH    LEFT RIGHT LEFT RIGHT   SHLD FLEX 130/140 130/135 4+* 4+*   SHLD ABD 95/130 95/130 4+* 4+*   SHLD ER 65 65     SHLD IR To L2/40 To L2/40            Knee flexion 101 101 5-* 5-   Knee ext 1 1 5-* 5-                Modalities:   Exercises: All exercises completed bilaterally.  Bolded completed 19     Exercise Reps/ Time Weight/ Level Comments   scifit 10 min L1 UE/LE use, slow pace  - increased time on  SHOULDER      Manual therapy 8 min total  AP mobs, inferior glides, UT TPR   Shoulder PROM bilat 22 min  Increased time spent d/t improvements made and improved tolerance  - SLOW PROM to avoid muscle guarding resulting in pain, lalitha. eccentric   Upper trap stretch 2x30\" ea       Scapular retraction 10x5\"       Table slide stretch 10x ea   Flex (add abd, scaption later)   Shoulder rolls  20x       Cane chest press 10x 2     Cane flexion 10x  Pain free range   Cane Protraction 10x     Wall slides  10x ea  Added 4/30          KNEES   Had to reduce all exercises to 1 set on 5/6 d/t increased pain   LAQs x15 ea  seated   Heel slides x15  ea  seated   Hip add 10x10\"  Seated; increased hold time/decreased reps on 4/15   Hip abd 2x20 blueberry Seated; progressed resistance on 4/17   HS curls 10x2  red Seated. Able to complete increased reps today  4/30   Sit to stand x  Elevated table, unable to complete due to pain. Myofacial roller 8 min total  Added 4/15: to belly of quad and distal quad, peripatellar areas         Other: Very slow progression through exercises with increased therapeutic rest required on 5/6 d/t pain; increased time in shoulder/knee ROM to obtain measurements       Specific Instructions for next treatment: AAROM with cane, reinstate scap training; sit to stands      Treatment Charges: Mins Units   []  Modalities     [x]  Ther Exercise 42 2   [x]  Manual Therapy 8 1   []  Ther Activities     []  Aquatics     []  Vasocompression     []  Other     Total Treatment time 50 3       Assessment: [x] Progressing toward goals. Little to no improvement in shoulder PROM/AROM values bilaterally since last assessment ~3 weeks ago. Pt reports continued HEP compliance. Demos significant muscle guarding and popping throughout PROM bilaterally despite manual and tactile cuing in attempt to reduce guarding. Still very challenged with current AROM program for knees and AAROM for shoulders, so no progressions made. Have been unable to attempt closed chain exercises for strengthening in most sessions d/t high pain levels. [] No change. [x] Other: Overall, pt has made some progress with ROM in flex/abd, but has been very limited in progress d/t muscle guarding and high pain. Will continue to progress as tolerated. STG: (to be met in 12 treatments) - Preliminary assessment on 4/17 by primary therapist:  1. ? Pain: No greater than 2/10 pain at rest and 4/10 pain with use of BUEs/BLEs for ADLs - NOT MET, still high pain levels at rest (7-8/10 typically)  2. ? ROM:  a. Bilateral knee flexion AROM to 110deg without pain at end range to improve flexibility required for squatting and bending when standing - No change, knee flexion PROM near same to eval (~100deg bilat passive, 90-95deg active)  b. Bilateral shoulder PROM to the following degrees to improve joint mobility:  i. Shoulder flex/abd to 150deg - No improvement since 4/17 assessment  ii. Shoulder IR/ER to 60deg and 50deg respectively - No improvement noted since 4/17 assessment  3. ? Strength:   a. 5-/5 MMT with no pain reported for bilateral shoulder abd/flex for improved overhead strength  b. 5/5 MMT in bilateral knee flex/ext with no pain reported for improved weightbearing support to joints   4. ? Function:   a. Pt will be able to tolerate standing for up to 25 minutes to complete housework with only minimal pain in B knees reported - Notes she can stand about 15-20 min  b. Pt will be able to wash hair independently without much compensation to improve ease of ADLs - NOT MET  5. Independent with Home Exercise Programs   6. Demonstrate Knowledge of fall prevention        Patient goals: to feel better        Pt. Education:  [x] Yes  [] No  [x] Reviewed Prior HEP/Ed  Method of Education: [x] Verbal  [] Demo  [] Written  Comprehension of Education:  [x] Verbalizes understanding. [] Demonstrates understanding. [x] Needs review.   [] Demonstrates/verbalizes HEP/Ed previously given. Plan: [x] Continue per plan of care.    [] Other:      Time In: 2:00 pm     Time Out: 3:04  pm    Electronically signed by:  Mauricio Jimenes PT

## 2019-05-08 ENCOUNTER — HOSPITAL ENCOUNTER (OUTPATIENT)
Dept: PHYSICAL THERAPY | Facility: CLINIC | Age: 74
Setting detail: THERAPIES SERIES
Discharge: HOME | End: 2019-05-08
Payer: MEDICAID

## 2019-05-08 PROCEDURE — 97140 MANUAL THERAPY 1/> REGIONS: CPT

## 2019-05-08 PROCEDURE — 97110 THERAPEUTIC EXERCISES: CPT

## 2019-05-08 NOTE — FLOWSHEET NOTE
[] [address]  Outpatient Rehabilitation &  Therapy  [address]  [phone]  F: [phone] [x] [address]  [facility]   [address]  P: [phone]  F: [phone] [] [place]  [address] [facility] [address]  P: [phone]  F: [phone] [] [address]  [facility]   [address]  [place]: [phone]  F: [phone]     Physical Therapy Daily Treatment Note    Date:  2019  Patient Name:  Slim Ceja    :  1945  MRN: [id number]  Physician: DELMI Cronin                                     Insurance: [employer] Diagnosis: Primary osteoarthritis of both shoulders; primary osteoarthritis involving multiple join               Rehab Codes: M25.511, M25.522, M25.611, M25.622, R29.3, M25.561, M25.562, M25.661, M25.662, R53.1, R26.9  Onset Date:                                   Next 's appt: 19  Visit# / total visits: 10/12  Cancels/No Shows: 1/0    Subjective:    Pain:  [x] Yes  [] No Location: bilat shoulders/knees  Pain Rating: (0-10 scale) 8.5/10 both shoulders and knees  Pain altered Tx:  [] No  [x] Yes  Action: Slow progression through exercises d/t pain  Comments: Pt arrives reporting that her shoulders are more painful than her knees today. Objective:  On 19: ROM            A/P ROM A/P STRENGTH  STRENGTH    LEFT RIGHT LEFT RIGHT   SHLD FLEX 130/140 130/135 4+* 4+*   SHLD ABD 95/130 95/130 4+* 4+*   SHLD ER 65 65     SHLD IR To L2/40 To L2/40            Knee flexion 101 101 5-* 5-   Knee ext 1 1 5-* 5-                Modalities:   Exercises: All exercises completed bilaterally.  Bolded completed 19     Exercise Reps/ Time Weight/ Level Comments   scifit 10 min L1 UE/LE use, slow pace  - increased time on          SHOULDER      Manual therapy 8 rest (7-8/10 typically)  2. ? ROM:  a. Bilateral knee flexion AROM to 110deg without pain at end range to improve flexibility required for squatting and bending when standing - No change, knee flexion PROM near same to eval (~100deg bilat passive, 90-95deg active)  b. Bilateral shoulder PROM to the following degrees to improve joint mobility:  i. Shoulder flex/abd to 150deg - No improvement since 4/17 assessment  ii. Shoulder IR/ER to 60deg and 50deg respectively - No improvement noted since 4/17 assessment  3. ? Strength:   a. 5-/5 MMT with no pain reported for bilateral shoulder abd/flex for improved overhead strength  b. 5/5 MMT in bilateral knee flex/ext with no pain reported for improved weightbearing support to joints   4. ? Function:   a. Pt will be able to tolerate standing for up to 25 minutes to complete housework with only minimal pain in B knees reported - Notes she can stand about 15-20 min  b. Pt will be able to wash hair independently without much compensation to improve ease of ADLs - NOT MET  5. Independent with Home Exercise Programs   6. Demonstrate Knowledge of fall prevention        Patient goals: to feel better        Pt. Education:  [x] Yes  [] No  [x] Reviewed Prior HEP/Ed  Method of Education: [x] Verbal  [] Demo  [] Written  Comprehension of Education:  [x] Verbalizes understanding. [] Demonstrates understanding. [x] Needs review. [] Demonstrates/verbalizes HEP/Ed previously given. Plan: [x] Continue per plan of care.    [] Other:      Time In: 145 pm     Time Out: 245  pm    Electronically signed by:  Walter Barboza PTA

## 2019-05-13 ENCOUNTER — HOSPITAL ENCOUNTER (OUTPATIENT)
Dept: PHYSICAL THERAPY | Facility: CLINIC | Age: 74
Setting detail: THERAPIES SERIES
Discharge: HOME | End: 2019-05-13
Payer: MEDICAID

## 2019-05-13 PROCEDURE — 97110 THERAPEUTIC EXERCISES: CPT

## 2019-05-13 NOTE — FLOWSHEET NOTE
[] [address]  Outpatient Rehabilitation &  Therapy  [address]  [phone]  F: [phone] [x] [address]  [address] [facility]   [address]  P: [phone]  F: [phone] [] [place]  [address]  P: [phone]  F: [phone] [] [address]  [facility]   [address]  [place]: [phone]  F: [phone]     Physical Therapy Daily Treatment Note    Date:  2019  Patient Name:  Breezy Sutherland    :  1945  MRN: [id number]  Physician: DELMI Anderson                                     Insurance: [employer] Diagnosis: Primary osteoarthritis of both shoulders; primary osteoarthritis involving multiple join               Rehab Codes: M25.511, M25.522, M25.611, M25.622, R29.3, M25.561, M25.562, M25.661, M25.662, R53.1, R26.9  Onset Date:                                   Next 's appt: 19  Visit# / total visits:   Cancels/No Shows: 1/0    Subjective:    Pain:  [x] Yes  [] No Location: bilat shoulders/knees  Pain Rating: (0-10 scale) 8/10 both shoulders and knees  Pain altered Tx:  [] No  [x] Yes  Action: Slow progression through exercises d/t pain  Comments: Pt arrives reporting in creased pain due to having to stand for a pronlonged period of time yesterday. Objective:  On 19: ROM            A/P ROM A/P STRENGTH  STRENGTH    LEFT RIGHT LEFT RIGHT   SHLD FLEX 130/140 130/135 4+* 4+*   SHLD ABD 95/130 95/130 4+* 4+*   SHLD ER 65 65     SHLD IR To L2/40 To L2/40            Knee flexion 101 101 5-* 5-   Knee ext 1 1 5-* 5-                Modalities:   Exercises: All exercises completed bilaterally.  Bolded completed 19     Exercise Reps/ Time Weight/ Level Comments   scifit 10 min L1 UE/LE use, slow pace  - increased time on          SHOULDER

## 2019-05-15 ENCOUNTER — HOSPITAL ENCOUNTER (OUTPATIENT)
Dept: PHYSICAL THERAPY | Facility: CLINIC | Age: 74
Setting detail: THERAPIES SERIES
Discharge: HOME | End: 2019-05-15
Payer: MEDICAID

## 2019-05-15 PROCEDURE — 97110 THERAPEUTIC EXERCISES: CPT

## 2019-05-15 NOTE — FLOWSHEET NOTE
[] Berenice Trevino 47  Outpatient Rehabilitation &  Therapy  [address]  [phone]  F: [phone] [x] [address]  [address] [email or web address]   [address]  P: [phone]  F: [phone] [] [address]  [address] [facility]  P: [phone]  F: [phone] [] [address] [facility]   [address]  [place]: [phone]  F: [phone]     Physical Therapy Daily Treatment Note    Date:  5/15/2019  Patient Name:  Gurinder Bates    :  1945  MRN: [id number]  Physician: DELMI Dillard                                     Insurance: [employer] Diagnosis: Primary osteoarthritis of both shoulders; primary osteoarthritis involving multiple join               Rehab Codes: M25.511, M25.522, M25.611, M25.622, R29.3, M25.561, M25.562, M25.661, M25.662, R53.1, R26.9  Onset Date:                                   Next 's appt: 19  Visit# / total visits:   Cancels/No Shows: 1/0    Subjective:    Pain:  [x] Yes  [] No Location: bilat shoulders/knees  Pain Rating: (0-10 scale) 8/10 both shoulders and knees  Pain altered Tx:  [] No  [x] Yes  Action: Slow progression through exercises d/t pain  Comments: Pt arrives reporting she is stiff and shoulders and knees are both a today. Objective:  On 19: ROM            A/P ROM A/P STRENGTH  STRENGTH    LEFT RIGHT LEFT RIGHT   SHLD FLEX 130/140 130/135 4+* 4+*   SHLD ABD 95/130 95/130 4+* 4+*   SHLD ER 65 65     SHLD IR To L2/40 To L2/40            Knee flexion 101 101 5-* 5-   Knee ext 1 1 5-* 5-                Modalities:   Exercises: All exercises completed bilaterally.  Bolded completed 05/15/19     Exercise Reps/ Time Weight/ Level Comments   scifit 10 min L1 UE/LE use, slow pace  - increased time on          SHOULDER      Manual therapy 8 min total  AP mobs, inferior glides, UT TPR   Shoulder PROM bilat 10 min  Increased time spent d/t improvements made and improved tolerance, AP mobs and inferior glides included in this  - SLOW PROM to avoid muscle guarding resulting in pain, lalitha. eccentric   Upper trap stretch 2x30\" ea       Scapular retraction 10x5\"       Table slide stretch 10x ea   Flex (add abd, scaption later)   Shoulder rolls  20x       Cane chest press 10x 2     Cane flexion 15x  Pain free range ,increased reps  5/13   Cane Protraction 15x  increased reps  5/13   Wall slides  10x ea  Added 4/30          KNEES      LAQs x15 ea  seated   Heel slides x15  ea  seated   Hip add 10x10\"  Seated; increased hold time/decreased reps on 4/15   Hip abd 2x20 blueberry Seated; progressed resistance on 4/17   HS curls 10x2  red Seated. Able to complete increased reps today  4/30 Only 15 completed on the left side 5/8   Sit to stands with cane  10  Elevated table, able to complete with cane 5/13   Myofacial roller 8 min total  Added 4/15: to belly of quad and distal quad, peripatellar areas         Other:     Specific Instructions for next treatment: AAROM with cane, reinstate scap training; sit to stands      Treatment Charges: Mins Units   []  Modalities     [x]  Ther Exercise 40 3   []  Manual Therapy     []  Ther Activities     []  Aquatics     []  Vasocompression     []  Other     Total Treatment time 40 3       Assessment: [x] Progressing toward goals: All exercises completed and reviewed this date for transition to discharge and HEP. Continues to be limited in progression and ROM due to pain. [] No change.        [] Other:        STG: (to be met in 12 treatments) - Preliminary assessment on 4/17 by primary therapist, final assessment on 5/15 by primary therapist:  1. ? Pain: No greater than 2/10 pain at rest and 4/10 pain with use of BUEs/BLEs for ADLs - NOT MET, still high pain levels at rest (7-8/10 typically)  2. ? ROM:  a. Bilateral knee flexion

## 2019-05-17 ENCOUNTER — HOSPITAL ENCOUNTER (OUTPATIENT)
Dept: PAIN MANAGEMENT | Age: 74
Discharge: HOME | End: 2019-05-17
Payer: MEDICAID

## 2019-05-17 VITALS
SYSTOLIC BLOOD PRESSURE: 136 MMHG | DIASTOLIC BLOOD PRESSURE: 61 MMHG | BODY MASS INDEX: 52.51 KG/M2 | TEMPERATURE: 98.7 F | HEART RATE: 63 BPM | HEIGHT: 59 IN

## 2019-05-17 DIAGNOSIS — Z51.81 MEDICATION MONITORING ENCOUNTER: ICD-10-CM

## 2019-05-17 DIAGNOSIS — M17.0 PRIMARY OSTEOARTHRITIS OF BOTH KNEES: ICD-10-CM

## 2019-05-17 DIAGNOSIS — M25.512 PAIN OF BOTH SHOULDER JOINTS: ICD-10-CM

## 2019-05-17 DIAGNOSIS — M19.011 PRIMARY OSTEOARTHRITIS OF BOTH SHOULDERS: ICD-10-CM

## 2019-05-17 DIAGNOSIS — E66.01 MORBID OBESITY WITH BMI OF 50.0-59.9, ADULT (HCC): ICD-10-CM

## 2019-05-17 DIAGNOSIS — M19.012 PRIMARY OSTEOARTHRITIS OF BOTH SHOULDERS: ICD-10-CM

## 2019-05-17 DIAGNOSIS — Z79.891 CHRONIC USE OF OPIATE DRUGS THERAPEUTIC PURPOSES: ICD-10-CM

## 2019-05-17 DIAGNOSIS — E66.01 MORBID OBESITY (HCC): Primary | ICD-10-CM

## 2019-05-17 DIAGNOSIS — M15.9 PRIMARY OSTEOARTHRITIS INVOLVING MULTIPLE JOINTS: ICD-10-CM

## 2019-05-17 DIAGNOSIS — M25.511 PAIN OF BOTH SHOULDER JOINTS: ICD-10-CM

## 2019-05-17 PROCEDURE — 99213 OFFICE O/P EST LOW 20 MIN: CPT

## 2019-05-17 PROCEDURE — 99213 OFFICE O/P EST LOW 20 MIN: CPT | Performed by: NURSE PRACTITIONER

## 2019-05-17 RX ORDER — OXYCODONE HYDROCHLORIDE 5 MG/1
5 TABLET ORAL EVERY 8 HOURS PRN
Qty: 90 TABLET | Refills: 0 | Status: SHIPPED | OUTPATIENT
Start: 2019-05-17 | End: 2019-06-14 | Stop reason: SDUPTHER

## 2019-05-17 ASSESSMENT — PAIN - FUNCTIONAL ASSESSMENT: PAIN_FUNCTIONAL_ASSESSMENT: PREVENTS OR INTERFERES SOME ACTIVE ACTIVITIES AND ADLS

## 2019-05-17 ASSESSMENT — ENCOUNTER SYMPTOMS
CONSTIPATION: 0
COUGH: 0
BACK PAIN: 1
SHORTNESS OF BREATH: 0

## 2019-05-17 ASSESSMENT — PAIN DESCRIPTION - PAIN TYPE: TYPE: CHRONIC PAIN

## 2019-05-17 ASSESSMENT — PAIN SCALES - GENERAL: PAINLEVEL_OUTOF10: 8

## 2019-05-17 ASSESSMENT — PAIN DESCRIPTION - DESCRIPTORS: DESCRIPTORS: ACHING;CONSTANT

## 2019-05-17 ASSESSMENT — PAIN DESCRIPTION - FREQUENCY: FREQUENCY: CONTINUOUS

## 2019-05-17 ASSESSMENT — PAIN DESCRIPTION - ORIENTATION: ORIENTATION: RIGHT;LEFT

## 2019-05-17 ASSESSMENT — PAIN DESCRIPTION - ONSET: ONSET: ON-GOING

## 2019-05-17 ASSESSMENT — PAIN DESCRIPTION - DIRECTION: RADIATING_TOWARDS: BILAT SHOULDER PAIN

## 2019-05-17 ASSESSMENT — PAIN DESCRIPTION - PROGRESSION: CLINICAL_PROGRESSION: NOT CHANGED

## 2019-05-17 ASSESSMENT — PAIN DESCRIPTION - LOCATION: LOCATION: SHOULDER;BACK

## 2019-05-17 NOTE — TELEPHONE ENCOUNTER
Request for Test Strips. Next Visit Date:  Future Appointments   Date Time Provider Husam Chavezi   5/31/2019 10:00 AM Jair Montelongo MD Resp Spec [facility]   6/14/2019 10:20 AM DAVID Trujillo - CNP [facility]   7/1/2019 11:50 AM Evita Hutchison MD AFL Neph Mario None   8/8/2019 12:40 PM Claudia Rowan MD [facility] IM [address] Maintenance   Topic Date Due    Pneumococcal 65+ years Vaccine (1 of 2 - PCV13) 06/21/2012    Diabetic retinal exam  03/01/2017    Shingles Vaccine (2 of 2) 05/24/2018    Lipid screen  04/09/2019    Flu vaccine (Season Ended) 01/20/2020 (Originally 9/1/2019)    Hepatitis C screen  02/20/2020 (Originally 1945)    Diabetic foot exam  08/27/2019    A1C test (Diabetic or Prediabetic)  02/04/2020    Potassium monitoring  04/01/2020    Creatinine monitoring  04/01/2020    Breast cancer screen  02/20/2021    Colon cancer screen colonoscopy  10/23/2025    DTaP/Tdap/Td vaccine (2 - Td) 04/01/2026    DEXA (modify frequency per FRAX score)  Completed       Hemoglobin A1C (%)   Date Value   02/04/2019 6.3   07/11/2018 7.6   03/26/2018 7.2             ( goal A1C is < 7)   Microalb/Crt.  Ratio (mcg/mg creat)   Date Value   09/06/2018 19     LDL Cholesterol (mg/dL)   Date Value   04/09/2018 97       (goal LDL is <100)   AST (U/L)   Date Value   07/17/2018 15     ALT (U/L)   Date Value   07/17/2018 9     BUN (mg/dL)   Date Value   04/01/2019 15     BP Readings from Last 3 Encounters:   05/17/19 136/61   04/16/19 (!) 143/83   04/01/19 (!) 140/80          (goal 120/80)    All Future Testing planned in [facility]  Lab Frequency Next Occurrence   Pain Management Drug Screen Once 07/17/2018   IR ARTHR/ASP/INJ MAJOR JT/BURSA W US Once 07/17/2018   Sleep Study with PAP Titration Once 08/30/2018   CBC Auto Differential Once 03/25/2019   Albumin Once 92/43/7062   Basic Metabolic Panel Once 94/52/5942   Protein, urine, random Once 03/25/2019   Creatinine, Random Urine Once 03/25/2019   Microalbumin / Creatinine Urine Ratio Once 03/25/2019   PTH, Intact Once 03/25/2019   Phosphorus Once 03/25/2019   Vitamin D 25 Hydroxy Once 03/25/2019   Chlamydia Trachomatis & Neisseria gonorrhoeae (GC) by amplified detection Once 08/05/2019   Pain Management Drug Screen Once 03/14/2019   CBC Once 07/01/2019   Protein / Creatinine Ratio, Urine Once 60/10/5270   Basic Metabolic Panel Every 12 Weeks 9/13/2019         Patient Active Problem List:     Obstructive sleep apnea     Hypertension     Hyperlipidemia     Gout     Asthma     Morbid obesity ([facility] [address]     Vitamin D deficiency     GERD (gastroesophageal reflux disease)     TIA (transient ischemic attack)     Stage 3 chronic kidney disease (HCC)     Anemia, iron deficiency     DJD (degenerative joint disease) of knee     Primary osteoarthritis of both shoulders     Pain of both shoulder joints     Primary osteoarthritis involving multiple joints     Pain in joint, lower leg     Type 2 diabetes mellitus without complication, without long-term current use of insulin (HCC)     Adjustment disorder with mixed anxiety and depressed mood     Bilateral leg edema     Extensor tenosynovitis of right wrist     Carpal tunnel syndrome on right     Medication monitoring encounter     Primary osteoarthritis of both knees     Postural dizziness     Mild intermittent asthma without complication     Morbid obesity with BMI of 50.0-59.9, adult ([address]     Chronic use of opiate drugs therapeutic purposes     Nuclear senile cataract

## 2019-05-17 NOTE — PROGRESS NOTES
Patient is here today to review medication contract. Chief Complaint: back pain, joint pain    PMH:  Pt c/o chronic bilat shoulder and knee pain with no known injury. She has been a pt here since 2015 with complaints of arthritic symptoms in the shoulders and knees. She says that she has had this for some time now. She had been evaluated by Dr Efrem Weiss the past, and has had injections at other pain management at [facility]. She says that these did not help her pain. She has had aquatic PT in the past, (which only helped while in the water).  It was recommended in the past that she have shoulder surgery, but she is not interested. We prescribe oxycodone and compounding cream for pain relief - which is helping with her pain. She had bilat shoulder injections  and reported 80-90% relief of pain for only about 2 weeks and and increased ROM but now feels pain is baseline but not ready to repeat at this time. At recent visit Dr Jeanne Paez suggested pt find other options to supplement medication for pain relief. Pt continues  PT with mild relief. Back Pain   This is a chronic problem. The current episode started more than 1 month ago. The problem occurs constantly. The problem has been gradually worsening since onset. The pain is present in the lumbar spine (bilat shoulders). The quality of the pain is described as stabbing, burning and aching. The pain does not radiate. The pain is at a severity of 8/10. The pain is moderate. The pain is the same all the time. The symptoms are aggravated by bending, sitting, twisting, standing and lying down. Stiffness is present in the morning. Pertinent negatives include no chest pain or fever. Risk factors include sedentary lifestyle and lack of exercise. She has tried NSAIDs, muscle relaxant, heat, analgesics and home exercises for the symptoms. The treatment provided no relief. Patient denies any new neurological symptoms.  No bowel or bladder incontinence, no weakness, and no falling. Pill count: appropriate    Morphine equivalent: 22.5    Controlled Substances Monitoring:     RX Monitoring 5/17/2019   Attestation The Prescription Monitoring Report for this patient was reviewed today. Chronic Pain Routine Monitoring Possible medication side effects, risk of tolerance/dependence & alternative treatments discussed. ;Obtaining appropriate analgesic effect of treatment. ;No signs of potential drug abuse or diversion identified: otherwise, see note documentation   Chronic Pain > 80 MEDD -       Past Medical History:   Diagnosis Date    Anemia, iron deficiency     Asthma     Carpal tunnel syndrome on right 8/29/2016    Chronic kidney disease     Edema     unspecified type    GERD (gastroesophageal reflux disease)     Glucose intolerance (impaired glucose tolerance)     Gout     History of anemia     Normocytic    History of corneal abrasion     Left    History of dyspnea     History of pneumonia     Hyperlipidemia     Hypertension     Morbid obesity (HCC)     BMI of 50.0-59.9, adult    Neck strain     Obstructive sleep apnea     on CPAP    Osteoarthritis     DJD of knees    Pain     bilateral shoulders, Lt hip    Shoulder strain     History of bilateral shoulder strain    Systemic hypertension     TIA (transient ischemic attack)     Type II or unspecified type diabetes mellitus without mention of complication, not stated as uncontrolled     Vitamin D deficiency     Wears dentures     upper and lower dentures    Wears glasses        Past Surgical History:   Procedure Laterality Date    COLONOSCOPY  10/23/15    per Dr. Vale Randle  9/21/15    empi select    NERVE BLOCK Bilateral 08/07/2018    diamond shoulder injection, decadron 10 mg, lido 1%, isovue m-200, naropin 0.5%       Allergies   Allergen Reactions    Nsaids Other (See Comments)     Chronic kidney disease    Proair Respiclick [Albuterol Sulfate]          Current Outpatient Medications:   

## 2019-05-20 RX ORDER — BLOOD SUGAR DIAGNOSTIC
STRIP MISCELLANEOUS
Qty: 100 EACH | Refills: 5 | Status: SHIPPED | OUTPATIENT
Start: 2019-05-20 | End: 2020-02-11

## 2019-05-28 NOTE — TELEPHONE ENCOUNTER
03/25/2019   Creatinine, Random Urine Once 03/25/2019   Microalbumin / Creatinine Urine Ratio Once 03/25/2019   PTH, Intact Once 03/25/2019   Phosphorus Once 03/25/2019   Vitamin D 25 Hydroxy Once 03/25/2019   Chlamydia Trachomatis & Neisseria gonorrhoeae (GC) by amplified detection Once 08/05/2019   Pain Management Drug Screen Once 03/14/2019   CBC Once 07/01/2019   Protein / Creatinine Ratio, Urine Once 70/95/3441   Basic Metabolic Panel Every 12 Weeks 9/13/2019               Patient Active Problem List:     Obstructive sleep apnea     Hypertension     Hyperlipidemia     Gout     Asthma     Morbid obesity ([facility] [address]     Vitamin D deficiency     GERD (gastroesophageal reflux disease)     TIA (transient ischemic attack)     Stage 3 chronic kidney disease (HCC)     Anemia, iron deficiency     DJD (degenerative joint disease) of knee     Primary osteoarthritis of both shoulders     Pain of both shoulder joints     Primary osteoarthritis involving multiple joints     Pain in joint, lower leg     Type 2 diabetes mellitus without complication, without long-term current use of insulin (HCC)     Adjustment disorder with mixed anxiety and depressed mood     Bilateral leg edema     Extensor tenosynovitis of right wrist     Carpal tunnel syndrome on right     Medication monitoring encounter     Primary osteoarthritis of both knees     Postural dizziness     Mild intermittent asthma without complication     Morbid obesity with BMI of 50.0-59.9, adult ([facility] [address]     Chronic use of opiate drugs therapeutic purposes     Nuclear senile cataract

## 2019-05-30 RX ORDER — GABAPENTIN 600 MG/1
TABLET ORAL
Qty: 84 TABLET | Refills: 5 | Status: SHIPPED | OUTPATIENT
Start: 2019-05-30 | End: 2019-08-28

## 2019-05-30 RX ORDER — CARVEDILOL 12.5 MG/1
TABLET ORAL
Qty: 56 TABLET | Refills: 5 | Status: SHIPPED | OUTPATIENT
Start: 2019-05-30 | End: 2019-11-18 | Stop reason: SDUPTHER

## 2019-05-30 RX ORDER — FUROSEMIDE 20 MG/1
TABLET ORAL
Qty: 28 TABLET | Refills: 5 | Status: SHIPPED | OUTPATIENT
Start: 2019-05-30 | End: 2019-11-18 | Stop reason: SDUPTHER

## 2019-05-30 RX ORDER — PRAVASTATIN SODIUM 40 MG
TABLET ORAL
Qty: 28 TABLET | Refills: 5 | Status: SHIPPED | OUTPATIENT
Start: 2019-05-30 | End: 2019-11-18 | Stop reason: SDUPTHER

## 2019-05-30 RX ORDER — GLIMEPIRIDE 2 MG/1
TABLET ORAL
Qty: 56 TABLET | Refills: 5 | Status: SHIPPED | OUTPATIENT
Start: 2019-05-30 | End: 2019-11-18 | Stop reason: SDUPTHER

## 2019-05-31 ENCOUNTER — OFFICE VISIT (OUTPATIENT)
Dept: PULMONOLOGY | Age: 74
End: 2019-05-31
Payer: MEDICAID

## 2019-05-31 VITALS
SYSTOLIC BLOOD PRESSURE: 135 MMHG | RESPIRATION RATE: 16 BRPM | HEIGHT: 59 IN | OXYGEN SATURATION: 98 % | HEART RATE: 76 BPM | BODY MASS INDEX: 54.03 KG/M2 | DIASTOLIC BLOOD PRESSURE: 80 MMHG | WEIGHT: 268 LBS

## 2019-05-31 DIAGNOSIS — E66.01 MORBID OBESITY WITH BMI OF 50.0-59.9, ADULT (HCC): ICD-10-CM

## 2019-05-31 DIAGNOSIS — G47.33 OBSTRUCTIVE SLEEP APNEA: Primary | ICD-10-CM

## 2019-05-31 DIAGNOSIS — I10 ESSENTIAL HYPERTENSION: ICD-10-CM

## 2019-05-31 DIAGNOSIS — M15.9 PRIMARY OSTEOARTHRITIS INVOLVING MULTIPLE JOINTS: ICD-10-CM

## 2019-05-31 DIAGNOSIS — M10.00 IDIOPATHIC GOUT, UNSPECIFIED CHRONICITY, UNSPECIFIED SITE: ICD-10-CM

## 2019-05-31 DIAGNOSIS — K21.9 GASTROESOPHAGEAL REFLUX DISEASE WITHOUT ESOPHAGITIS: ICD-10-CM

## 2019-05-31 DIAGNOSIS — E11.9 TYPE 2 DIABETES MELLITUS WITHOUT COMPLICATION, WITHOUT LONG-TERM CURRENT USE OF INSULIN (HCC): ICD-10-CM

## 2019-05-31 DIAGNOSIS — G45.9 TIA (TRANSIENT ISCHEMIC ATTACK): ICD-10-CM

## 2019-05-31 PROCEDURE — 4040F PNEUMOC VAC/ADMIN/RCVD: CPT | Performed by: INTERNAL MEDICINE

## 2019-05-31 PROCEDURE — G8427 DOCREV CUR MEDS BY ELIG CLIN: HCPCS | Performed by: INTERNAL MEDICINE

## 2019-05-31 PROCEDURE — 2022F DILAT RTA XM EVC RTNOPTHY: CPT | Performed by: INTERNAL MEDICINE

## 2019-05-31 PROCEDURE — 3017F COLORECTAL CA SCREEN DOC REV: CPT | Performed by: INTERNAL MEDICINE

## 2019-05-31 PROCEDURE — 99214 OFFICE O/P EST MOD 30 MIN: CPT | Performed by: INTERNAL MEDICINE

## 2019-05-31 PROCEDURE — 3044F HG A1C LEVEL LT 7.0%: CPT | Performed by: INTERNAL MEDICINE

## 2019-05-31 PROCEDURE — 1123F ACP DISCUSS/DSCN MKR DOCD: CPT | Performed by: INTERNAL MEDICINE

## 2019-05-31 PROCEDURE — G8417 CALC BMI ABV UP PARAM F/U: HCPCS | Performed by: INTERNAL MEDICINE

## 2019-05-31 PROCEDURE — 1090F PRES/ABSN URINE INCON ASSESS: CPT | Performed by: INTERNAL MEDICINE

## 2019-05-31 PROCEDURE — 1036F TOBACCO NON-USER: CPT | Performed by: INTERNAL MEDICINE

## 2019-05-31 PROCEDURE — G8399 PT W/DXA RESULTS DOCUMENT: HCPCS | Performed by: INTERNAL MEDICINE

## 2019-05-31 RX ORDER — ALBUTEROL SULFATE 90 UG/1
2 AEROSOL, METERED RESPIRATORY (INHALATION) 4 TIMES DAILY
Qty: 1 INHALER | Refills: 11 | Status: SHIPPED | OUTPATIENT
Start: 2019-05-31 | End: 2019-08-26

## 2019-05-31 ASSESSMENT — SLEEP AND FATIGUE QUESTIONNAIRES
HOW LIKELY ARE YOU TO NOD OFF OR FALL ASLEEP WHEN YOU ARE A PASSENGER IN A CAR FOR AN HOUR WITHOUT A BREAK: 0
HOW LIKELY ARE YOU TO NOD OFF OR FALL ASLEEP WHILE WATCHING TV: 2
HOW LIKELY ARE YOU TO NOD OFF OR FALL ASLEEP WHILE SITTING AND READING: 2
ESS TOTAL SCORE: 7
HOW LIKELY ARE YOU TO NOD OFF OR FALL ASLEEP WHILE LYING DOWN TO REST IN THE AFTERNOON WHEN CIRCUMSTANCES PERMIT: 2
HOW LIKELY ARE YOU TO NOD OFF OR FALL ASLEEP WHILE SITTING INACTIVE IN A PUBLIC PLACE: 0
HOW LIKELY ARE YOU TO NOD OFF OR FALL ASLEEP IN A CAR, WHILE STOPPED FOR A FEW MINUTES IN TRAFFIC: 0
HOW LIKELY ARE YOU TO NOD OFF OR FALL ASLEEP WHILE SITTING AND TALKING TO SOMEONE: 0
HOW LIKELY ARE YOU TO NOD OFF OR FALL ASLEEP WHILE SITTING QUIETLY AFTER LUNCH WITHOUT ALCOHOL: 1

## 2019-05-31 NOTE — PROGRESS NOTES
Bing Hilton  5/31/2019    Chief Complaint   Patient presents with    Follow-up    , YANA on CPAP      Bnig Hilton  presented for follow up for  sleep apnea syndrome that is being treated with CPAP. CPAP has been ineffective in relieving the apnea and improving her daytime symptoms. She is not sleepy, tired, fatigued during the daytime. [place] Sleepiness Scale due to the patient in the office revealed a score of 7, indicating lack of daytime sleepiness. The nonodorous and nasal stuffiness or sinusitis. He has no esophageal reflux symptoms. She has noted occasional pedal edema, which is very minimal.  She does not have any other parasomnias. Denies any morning headaches. Abigail Beatty is known to have systemic hypertension-under good control with the present therapy. Sleep Medicine 5/31/2019 1/18/2019 8/23/2018 9/9/2016   Sitting and reading 2 2 0 3   Watching TV 2 2 3 3   Sitting, inactive in a public place (e.g. a theatre or a meeting) 0 0 0 0   As a passenger in a car for an hour without a break 0 0 0 0   Lying down to rest in the afternoon when circumstances permit 2 3 1 3   Sitting and talking to someone 0 0 0 0   Sitting quietly after a lunch without alcohol 1 0 0 3   In a car, while stopped for a few minutes in traffic 0 0 0 0   Total score 7 7 4 12   An [place] Sleepiness Scale given to the patient the office revealed a score 07 indicating lack of daytime sleepiness. Patient also has hypertension-under good control with the present therapy. Her diabetes is also under good control  To continue to be morbidly obese. Need to lose weight. However, is not very successful. She is okay. He noted some mild pedal edema which could be manifestation of cor pulmonale secondary to sleep apnea syndrome. He does have a history of TIA, nose CVA. No myocardial infarction. Symptoms of reflux are under good control. Does have a gout within normal limits and acute arthritis.     Similarly, she does have osteoarthritis which is stable with present pain control medications. Review of Systems -as assistant was conducted for all other system including upper and lower extremities and no additional information was obtained. General ROS: negative for - chills, fatigue, fever or weight loss  ENT ROS: negative for - headaches, oral lesions or sore throat  Cardiovascular ROS: no chest pain , orthopnea or pnd   Gastrointestinal ROS: no abdominal pain, change in bowel habits, or black or bloody stools  Skin - no rash   Neuro - no blurry vision , no loc . No focal weakness   msk - no jt tenderness or swelling    Vascular - no claudication , rest completed and negative   Lymphatic - complete and negative   Hematology - oncology - complete and negative   Allergy immunology - complete and negative    no burning or hematuria       LUNG CANCER SCREENING     1. CRITERIA MET    []     CT ORDERED  []      2. CRITERIA NOT MET   [x]      3. REFUSED                    []        REASON CRITERIA NOT MET     1. SMOKING LESS THAN 30 PY  [x]      2. AGE LESS THAN 55 or GREATER 77 YEARS  []      3. QUIT SMOKING 15 YEARS OR GREATER   []      4. RECENT CT WITH IN 11 MONTHS    []      5. LIFE EXPECTANCY < 5 YEARS   []      6.  SIGNS  AND SYMPTOMS OF LUNG CANCER   []           Immunization   Immunization History   Administered Date(s) Administered    Tdap (Boostrix, Adacel) 04/01/2016    Zoster Subunit (Shingrix) 03/29/2018        Pneumococcal Vaccine     [] Up to date    [x] Indicated   [] Refused  [] Contraindicated       Influenza Vaccine   [x] Up to date    [] Indicated   [] Refused  [] Contraindicated       PAST MEDICAL HISTORY:         Diagnosis Date    Anemia, iron deficiency     Asthma     Carpal tunnel syndrome on right 8/29/2016    Chronic kidney disease     Edema     unspecified type    GERD (gastroesophageal reflux disease)     Glucose intolerance (impaired glucose tolerance)     Gout     History of anemia Normocytic    History of corneal abrasion     Left    History of dyspnea     History of pneumonia     Hyperlipidemia     Hypertension     Morbid obesity (HCC)     BMI of 50.0-59.9, adult    Neck strain     Obstructive sleep apnea     on CPAP    Osteoarthritis     DJD of knees    Pain     bilateral shoulders, Lt hip    Shoulder strain     History of bilateral shoulder strain    Systemic hypertension     TIA (transient ischemic attack)     Type II or unspecified type diabetes mellitus without mention of complication, not stated as uncontrolled     Vitamin D deficiency     Wears dentures     upper and lower dentures    Wears glasses        Family History:       Problem Relation Age of Onset    Asthma Mother     Stroke Sister     Diabetes Sister     Other Neg Hx         Sleep disorders or narcolepsy       SURGICAL HISTORY:   Past Surgical History:   Procedure Laterality Date    COLONOSCOPY  10/23/15    per Dr. Deny Rico  9/21/15    empi select    NERVE BLOCK Bilateral 2018    diamond shoulder injection, decadron 10 mg, lido 1%, isovue m-200, naropin 0.5%              Not in a hospital admission. Allergies   Allergen Reactions    Nsaids Other (See Comments)     Chronic kidney disease    Proair Respiclick [Albuterol Sulfate]      Social History     Tobacco Use   Smoking Status Former Smoker    Packs/day: 1.00    Years: 30.00    Pack years: 30.00    Types: Cigarettes    Start date: 1960    Last attempt to quit: 3/6/1990    Years since quittin.2   Smokeless Tobacco Never Used     Prior to Admission medications    Medication Sig Start Date End Date Taking?  Authorizing Provider   metFORMIN (GLUCOPHAGE) 500 MG tablet  19  Yes Historical Provider, MD   albuterol sulfate HFA (VENTOLIN HFA) 108 (90 Base) MCG/ACT inhaler Inhale 2 puffs into the lungs 4 times daily 19 Yes James Bryant MD   glimepiride (AMARYL) 2 MG tablet TAKE 1 TABLET TWICE DAILY(AM/PM) 5/30/19  Yes Renea Sams MD   gabapentin (NEURONTIN) 600 MG tablet TAKE 1 TABLET BY MOUTH 3 TIMES DAILY(AM/PM/BEDTIME) 5/30/19 6/29/19 Yes Renea Sams MD   pravastatin (PRAVACHOL) 40 MG tablet TAKE 1 TABLET AT BEDTIME 5/30/19  Yes Renea Sams MD   carvedilol (COREG) 12.5 MG tablet TAKE 1 TABLET TWICE DAILY(AM/PM) 5/30/19  Yes Renea Sams MD   furosemide (LASIX) 20 MG tablet TAKE 1 TABLET EVERY MORNING 5/30/19  Yes Renea Sams MD   ONETOUCH VERIO strip TEST BLOOD SUGAR 3 TIMES DAILY AS DIRECTED 5/20/19  Yes Renea Sams MD   oxyCODONE (ROXICODONE) 5 MG immediate release tablet Take 1 tablet by mouth every 8 hours as needed for Pain for up to 30 days.  5/17/19 6/16/19 Yes DAVID Sanchez - CNP   Omega-3 Fatty Acids (GNP FISH OIL) 1000 MG CAPS TAKE 1 CAPSULE BY MOUTH DAILY IN THE EVENING 4/1/19  Yes Renea Sams MD   Lancets MISC Use as directed with insulin use 3/4/19  Yes Renea Sams MD   diclofenac 1 % CREA Place onto the skin   Yes Historical Provider, MD   amLODIPine (NORVASC) 10 MG tablet TAKE 1 TABLET BY MOUTH DAILY IN THE MORNING 1/8/19  Yes Renea Sams MD   lisinopril (PRINIVIL;ZESTRIL) 20 MG tablet TAKE 1 TABLET BY MOUTH DAILY IN THE MORNING 1/8/19  Yes Renea Sams MD   FEROSUL 325 (65 Fe) MG tablet TAKE 1 TABLET BY MOUTH DAILY IN THE EVENING 1/8/19  Yes Renea Sams MD   aspirin 81 MG EC tablet TAKE 1 TABLET BY MOUTH DAILY IN THE EVENING 1/8/19  Yes Renea Sams MD   omeprazole (PRILOSEC) 40 MG delayed release capsule TAKE 1 CAPSULE BY MOUTH DAILY IN THE MORNING 1/8/19  Yes Renea Sams MD   allopurinol (ZYLOPRIM) 100 MG tablet TAKE 1 TABLET BY MOUTH AT BEDTIME 1/8/19  Yes Renea Sams MD   Multiple Vitamin (MULTIVITAMIN) tablet TAKE 1 TABLET EVERY MORNING 8/20/18  Yes Renea Sams MD   albuterol sulfate HFA (VENTOLIN HFA) 108 (90 Base) MCG/ACT inhaler Inhale 2 puffs into the lungs every 6 hours as needed for Wheezing 7/11/18  Yes Bon Coreas MD   calcium carbonate-vitamin D (CALTRATE) 600-400 MG-UNIT TABS per tab Take 1 tablet by mouth 2 times daily   Yes Historical Provider, MD         Physical Exam  General Appearance:    Alert, cooperative, no distress, appears stated age, Morbid obesity, no distress, not using accessory muscles. Has edema of the feet    Head:    Normocephalic, without obvious abnormality, atraumatic   Ear examination is normal.    Nose with no polyps. No sinus tenderness is noted. No aspirin intolerance noted. Throat examination is unremarkable. Eye examination revealed no jaundice. No Mohit's syndrome                :    Neck:   Supple, symmetrical, trachea midline, no adenopathy;     thyroid:  no enlargement/tenderness/nodules; no carotid    bruit or JVD. Neck is short and fat    Back:     Symmetric, no curvature, ROM normal, no CVA tenderness   Lungs:       AP diameter is not increased. Percussion note is diminished because of obesity. Air entry is diminished because of obesity. No rales or rhonchi are audible. No pleural friction rub is audible. Chest Wall:    No tenderness or deformity      Heart:    Regular rate and rhythm, S1 and S2 normal, no murmur, rub        or gallop no rvh                           Abdomen:                                                 Pulses:                                            Lymph nodes:                    Neurologic:                  Soft, non-tender, bowel sounds active all four quadrants,     no masses, no organomegaly         2+ and symmetric all extremities            Cervical, supraclavicular not enlarged or matted or tender      CNII-XII intact, normal strength 5/5 .   Sensation grossly normal  and reflexes normal 2+  throughout     Clubbing No  Lower ext edema No1+   [] , 2 +  [] , 3+   []  Upper ext edema No       Musculoskeletal - no joint swelling or tenderness or synovitis               /80   Pulse 76   Resp 16   Ht 4' 11\" (1.499 m)   Wt 268 lb (121.6 kg) SpO2 98% Comment: room air at rest  BMI 54.13 kg/m²     CXR  No recent chest x-ray      CT Scans  No recent echo    Echo  No recent echocardiograms        Assessment   Diagnosis Orders   1. Obstructive sleep apnea     2. Morbid obesity with BMI of 50.0-59.9, adult ([facility]     3. Idiopathic gout, unspecified chronicity, unspecified site     4. Primary osteoarthritis involving multiple joints     5. Gastroesophageal reflux disease without esophagitis     6. Essential hypertension     7. TIA (transient ischemic attack)     8. Type 2 diabetes mellitus without complication, without long-term current use of insulin ([facility]         Plan:    Patient has sleep apnea syndrome that is responding well to the use of CPAP. I advised her to continue use of CPAP as before, at least 6 hours every night. She is new pressure was also advised to use humidification along the CPAP to prevent any upper airway dryness. He doesn't feel sleepy, tired, fatigued during the daytime. She has grade 1 of her dyspnea. She was a given a refill for Ventolin to be used on an as-needed basis. Patient has no angina. No coronary artery disease. No evidence of any heart failure. Symptoms are auscultated. Reflux under good control. He have a TIA and no evidence of any stroke. Blood sugars are under good control. She'll continue the present therapy. Continue the same antihypertensive treatment as well. Thank you greatly help her to lose weight but unfortunately that may not be possible. She is known to have gout. There is no evidence of an acute arthritis, however. Patient have her Pneumovax and influenza vaccine. He is not a candidate for lung cancer screening.

## 2019-06-14 ENCOUNTER — HOSPITAL ENCOUNTER (OUTPATIENT)
Dept: PAIN MANAGEMENT | Age: 74
Discharge: HOME | End: 2019-06-14
Payer: MEDICAID

## 2019-06-14 VITALS
TEMPERATURE: 98.7 F | RESPIRATION RATE: 16 BRPM | DIASTOLIC BLOOD PRESSURE: 69 MMHG | SYSTOLIC BLOOD PRESSURE: 138 MMHG | HEART RATE: 60 BPM

## 2019-06-14 DIAGNOSIS — E66.01 MORBID OBESITY (HCC): ICD-10-CM

## 2019-06-14 DIAGNOSIS — M15.9 PRIMARY OSTEOARTHRITIS INVOLVING MULTIPLE JOINTS: ICD-10-CM

## 2019-06-14 DIAGNOSIS — M19.012 PRIMARY OSTEOARTHRITIS OF BOTH SHOULDERS: Chronic | ICD-10-CM

## 2019-06-14 DIAGNOSIS — M17.0 PRIMARY OSTEOARTHRITIS OF BOTH KNEES: ICD-10-CM

## 2019-06-14 DIAGNOSIS — M19.011 PRIMARY OSTEOARTHRITIS OF BOTH SHOULDERS: Chronic | ICD-10-CM

## 2019-06-14 DIAGNOSIS — E66.01 MORBID OBESITY WITH BMI OF 50.0-59.9, ADULT (HCC): Chronic | ICD-10-CM

## 2019-06-14 DIAGNOSIS — Z51.81 MEDICATION MONITORING ENCOUNTER: Primary | ICD-10-CM

## 2019-06-14 PROCEDURE — 99214 OFFICE O/P EST MOD 30 MIN: CPT

## 2019-06-14 PROCEDURE — 99213 OFFICE O/P EST LOW 20 MIN: CPT | Performed by: NURSE PRACTITIONER

## 2019-06-14 RX ORDER — OXYCODONE HYDROCHLORIDE 5 MG/1
5 TABLET ORAL EVERY 8 HOURS PRN
Qty: 90 TABLET | Refills: 0 | Status: SHIPPED | OUTPATIENT
Start: 2019-06-17 | End: 2019-07-19 | Stop reason: SDUPTHER

## 2019-06-14 RX ORDER — POLYETHYLENE GLYCOL 3350 17 G/17G
17 POWDER, FOR SOLUTION ORAL DAILY
Qty: 510 G | Refills: 2 | Status: SHIPPED | OUTPATIENT
Start: 2019-06-14 | End: 2020-03-10 | Stop reason: SDUPTHER

## 2019-06-14 ASSESSMENT — ENCOUNTER SYMPTOMS
SHORTNESS OF BREATH: 0
CONSTIPATION: 0
BACK PAIN: 1
COUGH: 0

## 2019-06-14 NOTE — PROGRESS NOTES
Patient is here today to review medication contract. Chief Complaint: back and shoulder pain    PMH: Pt c/o chronic bilat shoulder and knee pain with no known injury. She has been a pt here since 2015 with complaints of arthritic symptoms in the shoulders and knees. She says that she has had this for some time now. She had been evaluated by Dr Ray Soto the past, and has had injections at other pain management at [facility]. She says that these did not help her pain. She has had aquatic PT in the past, (which only helped while in the water).  It was recommended in the past that she have shoulder surgery, but she is not interested. We prescribe oxycodone and compounding cream for pain relief - which is helping with her pain. She had bilat shoulder injections  and reported 80-90% relief of pain for only about 2 weeks and and increased ROM but now feels pain is baseline but not ready to repeat at this time. At recent visit Dr Jaida Hayward suggested pt find other options to supplement medication for pain relief. Pt continues PT with mild relief. Back Pain   This is a chronic problem. The current episode started more than 1 year ago. The problem occurs intermittently. The problem is unchanged. The pain is present in the thoracic spine. The quality of the pain is described as aching. The pain radiates to the right foot, left foot, right knee and left knee. The pain is at a severity of 8/10. The pain is moderate. The pain is the same all the time. The symptoms are aggravated by bending, standing and position. Stiffness is present all day. Associated symptoms include numbness and tingling. Pertinent negatives include no chest pain or fever. Risk factors include lack of exercise, sedentary lifestyle and obesity. She has tried analgesics for the symptoms. The treatment provided mild relief. Shoulder Pain    The pain is present in the neck, left shoulder and right shoulder. This is a chronic problem.  The current episode started more than 1 year ago. There has been no history of extremity trauma. The problem occurs constantly. The problem has been unchanged. The quality of the pain is described as aching, burning and dull. The pain is at a severity of 8/10. The pain is moderate. Associated symptoms include a limited range of motion, numbness and tingling. Pertinent negatives include no fever. The symptoms are aggravated by activity and cold. She has tried oral narcotics and OTC ointments for the symptoms. The treatment provided mild relief. Her past medical history is significant for diabetes. Patient denies any new neurological symptoms. No bowel or bladder incontinence, no weakness, and no falling. Pill count: appropriate    Morphine equivalent:     Controlled Substance Monitoring:    Acute and Chronic Pain Monitoring:   RX Monitoring 6/14/2019   Attestation -   Periodic Controlled Substance Monitoring Possible medication side effects, risk of tolerance/dependence & alternative treatments discussed. ;No signs of potential drug abuse or diversion identified. ;Assessed functional status. ;Obtaining appropriate analgesic effect of treatment.    Chronic Pain > 80 MEDD -         Past Medical History:   Diagnosis Date    Anemia, iron deficiency     Asthma     Carpal tunnel syndrome on right 8/29/2016    Chronic kidney disease     Edema     unspecified type    GERD (gastroesophageal reflux disease)     Glucose intolerance (impaired glucose tolerance)     Gout     History of anemia     Normocytic    History of corneal abrasion     Left    History of dyspnea     History of pneumonia     Hyperlipidemia     Hypertension     Morbid obesity (HCC)     BMI of 50.0-59.9, adult    Neck strain     Obstructive sleep apnea     on CPAP    Osteoarthritis     DJD of knees    Pain     bilateral shoulders, Lt hip    Shoulder strain     History of bilateral shoulder strain    Systemic hypertension     TIA (transient ischemic attack)     Type II or unspecified type diabetes mellitus without mention of complication, not stated as uncontrolled     Vitamin D deficiency     Wears dentures     upper and lower dentures    Wears glasses        Past Surgical History:   Procedure Laterality Date    COLONOSCOPY  10/23/15    per Dr. Joanna Marcelino  9/21/15    empi select    NERVE BLOCK Bilateral 08/07/2018    diamond shoulder injection, decadron 10 mg, lido 1%, isovue m-200, naropin 0.5%       Allergies   Allergen Reactions    Nsaids Other (See Comments)     Chronic kidney disease    Proair Respiclick [Albuterol Sulfate]          Current Outpatient Medications:     metFORMIN (GLUCOPHAGE) 500 MG tablet, , Disp: , Rfl:     albuterol sulfate HFA (VENTOLIN HFA) 108 (90 Base) MCG/ACT inhaler, Inhale 2 puffs into the lungs 4 times daily, Disp: 1 Inhaler, Rfl: 11    glimepiride (AMARYL) 2 MG tablet, TAKE 1 TABLET TWICE DAILY(AM/PM), Disp: 56 tablet, Rfl: 5    gabapentin (NEURONTIN) 600 MG tablet, TAKE 1 TABLET BY MOUTH 3 TIMES DAILY(AM/PM/BEDTIME), Disp: 84 tablet, Rfl: 5    pravastatin (PRAVACHOL) 40 MG tablet, TAKE 1 TABLET AT BEDTIME, Disp: 28 tablet, Rfl: 5    carvedilol (COREG) 12.5 MG tablet, TAKE 1 TABLET TWICE DAILY(AM/PM), Disp: 56 tablet, Rfl: 5    furosemide (LASIX) 20 MG tablet, TAKE 1 TABLET EVERY MORNING, Disp: 28 tablet, Rfl: 5    ONETOUCH VERIO strip, TEST BLOOD SUGAR 3 TIMES DAILY AS DIRECTED, Disp: 100 each, Rfl: 5    oxyCODONE (ROXICODONE) 5 MG immediate release tablet, Take 1 tablet by mouth every 8 hours as needed for Pain for up to 30 days. , Disp: 90 tablet, Rfl: 0    Omega-3 Fatty Acids (GNP FISH OIL) 1000 MG CAPS, TAKE 1 CAPSULE BY MOUTH DAILY IN THE EVENING, Disp: 90 capsule, Rfl: 3    Lancets MISC, Use as directed with insulin use, Disp: 100 each, Rfl: 2    diclofenac 1 % CREA, Place onto the skin, Disp: , Rfl:     amLODIPine (NORVASC) 10 MG tablet, TAKE 1 TABLET BY MOUTH DAILY IN THE MORNING, Disp: 28 tablet, Rfl: 11    lisinopril (PRINIVIL;ZESTRIL) 20 MG tablet, TAKE 1 TABLET BY MOUTH DAILY IN THE MORNING, Disp: 28 tablet, Rfl: 11    FEROSUL 325 (65 Fe) MG tablet, TAKE 1 TABLET BY MOUTH DAILY IN THE EVENING, Disp: 28 tablet, Rfl: 11    aspirin 81 MG EC tablet, TAKE 1 TABLET BY MOUTH DAILY IN THE EVENING, Disp: 28 tablet, Rfl: 11    omeprazole (PRILOSEC) 40 MG delayed release capsule, TAKE 1 CAPSULE BY MOUTH DAILY IN THE MORNING, Disp: 28 capsule, Rfl: 11    allopurinol (ZYLOPRIM) 100 MG tablet, TAKE 1 TABLET BY MOUTH AT BEDTIME, Disp: 28 tablet, Rfl: 11    Multiple Vitamin (MULTIVITAMIN) tablet, TAKE 1 TABLET EVERY MORNING, Disp: 28 tablet, Rfl: 11    albuterol sulfate HFA (VENTOLIN HFA) 108 (90 Base) MCG/ACT inhaler, Inhale 2 puffs into the lungs every 6 hours as needed for Wheezing, Disp: 1 Inhaler, Rfl: 2    calcium carbonate-vitamin D (CALTRATE) 600-400 MG-UNIT TABS per tab, Take 1 tablet by mouth 2 times daily, Disp: , Rfl:     Current Facility-Administered Medications:     bupivacaine (PF) (MARCAINE) 0.5 % injection 10 mg, 2 mL, Intratendinous, Once, Glenice Hands, DO    Family History   Problem Relation Age of Onset    Asthma Mother     Stroke Sister     Diabetes Sister     Other Neg Hx         Sleep disorders or narcolepsy       Social History     Socioeconomic History    Marital status: Single     Spouse name: Not on file    Number of children: Not on file    Years of education: Not on file    Highest education level: Not on file   Occupational History    Not on file   Social Needs    Financial resource strain: Not on file    Food insecurity:     Worry: Not on file     Inability: Not on file    Transportation needs:     Medical: Not on file     Non-medical: Not on file   Tobacco Use    Smoking status: Former Smoker     Packs/day: 1.00     Years: 30.00     Pack years: 30.00     Types: Cigarettes     Start date: 1/1/1960     Last attempt to quit: 3/6/1990     Years since appropriate     XR 2017     Right knee:     1. Tricompartmental osteoarthrosis.  Severe narrowing of the medial  compartment.  Diffuse osteopenia.  Probable underlying CPPD. 2. No acute osseous abnormality. Left knee:     1. Small joint effusion. 2. Tricompartmental osteoarthrosis with moderate to severe narrowing of the  medial compartment.  Diffuse osteopenia.  Suspected CPPD. 3. No acute osseous abnormality      Assessment:  Problem List Items Addressed This Visit     Primary osteoarthritis of both shoulders (Chronic)    Morbid obesity with BMI of 50.0-59.9, adult (HCC) (Chronic)    Morbid obesity ([facility]    Primary osteoarthritis involving multiple joints    Medication monitoring encounter - Primary    Primary osteoarthritis of both knees         Treatment Plan:  Patient relates current medications are helping the pain. Patient reports taking pain medications as prescribed, denies obtaining medications from different sources and denies use of illegal drugs. Patient denies side effects from medications like nausea, vomiting, constipation or drowsiness. Patient reports current activities of daily living are possible due to medications and would like to continue them. As always, we encourage daily stretching and strengthening exercises, and recommend minimizing use of pain medications unless patient cannot get through daily activities due to pain. Contract requirements met. Continue opioid therapy.  Script written for oxycodone  Follow up appointment made for 4 weeks

## 2019-06-24 NOTE — TELEPHONE ENCOUNTER
E-scribe request for lancets. Please review and e-scribe if applicable. Next Visit Date:  8/8/2019    Health Maintenance   Topic Date Due    Pneumococcal 65+ years Vaccine (1 of 2 - PCV13) 06/21/2012    Diabetic retinal exam  03/01/2017    Shingles Vaccine (2 of 2) 05/24/2018    Lipid screen  04/09/2019    Flu vaccine (Season Ended) 01/20/2020 (Originally 9/1/2019)    Hepatitis C screen  02/20/2020 (Originally 1945)    Diabetic foot exam  08/27/2019    A1C test (Diabetic or Prediabetic)  02/04/2020    Potassium monitoring  04/01/2020    Creatinine monitoring  04/01/2020    Breast cancer screen  02/20/2021    Colon cancer screen colonoscopy  10/23/2025    DTaP/Tdap/Td vaccine (2 - Td) 04/01/2026    DEXA (modify frequency per FRAX score)  Completed             (applicable per patient's age: Cancer Screenings, Depression Screening, Fall Risk Screening, Immunizations)    Hemoglobin A1C (%)   Date Value   02/04/2019 6.3   07/11/2018 7.6   03/26/2018 7.2     Microalb/Crt.  Ratio (mcg/mg creat)   Date Value   09/06/2018 19     LDL Cholesterol (mg/dL)   Date Value   04/09/2018 97     AST (U/L)   Date Value   07/17/2018 15     ALT (U/L)   Date Value   07/17/2018 9     BUN (mg/dL)   Date Value   04/01/2019 15      (goal A1C is < 7)   (goal LDL is <100) need 30-50% reduction from baseline     BP Readings from Last 3 Encounters:   06/14/19 138/69   05/31/19 135/80   05/17/19 136/61    (goal /80)      All Future Testing planned in [facility]:  Lab Frequency Next Occurrence   Pain Management Drug Screen Once 07/17/2018   IR ARTHR/ASP/INJ MAJOR JT/BURSA W US Once 07/17/2018   Sleep Study with PAP Titration Once 08/30/2018   CBC Auto Differential Once 03/25/2019   Albumin Once 23/18/0343   Basic Metabolic Panel Once 34/30/8833   Protein, urine, random Once 03/25/2019   Creatinine, Random Urine Once 03/25/2019   Microalbumin / Creatinine Urine Ratio Once 03/25/2019   PTH, Intact Once 03/25/2019 Phosphorus Once 03/25/2019   Vitamin D 25 Hydroxy Once 03/25/2019   Chlamydia Trachomatis & Neisseria gonorrhoeae (GC) by amplified detection Once 08/05/2019   Pain Management Drug Screen Once 03/14/2019   CBC Once 07/01/2019   Protein / Creatinine Ratio, Urine Once 93/99/1661   Basic Metabolic Panel Every 12 Weeks 9/13/2019       Next Visit Date:  Future Appointments   Date Time Provider [facility]   7/1/2019 11:50 AM Iris Pierre MD AFL Neph Mario None   7/19/2019  1:00 PM Rick Velazco APRN - CNP Sylv [facility]   8/8/2019 12:40 PM Skye Martínez MD [facility] IM [address]   12/6/2019 11:00 AM Zia Triana MD Resp Spec [facility]            Patient Active Problem List:     Obstructive sleep apnea     Hypertension     Hyperlipidemia     Gout     Asthma     Morbid obesity ([facility] [address]     Vitamin D deficiency     GERD (gastroesophageal reflux disease)     TIA (transient ischemic attack)     Stage 3 chronic kidney disease (HCC)     Anemia, iron deficiency     DJD (degenerative joint disease) of knee     Primary osteoarthritis of both shoulders     Pain of both shoulder joints     Primary osteoarthritis involving multiple joints     Pain in joint, lower leg     Type 2 diabetes mellitus without complication, without long-term current use of insulin (HCC)     Adjustment disorder with mixed anxiety and depressed mood     Bilateral leg edema     Extensor tenosynovitis of right wrist     Carpal tunnel syndrome on right     Medication monitoring encounter     Primary osteoarthritis of both knees     Postural dizziness     Mild intermittent asthma without complication     Morbid obesity with BMI of 50.0-59.9, adult ([facility] [address]     Chronic use of opiate drugs therapeutic purposes     Nuclear senile cataract

## 2019-06-25 ENCOUNTER — TELEPHONE (OUTPATIENT)
Dept: INTERNAL MEDICINE | Age: 74
End: 2019-06-25

## 2019-06-25 RX ORDER — LANCETS 28 GAUGE
EACH MISCELLANEOUS
Qty: 100 EACH | Refills: 2 | Status: SHIPPED | OUTPATIENT
Start: 2019-06-25

## 2019-06-26 ENCOUNTER — HOSPITAL ENCOUNTER (OUTPATIENT)
Dept: PHYSICAL THERAPY | Facility: CLINIC | Age: 74
Setting detail: THERAPIES SERIES
Discharge: HOME | End: 2019-06-26
Payer: MEDICAID

## 2019-06-26 NOTE — DISCHARGE SUMMARY
[] [address] [address]  Outpatient Rehabilitation &  Therapy  [address]  [phone]  F: [phone] [] [address]  [facility]   [address]  P: [phone]  F: [phone] [] [address]  [address] [facility]  P: [phone]  F: [phone] [] [address]  [facility]   [address]   [facility] [place]: [phone]  F: [phone]     Physical Therapy Discharge Note    Date: 2019      Patient: Joon Pink  : 1945  MRN: [id number]: VQTTWC Nemo Cruz, APRN-CNP                                     [address] [facility] osteoarthritis of both shoulders; primary osteoarthritis involving multiple join               Rehab Codes: M25.511, M25.522, M25.611, M25.622, R29.3, M25.561, M25.562, M25.661, M25.662, R53.1, R26.9  Onset Date:                                   Next 's appt: 19  Visit# / total visits:                   Cancels/No Shows:   Date of initial visit: 3/29/19                Date of final visit: 5/15/19      Subjective:  (FROM LAST VISIT ON 5/15)  Pain:  [x] Yes  [] No          Location: bilat shoulders/knees  Pain Rating: (0-10 scale) 8/10 both shoulders and knees  Pain altered Tx:  [] No  [x] Yes  Action: Slow progression through exercises d/t pain  Comments: Pt arrives reporting she is stiff and shoulders and knees are both a today.         Objective:  Test Measurements:  On 19: ROM            A/P ROM A/P STRENGTH  STRENGTH     LEFT RIGHT LEFT RIGHT   SHLD FLEX 130/140 130/135 4+* 4+*   SHLD ABD 95/130 95/130 4+* 4+*   SHLD ER 65 65       SHLD IR To L2/40 To L2/40                   Knee flexion 101 101 5-* 5-   Knee ext 1 1 5-* 5-                    Function:   - Still with high levels of pain with weightbearing in bilateral knees  - Limited standing tolerance; improving overhead reach but limited lifting      Assessment:  [x] Progressing toward goals: Improvement in bilateral shoulder AROM and strength, though still moderate weakness noted. [x] No change. Knees continue to be very painful in weightbearing and has been very limited to very little progression of therex in closed-chain strengthening due to pain. Frequent painful popping bilaterally. D/t lack of progress made with therapy, pt to continue HEP at home to attempt for more long-term progress, however carryover to home program has been questionable despite emphasis by therapist.                            [] Other:         STG: (to be met in 12 treatments) - Preliminary assessment on 4/17 by primary therapist, final assessment on 5/15 by primary therapist:  1. ? Pain: No greater than 2/10 pain at rest and 4/10 pain with use of BUEs/BLEs for ADLs - NOT MET, still high pain levels at rest (7-8/10 typically)  2. ? ROM:  a. Bilateral knee flexion AROM to 110deg without pain at end range to improve flexibility required for squatting and bending when standing - No change, knee flexion PROM near same to eval (~100deg bilat passive, 90-95deg active)  b. Bilateral shoulder PROM to the following degrees to improve joint mobility:  i. Shoulder flex/abd to 150deg - No improvement since 4/17 assessment  ii. Shoulder IR/ER to 60deg and 50deg respectively - No improvement noted since 4/17 assessment  3. ? Strength:    a. 5-/5 MMT with no pain reported for bilateral shoulder abd/flex for improved overhead strength - Partially met: 5-/5 shoulder flex, 4+/5 shoulder abd; all with pain  b. 5/5 MMT in bilateral knee flex/ext with no pain reported for improved weightbearing support to joints   - Partially met, 5/5 in R knee flex/ext, 5-/5 on L knee   4. ? Function:   a.  Pt will be able to tolerate standing for up to 25 minutes to complete housework with only minimal pain in B knees reported - Notes she can stand about 15-20 min  b. Pt will be able to wash hair independently without much compensation to improve ease of ADLs - NOT MET  5. Independent with Home Exercise Programs - MET  6. Demonstrate Knowledge of fall prevention        Patient goals: to feel better        Treatment to Date:  [x] Therapeutic Exercise    [x] Modalities:  [] Therapeutic Activity    [] Ultrasound  [] Electrical Stimulation  [] Gait Training     [] Massage       [] Lumbar/Cervical Traction  [] Neuromuscular Re-education [x] Cold/hotpack [] Iontophoresis: 4 mg/mL  [x] Instruction in Home Exercise Program                     Dexamethasone Sodium  [] Manual Therapy             Phosphate 40-80 mAmin  [] Aquatic Therapy                   [] Vasocompression/    [] Other:             Game Ready    Discharge Status:     [] Pt recovered from conditions. Treatment goals were met. [x] Pt received maximum benefit. No further therapy indicated at this time. [x] Pt to continue exercise/home instructions independently. [] Therapy interrupted due to:    [] Pt has 2 or more no shows/cancels, is discontinued per our policy. [x] Pt has completed prescribed number of treatment sessions. [] Other:         Electronically signed by Michelle Hatfield PT on 6/26/2019 at 2:52 PM      If you have any questions or concerns, please don't hesitate to call.   Thank you for your referral.

## 2019-07-01 ENCOUNTER — HOSPITAL ENCOUNTER (OUTPATIENT)
Age: 74
Discharge: HOME | End: 2019-07-01
Payer: MEDICAID

## 2019-07-01 DIAGNOSIS — N18.30 CKD (CHRONIC KIDNEY DISEASE), STAGE III (HCC): ICD-10-CM

## 2019-07-01 LAB
CREATININE URINE: 217.3 MG/DL (ref 28–217)
HCT VFR BLD CALC: 35.7 % (ref 36.3–47.1)
HEMOGLOBIN: 11.4 G/DL (ref 11.9–15.1)
MCH RBC QN AUTO: 31 PG (ref 25.2–33.5)
MCHC RBC AUTO-ENTMCNC: 31.9 G/DL (ref 28.4–34.8)
MCV RBC AUTO: 97 FL (ref 82.6–102.9)
NRBC AUTOMATED: 0 PER 100 WBC
PDW BLD-RTO: 13.9 % (ref 11.8–14.4)
PLATELET # BLD: 208 K/UL (ref 138–453)
PMV BLD AUTO: 11.3 FL (ref 8.1–13.5)
RBC # BLD: 3.68 M/UL (ref 3.95–5.11)
TOTAL PROTEIN, URINE: 25 MG/DL
URINE TOTAL PROTEIN CREATININE RATIO: 0.12 (ref 0–0.2)
WBC # BLD: 6.7 K/UL (ref 3.5–11.3)

## 2019-07-01 PROCEDURE — 85027 COMPLETE CBC AUTOMATED: CPT

## 2019-07-01 PROCEDURE — 84156 ASSAY OF PROTEIN URINE: CPT

## 2019-07-01 PROCEDURE — 82570 ASSAY OF URINE CREATININE: CPT

## 2019-07-01 PROCEDURE — 36415 COLL VENOUS BLD VENIPUNCTURE: CPT

## 2019-07-19 ENCOUNTER — OFFICE VISIT (OUTPATIENT)
Dept: PAIN MANAGEMENT | Age: 74
End: 2019-07-19
Payer: MEDICAID

## 2019-07-19 VITALS
HEIGHT: 60 IN | DIASTOLIC BLOOD PRESSURE: 74 MMHG | HEART RATE: 66 BPM | SYSTOLIC BLOOD PRESSURE: 141 MMHG | OXYGEN SATURATION: 96 % | WEIGHT: 269.8 LBS | BODY MASS INDEX: 52.97 KG/M2

## 2019-07-19 DIAGNOSIS — M19.012 PRIMARY OSTEOARTHRITIS OF BOTH SHOULDERS: Primary | Chronic | ICD-10-CM

## 2019-07-19 DIAGNOSIS — Z79.891 ENCOUNTER FOR LONG-TERM OPIATE ANALGESIC USE: ICD-10-CM

## 2019-07-19 DIAGNOSIS — M17.0 PRIMARY OSTEOARTHRITIS OF BOTH KNEES: ICD-10-CM

## 2019-07-19 DIAGNOSIS — M19.011 PRIMARY OSTEOARTHRITIS OF BOTH SHOULDERS: Primary | Chronic | ICD-10-CM

## 2019-07-19 PROCEDURE — 99213 OFFICE O/P EST LOW 20 MIN: CPT | Performed by: NURSE PRACTITIONER

## 2019-07-19 RX ORDER — OXYCODONE HYDROCHLORIDE 5 MG/1
5 TABLET ORAL EVERY 8 HOURS PRN
Qty: 90 TABLET | Refills: 0 | Status: SHIPPED | OUTPATIENT
Start: 2019-07-19 | End: 2019-08-18

## 2019-07-19 ASSESSMENT — ENCOUNTER SYMPTOMS: RESPIRATORY NEGATIVE: 1

## 2019-07-22 RX ORDER — MULTIVITAMIN WITH FOLIC ACID 400 MCG
TABLET ORAL
Qty: 28 TABLET | Refills: 11 | Status: SHIPPED | OUTPATIENT
Start: 2019-07-22 | End: 2020-01-01

## 2019-07-22 NOTE — TELEPHONE ENCOUNTER
Request for Multiple Vitamin. Next Visit Date:  Future Appointments   Date Time Provider [facility]   8/8/2019 12:40 PM Terrence Alejandra MD [facility] IM [facility]   8/13/2019  2:40 PM Denita Bruno MD [facility]   10/7/2019  2:50 PM Tete Craig MD [facility] Neph Mario None   12/6/2019 11:00 AM Ketan Mcclain MD Resp Spec [address] Maintenance   Topic Date Due    Pneumococcal 65+ years Vaccine (1 of 2 - PCV13) 06/21/2012    Diabetic retinal exam  03/01/2017    Shingles Vaccine (2 of 2) 05/24/2018    Lipid screen  04/09/2019    Flu vaccine (1) 01/20/2020 (Originally 9/1/2019)    Hepatitis C screen  02/20/2020 (Originally 1945)    Diabetic foot exam  08/27/2019    A1C test (Diabetic or Prediabetic)  02/04/2020    Potassium monitoring  04/01/2020    Creatinine monitoring  04/01/2020    Breast cancer screen  02/20/2021    Colon cancer screen colonoscopy  10/23/2025    DTaP/Tdap/Td vaccine (2 - Td) 04/01/2026    DEXA (modify frequency per FRAX score)  Completed       Hemoglobin A1C (%)   Date Value   02/04/2019 6.3   07/11/2018 7.6   03/26/2018 7.2             ( goal A1C is < 7)   Microalb/Crt.  Ratio (mcg/mg creat)   Date Value   09/06/2018 19     LDL Cholesterol (mg/dL)   Date Value   04/09/2018 97       (goal LDL is <100)   AST (U/L)   Date Value   07/17/2018 15     ALT (U/L)   Date Value   07/17/2018 9     BUN (mg/dL)   Date Value   04/01/2019 15     BP Readings from Last 3 Encounters:   07/19/19 (!) 141/74   07/01/19 120/64   06/14/19 138/69          (goal 120/80)    All Future Testing planned in [facility]  Lab Frequency Next Occurrence   Sleep Study with PAP Titration Once 08/30/2018   CBC Auto Differential Once 03/25/2019   Albumin Once 23/68/1173   Basic Metabolic Panel Once 83/69/9624   Protein, urine, random Once 03/25/2019   Creatinine, Random Urine Once 03/25/2019   Microalbumin / Creatinine Urine Ratio Once 03/25/2019   PTH, Intact Once 03/25/2019   Phosphorus Once 03/25/2019

## 2019-08-13 ENCOUNTER — OFFICE VISIT (OUTPATIENT)
Dept: PAIN MANAGEMENT | Age: 74
End: 2019-08-13
Payer: MEDICAID

## 2019-08-13 VITALS
WEIGHT: 265.2 LBS | DIASTOLIC BLOOD PRESSURE: 78 MMHG | OXYGEN SATURATION: 97 % | TEMPERATURE: 98.3 F | BODY MASS INDEX: 52.06 KG/M2 | SYSTOLIC BLOOD PRESSURE: 151 MMHG | HEART RATE: 75 BPM | HEIGHT: 60 IN

## 2019-08-13 DIAGNOSIS — E66.01 MORBID OBESITY WITH BMI OF 50.0-59.9, ADULT (HCC): Chronic | ICD-10-CM

## 2019-08-13 DIAGNOSIS — M15.9 PRIMARY OSTEOARTHRITIS INVOLVING MULTIPLE JOINTS: Primary | ICD-10-CM

## 2019-08-13 DIAGNOSIS — Z79.891 CHRONIC USE OF OPIATE DRUG FOR THERAPEUTIC PURPOSE: Chronic | ICD-10-CM

## 2019-08-13 PROCEDURE — 4040F PNEUMOC VAC/ADMIN/RCVD: CPT | Performed by: ANESTHESIOLOGY

## 2019-08-13 PROCEDURE — G8399 PT W/DXA RESULTS DOCUMENT: HCPCS | Performed by: ANESTHESIOLOGY

## 2019-08-13 PROCEDURE — G8417 CALC BMI ABV UP PARAM F/U: HCPCS | Performed by: ANESTHESIOLOGY

## 2019-08-13 PROCEDURE — 3017F COLORECTAL CA SCREEN DOC REV: CPT | Performed by: ANESTHESIOLOGY

## 2019-08-13 PROCEDURE — 99213 OFFICE O/P EST LOW 20 MIN: CPT | Performed by: ANESTHESIOLOGY

## 2019-08-13 PROCEDURE — 1123F ACP DISCUSS/DSCN MKR DOCD: CPT | Performed by: ANESTHESIOLOGY

## 2019-08-13 PROCEDURE — 1090F PRES/ABSN URINE INCON ASSESS: CPT | Performed by: ANESTHESIOLOGY

## 2019-08-13 PROCEDURE — G8427 DOCREV CUR MEDS BY ELIG CLIN: HCPCS | Performed by: ANESTHESIOLOGY

## 2019-08-13 PROCEDURE — 1036F TOBACCO NON-USER: CPT | Performed by: ANESTHESIOLOGY

## 2019-08-13 RX ORDER — OXYCODONE HYDROCHLORIDE 5 MG/1
5 TABLET ORAL 2 TIMES DAILY PRN
Qty: 60 TABLET | Refills: 0 | Status: SHIPPED | OUTPATIENT
Start: 2019-08-19 | End: 2019-09-18 | Stop reason: SDUPTHER

## 2019-08-13 ASSESSMENT — ENCOUNTER SYMPTOMS
BACK PAIN: 1
GASTROINTESTINAL NEGATIVE: 1
RESPIRATORY NEGATIVE: 1
ALLERGIC/IMMUNOLOGIC NEGATIVE: 1

## 2019-08-13 NOTE — PROGRESS NOTES
Social connections:     Talks on phone: None     Gets together: None     Attends Jainism service: None     Active member of club or organization: None     Attends meetings of clubs or organizations: None     Relationship status: None    Intimate partner violence:     Fear of current or ex partner: None     Emotionally abused: None     Physically abused: None     Forced sexual activity: None   Other Topics Concern    None   Social History Narrative    None     Family History   Problem Relation Age of Onset    Asthma Mother     Stroke Sister     Diabetes Sister     Other Neg Hx         Sleep disorders or narcolepsy     Allergies   Allergen Reactions    Nsaids Other (See Comments)     Chronic kidney disease    Proair Respiclick [Albuterol Sulfate]      Nsaids and Proair respiclick [albuterol sulfate]   Vitals:    08/13/19 1411   BP: (!) 151/78   Pulse: 75   Temp: 98.3 °F (36.8 °C)   SpO2: 97%     Current Outpatient Medications   Medication Sig Dispense Refill    [START ON 8/19/2019] oxyCODONE (ROXICODONE) 5 MG immediate release tablet Take 1 tablet by mouth 2 times daily as needed for Pain for up to 30 days. 60 tablet 0    Multiple Vitamin (MULTIVITAMIN) tablet TAKE 1 TABLET EVERY MORNING 28 tablet 11    oxyCODONE (ROXICODONE) 5 MG immediate release tablet Take 1 tablet by mouth every 8 hours as needed for Pain for up to 30 days.  90 tablet 0    Aimsco Ultra Thin Lancets MISC USE AS DIRECTED WITH INSULIN  each 2    metFORMIN (GLUCOPHAGE) 500 MG tablet       albuterol sulfate HFA (VENTOLIN HFA) 108 (90 Base) MCG/ACT inhaler Inhale 2 puffs into the lungs 4 times daily 1 Inhaler 11    glimepiride (AMARYL) 2 MG tablet TAKE 1 TABLET TWICE DAILY(AM/PM) 56 tablet 5    pravastatin (PRAVACHOL) 40 MG tablet TAKE 1 TABLET AT BEDTIME 28 tablet 5    carvedilol (COREG) 12.5 MG tablet TAKE 1 TABLET TWICE DAILY(AM/PM) 56 tablet 5    furosemide (LASIX) 20 MG tablet TAKE 1 TABLET EVERY MORNING 28 tablet 5   

## 2019-08-21 ENCOUNTER — TELEPHONE (OUTPATIENT)
Dept: INTERNAL MEDICINE | Age: 74
End: 2019-08-21

## 2019-08-22 NOTE — TELEPHONE ENCOUNTER
PC to [facility]--informed them to hold on gabapentin and metformin. She has an appointment on 8/26 at 1 pm.  Writer asked them to wait to send out bubble pack until her medications were reviewed by the NP and then the pharmacy would be contacted. They are agreeable to plan. Writer also contacted the patient and informed her that metformin and gabapentin should be held. She was advised to keep appt 8/26 and to bring her meds with her to appointment.   Meds would be reviewed and pharmacy would be contacted with updated med list.

## 2019-08-26 ENCOUNTER — OFFICE VISIT (OUTPATIENT)
Dept: INTERNAL MEDICINE | Age: 74
End: 2019-08-26
Payer: MEDICAID

## 2019-08-26 ENCOUNTER — TELEPHONE (OUTPATIENT)
Dept: INTERNAL MEDICINE | Age: 74
End: 2019-08-26

## 2019-08-26 VITALS
HEIGHT: 60 IN | HEART RATE: 71 BPM | DIASTOLIC BLOOD PRESSURE: 91 MMHG | WEIGHT: 265 LBS | BODY MASS INDEX: 52.03 KG/M2 | SYSTOLIC BLOOD PRESSURE: 163 MMHG

## 2019-08-26 DIAGNOSIS — Z23 NEED FOR PNEUMOCOCCAL VACCINATION: ICD-10-CM

## 2019-08-26 DIAGNOSIS — Z13.220 SCREENING FOR HYPERLIPIDEMIA: ICD-10-CM

## 2019-08-26 DIAGNOSIS — I10 ESSENTIAL HYPERTENSION: ICD-10-CM

## 2019-08-26 DIAGNOSIS — E11.9 TYPE 2 DIABETES MELLITUS WITHOUT COMPLICATION, WITHOUT LONG-TERM CURRENT USE OF INSULIN (HCC): Primary | ICD-10-CM

## 2019-08-26 DIAGNOSIS — M54.2 NECK PAIN: ICD-10-CM

## 2019-08-26 DIAGNOSIS — Z23 NEED FOR SHINGLES VACCINE: ICD-10-CM

## 2019-08-26 DIAGNOSIS — N18.30 STAGE 3 CHRONIC KIDNEY DISEASE (HCC): ICD-10-CM

## 2019-08-26 LAB — HBA1C MFR BLD: 5.6 %

## 2019-08-26 PROCEDURE — 3017F COLORECTAL CA SCREEN DOC REV: CPT | Performed by: NURSE PRACTITIONER

## 2019-08-26 PROCEDURE — 1090F PRES/ABSN URINE INCON ASSESS: CPT | Performed by: NURSE PRACTITIONER

## 2019-08-26 PROCEDURE — 99215 OFFICE O/P EST HI 40 MIN: CPT | Performed by: NURSE PRACTITIONER

## 2019-08-26 PROCEDURE — 4040F PNEUMOC VAC/ADMIN/RCVD: CPT | Performed by: NURSE PRACTITIONER

## 2019-08-26 PROCEDURE — 2022F DILAT RTA XM EVC RTNOPTHY: CPT | Performed by: NURSE PRACTITIONER

## 2019-08-26 PROCEDURE — 99211 OFF/OP EST MAY X REQ PHY/QHP: CPT | Performed by: NURSE PRACTITIONER

## 2019-08-26 PROCEDURE — 1123F ACP DISCUSS/DSCN MKR DOCD: CPT | Performed by: NURSE PRACTITIONER

## 2019-08-26 PROCEDURE — 3044F HG A1C LEVEL LT 7.0%: CPT | Performed by: NURSE PRACTITIONER

## 2019-08-26 PROCEDURE — 1036F TOBACCO NON-USER: CPT | Performed by: NURSE PRACTITIONER

## 2019-08-26 PROCEDURE — G8417 CALC BMI ABV UP PARAM F/U: HCPCS | Performed by: NURSE PRACTITIONER

## 2019-08-26 PROCEDURE — 83036 HEMOGLOBIN GLYCOSYLATED A1C: CPT | Performed by: NURSE PRACTITIONER

## 2019-08-26 PROCEDURE — G8399 PT W/DXA RESULTS DOCUMENT: HCPCS | Performed by: NURSE PRACTITIONER

## 2019-08-26 PROCEDURE — G8427 DOCREV CUR MEDS BY ELIG CLIN: HCPCS | Performed by: NURSE PRACTITIONER

## 2019-08-26 ASSESSMENT — ENCOUNTER SYMPTOMS
BLURRED VISION: 0
ORTHOPNEA: 0
SHORTNESS OF BREATH: 0
PHOTOPHOBIA: 0
VISUAL CHANGE: 0
TROUBLE SWALLOWING: 0

## 2019-08-26 ASSESSMENT — PATIENT HEALTH QUESTIONNAIRE - PHQ9
SUM OF ALL RESPONSES TO PHQ QUESTIONS 1-9: 0
2. FEELING DOWN, DEPRESSED OR HOPELESS: 0
1. LITTLE INTEREST OR PLEASURE IN DOING THINGS: 0
SUM OF ALL RESPONSES TO PHQ9 QUESTIONS 1 & 2: 0
SUM OF ALL RESPONSES TO PHQ QUESTIONS 1-9: 0

## 2019-08-26 NOTE — TELEPHONE ENCOUNTER
Patient was seen in the office today 8/26/19 and her pharmacy needs clarification on what the patient is supposed to be taking. When the patient was in the office on 2/4/19 she was told to stop taking gabapentin and glimepiride by Dr. Elpidio Mendoza. The patient stated at her visit today she is not supposed to be taking gabapentin and metformin. Since her appointment on 2/4/19 Dr. Elpidio Mendoza has given refills for all of those medications. The patient's pharmacy is confused on what the patient is supposed to be getting in her bubble packs. Can you please clarify.

## 2019-08-26 NOTE — PROGRESS NOTES
(PNEUMOVAX 23) 25 MCG/0.5ML inj; Inject 0.5 mLs into the muscle once for 1 dose  Dispense: 0.5 mL; Refill: 0    6. Stage 3 chronic kidney disease ([address]      7. Essential hypertension    RV 3 months    When asked if refills were needed, patient told this provider her pharmacy takes care of requesting refills as she has her medications in pill packs. Patient then asked MA about her refills. Further investigation reveals she is possibly still taking medications previously discontinued. Requested review by care coordinator.        DAVID Ledezma - CNP

## 2019-08-28 ENCOUNTER — CARE COORDINATION (OUTPATIENT)
Dept: CARE COORDINATION | Age: 74
End: 2019-08-28

## 2019-09-16 ENCOUNTER — HOSPITAL ENCOUNTER (OUTPATIENT)
Age: 74
Discharge: HOME | End: 2019-09-16
Payer: MEDICAID

## 2019-09-16 ENCOUNTER — HOSPITAL ENCOUNTER (OUTPATIENT)
Dept: GENERAL RADIOLOGY | Age: 74
Discharge: HOME | End: 2019-09-18
Payer: MEDICAID

## 2019-09-16 ENCOUNTER — HOSPITAL ENCOUNTER (OUTPATIENT)
Age: 74
Discharge: HOME | End: 2019-09-18
Payer: MEDICAID

## 2019-09-16 DIAGNOSIS — Z13.220 SCREENING FOR HYPERLIPIDEMIA: ICD-10-CM

## 2019-09-16 DIAGNOSIS — M54.2 NECK PAIN: ICD-10-CM

## 2019-09-16 LAB
CHOLESTEROL, FASTING: 188 MG/DL
CHOLESTEROL/HDL RATIO: 2.8
HDLC SERPL-MCNC: 66 MG/DL
LDL CHOLESTEROL: 107 MG/DL (ref 0–130)
TRIGLYCERIDE, FASTING: 77 MG/DL
VLDLC SERPL CALC-MCNC: NORMAL MG/DL (ref 1–30)

## 2019-09-16 PROCEDURE — 72040 X-RAY EXAM NECK SPINE 2-3 VW: CPT

## 2019-09-16 PROCEDURE — 80061 LIPID PANEL: CPT

## 2019-09-16 PROCEDURE — 36415 COLL VENOUS BLD VENIPUNCTURE: CPT

## 2019-09-18 ENCOUNTER — OFFICE VISIT (OUTPATIENT)
Dept: PAIN MANAGEMENT | Age: 74
End: 2019-09-18
Payer: MEDICAID

## 2019-09-18 VITALS
OXYGEN SATURATION: 97 % | DIASTOLIC BLOOD PRESSURE: 81 MMHG | HEART RATE: 56 BPM | WEIGHT: 266 LBS | HEIGHT: 63 IN | BODY MASS INDEX: 47.13 KG/M2 | SYSTOLIC BLOOD PRESSURE: 171 MMHG

## 2019-09-18 DIAGNOSIS — M25.512 PAIN OF BOTH SHOULDER JOINTS: ICD-10-CM

## 2019-09-18 DIAGNOSIS — E66.01 MORBID OBESITY (HCC): ICD-10-CM

## 2019-09-18 DIAGNOSIS — Z51.81 MEDICATION MONITORING ENCOUNTER: ICD-10-CM

## 2019-09-18 DIAGNOSIS — Z79.891 CHRONIC USE OF OPIATE DRUG FOR THERAPEUTIC PURPOSE: Primary | Chronic | ICD-10-CM

## 2019-09-18 DIAGNOSIS — M17.0 PRIMARY OSTEOARTHRITIS OF BOTH KNEES: ICD-10-CM

## 2019-09-18 DIAGNOSIS — M19.011 PRIMARY OSTEOARTHRITIS OF BOTH SHOULDERS: Chronic | ICD-10-CM

## 2019-09-18 DIAGNOSIS — M25.511 PAIN OF BOTH SHOULDER JOINTS: ICD-10-CM

## 2019-09-18 DIAGNOSIS — M15.9 PRIMARY OSTEOARTHRITIS INVOLVING MULTIPLE JOINTS: ICD-10-CM

## 2019-09-18 DIAGNOSIS — M19.012 PRIMARY OSTEOARTHRITIS OF BOTH SHOULDERS: Chronic | ICD-10-CM

## 2019-09-18 DIAGNOSIS — E66.01 MORBID OBESITY WITH BMI OF 50.0-59.9, ADULT (HCC): Chronic | ICD-10-CM

## 2019-09-18 PROCEDURE — 99213 OFFICE O/P EST LOW 20 MIN: CPT | Performed by: NURSE PRACTITIONER

## 2019-09-18 RX ORDER — OXYCODONE HYDROCHLORIDE 5 MG/1
5 TABLET ORAL DAILY PRN
Qty: 30 TABLET | Refills: 0 | Status: SHIPPED | OUTPATIENT
Start: 2019-09-18 | End: 2019-10-18

## 2019-09-18 ASSESSMENT — ENCOUNTER SYMPTOMS
WHEEZING: 1
SNORING: 0
SLEEP DISTURBANCES DUE TO BREATHING: 0
HEMOPTYSIS: 0
SPUTUM PRODUCTION: 0
BACK PAIN: 0
COUGH: 1
CONSTIPATION: 0
SHORTNESS OF BREATH: 0

## 2019-09-18 NOTE — PROGRESS NOTES
Dysfunction / others) : none  Mood: good  Sleep pattern and quality: good  Activity level: good    Pill count Today:#1  Last dose taken  9/18/2019  [place]AR[place] report reviewed today: yes  ER/Hospitalizations/PCP visit related to pain since last visit:no   Any legal problems e.g. DUI etc.:No  Satisfied with current management: Yes    Opioid Contract 9/18/2019  Last Urine Dug screen dated: 314/2019          Past Medical History, Past Surgical History, Social History, Allergies and Medications reviewed and updated in EPIC as indicated    Family History reviewed and is noncontributory. Patient denies any new neurological symptoms. No bowel or bladder incontinence, no weakness, and no falling. Pill count: appropriate    Morphine equivalent: 15    Controlled Substance Monitoring:    Acute and Chronic Pain Monitoring:   RX Monitoring 9/18/2019   Attestation -   Periodic Controlled Substance Monitoring Possible medication side effects, risk of tolerance/dependence & alternative treatments discussed. ;No signs of potential drug abuse or diversion identified. ;Assessed functional status. ;Obtaining appropriate analgesic effect of treatment.    Chronic Pain > 50 MEDD -   Chronic Pain > 80 MEDD -         Past Medical History:   Diagnosis Date    Anemia, iron deficiency     Asthma     Carpal tunnel syndrome on right 8/29/2016    Chronic kidney disease     Edema     unspecified type    GERD (gastroesophageal reflux disease)     Glucose intolerance (impaired glucose tolerance)     Gout     History of anemia     Normocytic    History of corneal abrasion     Left    History of dyspnea     History of pneumonia     Hyperlipidemia     Hypertension     Morbid obesity (HCC)     BMI of 50.0-59.9, adult    Neck strain     Obstructive sleep apnea     on CPAP    Osteoarthritis     DJD of knees    Pain     bilateral shoulders, Lt hip    Shoulder strain     History of bilateral shoulder strain    Systemic hypertension     clubs or organizations: Not on file     Relationship status: Not on file    Intimate partner violence:     Fear of current or ex partner: Not on file     Emotionally abused: Not on file     Physically abused: Not on file     Forced sexual activity: Not on file   Other Topics Concern    Not on file   Social History Narrative    Not on file       Review of Systems:  Review of Systems   Constitution: Negative. Negative for chills and fever. Cardiovascular: Negative for chest pain and irregular heartbeat. Respiratory: Positive for cough and wheezing. Negative for hemoptysis, shortness of breath, sleep disturbances due to breathing, snoring and sputum production. Musculoskeletal: Positive for arthritis, joint pain, joint swelling, neck pain and stiffness. Negative for back pain, falls, gout, muscle cramps, muscle weakness and myalgias. Gastrointestinal: Negative for constipation. Neurological: Negative. Negative for disturbances in coordination and loss of balance. Physical Exam:  Ht 5' 3\" (1.6 m)   Wt 266 lb (120.7 kg)   BMI 47.12 kg/m²     Physical Exam   Constitutional: She is oriented to person, place, and time. HENT:   Head: Normocephalic. Eyes: EOM are normal.   Neck: Normal range of motion. Pulmonary/Chest: Effort normal.   Musculoskeletal: Normal range of motion. Right knee: Tenderness found. Left knee: Tenderness found. Neurological: She is alert and oriented to person, place, and time. Skin: Skin is warm and dry.        Record/Diagnostics Review:    Last gómez 3/2019 and was appropriate     Assessment:  Problem List Items Addressed This Visit     Primary osteoarthritis of both shoulders (Chronic)    Morbid obesity with BMI of 50.0-59.9, adult (HCC) (Chronic)    Chronic use of opiate drugs therapeutic purposes - Primary (Chronic)    Morbid obesity (HCC)    Pain of both shoulder joints    Primary osteoarthritis involving multiple joints    Medication monitoring

## 2019-09-26 DIAGNOSIS — R93.89 ABNORMAL X-RAY: Primary | ICD-10-CM

## 2019-10-03 ENCOUNTER — HOSPITAL ENCOUNTER (OUTPATIENT)
Dept: GENERAL RADIOLOGY | Age: 74
Discharge: HOME | End: 2019-10-05
Payer: MEDICAID

## 2019-10-03 ENCOUNTER — HOSPITAL ENCOUNTER (OUTPATIENT)
Age: 74
Discharge: HOME | End: 2019-10-03
Payer: MEDICAID

## 2019-10-03 DIAGNOSIS — N18.30 CKD (CHRONIC KIDNEY DISEASE), STAGE III (HCC): ICD-10-CM

## 2019-10-03 DIAGNOSIS — R93.89 ABNORMAL X-RAY: ICD-10-CM

## 2019-10-03 LAB
ANION GAP SERPL CALCULATED.3IONS-SCNC: 18 MMOL/L (ref 9–17)
BUN BLDV-MCNC: 16 MG/DL (ref 8–23)
BUN/CREAT BLD: ABNORMAL (ref 9–20)
CALCIUM SERPL-MCNC: 9.8 MG/DL (ref 8.6–10.4)
CHLORIDE BLD-SCNC: 107 MMOL/L (ref 98–107)
CO2: 21 MMOL/L (ref 20–31)
CREAT SERPL-MCNC: 0.96 MG/DL (ref 0.5–0.9)
CREATININE URINE: 120.5 MG/DL (ref 28–217)
GFR AFRICAN AMERICAN: >60 ML/MIN
GFR NON-AFRICAN AMERICAN: 57 ML/MIN
GFR SERPL CREATININE-BSD FRML MDRD: ABNORMAL ML/MIN/{1.73_M2}
GFR SERPL CREATININE-BSD FRML MDRD: ABNORMAL ML/MIN/{1.73_M2}
GLUCOSE BLD-MCNC: 139 MG/DL (ref 70–99)
HCT VFR BLD CALC: 40.1 % (ref 36.3–47.1)
HEMOGLOBIN: 12.9 G/DL (ref 11.9–15.1)
MCH RBC QN AUTO: 30.7 PG (ref 25.2–33.5)
MCHC RBC AUTO-ENTMCNC: 32.2 G/DL (ref 28.4–34.8)
MCV RBC AUTO: 95.5 FL (ref 82.6–102.9)
NRBC AUTOMATED: 0 PER 100 WBC
PDW BLD-RTO: 13.8 % (ref 11.8–14.4)
PLATELET # BLD: 239 K/UL (ref 138–453)
PMV BLD AUTO: 12 FL (ref 8.1–13.5)
POTASSIUM SERPL-SCNC: 3.8 MMOL/L (ref 3.7–5.3)
RBC # BLD: 4.2 M/UL (ref 3.95–5.11)
SODIUM BLD-SCNC: 146 MMOL/L (ref 135–144)
TOTAL PROTEIN, URINE: 58 MG/DL
URINE TOTAL PROTEIN CREATININE RATIO: 0.48 (ref 0–0.2)
WBC # BLD: 7 K/UL (ref 3.5–11.3)

## 2019-10-03 PROCEDURE — 36415 COLL VENOUS BLD VENIPUNCTURE: CPT

## 2019-10-03 PROCEDURE — 84156 ASSAY OF PROTEIN URINE: CPT

## 2019-10-03 PROCEDURE — 82570 ASSAY OF URINE CREATININE: CPT

## 2019-10-03 PROCEDURE — 71046 X-RAY EXAM CHEST 2 VIEWS: CPT

## 2019-10-03 PROCEDURE — 80048 BASIC METABOLIC PNL TOTAL CA: CPT

## 2019-10-03 PROCEDURE — 85027 COMPLETE CBC AUTOMATED: CPT

## 2019-10-28 ENCOUNTER — OFFICE VISIT (OUTPATIENT)
Dept: PAIN MANAGEMENT | Age: 74
End: 2019-10-28
Payer: MEDICAID

## 2019-10-28 VITALS
HEIGHT: 63 IN | SYSTOLIC BLOOD PRESSURE: 133 MMHG | HEART RATE: 65 BPM | OXYGEN SATURATION: 97 % | DIASTOLIC BLOOD PRESSURE: 65 MMHG | WEIGHT: 261.6 LBS | BODY MASS INDEX: 46.35 KG/M2

## 2019-10-28 DIAGNOSIS — M19.011 PRIMARY OSTEOARTHRITIS OF BOTH SHOULDERS: Primary | Chronic | ICD-10-CM

## 2019-10-28 DIAGNOSIS — Z51.81 MEDICATION MONITORING ENCOUNTER: ICD-10-CM

## 2019-10-28 DIAGNOSIS — M17.0 PRIMARY OSTEOARTHRITIS OF BOTH KNEES: ICD-10-CM

## 2019-10-28 DIAGNOSIS — M19.012 PRIMARY OSTEOARTHRITIS OF BOTH SHOULDERS: Primary | Chronic | ICD-10-CM

## 2019-10-28 PROCEDURE — 99214 OFFICE O/P EST MOD 30 MIN: CPT | Performed by: NURSE PRACTITIONER

## 2019-10-28 ASSESSMENT — ENCOUNTER SYMPTOMS
CONSTIPATION: 0
SHORTNESS OF BREATH: 0
COUGH: 0

## 2019-11-03 ENCOUNTER — APPOINTMENT (OUTPATIENT)
Dept: GENERAL RADIOLOGY | Age: 74
End: 2019-11-03
Payer: MEDICAID

## 2019-11-03 ENCOUNTER — HOSPITAL ENCOUNTER (EMERGENCY)
Age: 74
Discharge: HOME | End: 2019-11-03
Attending: EMERGENCY MEDICINE
Payer: MEDICAID

## 2019-11-03 VITALS
HEART RATE: 85 BPM | WEIGHT: 266 LBS | RESPIRATION RATE: 18 BRPM | HEIGHT: 63 IN | DIASTOLIC BLOOD PRESSURE: 76 MMHG | OXYGEN SATURATION: 94 % | BODY MASS INDEX: 47.13 KG/M2 | TEMPERATURE: 99.7 F | SYSTOLIC BLOOD PRESSURE: 155 MMHG

## 2019-11-03 DIAGNOSIS — M25.522 LEFT ELBOW PAIN: Primary | ICD-10-CM

## 2019-11-03 PROCEDURE — 99283 EMERGENCY DEPT VISIT LOW MDM: CPT

## 2019-11-03 PROCEDURE — 6370000000 HC RX 637 (ALT 250 FOR IP): Performed by: NURSE PRACTITIONER

## 2019-11-03 PROCEDURE — 73080 X-RAY EXAM OF ELBOW: CPT

## 2019-11-03 RX ORDER — HYDROCODONE BITARTRATE AND ACETAMINOPHEN 5; 325 MG/1; MG/1
1 TABLET ORAL EVERY 8 HOURS PRN
Qty: 9 TABLET | Refills: 0 | Status: SHIPPED | OUTPATIENT
Start: 2019-11-03 | End: 2019-11-06

## 2019-11-03 RX ORDER — HYDROCODONE BITARTRATE AND ACETAMINOPHEN 5; 325 MG/1; MG/1
1 TABLET ORAL ONCE
Status: COMPLETED | OUTPATIENT
Start: 2019-11-03 | End: 2019-11-03

## 2019-11-03 RX ADMIN — HYDROCODONE BITARTRATE AND ACETAMINOPHEN 1 TABLET: 5; 325 TABLET ORAL at 16:10

## 2019-11-03 ASSESSMENT — PAIN SCALES - WONG BAKER: WONGBAKER_NUMERICALRESPONSE: 10

## 2019-11-03 ASSESSMENT — PAIN DESCRIPTION - ORIENTATION: ORIENTATION: RIGHT

## 2019-11-03 ASSESSMENT — PAIN SCALES - GENERAL: PAINLEVEL_OUTOF10: 10

## 2019-11-03 ASSESSMENT — ENCOUNTER SYMPTOMS: COLOR CHANGE: 0

## 2019-11-03 ASSESSMENT — PAIN DESCRIPTION - LOCATION: LOCATION: ARM

## 2019-11-03 ASSESSMENT — PAIN DESCRIPTION - FREQUENCY: FREQUENCY: CONTINUOUS

## 2019-11-03 ASSESSMENT — PAIN DESCRIPTION - DESCRIPTORS: DESCRIPTORS: CONSTANT;THROBBING;SHOOTING;SHARP

## 2019-11-19 RX ORDER — GLIMEPIRIDE 2 MG/1
TABLET ORAL
Qty: 60 TABLET | Refills: 3 | Status: SHIPPED | OUTPATIENT
Start: 2019-11-19 | End: 2020-03-10

## 2019-11-19 RX ORDER — FUROSEMIDE 20 MG/1
TABLET ORAL
Qty: 30 TABLET | Refills: 3 | Status: SHIPPED | OUTPATIENT
Start: 2019-11-19 | End: 2020-03-10

## 2019-11-19 RX ORDER — CARVEDILOL 12.5 MG/1
TABLET ORAL
Qty: 60 TABLET | Refills: 3 | Status: SHIPPED | OUTPATIENT
Start: 2019-11-19 | End: 2020-03-10

## 2019-11-19 RX ORDER — PRAVASTATIN SODIUM 40 MG
TABLET ORAL
Qty: 30 TABLET | Refills: 3 | Status: SHIPPED | OUTPATIENT
Start: 2019-11-19 | End: 2020-03-10

## 2019-11-26 ENCOUNTER — OFFICE VISIT (OUTPATIENT)
Dept: INTERNAL MEDICINE | Age: 74
End: 2019-11-26
Payer: MEDICAID

## 2019-11-26 VITALS
BODY MASS INDEX: 53.38 KG/M2 | SYSTOLIC BLOOD PRESSURE: 166 MMHG | DIASTOLIC BLOOD PRESSURE: 69 MMHG | HEIGHT: 59 IN | HEART RATE: 67 BPM | WEIGHT: 264.8 LBS

## 2019-11-26 DIAGNOSIS — M19.022 PRIMARY OSTEOARTHRITIS OF LEFT ELBOW: Primary | ICD-10-CM

## 2019-11-26 DIAGNOSIS — M50.30 DDD (DEGENERATIVE DISC DISEASE), CERVICAL: ICD-10-CM

## 2019-11-26 PROCEDURE — 99213 OFFICE O/P EST LOW 20 MIN: CPT | Performed by: STUDENT IN AN ORGANIZED HEALTH CARE EDUCATION/TRAINING PROGRAM

## 2019-11-26 PROCEDURE — G8484 FLU IMMUNIZE NO ADMIN: HCPCS | Performed by: STUDENT IN AN ORGANIZED HEALTH CARE EDUCATION/TRAINING PROGRAM

## 2019-11-26 PROCEDURE — 99211 OFF/OP EST MAY X REQ PHY/QHP: CPT | Performed by: INTERNAL MEDICINE

## 2019-11-26 PROCEDURE — 1036F TOBACCO NON-USER: CPT | Performed by: STUDENT IN AN ORGANIZED HEALTH CARE EDUCATION/TRAINING PROGRAM

## 2019-11-26 PROCEDURE — 3017F COLORECTAL CA SCREEN DOC REV: CPT | Performed by: STUDENT IN AN ORGANIZED HEALTH CARE EDUCATION/TRAINING PROGRAM

## 2019-11-26 PROCEDURE — G8417 CALC BMI ABV UP PARAM F/U: HCPCS | Performed by: STUDENT IN AN ORGANIZED HEALTH CARE EDUCATION/TRAINING PROGRAM

## 2019-11-26 PROCEDURE — G8399 PT W/DXA RESULTS DOCUMENT: HCPCS | Performed by: STUDENT IN AN ORGANIZED HEALTH CARE EDUCATION/TRAINING PROGRAM

## 2019-11-26 PROCEDURE — 1090F PRES/ABSN URINE INCON ASSESS: CPT | Performed by: STUDENT IN AN ORGANIZED HEALTH CARE EDUCATION/TRAINING PROGRAM

## 2019-11-26 PROCEDURE — 4040F PNEUMOC VAC/ADMIN/RCVD: CPT | Performed by: STUDENT IN AN ORGANIZED HEALTH CARE EDUCATION/TRAINING PROGRAM

## 2019-11-26 PROCEDURE — G8427 DOCREV CUR MEDS BY ELIG CLIN: HCPCS | Performed by: STUDENT IN AN ORGANIZED HEALTH CARE EDUCATION/TRAINING PROGRAM

## 2019-11-26 PROCEDURE — 1123F ACP DISCUSS/DSCN MKR DOCD: CPT | Performed by: STUDENT IN AN ORGANIZED HEALTH CARE EDUCATION/TRAINING PROGRAM

## 2019-12-17 DIAGNOSIS — I10 ESSENTIAL HYPERTENSION: ICD-10-CM

## 2019-12-17 DIAGNOSIS — M10.9 GOUT WITHOUT TOPHUS: ICD-10-CM

## 2019-12-19 RX ORDER — OMEPRAZOLE 40 MG/1
CAPSULE, DELAYED RELEASE ORAL
Qty: 28 CAPSULE | Refills: 11 | Status: SHIPPED | OUTPATIENT
Start: 2019-12-19 | End: 2020-01-01 | Stop reason: SDUPTHER

## 2019-12-19 RX ORDER — FERROUS SULFATE 325(65) MG
TABLET ORAL
Qty: 28 TABLET | Refills: 11 | Status: SHIPPED | OUTPATIENT
Start: 2019-12-19 | End: 2020-01-01 | Stop reason: SDUPTHER

## 2019-12-19 RX ORDER — LISINOPRIL 20 MG/1
TABLET ORAL
Qty: 28 TABLET | Refills: 11 | Status: SHIPPED | OUTPATIENT
Start: 2019-12-19 | End: 2020-01-15 | Stop reason: SDUPTHER

## 2019-12-19 RX ORDER — ASPIRIN 81 MG/1
TABLET, COATED ORAL
Qty: 28 TABLET | Refills: 11 | Status: SHIPPED | OUTPATIENT
Start: 2019-12-19 | End: 2020-01-01 | Stop reason: SDUPTHER

## 2019-12-19 RX ORDER — ALLOPURINOL 100 MG/1
TABLET ORAL
Qty: 28 TABLET | Refills: 11 | Status: SHIPPED | OUTPATIENT
Start: 2019-12-19 | End: 2020-01-01 | Stop reason: SDUPTHER

## 2019-12-19 RX ORDER — AMLODIPINE BESYLATE 10 MG/1
TABLET ORAL
Qty: 28 TABLET | Refills: 11 | Status: SHIPPED | OUTPATIENT
Start: 2019-12-19 | End: 2020-01-01 | Stop reason: SDUPTHER

## 2019-12-23 ENCOUNTER — TELEPHONE (OUTPATIENT)
Dept: PRIMARY CARE CLINIC | Age: 74
End: 2019-12-23

## 2019-12-23 ENCOUNTER — OFFICE VISIT (OUTPATIENT)
Dept: PRIMARY CARE CLINIC | Age: 74
End: 2019-12-23
Payer: MEDICAID

## 2019-12-23 VITALS
BODY MASS INDEX: 57.56 KG/M2 | WEIGHT: 285 LBS | DIASTOLIC BLOOD PRESSURE: 85 MMHG | TEMPERATURE: 98.1 F | HEART RATE: 73 BPM | SYSTOLIC BLOOD PRESSURE: 139 MMHG

## 2019-12-23 DIAGNOSIS — N63.20 LEFT BREAST MASS: Primary | ICD-10-CM

## 2019-12-23 PROCEDURE — 4040F PNEUMOC VAC/ADMIN/RCVD: CPT | Performed by: INTERNAL MEDICINE

## 2019-12-23 PROCEDURE — G8484 FLU IMMUNIZE NO ADMIN: HCPCS | Performed by: INTERNAL MEDICINE

## 2019-12-23 PROCEDURE — G8427 DOCREV CUR MEDS BY ELIG CLIN: HCPCS | Performed by: INTERNAL MEDICINE

## 2019-12-23 PROCEDURE — 3017F COLORECTAL CA SCREEN DOC REV: CPT | Performed by: INTERNAL MEDICINE

## 2019-12-23 PROCEDURE — 1090F PRES/ABSN URINE INCON ASSESS: CPT | Performed by: INTERNAL MEDICINE

## 2019-12-23 PROCEDURE — G8417 CALC BMI ABV UP PARAM F/U: HCPCS | Performed by: INTERNAL MEDICINE

## 2019-12-23 PROCEDURE — 99213 OFFICE O/P EST LOW 20 MIN: CPT | Performed by: INTERNAL MEDICINE

## 2019-12-23 PROCEDURE — G8399 PT W/DXA RESULTS DOCUMENT: HCPCS | Performed by: INTERNAL MEDICINE

## 2019-12-23 PROCEDURE — 1036F TOBACCO NON-USER: CPT | Performed by: INTERNAL MEDICINE

## 2019-12-23 PROCEDURE — 1123F ACP DISCUSS/DSCN MKR DOCD: CPT | Performed by: INTERNAL MEDICINE

## 2019-12-27 ENCOUNTER — HOSPITAL ENCOUNTER (OUTPATIENT)
Dept: MAMMOGRAPHY | Age: 74
Discharge: HOME | End: 2019-12-29
Payer: MEDICAID

## 2019-12-27 ENCOUNTER — HOSPITAL ENCOUNTER (OUTPATIENT)
Dept: ULTRASOUND IMAGING | Age: 74
Discharge: HOME | End: 2019-12-29
Payer: MEDICAID

## 2019-12-27 DIAGNOSIS — N63.20 LEFT BREAST MASS: ICD-10-CM

## 2019-12-27 DIAGNOSIS — R92.8 ABNORMAL MAMMOGRAM: ICD-10-CM

## 2019-12-27 PROCEDURE — G0279 TOMOSYNTHESIS, MAMMO: HCPCS

## 2019-12-27 PROCEDURE — 76642 ULTRASOUND BREAST LIMITED: CPT

## 2019-12-30 DIAGNOSIS — N63.20 LEFT BREAST MASS: Primary | ICD-10-CM

## 2020-01-01 ENCOUNTER — VIRTUAL VISIT (OUTPATIENT)
Dept: INTERNAL MEDICINE | Age: 75
End: 2020-01-01
Payer: MEDICAID

## 2020-01-01 ENCOUNTER — HOSPITAL ENCOUNTER (OUTPATIENT)
Dept: MAMMOGRAPHY | Age: 75
Discharge: HOME | End: 2020-12-20
Payer: MEDICARE

## 2020-01-01 ENCOUNTER — TELEPHONE (OUTPATIENT)
Dept: INTERNAL MEDICINE | Age: 75
End: 2020-01-01

## 2020-01-01 ENCOUNTER — HOSPITAL ENCOUNTER (OUTPATIENT)
Age: 75
Discharge: HOME | End: 2020-12-18
Payer: MEDICARE

## 2020-01-01 ENCOUNTER — HOSPITAL ENCOUNTER (OUTPATIENT)
Dept: ULTRASOUND IMAGING | Age: 75
End: 2020-01-01
Payer: MEDICARE

## 2020-01-01 LAB
ABSOLUTE EOS #: 0.18 K/UL (ref 0–0.44)
ABSOLUTE IMMATURE GRANULOCYTE: <0.03 K/UL (ref 0–0.3)
ABSOLUTE LYMPH #: 2.07 K/UL (ref 1.1–3.7)
ABSOLUTE MONO #: 0.45 K/UL (ref 0.1–1.2)
ALBUMIN SERPL-MCNC: 4.1 G/DL (ref 3.5–5.2)
ALBUMIN/GLOBULIN RATIO: 1.2 (ref 1–2.5)
ALP BLD-CCNC: 120 U/L (ref 35–104)
ALT SERPL-CCNC: 10 U/L (ref 5–33)
ANION GAP SERPL CALCULATED.3IONS-SCNC: 13 MMOL/L (ref 9–17)
AST SERPL-CCNC: 18 U/L
BASOPHILS # BLD: 1 % (ref 0–2)
BASOPHILS ABSOLUTE: 0.05 K/UL (ref 0–0.2)
BILIRUB SERPL-MCNC: 0.28 MG/DL (ref 0.3–1.2)
BUN BLDV-MCNC: 35 MG/DL (ref 8–23)
BUN/CREAT BLD: ABNORMAL (ref 9–20)
CALCIUM SERPL-MCNC: 9.5 MG/DL (ref 8.6–10.4)
CHLORIDE BLD-SCNC: 108 MMOL/L (ref 98–107)
CHOLESTEROL/HDL RATIO: 2.6
CHOLESTEROL: 195 MG/DL
CO2: 20 MMOL/L (ref 20–31)
CREAT SERPL-MCNC: 1.2 MG/DL (ref 0.5–0.9)
DIFFERENTIAL TYPE: NORMAL
EOSINOPHILS RELATIVE PERCENT: 3 % (ref 1–4)
ESTIMATED AVERAGE GLUCOSE: 151 MG/DL
GFR AFRICAN AMERICAN: 53 ML/MIN
GFR NON-AFRICAN AMERICAN: 44 ML/MIN
GFR SERPL CREATININE-BSD FRML MDRD: ABNORMAL ML/MIN/{1.73_M2}
GFR SERPL CREATININE-BSD FRML MDRD: ABNORMAL ML/MIN/{1.73_M2}
GLUCOSE BLD-MCNC: 201 MG/DL (ref 70–99)
HBA1C MFR BLD: 6.9 % (ref 4–6)
HCT VFR BLD CALC: 39.7 % (ref 36.3–47.1)
HDLC SERPL-MCNC: 75 MG/DL
HEMOGLOBIN: 12.6 G/DL (ref 11.9–15.1)
HEPATITIS C ANTIBODY: NONREACTIVE
IMMATURE GRANULOCYTES: 0 %
LDL CHOLESTEROL: 105 MG/DL (ref 0–130)
LYMPHOCYTES # BLD: 33 % (ref 24–43)
MCH RBC QN AUTO: 30.8 PG (ref 25.2–33.5)
MCHC RBC AUTO-ENTMCNC: 31.7 G/DL (ref 28.4–34.8)
MCV RBC AUTO: 97.1 FL (ref 82.6–102.9)
MONOCYTES # BLD: 7 % (ref 3–12)
NRBC AUTOMATED: 0 PER 100 WBC
PDW BLD-RTO: 13.3 % (ref 11.8–14.4)
PLATELET # BLD: 218 K/UL (ref 138–453)
PLATELET ESTIMATE: NORMAL
PMV BLD AUTO: 11.9 FL (ref 8.1–13.5)
POTASSIUM SERPL-SCNC: 4.5 MMOL/L (ref 3.7–5.3)
RBC # BLD: 4.09 M/UL (ref 3.95–5.11)
RBC # BLD: NORMAL 10*6/UL
SEG NEUTROPHILS: 56 % (ref 36–65)
SEGMENTED NEUTROPHILS ABSOLUTE COUNT: 3.49 K/UL (ref 1.5–8.1)
SODIUM BLD-SCNC: 141 MMOL/L (ref 135–144)
TOTAL PROTEIN: 7.6 G/DL (ref 6.4–8.3)
TRIGL SERPL-MCNC: 73 MG/DL
TSH SERPL DL<=0.05 MIU/L-ACNC: 0.91 MIU/L (ref 0.3–5)
URIC ACID: 6 MG/DL (ref 2.4–5.7)
VITAMIN D 25-HYDROXY: 27.2 NG/ML (ref 30–100)
VLDLC SERPL CALC-MCNC: NORMAL MG/DL (ref 1–30)
WBC # BLD: 6.3 K/UL (ref 3.5–11.3)
WBC # BLD: NORMAL 10*3/UL

## 2020-01-01 PROCEDURE — 36415 COLL VENOUS BLD VENIPUNCTURE: CPT

## 2020-01-01 PROCEDURE — 77066 DX MAMMO INCL CAD BI: CPT

## 2020-01-01 PROCEDURE — 99212 OFFICE O/P EST SF 10 MIN: CPT | Performed by: INTERNAL MEDICINE

## 2020-01-01 PROCEDURE — 80061 LIPID PANEL: CPT

## 2020-01-01 PROCEDURE — 80053 COMPREHEN METABOLIC PANEL: CPT

## 2020-01-01 PROCEDURE — 83036 HEMOGLOBIN GLYCOSYLATED A1C: CPT

## 2020-01-01 PROCEDURE — 84550 ASSAY OF BLOOD/URIC ACID: CPT

## 2020-01-01 PROCEDURE — 86803 HEPATITIS C AB TEST: CPT

## 2020-01-01 PROCEDURE — 85025 COMPLETE CBC W/AUTO DIFF WBC: CPT

## 2020-01-01 PROCEDURE — 82306 VITAMIN D 25 HYDROXY: CPT

## 2020-01-01 PROCEDURE — 84443 ASSAY THYROID STIM HORMONE: CPT

## 2020-01-01 RX ORDER — FUROSEMIDE 20 MG/1
TABLET ORAL
Qty: 30 TABLET | Refills: 0 | Status: SHIPPED | OUTPATIENT
Start: 2020-01-01 | End: 2020-01-01

## 2020-01-01 RX ORDER — AMLODIPINE BESYLATE 10 MG/1
TABLET ORAL
Qty: 28 TABLET | Refills: 11 | Status: SHIPPED | OUTPATIENT
Start: 2020-01-01

## 2020-01-01 RX ORDER — LISINOPRIL 30 MG/1
TABLET ORAL
Qty: 30 TABLET | Refills: 0 | Status: SHIPPED | OUTPATIENT
Start: 2020-01-01 | End: 2020-01-01

## 2020-01-01 RX ORDER — LORATADINE 10 MG/1
TABLET ORAL
Qty: 30 TABLET | Refills: 0 | Status: SHIPPED | OUTPATIENT
Start: 2020-01-01 | End: 2020-01-01

## 2020-01-01 RX ORDER — OMEPRAZOLE 40 MG/1
CAPSULE, DELAYED RELEASE ORAL
Qty: 28 CAPSULE | Refills: 11 | Status: SHIPPED | OUTPATIENT
Start: 2020-01-01

## 2020-01-01 RX ORDER — FUROSEMIDE 20 MG/1
TABLET ORAL
Qty: 30 TABLET | Refills: 1 | Status: SHIPPED | OUTPATIENT
Start: 2020-01-01 | End: 2020-01-01

## 2020-01-01 RX ORDER — POLYETHYLENE GLYCOL 3350 17 G/17G
17 POWDER, FOR SOLUTION ORAL DAILY
Qty: 116 G | Refills: 3 | Status: SHIPPED | OUTPATIENT
Start: 2020-01-01

## 2020-01-01 RX ORDER — POLYETHYLENE GLYCOL 3350 17 G/17G
POWDER, FOR SOLUTION ORAL
COMMUNITY
Start: 2020-04-07 | End: 2020-01-01

## 2020-01-01 RX ORDER — PRAVASTATIN SODIUM 40 MG
TABLET ORAL
Qty: 30 TABLET | Refills: 0 | Status: SHIPPED | OUTPATIENT
Start: 2020-01-01 | End: 2020-01-01

## 2020-01-01 RX ORDER — CARVEDILOL 12.5 MG/1
TABLET ORAL
Qty: 60 TABLET | Refills: 1 | Status: SHIPPED | OUTPATIENT
Start: 2020-01-01 | End: 2020-01-01

## 2020-01-01 RX ORDER — CARVEDILOL 12.5 MG/1
TABLET ORAL
Qty: 60 TABLET | Refills: 0 | Status: SHIPPED | OUTPATIENT
Start: 2020-01-01 | End: 2021-01-01

## 2020-01-01 RX ORDER — GLIMEPIRIDE 2 MG/1
TABLET ORAL
Qty: 60 TABLET | Refills: 0 | Status: SHIPPED | OUTPATIENT
Start: 2020-01-01 | End: 2020-01-01

## 2020-01-01 RX ORDER — MULTIVITAMIN WITH FOLIC ACID 400 MCG
TABLET ORAL
Qty: 28 TABLET | Refills: 10 | Status: SHIPPED | OUTPATIENT
Start: 2020-01-01

## 2020-01-01 RX ORDER — SYRING-NEEDL,DISP,INSUL,0.3 ML 30 GX5/16"
1 SYRINGE, EMPTY DISPOSABLE MISCELLANEOUS ONCE
Qty: 100 DEVICE | Refills: 0 | Status: SHIPPED | OUTPATIENT
Start: 2020-01-01 | End: 2020-01-01

## 2020-01-01 RX ORDER — LANCETS 28 GAUGE
EACH MISCELLANEOUS
Qty: 100 EACH | Refills: 3 | Status: SHIPPED | OUTPATIENT
Start: 2020-01-01 | End: 2020-01-01

## 2020-01-01 RX ORDER — GLIMEPIRIDE 2 MG/1
TABLET ORAL
Qty: 60 TABLET | Refills: 1 | Status: SHIPPED | OUTPATIENT
Start: 2020-01-01 | End: 2020-01-01

## 2020-01-01 RX ORDER — LISINOPRIL 30 MG/1
TABLET ORAL
Qty: 30 TABLET | Refills: 0 | Status: SHIPPED | OUTPATIENT
Start: 2020-01-01 | End: 2021-01-01

## 2020-01-01 RX ORDER — GLUCOSAMINE HCL/CHONDROITIN SU 500-400 MG
CAPSULE ORAL
Qty: 100 STRIP | Refills: 0 | Status: SHIPPED | OUTPATIENT
Start: 2020-01-01 | End: 2021-01-01

## 2020-01-01 RX ORDER — LORATADINE 10 MG/1
TABLET ORAL
Qty: 30 TABLET | Refills: 0 | Status: SHIPPED | OUTPATIENT
Start: 2020-01-01 | End: 2021-01-01

## 2020-01-01 RX ORDER — FUROSEMIDE 20 MG/1
TABLET ORAL
Qty: 30 TABLET | Refills: 2 | Status: SHIPPED | OUTPATIENT
Start: 2020-01-01 | End: 2020-01-01

## 2020-01-01 RX ORDER — ALBUTEROL SULFATE 90 UG/1
2 AEROSOL, METERED RESPIRATORY (INHALATION) EVERY 6 HOURS PRN
Qty: 1 INHALER | Refills: 6 | Status: SHIPPED | OUTPATIENT
Start: 2020-01-01

## 2020-01-01 RX ORDER — BLOOD-GLUCOSE METER
EACH MISCELLANEOUS
COMMUNITY
Start: 2020-01-01

## 2020-01-01 RX ORDER — BLOOD-GLUCOSE METER
1 KIT MISCELLANEOUS DAILY
Qty: 1 KIT | Refills: 0 | Status: SHIPPED | OUTPATIENT
Start: 2020-01-01 | End: 2021-01-01

## 2020-01-01 RX ORDER — PRAVASTATIN SODIUM 40 MG
TABLET ORAL
Qty: 30 TABLET | Refills: 2 | Status: SHIPPED | OUTPATIENT
Start: 2020-01-01 | End: 2020-01-01

## 2020-01-01 RX ORDER — FERROUS SULFATE 325(65) MG
TABLET ORAL
Qty: 28 TABLET | Refills: 11 | Status: SHIPPED | OUTPATIENT
Start: 2020-01-01

## 2020-01-01 RX ORDER — ALLOPURINOL 100 MG/1
TABLET ORAL
Qty: 28 TABLET | Refills: 11 | Status: SHIPPED | OUTPATIENT
Start: 2020-01-01

## 2020-01-01 RX ORDER — CARVEDILOL 12.5 MG/1
TABLET ORAL
Qty: 60 TABLET | Refills: 0 | Status: SHIPPED | OUTPATIENT
Start: 2020-01-01 | End: 2020-01-01

## 2020-01-01 RX ORDER — PRAVASTATIN SODIUM 40 MG
TABLET ORAL
Qty: 30 TABLET | Refills: 0 | Status: SHIPPED | OUTPATIENT
Start: 2020-01-01 | End: 2021-01-01

## 2020-01-01 RX ORDER — OMEGA-3 FATTY ACIDS/FISH OIL 300-500 MG
CAPSULE ORAL
Qty: 90 CAPSULE | Refills: 1 | Status: SHIPPED | OUTPATIENT
Start: 2020-01-01

## 2020-01-01 RX ORDER — FUROSEMIDE 20 MG/1
TABLET ORAL
Qty: 30 TABLET | Refills: 0 | Status: SHIPPED | OUTPATIENT
Start: 2020-01-01 | End: 2021-01-01

## 2020-01-01 RX ORDER — GLIMEPIRIDE 2 MG/1
TABLET ORAL
Qty: 60 TABLET | Refills: 0 | Status: SHIPPED | OUTPATIENT
Start: 2020-01-01 | End: 2021-01-01 | Stop reason: SDUPTHER

## 2020-01-01 RX ORDER — ASPIRIN 81 MG/1
TABLET ORAL
Qty: 28 TABLET | Refills: 11 | Status: SHIPPED | OUTPATIENT
Start: 2020-01-01

## 2020-01-01 RX ORDER — BLOOD SUGAR DIAGNOSTIC
STRIP MISCELLANEOUS
Qty: 100 EACH | Refills: 3 | Status: SHIPPED | OUTPATIENT
Start: 2020-01-01

## 2020-01-01 RX ORDER — CARVEDILOL 12.5 MG/1
TABLET ORAL
Qty: 60 TABLET | Refills: 2 | Status: SHIPPED | OUTPATIENT
Start: 2020-01-01 | End: 2020-01-01

## 2020-01-01 RX ORDER — PRAVASTATIN SODIUM 40 MG
TABLET ORAL
Qty: 30 TABLET | Refills: 1 | Status: SHIPPED | OUTPATIENT
Start: 2020-01-01 | End: 2020-01-01

## 2020-01-01 RX ORDER — GLIMEPIRIDE 2 MG/1
TABLET ORAL
Qty: 60 TABLET | Refills: 2 | Status: SHIPPED | OUTPATIENT
Start: 2020-01-01 | End: 2020-01-01

## 2020-01-15 ENCOUNTER — OFFICE VISIT (OUTPATIENT)
Dept: INTERNAL MEDICINE | Age: 75
End: 2020-01-15
Payer: MEDICAID

## 2020-01-15 VITALS
WEIGHT: 264.6 LBS | HEART RATE: 60 BPM | BODY MASS INDEX: 53.34 KG/M2 | DIASTOLIC BLOOD PRESSURE: 88 MMHG | SYSTOLIC BLOOD PRESSURE: 148 MMHG | HEIGHT: 59 IN

## 2020-01-15 PROCEDURE — 1036F TOBACCO NON-USER: CPT | Performed by: STUDENT IN AN ORGANIZED HEALTH CARE EDUCATION/TRAINING PROGRAM

## 2020-01-15 PROCEDURE — G8484 FLU IMMUNIZE NO ADMIN: HCPCS | Performed by: STUDENT IN AN ORGANIZED HEALTH CARE EDUCATION/TRAINING PROGRAM

## 2020-01-15 PROCEDURE — 4040F PNEUMOC VAC/ADMIN/RCVD: CPT | Performed by: STUDENT IN AN ORGANIZED HEALTH CARE EDUCATION/TRAINING PROGRAM

## 2020-01-15 PROCEDURE — 99211 OFF/OP EST MAY X REQ PHY/QHP: CPT | Performed by: INTERNAL MEDICINE

## 2020-01-15 PROCEDURE — 99213 OFFICE O/P EST LOW 20 MIN: CPT | Performed by: STUDENT IN AN ORGANIZED HEALTH CARE EDUCATION/TRAINING PROGRAM

## 2020-01-15 PROCEDURE — G8399 PT W/DXA RESULTS DOCUMENT: HCPCS | Performed by: STUDENT IN AN ORGANIZED HEALTH CARE EDUCATION/TRAINING PROGRAM

## 2020-01-15 PROCEDURE — 3017F COLORECTAL CA SCREEN DOC REV: CPT | Performed by: STUDENT IN AN ORGANIZED HEALTH CARE EDUCATION/TRAINING PROGRAM

## 2020-01-15 PROCEDURE — G8427 DOCREV CUR MEDS BY ELIG CLIN: HCPCS | Performed by: STUDENT IN AN ORGANIZED HEALTH CARE EDUCATION/TRAINING PROGRAM

## 2020-01-15 PROCEDURE — 1123F ACP DISCUSS/DSCN MKR DOCD: CPT | Performed by: STUDENT IN AN ORGANIZED HEALTH CARE EDUCATION/TRAINING PROGRAM

## 2020-01-15 PROCEDURE — 1090F PRES/ABSN URINE INCON ASSESS: CPT | Performed by: STUDENT IN AN ORGANIZED HEALTH CARE EDUCATION/TRAINING PROGRAM

## 2020-01-15 PROCEDURE — G8417 CALC BMI ABV UP PARAM F/U: HCPCS | Performed by: STUDENT IN AN ORGANIZED HEALTH CARE EDUCATION/TRAINING PROGRAM

## 2020-01-15 RX ORDER — LISINOPRIL 30 MG/1
TABLET ORAL
Qty: 30 TABLET | Refills: 1 | Status: SHIPPED | OUTPATIENT
Start: 2020-01-15 | End: 2020-03-10

## 2020-01-15 RX ORDER — LORATADINE 10 MG/1
10 TABLET ORAL DAILY
Qty: 30 TABLET | Refills: 0 | Status: SHIPPED | OUTPATIENT
Start: 2020-01-15 | End: 2020-02-11

## 2020-01-15 NOTE — PROGRESS NOTES
DIABETES and HYPERTENSION visit    BP Readings from Last 3 Encounters:   12/23/19 139/85   11/26/19 (!) 166/69   11/03/19 (!) 155/76        Hemoglobin A1C (%)   Date Value   08/26/2019 5.6   02/04/2019 6.3   07/11/2018 7.6     Microalb/Crt. Ratio (mcg/mg creat)   Date Value   09/06/2018 19     LDL Cholesterol (mg/dL)   Date Value   09/16/2019 107     HDL (mg/dL)   Date Value   09/16/2019 66     BUN (mg/dL)   Date Value   10/03/2019 16     CREATININE (mg/dL)   Date Value   10/03/2019 0.96 (H)     Glucose (mg/dL)   Date Value   10/03/2019 139 (H)   05/29/2012 138 (H)            Have you changed or started any medications since your last visit including any over-the-counter medicines, vitamins, or herbal medicines? yes - tylenol   Have you stopped taking any of your medications? Is so, why? -  no  Are you having any side effects from any of your medications? - no    Have you seen any other physician or provider since your last visit?  no   Have you had any other diagnostic tests since your last visit? yes -    Have you been seen in the emergency room and/or had an admission in a hospital since we last saw you?  yes - walk-in   Have you had your routine dental cleaning in the past 6 months?  no     Have you had your annual diabetic retinal (eye) exam? No   (ensure copy of exam is in the chart)    Do you have an active MyChart account? If no, what is the barrier?   Yes    Patient Care Team:  Saint Blossom, MD as PCP - General (Internal Medicine)  Saint Blossom, MD as PCP - Glenny Gates Provider  Paul Khan MD as Consulting Physician (Pulmonology)  Sharon Fontanez MD as Consulting Physician (Internal Medicine)    Medical History Review  Past Medical, Family, and Social History reviewed and does contribute to the patient presenting condition    Health Maintenance   Topic Date Due    Pneumococcal 65+ years Vaccine (1 of 1 - PPSV23) 06/21/2012    Diabetic retinal exam  03/01/2017    Flu vaccine (1) 09/01/2019  Hepatitis C screen  02/20/2020 (Originally 1945)    Diabetic foot exam  08/26/2020    A1C test (Diabetic or Prediabetic)  08/26/2020    Lipid screen  09/16/2020    Potassium monitoring  10/03/2020    Creatinine monitoring  10/03/2020    Breast cancer screen  12/27/2021    Colon cancer screen colonoscopy  10/23/2025    DTaP/Tdap/Td vaccine (2 - Td) 04/01/2026    DEXA (modify frequency per FRAX score)  Completed    Shingles Vaccine  Completed

## 2020-01-15 NOTE — PROGRESS NOTES
Patient  is here for follow-up of her breast lump that she noticed on her breast exam last month. She went to urgent care and a mammogram and ultrasound was ordered. She had a mammogram that showed a suspicious lesion in the left breast.  She underwent an ultrasound as well and advised biopsy. There is a smooth circumscribed lesion. Could be fibroadenoma but needs a biopsy. Biopsy is scheduled for her and she is advised to follow-up with this as soon as possible. She had a previous diagnostic mammogram in February which was negative though she has had previous breast biopsies in the past.  bp has been better last few visits. Her creatinine has been stable. Will advised to increase lisinopril to 30 mg daily and to recheck her BMP within a week. Her pulse is 60 and her Coreg was not increased. She is also on full dose Norvasc. She has hyperuricemia and therefore a diuretic was not started. Attending Physician Statement  I have discussed the care of Lopez Lav including pertinent history and exam findings,  with the resident. I have reviewed the key elements of all parts of the encounter with the resident. I agree with the assessment, plan and orders as documented by the resident.   (GE Modifier)

## 2020-01-15 NOTE — PROGRESS NOTES
[facility] [address] [address]  Dept: [phone]  Dept Fax: [phone]    Office Progress/Follow Up Note  Date of patient's visit: 1/15/2020  Patient's Name:  Emre Penaloza YOB: 1945            Patient Care Team:  Susie Hollins MD as PCP - General (Internal Medicine)  Susie Hollins MD as PCP - [facility]  Vanesa Montero MD as Consulting Physician (Pulmonology)  Deavida Sunshine MD as Consulting Physician (Internal Medicine)    REASON FOR VISIT: Routine outpatient follow up    HISTORY OF PRESENT ILLNESS:      Chief Complaint   Patient presents with    Breast Mass     Here for ollow up on left breast mass       History was obtained from the patient. Emre Penaloza is a 69 y.o. is here for a follow up visit. Patient reports noticing a breast lump after scratching a pruritic area over left breast with additional bruising. Patient reported to walk-in clinic 12/23/2019 where she was prescribed a mammogram.  At the time mammogram found 1.5 x 1.4 cm mass. Targeted ultrasound 12/27/2019 showed ovoid well-demarcated heterogenous mass 2.51 x 1.33 x 1.76 cm with central anechoic area in upper inner left breast.  At this time, patient was instructed to get breast biopsy. However due to scheduling miscommunication, patient unable to get biopsy and is here today for another order for biopsy. Patient denies fevers, chills, weight loss. Patient denies discharge to breast.  Patient denies family history of breast cancer, denies personal history of ovarian, endometrial cancers. Patient did report mass may have felt smaller on self-exam, bruising and pruritus resolved. Patient blood pressure found to be elevated today 167/93. Patient reports compliance to medication. Patient still follows with nephrology for CKD stage III, last seen 10/2019. Lab work outstanding.   Patient additionally reports having some itching in ears tablet TAKE 1 TABLET BY MOUTH DAILY IN THE EVENING 28 tablet 11    ASPIRIN LOW DOSE 81 MG EC tablet TAKE 1 TABLET BY MOUTH DAILY IN THE EVENING 28 tablet 11    omeprazole (PRILOSEC) 40 MG delayed release capsule TAKE 1 CAPSULE BY MOUTH DAILY IN THE MORNING 28 capsule 11    allopurinol (ZYLOPRIM) 100 MG tablet TAKE 1 TABLET BY MOUTH AT BEDTIME 28 tablet 11    glimepiride (AMARYL) 2 MG tablet TAKE 1 TABLET TWICE DAILY(AM/PM) 60 tablet 3    carvedilol (COREG) 12.5 MG tablet TAKE 1 TABLET TWICE DAILY(AM/PM) 60 tablet 3    pravastatin (PRAVACHOL) 40 MG tablet TAKE 1 TABLET AT BEDTIME 30 tablet 3    furosemide (LASIX) 20 MG tablet TAKE 1 TABLET EVERY MORNING 30 tablet 3    Multiple Vitamin (MULTIVITAMIN) tablet TAKE 1 TABLET EVERY MORNING 28 tablet 11    Aimsco Ultra Thin Lancets MISC USE AS DIRECTED WITH INSULIN  each 2    ONETOUCH VERIO strip TEST BLOOD SUGAR 3 TIMES DAILY AS DIRECTED 100 each 5    Omega-3 Fatty Acids (GNP FISH OIL) 1000 MG CAPS TAKE 1 CAPSULE BY MOUTH DAILY IN THE EVENING 90 capsule 3    diclofenac 1 % CREA Place onto the skin      albuterol sulfate HFA (VENTOLIN HFA) 108 (90 Base) MCG/ACT inhaler Inhale 2 puffs into the lungs every 6 hours as needed for Wheezing 1 Inhaler 2     No current facility-administered medications for this visit. SOCIAL HISTORY    Reviewed and no change from previous record. Brittany Carson  reports that she quit smoking about 29 years ago. Her smoking use included cigarettes. She started smoking about 60 years ago. She has a 30.00 pack-year smoking history. She has never used smokeless tobacco.    FAMILY HISTORY:    Reviewed and No change from previous visit    REVIEW OF SYSTEMS:    CONSTITUTIONAL: Denies: fever, chills  PSYCH: Denies: anxiety, depression  ALLERGIES: Denies: urticaria.   Positive for itching in ears and throat  EYES: Denies: blurry vision, decreased vision, photophobia  ENT: Denies: sore throat, nasal congestion  CARDIOVASCULAR: Denies: chest pain, dyspnea on exertion  RESPIRATORY: Denies: cough, hemoptysis, shortness of breath  GI: Denies: Denies: abdominal pain, flank pain  : Denies: Denies: dysuria, frequency/urgency  NEURO: Denies: dizzy/vertigo, headache  MUSCULOSKELETAL: Denies: back pain, joint pain  SKIN: Denies: rash, itching    PHYSICAL EXAM:    There were no vitals filed for this visit. BP Readings from Last 3 Encounters:   12/23/19 139/85   11/26/19 (!) 166/69   11/03/19 (!) 155/76      General appearance - alert, well appearing, and in no distress  Mental status - alert, oriented to person, place, and time  Mouth - mucous membranes moist, pharynx normal without lesions  Neck - supple, no significant adenopathy  Chest - clear to auscultation, no wheezes, rales or rhonchi, symmetric air entry  Heart - normal rate, regular rhythm, normal S1, S2, no murmurs, rubs, clicks or gallops  Abdomen - soft, nontender, nondistended, no masses or organomegaly  Neurological - alert, oriented, normal speech, no focal findings or movement disorder noted  Extremities - peripheral pulses normal, no pedal edema, no clubbing or cyanosis  Skin - normal coloration and turgor, no rashes, no suspicious skin lesions noted    LABORATORY FINDINGS:    CBC:  Lab Results   Component Value Date    WBC 7.0 10/03/2019    HGB 12.9 10/03/2019     10/03/2019     12/12/2011       BMP:    Lab Results   Component Value Date     10/03/2019    K 3.8 10/03/2019     10/03/2019    CO2 21 10/03/2019    BUN 16 10/03/2019    CREATININE 0.96 10/03/2019    GLUCOSE 139 10/03/2019    GLUCOSE 138 05/29/2012       HEMOGLOBIN A1C:   Lab Results   Component Value Date    LABA1C 5.6 08/26/2019       FASTING LIPID PANEL:  Lab Results   Component Value Date    CHOL 191 04/01/2016    HDL 66 09/16/2019    TRIG 57 04/01/2016       ASSESSMENT AND PLAN:    Karla Mariano was seen today for breast mass and other.     Diagnoses and all orders for this visit:    Breast mass in female  Positive findings on mammogram and ultrasound for left breast lump on 12/27/2019  Missed biopsy, will reschedule second biopsy    Essential hypertension  On amlodipine 10, Coreg 12.5, lisinopril 20, elevated today    Gout without tophus  Well-controlled on allopurinol    Stage 3 chronic kidney disease ([email or web address]  Follows with Dr. Levon Martinez, last creatinine 0.96  On Lasix 20    Morbid obesity with BMI of 50.0-59.9, adult ([email or web address]  Counseled on exercise and diet    Prediabetes  Last HbA1c 8/20 6-2019 5.6  On Amaryl 2    Itching of ear  Chronic problem, not accompanied with rhinorrhea, potential allergy reaction  Trial of Claritin 10      FOLLOW UP AND INSTRUCTIONS:   · No follow-ups on file. · Emmauvenia Abo received counseling on the following healthy behaviors: nutrition and exercise    · Discussed use, benefit, and side effects of prescribed medications. Barriers to medication compliance addressed. All patient questions answered. Pt voiced understanding.      · Patient given educational materials - see patient instructions    Sahil Walton MD  PGY-2  [facility]  1/15/2020, 9:34 AM

## 2020-01-28 ENCOUNTER — HOSPITAL ENCOUNTER (OUTPATIENT)
Dept: ULTRASOUND IMAGING | Age: 75
Discharge: HOME | End: 2020-01-30
Payer: MEDICAID

## 2020-01-28 PROCEDURE — 76642 ULTRASOUND BREAST LIMITED: CPT

## 2020-02-03 ENCOUNTER — OFFICE VISIT (OUTPATIENT)
Dept: PULMONOLOGY | Age: 75
End: 2020-02-03
Payer: MEDICAID

## 2020-02-03 VITALS
BODY MASS INDEX: 51.24 KG/M2 | HEIGHT: 60 IN | SYSTOLIC BLOOD PRESSURE: 149 MMHG | WEIGHT: 261 LBS | RESPIRATION RATE: 14 BRPM | DIASTOLIC BLOOD PRESSURE: 77 MMHG | OXYGEN SATURATION: 98 % | HEART RATE: 86 BPM

## 2020-02-03 PROCEDURE — 1036F TOBACCO NON-USER: CPT | Performed by: INTERNAL MEDICINE

## 2020-02-03 PROCEDURE — 3046F HEMOGLOBIN A1C LEVEL >9.0%: CPT | Performed by: INTERNAL MEDICINE

## 2020-02-03 PROCEDURE — G8427 DOCREV CUR MEDS BY ELIG CLIN: HCPCS | Performed by: INTERNAL MEDICINE

## 2020-02-03 PROCEDURE — 3017F COLORECTAL CA SCREEN DOC REV: CPT | Performed by: INTERNAL MEDICINE

## 2020-02-03 PROCEDURE — 99214 OFFICE O/P EST MOD 30 MIN: CPT | Performed by: INTERNAL MEDICINE

## 2020-02-03 PROCEDURE — 2022F DILAT RTA XM EVC RTNOPTHY: CPT | Performed by: INTERNAL MEDICINE

## 2020-02-03 PROCEDURE — 1123F ACP DISCUSS/DSCN MKR DOCD: CPT | Performed by: INTERNAL MEDICINE

## 2020-02-03 PROCEDURE — G8484 FLU IMMUNIZE NO ADMIN: HCPCS | Performed by: INTERNAL MEDICINE

## 2020-02-03 PROCEDURE — 1090F PRES/ABSN URINE INCON ASSESS: CPT | Performed by: INTERNAL MEDICINE

## 2020-02-03 PROCEDURE — 4040F PNEUMOC VAC/ADMIN/RCVD: CPT | Performed by: INTERNAL MEDICINE

## 2020-02-03 PROCEDURE — G8417 CALC BMI ABV UP PARAM F/U: HCPCS | Performed by: INTERNAL MEDICINE

## 2020-02-03 PROCEDURE — G8399 PT W/DXA RESULTS DOCUMENT: HCPCS | Performed by: INTERNAL MEDICINE

## 2020-02-03 ASSESSMENT — SLEEP AND FATIGUE QUESTIONNAIRES
HOW LIKELY ARE YOU TO NOD OFF OR FALL ASLEEP WHILE WATCHING TV: 3
HOW LIKELY ARE YOU TO NOD OFF OR FALL ASLEEP WHILE SITTING QUIETLY AFTER LUNCH WITHOUT ALCOHOL: 3
HOW LIKELY ARE YOU TO NOD OFF OR FALL ASLEEP WHILE SITTING AND READING: 0
ESS TOTAL SCORE: 12
HOW LIKELY ARE YOU TO NOD OFF OR FALL ASLEEP WHEN YOU ARE A PASSENGER IN A CAR FOR AN HOUR WITHOUT A BREAK: 0
HOW LIKELY ARE YOU TO NOD OFF OR FALL ASLEEP WHILE SITTING AND TALKING TO SOMEONE: 0
HOW LIKELY ARE YOU TO NOD OFF OR FALL ASLEEP WHILE SITTING INACTIVE IN A PUBLIC PLACE: 3
HOW LIKELY ARE YOU TO NOD OFF OR FALL ASLEEP WHILE LYING DOWN TO REST IN THE AFTERNOON WHEN CIRCUMSTANCES PERMIT: 3
HOW LIKELY ARE YOU TO NOD OFF OR FALL ASLEEP IN A CAR, WHILE STOPPED FOR A FEW MINUTES IN TRAFFIC: 0

## 2020-02-04 NOTE — PROGRESS NOTES
Delfino De La Cruz  2/4/2020    Chief Complaint   Patient presents with    Sleep Apnea    , YANA on CPAP      Delfino De La Cruz . is known to have YANA that is beng treated with CPAP. She denies any subjective sleepiness but her score on [place] scale is 12 indicating sleepiness. No headach. No nasal stuffiness and or symptoms of sinusitis. No parasomnias. Not sleep during the day time  Blood pressure is under good control. No chest pain ,PND or angina. GE reflux is better. No acute arthritis. .  Blood sugar control has improved. Continues to have pedal edemadue to [facility]    Sleep Medicine 2/3/2020 5/31/2019 1/18/2019 8/23/2018 9/9/2016   Sitting and reading 0 2 2 0 3   Watching TV 3 2 2 3 3   Sitting, inactive in a public place (e.g. a theatre or a meeting) 3 0 0 0 0   As a passenger in a car for an hour without a break 0 0 0 0 0   Lying down to rest in the afternoon when circumstances permit 3 2 3 1 3   Sitting and talking to someone 0 0 0 0 0   Sitting quietly after a lunch without alcohol 3 1 0 0 3   In a car, while stopped for a few minutes in traffic 0 0 0 0 0   Total score 12 7 7 4 12   . Review of Systems -as assistant was conducted for all other system including upper and lower extremities and no additional information was obtained. General ROS: negative for - chills, fatigue, fever or weight loss  ENT ROS: negative for - headaches, oral lesions or sore throat  Cardiovascular ROS: no chest pain , orthopnea or pnd   Gastrointestinal ROS: no abdominal pain, change in bowel habits, or black or bloody stools  Skin - no rash   Neuro - no blurry vision , no loc .  No focal weakness   msk - no jt tenderness or swelling    Vascular - no claudication , rest completed and negative   Lymphatic - complete and negative   Hematology - oncology - complete and negative   Allergy immunology - complete and negative    no burning or hematuria       LUNG CANCER SCREENING     1. CRITERIA MET    []     CT ORDERED  [] 2. CRITERIA NOT MET   [x]      3. REFUSED                    []        REASON CRITERIA NOT MET     1. SMOKING LESS THAN 30 PY  [x]      2. AGE LESS THAN 55 or GREATER 77 YEARS  []      3. QUIT SMOKING 15 YEARS OR GREATER   []      4. RECENT CT WITH IN 11 MONTHS    []      5. LIFE EXPECTANCY < 5 YEARS   []      6.  SIGNS  AND SYMPTOMS OF LUNG CANCER   []           Immunization   Immunization History   Administered Date(s) Administered    Tdap (Boostrix, Adacel) 04/01/2016    Zoster Recombinant (Shingrix) 03/29/2018, 08/26/2019        Pneumococcal Vaccine     [] Up to date    [x] Indicated   [] Refused  [] Contraindicated       Influenza Vaccine   [x] Up to date    [] Indicated   [] Refused  [] Contraindicated       PAST MEDICAL HISTORY:         Diagnosis Date    Anemia, iron deficiency     Asthma     Carpal tunnel syndrome on right 8/29/2016    Chronic kidney disease     Edema     unspecified type    Former smoker     GERD (gastroesophageal reflux disease)     Glucose intolerance (impaired glucose tolerance)     History of anemia     Normocytic    History of corneal abrasion     Left    History of dyspnea     History of pneumonia     Hyperlipidemia     Hypertension     Idiopathic gout     Morbid obesity (HCC)     BMI of 50.0-59.9, adult    Neck strain     Obstructive sleep apnea     on CPAP    Osteoarthritis     DJD of knees    Pain     bilateral shoulders, Lt hip    Shoulder strain     History of bilateral shoulder strain    Systemic hypertension     TIA (transient ischemic attack)     Type II or unspecified type diabetes mellitus without mention of complication, not stated as uncontrolled     Vitamin D deficiency     Wears dentures     upper and lower dentures    Wears glasses        Family History:       Problem Relation Age of Onset    Asthma Mother     Stroke Sister     Diabetes Sister     Other Neg Hx         Sleep disorders or narcolepsy       SURGICAL HISTORY:   Past Surgical History:   Procedure Laterality Date    COLONOSCOPY  10/23/15    per Dr. Ingrid Montague  9/21/15    empi select    NERVE BLOCK Bilateral 2018    diamond shoulder injection, decadron 10 mg, lido 1%, isovue m-200, naropin 0.5%              Not in a hospital admission. Allergies   Allergen Reactions    Nsaids Other (See Comments)     Chronic kidney disease    Proair Respiclick [Albuterol Sulfate]      Social History     Tobacco Use   Smoking Status Former Smoker    Packs/day: 1.00    Years: 30.00    Pack years: 30.00    Types: Cigarettes    Start date: 1960    Last attempt to quit: 3/6/1990    Years since quittin.9   Smokeless Tobacco Never Used     Prior to Admission medications    Medication Sig Start Date End Date Taking?  Authorizing Provider   loratadine (CLARITIN) 10 MG tablet Take 1 tablet by mouth daily 1/15/20  Yes Marielle Lucero MD   lisinopril (PRINIVIL;ZESTRIL) 30 MG tablet TAKE 1 TABLET BY MOUTH DAILY IN THE MORNING  Cancel lisinopril 20 mg 1/15/20  Yes Marielle Lucero MD   amLODIPine (NORVASC) 10 MG tablet TAKE 1 TABLET BY MOUTH DAILY IN THE MORNING 19  Yes Yola West MD   FEROSUL 325 (65 Fe) MG tablet TAKE 1 TABLET BY MOUTH DAILY IN THE EVENING 19  Yes Yola West MD   ASPIRIN LOW DOSE 81 MG EC tablet TAKE 1 TABLET BY MOUTH DAILY IN THE EVENING 19  Yes Yola West MD   omeprazole (PRILOSEC) 40 MG delayed release capsule TAKE 1 CAPSULE BY MOUTH DAILY IN THE MORNING 19  Yes Yola West MD   allopurinol (ZYLOPRIM) 100 MG tablet TAKE 1 TABLET BY MOUTH AT BEDTIME 19  Yes Yola West MD   glimepiride (AMARYL) 2 MG tablet TAKE 1 TABLET TWICE DAILY(AM/PM) 19  Yes DAVID Redmond CNP   carvedilol (COREG) 12.5 MG tablet TAKE 1 TABLET TWICE DAILY(AM/PM) 19  Yes DAVID Redmond CNP   pravastatin (PRAVACHOL) 40 MG tablet TAKE 1 TABLET AT BEDTIME 19  Yes DAVID Redmond CNP furosemide (LASIX) 20 MG tablet TAKE 1 TABLET EVERY MORNING 11/19/19  Yes Luis Alberto Mcnair APRN - CNP   Multiple Vitamin (MULTIVITAMIN) tablet TAKE 1 TABLET EVERY MORNING 7/22/19  Yes Susie Hollins MD   Aimsco Ultra Thin Lancets MISC USE AS DIRECTED WITH INSULIN USE 6/25/19  Yes Susie Hollins MD   ONETOUCH VERIO strip TEST BLOOD SUGAR 3 TIMES DAILY AS DIRECTED 5/20/19  Yes Susie Hollins MD   Omega-3 Fatty Acids (GNP FISH OIL) 1000 MG CAPS TAKE 1 CAPSULE BY MOUTH DAILY IN THE EVENING 4/1/19  Yes Susie Hollins MD   diclofenac 1 % CREA Place onto the skin   Yes Historical Provider, MD   albuterol sulfate HFA (VENTOLIN HFA) 108 (90 Base) MCG/ACT inhaler Inhale 2 puffs into the lungs every 6 hours as needed for Wheezing 7/11/18  Yes Pepper Aguilar MD         Physical ExamNo change in physical exam  General Appearance:    Alert, cooperative, no distress, appears stated age, Morbid obesity, no distress, not using accessory muscles. Has edema of the feet    Head:    Normocephalic, without obvious abnormality, atraumatic   Ear examination is normal.    Nose with no polyps. No sinus tenderness is noted. No aspirin intolerance noted. Throat examination is unremarkable. Eye examination revealed no jaundice. No Mohit's syndrome                :    Neck:   Supple, symmetrical, trachea midline, no adenopathy;     thyroid:  no enlargement/tenderness/nodules; no carotid    bruit or JVD. Neck is short and fat    Back:     Symmetric, no curvature, ROM normal, no CVA tenderness   Lungs:       AP diameter is not increased. Percussion note is diminished because of obesity. Air entry is diminished because of obesity. No rales or rhonchi are audible. No pleural friction rub is audible.        Chest Wall:    No tenderness or deformity      Heart:    Regular rate and rhythm, S1 and S2 normal, no murmur, rub        or gallop no rvh                           Abdomen: Pulses:                                            Lymph nodes:                    Neurologic:                  Soft, non-tender, bowel sounds active all four quadrants,     no masses, no organomegaly         2+ and symmetric all extremities            Cervical, supraclavicular not enlarged or matted or tender      CNII-XII intact, normal strength 5/5 . Sensation grossly normal  and reflexes normal 2+  throughout     Clubbing No  Lower ext edema No1+   [] , 2 +  [] , 3+   []  Upper ext edema No       Musculoskeletal - no joint swelling or tenderness or synovitis               BP (!) 149/77 (Site: Left Lower Arm, Position: Sitting, Cuff Size: Medium Adult)   Pulse 86   Resp 14   Ht 5' (1.524 m)   Wt 261 lb (118.4 kg)   SpO2 98% Comment: Room air at rest  BMI 50.97 kg/m²     CXR  No recent chest x-ray      CT Scans  No recent echo    Echo  No recent echocardiograms        Assessment   Diagnosis Orders   1. Obstructive sleep apnea     2. YANA on CPAP     3. Bilateral leg edema     4. Morbid obesity with BMI of 50.0-59.9, adult ([address]     5. Primary osteoarthritis of both knees     6. Idiopathic gout, unspecified chronicity, unspecified site     7. TIA (transient ischemic attack)     8. Gastroesophageal reflux disease without esophagitis     9. Essential hypertension     10. Type 2 diabetes mellitus without complication, without long-term current use of insulin (HCC)         Plan:  YANA is responding well to use of CPAP. Will continue the same. Continue use of humidification  It will help to loose weight  Continue present Rx for HTN  Continue Rx for DM  SHE  have had Flue vaccine  Needs Pneumovax but refuses to take  Continue treatment for gout. weight reduction.   Diuretics as needed

## 2020-02-10 NOTE — TELEPHONE ENCOUNTER
Visit Date:  Future Appointments   Date Time Provider Department Center   2/27/2020  1:00 PM Shannon Kamara MD [facility]   4/6/2020 11:10 AM Tyra Thomson MD [facility] None   8/3/2020 10:45 AM Benjamin Sharpe MD Resp Spec [facility]            Patient Active Problem List:     Obstructive sleep apnea     Hypertension     Hyperlipidemia     Gout     Asthma     Morbid obesity ([facility] [address]     Vitamin D deficiency     GERD (gastroesophageal reflux disease)     TIA (transient ischemic attack)     Stage 3 chronic kidney disease (HCC)     Anemia, iron deficiency     DJD (degenerative joint disease) of knee     Primary osteoarthritis of both shoulders     Pain of both shoulder joints     Primary osteoarthritis involving multiple joints     Pain in joint, lower leg     Type 2 diabetes mellitus without complication, without long-term current use of insulin (HCC)     Adjustment disorder with mixed anxiety and depressed mood     Bilateral leg edema     Extensor tenosynovitis of right wrist     Carpal tunnel syndrome on right     Medication monitoring encounter     Primary osteoarthritis of both knees     Postural dizziness     Mild intermittent asthma without complication     Morbid obesity with BMI of 50.0-59.9, adult ([facility] [address]     Chronic use of opiate drugs therapeutic purposes     Nuclear senile cataract

## 2020-02-10 NOTE — TELEPHONE ENCOUNTER
Visit Date:  Future Appointments   Date Time Provider Department Center   2/27/2020  1:00 PM Jaya Ayon MD [facility]   4/6/2020 11:10 AM Janis Hooks MD [facility] None   8/3/2020 10:45 AM Deepak Sanchez MD Resp Spec [facility] Fang            Patient Active Problem List:     Obstructive sleep apnea     Hypertension     Hyperlipidemia     Gout     Asthma     Morbid obesity ([facility] [address]     Vitamin D deficiency     GERD (gastroesophageal reflux disease)     TIA (transient ischemic attack)     Stage 3 chronic kidney disease (HCC)     Anemia, iron deficiency     DJD (degenerative joint disease) of knee     Primary osteoarthritis of both shoulders     Pain of both shoulder joints     Primary osteoarthritis involving multiple joints     Pain in joint, lower leg     Type 2 diabetes mellitus without complication, without long-term current use of insulin (HCC)     Adjustment disorder with mixed anxiety and depressed mood     Bilateral leg edema     Extensor tenosynovitis of right wrist     Carpal tunnel syndrome on right     Medication monitoring encounter     Primary osteoarthritis of both knees     Postural dizziness     Mild intermittent asthma without complication     Morbid obesity with BMI of 50.0-59.9, adult ([facility] [address]     Chronic use of opiate drugs therapeutic purposes     Nuclear senile cataract

## 2020-02-11 RX ORDER — LORATADINE 10 MG/1
TABLET ORAL
Qty: 30 TABLET | Refills: 0 | Status: SHIPPED | OUTPATIENT
Start: 2020-02-11 | End: 2020-04-07

## 2020-02-11 RX ORDER — BLOOD SUGAR DIAGNOSTIC
STRIP MISCELLANEOUS
Qty: 100 EACH | Refills: 4 | Status: SHIPPED | OUTPATIENT
Start: 2020-02-11 | End: 2020-01-01

## 2020-02-27 ENCOUNTER — OFFICE VISIT (OUTPATIENT)
Dept: INTERNAL MEDICINE | Age: 75
End: 2020-02-27
Payer: MEDICAID

## 2020-02-27 VITALS
WEIGHT: 263 LBS | DIASTOLIC BLOOD PRESSURE: 62 MMHG | HEIGHT: 59 IN | SYSTOLIC BLOOD PRESSURE: 149 MMHG | BODY MASS INDEX: 53.02 KG/M2

## 2020-02-27 PROCEDURE — 1090F PRES/ABSN URINE INCON ASSESS: CPT | Performed by: INTERNAL MEDICINE

## 2020-02-27 PROCEDURE — 3017F COLORECTAL CA SCREEN DOC REV: CPT | Performed by: INTERNAL MEDICINE

## 2020-02-27 PROCEDURE — G8484 FLU IMMUNIZE NO ADMIN: HCPCS | Performed by: INTERNAL MEDICINE

## 2020-02-27 PROCEDURE — 1123F ACP DISCUSS/DSCN MKR DOCD: CPT | Performed by: INTERNAL MEDICINE

## 2020-02-27 PROCEDURE — G8399 PT W/DXA RESULTS DOCUMENT: HCPCS | Performed by: INTERNAL MEDICINE

## 2020-02-27 PROCEDURE — 1036F TOBACCO NON-USER: CPT | Performed by: INTERNAL MEDICINE

## 2020-02-27 PROCEDURE — G8427 DOCREV CUR MEDS BY ELIG CLIN: HCPCS | Performed by: INTERNAL MEDICINE

## 2020-02-27 PROCEDURE — G8417 CALC BMI ABV UP PARAM F/U: HCPCS | Performed by: INTERNAL MEDICINE

## 2020-02-27 PROCEDURE — 99214 OFFICE O/P EST MOD 30 MIN: CPT | Performed by: INTERNAL MEDICINE

## 2020-02-27 PROCEDURE — 99211 OFF/OP EST MAY X REQ PHY/QHP: CPT | Performed by: INTERNAL MEDICINE

## 2020-02-27 PROCEDURE — 4040F PNEUMOC VAC/ADMIN/RCVD: CPT | Performed by: INTERNAL MEDICINE

## 2020-02-27 RX ORDER — GLIMEPIRIDE 2 MG/1
TABLET ORAL
Qty: 60 TABLET | Refills: 3 | Status: CANCELLED | OUTPATIENT
Start: 2020-02-27

## 2020-02-27 RX ORDER — ACETAMINOPHEN 500 MG
500 TABLET ORAL EVERY 6 HOURS PRN
Qty: 30 TABLET | Refills: 0 | Status: SHIPPED | OUTPATIENT
Start: 2020-02-27 | End: 2020-04-23 | Stop reason: SDUPTHER

## 2020-02-27 ASSESSMENT — PATIENT HEALTH QUESTIONNAIRE - PHQ9
SUM OF ALL RESPONSES TO PHQ9 QUESTIONS 1 & 2: 0
SUM OF ALL RESPONSES TO PHQ QUESTIONS 1-9: 0
1. LITTLE INTEREST OR PLEASURE IN DOING THINGS: 0
SUM OF ALL RESPONSES TO PHQ QUESTIONS 1-9: 0
2. FEELING DOWN, DEPRESSED OR HOPELESS: 0

## 2020-02-27 ASSESSMENT — ENCOUNTER SYMPTOMS
GASTROINTESTINAL NEGATIVE: 1
RESPIRATORY NEGATIVE: 1
ALLERGIC/IMMUNOLOGIC NEGATIVE: 1
EYES NEGATIVE: 1

## 2020-02-27 NOTE — PATIENT INSTRUCTIONS
Follow-up appointment scheduled for   06/25/2020   , AVS given to patient. Medications e-scribed to pharmacy of patient's choice.     Printed script for hinged knee brace     [facility]erral to [facility] ob/gyn [phone]     tv

## 2020-02-27 NOTE — PROGRESS NOTES
[facility]   Progress Note      Chronic Disease Visit Information    BP Readings from Last 3 Encounters:   02/03/20 (!) 149/77   01/15/20 (!) 148/88   12/23/19 139/85          Hemoglobin A1C (%)   Date Value   08/26/2019 5.6   02/04/2019 6.3   07/11/2018 7.6     Microalb/Crt. Ratio (mcg/mg creat)   Date Value   09/06/2018 19     LDL Cholesterol (mg/dL)   Date Value   09/16/2019 107     HDL (mg/dL)   Date Value   09/16/2019 66     BUN (mg/dL)   Date Value   10/03/2019 16     CREATININE (mg/dL)   Date Value   10/03/2019 0.96 (H)     Glucose (mg/dL)   Date Value   10/03/2019 139 (H)   05/29/2012 138 (H)            Have you changed or started any medications since your last visit including any over-the-counter medicines, vitamins, or herbal medicines? no   Are you having any side effects from any of your medications? -  no  Have you stopped taking any of your medications? Is so, why? -  no    Have you seen any other physician or provider since your last visit? Yes - Records Obtained  Have you had any other diagnostic tests since your last visit? No  Have you been seen in the emergency room and/or had an admission to a hospital since we last saw you? No  Have you had your annual diabetic retinal (eye) exam? No  Have you had your routine dental cleaning in the past 6 months? no    Have you activated your Apprion account? If not, what are your barriers?  Yes     Patient Care Team:  Aidee Mares MD as PCP - General (Internal Medicine)  Aidee Mares MD as [facility]CP - [facility]  Rosette Fraire MD as Consulting Physician (Pulmonology)  Teto Ramirez MD as Consulting Physician (Internal Medicine)         Medical History Review  Past Medical, Family, and Social History reviewed and does not contribute to the patient presenting condition    Health Maintenance   Topic Date Due    Hepatitis C screen  1945    Hepatitis B vaccine (1 of 3 - Risk 3-dose series) 06/21/1966    Pneumococcal Specialists  [employer]                   2/27/2020, 2:31 PM

## 2020-03-10 RX ORDER — POLYETHYLENE GLYCOL 3350 17 G/17G
17 POWDER, FOR SOLUTION ORAL DAILY
Qty: 510 G | Refills: 2 | Status: SHIPPED | OUTPATIENT
Start: 2020-03-10 | End: 2020-04-09

## 2020-03-10 RX ORDER — PRAVASTATIN SODIUM 40 MG
TABLET ORAL
Qty: 30 TABLET | Refills: 2 | Status: SHIPPED | OUTPATIENT
Start: 2020-03-10 | End: 2020-01-01

## 2020-03-10 RX ORDER — CARVEDILOL 12.5 MG/1
TABLET ORAL
Qty: 60 TABLET | Refills: 2 | Status: SHIPPED | OUTPATIENT
Start: 2020-03-10 | End: 2020-01-01

## 2020-03-10 RX ORDER — GLIMEPIRIDE 2 MG/1
TABLET ORAL
Qty: 60 TABLET | Refills: 2 | Status: SHIPPED | OUTPATIENT
Start: 2020-03-10 | End: 2020-01-01

## 2020-03-10 RX ORDER — FUROSEMIDE 20 MG/1
TABLET ORAL
Qty: 30 TABLET | Refills: 2 | Status: SHIPPED | OUTPATIENT
Start: 2020-03-10 | End: 2020-01-01

## 2020-03-10 RX ORDER — OMEGA-3 FATTY ACIDS/FISH OIL 300-500 MG
CAPSULE ORAL
Qty: 90 CAPSULE | Refills: 2 | Status: SHIPPED | OUTPATIENT
Start: 2020-03-10 | End: 2020-01-01

## 2020-03-10 RX ORDER — LISINOPRIL 30 MG/1
TABLET ORAL
Qty: 30 TABLET | Refills: 0 | Status: SHIPPED | OUTPATIENT
Start: 2020-03-10 | End: 2020-01-01

## 2020-03-10 NOTE — TELEPHONE ENCOUNTER
Request for Fish oil. Next Visit Date:  Future Appointments   Date Time Provider Husam Chavezi   4/6/2020 11:10 AM Yareli Wilde MD [facility] None   4/9/2020  2:30 PM [id number] [address] OB/Gyn TOLP   6/25/2020  1:20 PM Russell Lima MD [facility] IM TOLPP   8/3/2020 10:45 AM Derek Root  [address] Maintenance   Topic Date Due    Hepatitis C screen  1945    Hepatitis B vaccine (1 of 3 - Risk 3-dose series) 06/21/1966    Pneumococcal 65+ years Vaccine (1 of 1 - PPSV23) 06/21/2012    Diabetic retinal exam  03/01/2017    Flu vaccine (1) 09/01/2019    Diabetic foot exam  08/26/2020    A1C test (Diabetic or Prediabetic)  08/26/2020    Lipid screen  09/16/2020    Potassium monitoring  10/03/2020    Creatinine monitoring  10/03/2020    Breast cancer screen  12/27/2021    Colon cancer screen colonoscopy  10/23/2025    DTaP/Tdap/Td vaccine (2 - Td) 04/01/2026    DEXA (modify frequency per FRAX score)  Completed    Shingles Vaccine  Completed    Hepatitis A vaccine  Aged Out    Hib vaccine  Aged Out    Meningococcal (ACWY) vaccine  Aged Out       Hemoglobin A1C (%)   Date Value   08/26/2019 5.6   02/04/2019 6.3   07/11/2018 7.6             ( goal A1C is < 7)   Microalb/Crt.  Ratio (mcg/mg creat)   Date Value   09/06/2018 19     LDL Cholesterol (mg/dL)   Date Value   09/16/2019 107       (goal LDL is <100)   AST (U/L)   Date Value   07/17/2018 15     ALT (U/L)   Date Value   07/17/2018 9     BUN (mg/dL)   Date Value   10/03/2019 16     BP Readings from Last 3 Encounters:   02/27/20 (!) 149/62   02/03/20 (!) 149/77   01/15/20 (!) 148/88          (goal 120/80)    All Future Testing planned in [facility]  Lab Frequency Next Occurrence   Pain Management Drug Screen Once 49/18/8463   Basic Metabolic Panel Once 78/75/5485   CBC Once 04/07/2020   Protein / creatinine ratio, urine Once 04/07/2020   [facility] DIGITAL DIAGNOSTIC W OR WO CAD LEFT Once 04/07/2020    BREAST BIOPSY W LOC DEVICE 1ST LESION LEFT Once 35/66/2591   Basic Metabolic Panel Every 12 Weeks    Basic Metabolic Panel Every 12 Weeks          Patient Active Problem List:     Obstructive sleep apnea     Hypertension     Hyperlipidemia     Gout     Asthma     Morbid obesity ([address]     Vitamin D deficiency     GERD (gastroesophageal reflux disease)     TIA (transient ischemic attack)     Stage 3 chronic kidney disease (HCC)     Anemia, iron deficiency     DJD (degenerative joint disease) of knee     Primary osteoarthritis of both shoulders     Pain of both shoulder joints     Primary osteoarthritis involving multiple joints     Pain in joint, lower leg     Type 2 diabetes mellitus without complication, without long-term current use of insulin (HCC)     Adjustment disorder with mixed anxiety and depressed mood     Bilateral leg edema     Extensor tenosynovitis of right wrist     Carpal tunnel syndrome on right     Medication monitoring encounter     Primary osteoarthritis of both knees     Postural dizziness     Mild intermittent asthma without complication     Morbid obesity with BMI of 50.0-59.9, adult ([facility] [address]     Chronic use of opiate drugs therapeutic purposes     Nuclear senile cataract

## 2020-03-10 NOTE — TELEPHONE ENCOUNTER
Request for Miralax. Next Visit Date:  Future Appointments   Date Time Provider Husam Merazisti   4/6/2020 11:10 AM Jennifer Ochoa MD [facility] None   4/9/2020  2:30 PM [id number] [address] OB/Gyn TOLP   6/25/2020  1:20 PM [place] MD [facility] IM TOLPP   8/3/2020 10:45 AM Denis Hollis  [address] Maintenance   Topic Date Due    Hepatitis C screen  1945    Hepatitis B vaccine (1 of 3 - Risk 3-dose series) 06/21/1966    Pneumococcal 65+ years Vaccine (1 of 1 - PPSV23) 06/21/2012    Diabetic retinal exam  03/01/2017    Flu vaccine (1) 09/01/2019    Diabetic foot exam  08/26/2020    A1C test (Diabetic or Prediabetic)  08/26/2020    Lipid screen  09/16/2020    Potassium monitoring  10/03/2020    Creatinine monitoring  10/03/2020    Breast cancer screen  12/27/2021    Colon cancer screen colonoscopy  10/23/2025    DTaP/Tdap/Td vaccine (2 - Td) 04/01/2026    DEXA (modify frequency per FRAX score)  Completed    Shingles Vaccine  Completed    Hepatitis A vaccine  Aged Out    Hib vaccine  Aged Out    Meningococcal (ACWY) vaccine  Aged Out       Hemoglobin A1C (%)   Date Value   08/26/2019 5.6   02/04/2019 6.3   07/11/2018 7.6             ( goal A1C is < 7)   Microalb/Crt.  Ratio (mcg/mg creat)   Date Value   09/06/2018 19     LDL Cholesterol (mg/dL)   Date Value   09/16/2019 107       (goal LDL is <100)   AST (U/L)   Date Value   07/17/2018 15     ALT (U/L)   Date Value   07/17/2018 9     BUN (mg/dL)   Date Value   10/03/2019 16     BP Readings from Last 3 Encounters:   02/27/20 (!) 149/62   02/03/20 (!) 149/77   01/15/20 (!) 148/88          (goal 120/80)    All Future Testing planned in [facility]  Lab Frequency Next Occurrence   Pain Management Drug Screen Once 53/23/5253   Basic Metabolic Panel Once 05/61/1460   CBC Once 04/07/2020   Protein / creatinine ratio, urine Once 04/07/2020   BEATRIZ DIGITAL DIAGNOSTIC W OR WO CAD LEFT Once 04/07/2020   US BREAST BIOPSY W LOC DEVICE 1ST LESION LEFT Once 48/30/9145   Basic Metabolic Panel Every 12 Weeks    Basic Metabolic Panel Every 12 Weeks          Patient Active Problem List:     Obstructive sleep apnea     Hypertension     Hyperlipidemia     Gout     Asthma     Morbid obesity ([address]     Vitamin D deficiency     GERD (gastroesophageal reflux disease)     TIA (transient ischemic attack)     Stage 3 chronic kidney disease (HCC)     Anemia, iron deficiency     DJD (degenerative joint disease) of knee     Primary osteoarthritis of both shoulders     Pain of both shoulder joints     Primary osteoarthritis involving multiple joints     Pain in joint, lower leg     Type 2 diabetes mellitus without complication, without long-term current use of insulin (HCC)     Adjustment disorder with mixed anxiety and depressed mood     Bilateral leg edema     Extensor tenosynovitis of right wrist     Carpal tunnel syndrome on right     Medication monitoring encounter     Primary osteoarthritis of both knees     Postural dizziness     Mild intermittent asthma without complication     Morbid obesity with BMI of 50.0-59.9, adult ([facility] [address]     Chronic use of opiate drugs therapeutic purposes     Nuclear senile cataract

## 2020-03-10 NOTE — TELEPHONE ENCOUNTER
Request for Lisinopril. Next Visit Date:  Future Appointments   Date Time Provider Husam Bowden   4/6/2020 11:10 AM Yareli Wilde MD [facility] None   4/9/2020  2:30 PM [id number] [address] OB/Gyn TOLP   6/25/2020  1:20 PM Russell Lima MD [facility] IM TOLPP   8/3/2020 10:45 AM Derek Root  [address] Maintenance   Topic Date Due    Hepatitis C screen  1945    Hepatitis B vaccine (1 of 3 - Risk 3-dose series) 06/21/1966    Pneumococcal 65+ years Vaccine (1 of 1 - PPSV23) 06/21/2012    Diabetic retinal exam  03/01/2017    Flu vaccine (1) 09/01/2019    Diabetic foot exam  08/26/2020    A1C test (Diabetic or Prediabetic)  08/26/2020    Lipid screen  09/16/2020    Potassium monitoring  10/03/2020    Creatinine monitoring  10/03/2020    Breast cancer screen  12/27/2021    Colon cancer screen colonoscopy  10/23/2025    DTaP/Tdap/Td vaccine (2 - Td) 04/01/2026    DEXA (modify frequency per FRAX score)  Completed    Shingles Vaccine  Completed    Hepatitis A vaccine  Aged Out    Hib vaccine  Aged Out    Meningococcal (ACWY) vaccine  Aged Out       Hemoglobin A1C (%)   Date Value   08/26/2019 5.6   02/04/2019 6.3   07/11/2018 7.6             ( goal A1C is < 7)   Microalb/Crt.  Ratio (mcg/mg creat)   Date Value   09/06/2018 19     LDL Cholesterol (mg/dL)   Date Value   09/16/2019 107       (goal LDL is <100)   AST (U/L)   Date Value   07/17/2018 15     ALT (U/L)   Date Value   07/17/2018 9     BUN (mg/dL)   Date Value   10/03/2019 16     BP Readings from Last 3 Encounters:   02/27/20 (!) 149/62   02/03/20 (!) 149/77   01/15/20 (!) 148/88          (goal 120/80)    All Future Testing planned in [facility]  Lab Frequency Next Occurrence   Pain Management Drug Screen Once 84/88/2832   Basic Metabolic Panel Once 52/35/7924   CBC Once 04/07/2020   Protein / creatinine ratio, urine Once 04/07/2020   BEATRIZ DIGITAL DIAGNOSTIC W OR WO CAD LEFT Once 04/07/2020   US

## 2020-04-07 RX ORDER — LORATADINE 10 MG/1
TABLET ORAL
Qty: 30 TABLET | Refills: 0 | Status: SHIPPED | OUTPATIENT
Start: 2020-04-07 | End: 2020-01-01

## 2020-04-21 NOTE — TELEPHONE ENCOUNTER
Request for Acetaminophen. Patient said she cant find Tylenol OTC because everywhere is sold out. Next Visit Date:  Future Appointments   Date Time Provider Husam Chavezi   6/25/2020  1:20 PM Yahaira Kline MD [facility]   8/3/2020 10:45 AM Yvan Mckoy MD Resp Spec [facility]   10/5/2020 10:50 AM Seun Dennis MD [facility] None       Health Maintenance   Topic Date Due    Hepatitis C screen  1945    Pneumococcal 65+ years Vaccine (1 of 1 - PPSV23) 06/21/2012    Diabetic retinal exam  03/01/2017    Diabetic foot exam  08/26/2020    A1C test (Diabetic or Prediabetic)  08/26/2020    Flu vaccine (Season Ended) 09/01/2020    Lipid screen  09/16/2020    Potassium monitoring  10/03/2020    Creatinine monitoring  10/03/2020    Breast cancer screen  12/27/2021    Colon cancer screen colonoscopy  10/23/2025    DTaP/Tdap/Td vaccine (2 - Td) 04/01/2026    DEXA (modify frequency per FRAX score)  Completed    Shingles Vaccine  Completed    Hepatitis A vaccine  Aged Out    Hib vaccine  Aged Out    Meningococcal (ACWY) vaccine  Aged Out       Hemoglobin A1C (%)   Date Value   08/26/2019 5.6   02/04/2019 6.3   07/11/2018 7.6             ( goal A1C is < 7)   Microalb/Crt.  Ratio (mcg/mg creat)   Date Value   09/06/2018 19     LDL Cholesterol (mg/dL)   Date Value   09/16/2019 107       (goal LDL is <100)   AST (U/L)   Date Value   07/17/2018 15     ALT (U/L)   Date Value   07/17/2018 9     BUN (mg/dL)   Date Value   10/03/2019 16     BP Readings from Last 3 Encounters:   02/27/20 (!) 149/62   02/03/20 (!) 149/77   01/15/20 (!) 148/88          (goal 120/80)    All Future Testing planned in [facility]  Lab Frequency Next Occurrence   Basic Metabolic Panel Once 41/42/3059   CBC Once 04/07/2020   Protein / creatinine ratio, urine Once 04/07/2020   BEATRIZ DIGITAL DIAGNOSTIC W OR WO CAD LEFT Once 04/07/2020   US BREAST BIOPSY W LOC DEVICE 1ST LESION LEFT Once 04/14/2020   CBC Once 10/06/2020   Protein / Creatinine Ratio, Urine Once 99/17/9733   Basic Metabolic Panel Every 12 Weeks    Basic Metabolic Panel Every 6 Months 9/18/2020, 3/5/2021         Patient Active Problem List:     Obstructive sleep apnea     Hypertension     Hyperlipidemia     Gout     Asthma     Morbid obesity ([facility] [address]     Vitamin D deficiency     GERD (gastroesophageal reflux disease)     TIA (transient ischemic attack)     Stage 3 chronic kidney disease (HCC)     Anemia, iron deficiency     DJD (degenerative joint disease) of knee     Primary osteoarthritis of both shoulders     Pain of both shoulder joints     Primary osteoarthritis involving multiple joints     Pain in joint, lower leg     Type 2 diabetes mellitus without complication, without long-term current use of insulin (HCC)     Adjustment disorder with mixed anxiety and depressed mood     Bilateral leg edema     Extensor tenosynovitis of right wrist     Carpal tunnel syndrome on right     Medication monitoring encounter     Primary osteoarthritis of both knees     Postural dizziness     Mild intermittent asthma without complication     Morbid obesity with BMI of 50.0-59.9, adult ([facility] [address]     Chronic use of opiate drugs therapeutic purposes     Nuclear senile cataract

## 2020-04-23 RX ORDER — ACETAMINOPHEN 500 MG
500 TABLET ORAL EVERY 6 HOURS PRN
Qty: 30 TABLET | Refills: 0 | Status: SHIPPED | OUTPATIENT
Start: 2020-04-23

## 2020-05-05 NOTE — TELEPHONE ENCOUNTER
Refill request for Lisinopril and Loratadine. If appropriate please send medication(s) to patients pharmacy. Next appt: 6/25/2020      Health Maintenance   Topic Date Due    Hepatitis C screen  1945    Pneumococcal 65+ years Vaccine (1 of 1 - PPSV23) 06/21/2012    Diabetic retinal exam  03/01/2017    Diabetic foot exam  08/26/2020    A1C test (Diabetic or Prediabetic)  08/26/2020    Flu vaccine (Season Ended) 09/01/2020    Lipid screen  09/16/2020    Potassium monitoring  10/03/2020    Creatinine monitoring  10/03/2020    Breast cancer screen  12/27/2021    Colon cancer screen colonoscopy  10/23/2025    DTaP/Tdap/Td vaccine (2 - Td) 04/01/2026    DEXA (modify frequency per FRAX score)  Completed    Shingles Vaccine  Completed    Hepatitis A vaccine  Aged Out    Hib vaccine  Aged Out    Meningococcal (ACWY) vaccine  Aged Out       Hemoglobin A1C (%)   Date Value   08/26/2019 5.6   02/04/2019 6.3   07/11/2018 7.6             ( goal A1C is < 7)   Microalb/Crt.  Ratio (mcg/mg creat)   Date Value   09/06/2018 19     LDL Cholesterol (mg/dL)   Date Value   09/16/2019 107       (goal LDL is <100)   AST (U/L)   Date Value   07/17/2018 15     ALT (U/L)   Date Value   07/17/2018 9     BUN (mg/dL)   Date Value   10/03/2019 16     BP Readings from Last 3 Encounters:   02/27/20 (!) 149/62   02/03/20 (!) 149/77   01/15/20 (!) 148/88          (goal 120/80)          Patient Active Problem List:     Obstructive sleep apnea     Hypertension     Hyperlipidemia     Gout     Asthma     Morbid obesity ([facility] [address]     Vitamin D deficiency     GERD (gastroesophageal reflux disease)     TIA (transient ischemic attack)     Stage 3 chronic kidney disease (HCC)     Anemia, iron deficiency     DJD (degenerative joint disease) of knee     Primary osteoarthritis of both shoulders     Pain of both shoulder joints     Primary osteoarthritis involving multiple joints     Pain in joint, lower leg     Type 2 diabetes mellitus without

## 2020-06-12 NOTE — TELEPHONE ENCOUNTER
Once 27/94/4373   Basic Metabolic Panel Every 12 Weeks    Basic Metabolic Panel Every 6 Months 9/18/2020, 3/5/2021         Patient Active Problem List:     Obstructive sleep apnea     Hypertension     Hyperlipidemia     Gout     Asthma     Morbid obesity ([facility] [address]     Vitamin D deficiency     GERD (gastroesophageal reflux disease)     TIA (transient ischemic attack)     Stage 3 chronic kidney disease (HCC)     Anemia, iron deficiency     DJD (degenerative joint disease) of knee     Primary osteoarthritis of both shoulders     Pain of both shoulder joints     Primary osteoarthritis involving multiple joints     Pain in joint, lower leg     Type 2 diabetes mellitus without complication, without long-term current use of insulin (HCC)     Adjustment disorder with mixed anxiety and depressed mood     Bilateral leg edema     Extensor tenosynovitis of right wrist     Carpal tunnel syndrome on right     Medication monitoring encounter     Primary osteoarthritis of both knees     Postural dizziness     Mild intermittent asthma without complication     Morbid obesity with BMI of 50.0-59.9, adult ([facility] [address]     Chronic use of opiate drugs therapeutic purposes     Nuclear senile cataract

## 2020-06-19 NOTE — TELEPHONE ENCOUNTER
Fax received from pharmacy - they want to continue the diclofenac transdermal cream per patient request. They are requesting the script be sent in as diclofenac sodium 1% transdermal cream, apply 1/2 to 1 pump to area of pain 2-4 times daily as needed massaging in well. Repeat application after 10 minutes. It states a verbal can be called in to the pharmacy for continuation.

## 2020-06-30 NOTE — TELEPHONE ENCOUNTER
Request for carvedilol, pravastatin, furosemide, lisinopril, glimepiride - meds pended. Please fill if appropriate. Next Visit Date:  Future Appointments   Date Time Provider Husam Bowden   8/3/2020 10:45 AM Ann-Marie Verdugo MD Resp Spec [facility]   10/5/2020 10:50 AM Adelita Glass MD [facility] None       Health Maintenance   Topic Date Due    Hepatitis C screen  1945    Pneumococcal 65+ years Vaccine (1 of 1 - PPSV23) 06/21/2012    Diabetic retinal exam  03/01/2017    Diabetic foot exam  08/26/2020    A1C test (Diabetic or Prediabetic)  08/26/2020    Flu vaccine (Season Ended) 09/01/2020    Lipid screen  09/16/2020    Potassium monitoring  10/03/2020    Creatinine monitoring  10/03/2020    Breast cancer screen  12/27/2021    Colon cancer screen colonoscopy  10/23/2025    DTaP/Tdap/Td vaccine (2 - Td) 04/01/2026    DEXA (modify frequency per FRAX score)  Completed    Shingles Vaccine  Completed    Hepatitis A vaccine  Aged Out    Hib vaccine  Aged Out    Meningococcal (ACWY) vaccine  Aged Out       Hemoglobin A1C (%)   Date Value   08/26/2019 5.6   02/04/2019 6.3   07/11/2018 7.6             ( goal A1C is < 7)   Microalb/Crt.  Ratio (mcg/mg creat)   Date Value   09/06/2018 19     LDL Cholesterol (mg/dL)   Date Value   09/16/2019 107       (goal LDL is <100)   AST (U/L)   Date Value   07/17/2018 15     ALT (U/L)   Date Value   07/17/2018 9     BUN (mg/dL)   Date Value   10/03/2019 16     BP Readings from Last 3 Encounters:   02/27/20 (!) 149/62   02/03/20 (!) 149/77   01/15/20 (!) 148/88          (goal 120/80)    All Future Testing planned in [facility]  Lab Frequency Next Occurrence   Basic Metabolic Panel Once 99/46/8710   CBC Once 04/07/2020   Protein / creatinine ratio, urine Once 04/07/2020   BEATRIZ DIGITAL DIAGNOSTIC W OR WO CAD LEFT Once 07/23/2020   CBC Once 10/06/2020   Protein / Creatinine Ratio, Urine Once 10/06/2020   Hepatitis C Antibody Once 12/30/8462   Basic Metabolic Panel Every 12 Weeks    Basic Metabolic Panel Every 6 Months 9/18/2020, 3/5/2021         Patient Active Problem List:     Obstructive sleep apnea     Hypertension     Hyperlipidemia     Gout     Asthma     Morbid obesity ([facility] [address]     Vitamin D deficiency     GERD (gastroesophageal reflux disease)     TIA (transient ischemic attack)     Stage 3 chronic kidney disease (HCC)     Anemia, iron deficiency     DJD (degenerative joint disease) of knee     Primary osteoarthritis of both shoulders     Pain of both shoulder joints     Primary osteoarthritis involving multiple joints     Pain in joint, lower leg     Type 2 diabetes mellitus without complication, without long-term current use of insulin (HCC)     Adjustment disorder with mixed anxiety and depressed mood     Bilateral leg edema     Extensor tenosynovitis of right wrist     Carpal tunnel syndrome on right     Medication monitoring encounter     Primary osteoarthritis of both knees     Postural dizziness     Mild intermittent asthma without complication     Morbid obesity with BMI of 50.0-59.9, adult ([facility] [address]     Chronic use of opiate drugs therapeutic purposes     Nuclear senile cataract

## 2020-06-30 NOTE — TELEPHONE ENCOUNTER
Request for loratadine - med pended. Please fill if approrpiate. Next Visit Date:  Future Appointments   Date Time Provider Husam Bowden   8/3/2020 10:45 AM Yonatan Haynes MD Resp Spec [facility]   10/5/2020 10:50 AM Leodan Mckenna MD [facility] None       Health Maintenance   Topic Date Due    Hepatitis C screen  1945    Pneumococcal 65+ years Vaccine (1 of 1 - PPSV23) 06/21/2012    Diabetic retinal exam  03/01/2017    Diabetic foot exam  08/26/2020    A1C test (Diabetic or Prediabetic)  08/26/2020    Flu vaccine (Season Ended) 09/01/2020    Lipid screen  09/16/2020    Potassium monitoring  10/03/2020    Creatinine monitoring  10/03/2020    Breast cancer screen  12/27/2021    Colon cancer screen colonoscopy  10/23/2025    DTaP/Tdap/Td vaccine (2 - Td) 04/01/2026    DEXA (modify frequency per FRAX score)  Completed    Shingles Vaccine  Completed    Hepatitis A vaccine  Aged Out    Hib vaccine  Aged Out    Meningococcal (ACWY) vaccine  Aged Out       Hemoglobin A1C (%)   Date Value   08/26/2019 5.6   02/04/2019 6.3   07/11/2018 7.6             ( goal A1C is < 7)   Microalb/Crt.  Ratio (mcg/mg creat)   Date Value   09/06/2018 19     LDL Cholesterol (mg/dL)   Date Value   09/16/2019 107       (goal LDL is <100)   AST (U/L)   Date Value   07/17/2018 15     ALT (U/L)   Date Value   07/17/2018 9     BUN (mg/dL)   Date Value   10/03/2019 16     BP Readings from Last 3 Encounters:   02/27/20 (!) 149/62   02/03/20 (!) 149/77   01/15/20 (!) 148/88          (goal 120/80)    All Future Testing planned in [facility]  Lab Frequency Next Occurrence   Basic Metabolic Panel Once 69/65/2241   CBC Once 04/07/2020   Protein / creatinine ratio, urine Once 04/07/2020   BEATRIZ DIGITAL DIAGNOSTIC W OR WO CAD LEFT Once 07/23/2020   CBC Once 10/06/2020   Protein / Creatinine Ratio, Urine Once 10/06/2020   Hepatitis C Antibody Once 72/29/6704   Basic Metabolic Panel Every 12 Weeks    Basic Metabolic Panel Every 6

## 2020-06-30 NOTE — TELEPHONE ENCOUNTER
Request for multi vit - med pended. Please fill if appropriate. Next Visit Date:  Future Appointments   Date Time Provider Husam Bowden   8/3/2020 10:45 AM Kayden Wilcox MD Resp Spec [facility]   10/5/2020 10:50 AM Prabha Altman MD [facility] None       Health Maintenance   Topic Date Due    Hepatitis C screen  1945    Pneumococcal 65+ years Vaccine (1 of 1 - PPSV23) 06/21/2012    Diabetic retinal exam  03/01/2017    Diabetic foot exam  08/26/2020    A1C test (Diabetic or Prediabetic)  08/26/2020    Flu vaccine (Season Ended) 09/01/2020    Lipid screen  09/16/2020    Potassium monitoring  10/03/2020    Creatinine monitoring  10/03/2020    Breast cancer screen  12/27/2021    Colon cancer screen colonoscopy  10/23/2025    DTaP/Tdap/Td vaccine (2 - Td) 04/01/2026    DEXA (modify frequency per FRAX score)  Completed    Shingles Vaccine  Completed    Hepatitis A vaccine  Aged Out    Hib vaccine  Aged Out    Meningococcal (ACWY) vaccine  Aged Out       Hemoglobin A1C (%)   Date Value   08/26/2019 5.6   02/04/2019 6.3   07/11/2018 7.6             ( goal A1C is < 7)   Microalb/Crt.  Ratio (mcg/mg creat)   Date Value   09/06/2018 19     LDL Cholesterol (mg/dL)   Date Value   09/16/2019 107       (goal LDL is <100)   AST (U/L)   Date Value   07/17/2018 15     ALT (U/L)   Date Value   07/17/2018 9     BUN (mg/dL)   Date Value   10/03/2019 16     BP Readings from Last 3 Encounters:   02/27/20 (!) 149/62   02/03/20 (!) 149/77   01/15/20 (!) 148/88          (goal 120/80)    All Future Testing planned in [facility]  Lab Frequency Next Occurrence   Basic Metabolic Panel Once 07/07/8746   CBC Once 04/07/2020   Protein / creatinine ratio, urine Once 04/07/2020   BEATRIZ DIGITAL DIAGNOSTIC W OR WO CAD LEFT Once 07/23/2020   CBC Once 10/06/2020   Protein / Creatinine Ratio, Urine Once 10/06/2020   Hepatitis C Antibody Once 67/13/4731   Basic Metabolic Panel Every 12 Weeks    Basic Metabolic Panel Every 6 Months 9/18/2020, 3/5/2021         Patient Active Problem List:     Obstructive sleep apnea     Hypertension     Hyperlipidemia     Gout     Asthma     Morbid obesity ([facility] [address]     Vitamin D deficiency     GERD (gastroesophageal reflux disease)     TIA (transient ischemic attack)     Stage 3 chronic kidney disease ([facility])     Anemia, iron deficiency     DJD (degenerative joint disease) of knee     Primary osteoarthritis of both shoulders     Pain of both shoulder joints     Primary osteoarthritis involving multiple joints     Pain in joint, lower leg     Type 2 diabetes mellitus without complication, without long-term current use of insulin ([facility])     Adjustment disorder with mixed anxiety and depressed mood     Bilateral leg edema     Extensor tenosynovitis of right wrist     Carpal tunnel syndrome on right     Medication monitoring encounter     Primary osteoarthritis of both knees     Postural dizziness     Mild intermittent asthma without complication     Morbid obesity with BMI of 50.0-59.9, adult ([facility] [address]     Chronic use of opiate drugs therapeutic purposes     Nuclear senile cataract

## 2020-07-22 NOTE — TELEPHONE ENCOUNTER
Dr Alex Pedro, patient is current and due in for an appointment on 8/3/20. No mention of this medication in your last dictation but you have prescribed in the past. Please sign for refill if ok. Thank you.

## 2020-07-30 NOTE — TELEPHONE ENCOUNTER
Refill request for Claritin and Lisinopril. If appropriate please send medication(s) to patients pharmacy. Next appt: Patient is currently on wait list for provider. Last appt: 6/19/2020    Health Maintenance   Topic Date Due    Hepatitis C screen  1945    Pneumococcal 65+ years Vaccine (1 of 1 - PPSV23) 06/21/2012    Diabetic retinal exam  03/01/2017    Diabetic foot exam  08/26/2020    A1C test (Diabetic or Prediabetic)  08/26/2020    Flu vaccine (1) 09/01/2020    Lipid screen  09/16/2020    Potassium monitoring  10/03/2020    Creatinine monitoring  10/03/2020    Breast cancer screen  12/27/2021    Colon cancer screen colonoscopy  10/23/2025    DTaP/Tdap/Td vaccine (2 - Td) 04/01/2026    DEXA (modify frequency per FRAX score)  Completed    Shingles Vaccine  Completed    Hepatitis A vaccine  Aged Out    Hib vaccine  Aged Out    Meningococcal (ACWY) vaccine  Aged Out       Hemoglobin A1C (%)   Date Value   08/26/2019 5.6   02/04/2019 6.3   07/11/2018 7.6             ( goal A1C is < 7)   Microalb/Crt.  Ratio (mcg/mg creat)   Date Value   09/06/2018 19     LDL Cholesterol (mg/dL)   Date Value   09/16/2019 107       (goal LDL is <100)   AST (U/L)   Date Value   07/17/2018 15     ALT (U/L)   Date Value   07/17/2018 9     BUN (mg/dL)   Date Value   10/03/2019 16     BP Readings from Last 3 Encounters:   02/27/20 (!) 149/62   02/03/20 (!) 149/77   01/15/20 (!) 148/88          (goal 120/80)          Patient Active Problem List:     Obstructive sleep apnea     Hypertension     Hyperlipidemia     Gout     Asthma     Morbid obesity ([facility] [address]     Vitamin D deficiency     GERD (gastroesophageal reflux disease)     TIA (transient ischemic attack)     Stage 3 chronic kidney disease (HCC)     Anemia, iron deficiency     DJD (degenerative joint disease) of knee     Primary osteoarthritis of both shoulders     Pain of both shoulder joints     Primary osteoarthritis involving multiple joints     Pain in joint, lower leg     Type 2 diabetes mellitus without complication, without long-term current use of insulin (HCC)     Adjustment disorder with mixed anxiety and depressed mood     Bilateral leg edema     Extensor tenosynovitis of right wrist     Carpal tunnel syndrome on right     Medication monitoring encounter     Primary osteoarthritis of both knees     Postural dizziness     Mild intermittent asthma without complication     Morbid obesity with BMI of 50.0-59.9, adult (HCC)     Chronic use of opiate drugs therapeutic purposes     Nuclear senile cataract

## 2020-09-21 NOTE — TELEPHONE ENCOUNTER
([facility] [address]     Vitamin D deficiency     GERD (gastroesophageal reflux disease)     TIA (transient ischemic attack)     Stage 3 chronic kidney disease (HCC)     Anemia, iron deficiency     DJD (degenerative joint disease) of knee     Primary osteoarthritis of both shoulders     Pain of both shoulder joints     Primary osteoarthritis involving multiple joints     Pain in joint, lower leg     Type 2 diabetes mellitus without complication, without long-term current use of insulin ([facility])     Adjustment disorder with mixed anxiety and depressed mood     Bilateral leg edema     Extensor tenosynovitis of right wrist     Carpal tunnel syndrome on right     Medication monitoring encounter     Primary osteoarthritis of both knees     Postural dizziness     Mild intermittent asthma without complication     Morbid obesity with BMI of 50.0-59.9, adult ([facility] [address]     Chronic use of opiate drugs therapeutic purposes     Nuclear senile cataract

## 2020-09-23 NOTE — TELEPHONE ENCOUNTER
Request for coreg, pravastatin, lasix, amaryl, lisinopril, claritin. Next Visit Date:  Future Appointments   Date Time Provider Husam Bowden   10/5/2020 10:50 AM Renny Metzger MD [facility] None   10/9/2020 10:00 AM Armen Martinez MD [facility] IM [address] Maintenance   Topic Date Due    Hepatitis C screen  1945    Pneumococcal 65+ years Vaccine (1 of 1 - PPSV23) 06/21/2012    Diabetic retinal exam  03/01/2017    Diabetic foot exam  08/26/2020    A1C test (Diabetic or Prediabetic)  08/26/2020    Flu vaccine (1) 09/01/2020    Lipid screen  09/16/2020    Potassium monitoring  10/03/2020    Creatinine monitoring  10/03/2020    Breast cancer screen  12/27/2021    Colon cancer screen colonoscopy  10/23/2025    DTaP/Tdap/Td vaccine (2 - Td) 04/01/2026    DEXA (modify frequency per FRAX score)  Completed    Shingles Vaccine  Completed    Hepatitis A vaccine  Aged Out    Hib vaccine  Aged Out    Meningococcal (ACWY) vaccine  Aged Out       Hemoglobin A1C (%)   Date Value   08/26/2019 5.6   02/04/2019 6.3   07/11/2018 7.6             ( goal A1C is < 7)   Microalb/Crt.  Ratio (mcg/mg creat)   Date Value   09/06/2018 19     LDL Cholesterol (mg/dL)   Date Value   09/16/2019 107       (goal LDL is <100)   AST (U/L)   Date Value   07/17/2018 15     ALT (U/L)   Date Value   07/17/2018 9     BUN (mg/dL)   Date Value   10/03/2019 16     BP Readings from Last 3 Encounters:   02/27/20 (!) 149/62   02/03/20 (!) 149/77   01/15/20 (!) 148/88          (goal 120/80)    All Future Testing planned in [facility]  Lab Frequency Next Occurrence   Basic Metabolic Panel Once 00/35/8260   CBC Once 04/07/2020   Protein / creatinine ratio, urine Once 04/07/2020   CBC Once 10/06/2020   Protein / Creatinine Ratio, Urine Once 10/06/2020   Hepatitis C Antibody Once 05/08/1772   Basic Metabolic Panel Every 6 Months 3/5/2021         Patient Active Problem List:     Obstructive sleep apnea     Hypertension Hyperlipidemia     Gout     Asthma     Morbid obesity ([facility] [address]     Vitamin D deficiency     GERD (gastroesophageal reflux disease)     TIA (transient ischemic attack)     Stage 3 chronic kidney disease (HCC)     Anemia, iron deficiency     DJD (degenerative joint disease) of knee     Primary osteoarthritis of both shoulders     Pain of both shoulder joints     Primary osteoarthritis involving multiple joints     Pain in joint, lower leg     Type 2 diabetes mellitus without complication, without long-term current use of insulin ([facility])     Adjustment disorder with mixed anxiety and depressed mood     Bilateral leg edema     Extensor tenosynovitis of right wrist     Carpal tunnel syndrome on right     Medication monitoring encounter     Primary osteoarthritis of both knees     Postural dizziness     Mild intermittent asthma without complication     Morbid obesity with BMI of 50.0-59.9, adult ([facility] [address]     Chronic use of opiate drugs therapeutic purposes     Nuclear senile cataract

## 2020-09-24 NOTE — TELEPHONE ENCOUNTER
PA request received for OneTouch Verio strips    PA processed and submitted to pt insurance, waiting for response in regards to medication coverage

## 2020-10-01 NOTE — LETTER
[address]  [address] [facility] [address]  Phone: [phone]  Fax: [phone]    Josiah Jackson MD        October 1, 2020    [facility]  [address] [place] [address],[address] [address]      Dear Venu Waite: We are sending this letter because your PCP ordered [facility] for you to have done at your last visit here and they have not yet been completed. If you can please come to our office on the 2nd floor to  your orders to have them compelted. If you do not have a follow-up appointment scheduled you can either contact the office to make an appointment with us or you can make one when you come in to pick-up your orders. If you have any questions or concerns, please don't hesitate to call.     Sincerely,        Josiah Jackson MD

## 2020-10-20 NOTE — TELEPHONE ENCOUNTER
The patient is asking for a script for a test strip meter    The patient is requesting an order for a mammogram

## 2020-10-23 NOTE — TELEPHONE ENCOUNTER
Script for alcohol pads left on voice mail at [employer]. Alcohol Pads #100 with refills 11. Printed script destroyed.

## 2020-10-23 NOTE — TELEPHONE ENCOUNTER
Request for loratadine, lisinopril - meds pended. Please fill if appropriate. Next Visit Date:  Future Appointments   Date Time Provider Husam Chavezi   11/12/2020  1:00 PM Jami Kuo MD [facility] IM [address] Maintenance   Topic Date Due    Hepatitis C screen  1945    Pneumococcal 65+ years Vaccine (1 of 1 - PPSV23) 06/21/2012    Diabetic retinal exam  03/01/2017    Diabetic foot exam  08/26/2020    A1C test (Diabetic or Prediabetic)  08/26/2020    Flu vaccine (1) 09/01/2020    Lipid screen  09/16/2020    Potassium monitoring  10/03/2020    Creatinine monitoring  10/03/2020    Breast cancer screen  12/27/2021    Colon cancer screen colonoscopy  10/23/2025    DTaP/Tdap/Td vaccine (2 - Td) 04/01/2026    DEXA (modify frequency per FRAX score)  Completed    Shingles Vaccine  Completed    Hepatitis A vaccine  Aged Out    Hib vaccine  Aged Out    Meningococcal (ACWY) vaccine  Aged Out       Hemoglobin A1C (%)   Date Value   08/26/2019 5.6   02/04/2019 6.3   07/11/2018 7.6             ( goal A1C is < 7)   Microalb/Crt.  Ratio (mcg/mg creat)   Date Value   09/06/2018 19     LDL Cholesterol (mg/dL)   Date Value   09/16/2019 107       (goal LDL is <100)   AST (U/L)   Date Value   07/17/2018 15     ALT (U/L)   Date Value   07/17/2018 9     BUN (mg/dL)   Date Value   10/03/2019 16     BP Readings from Last 3 Encounters:   02/27/20 (!) 149/62   02/03/20 (!) 149/77   01/15/20 (!) 148/88          (goal 120/80)    All Future Testing planned in [facility]  Lab Frequency Next Occurrence   CBC Once 10/06/2020   Protein / Creatinine Ratio, Urine Once 10/06/2020   Hepatitis C Antibody Once 01/01/2021   BEATRIZ DIGITAL DIAGNOSTIC W OR WO CAD BILATERAL Once 02/30/7052   Basic Metabolic Panel Every 6 Months 3/5/2021         Patient Active Problem List:     Obstructive sleep apnea     Hypertension     Hyperlipidemia     Gout     Asthma     Morbid obesity ([address]     Vitamin D deficiency     GERD (gastroesophageal reflux disease)     TIA (transient ischemic attack)     Stage 3 chronic kidney disease     Anemia, iron deficiency     DJD (degenerative joint disease) of knee     Primary osteoarthritis of both shoulders     Pain of both shoulder joints     Primary osteoarthritis involving multiple joints     Pain in joint, lower leg     Type 2 diabetes mellitus without complication, without long-term current use of insulin (HCC)     Adjustment disorder with mixed anxiety and depressed mood     Bilateral leg edema     Extensor tenosynovitis of right wrist     Carpal tunnel syndrome on right     Medication monitoring encounter     Primary osteoarthritis of both knees     Postural dizziness     Mild intermittent asthma without complication     Morbid obesity with BMI of 50.0-59.9, adult ([facility] [address]     Chronic use of opiate drugs therapeutic purposes     Nuclear senile cataract

## 2020-10-26 NOTE — TELEPHONE ENCOUNTER
[facility] calling stating that they need the refill now because the pt has her medication bubble pack and it has to be there by Wednesday.

## 2020-11-13 NOTE — PATIENT INSTRUCTIONS
Medications e-scribe to pharmacy of pt's choice. Scripts for lab given to pt with fasting instructions, pt will get labs done as soon as possible. Reprinted Mammogram order with phone number to call and schedule appt. Patient to return to clinic 6 months (office will call and schedule appt). AVS reviewed and given to pt. It is very important for your care that you keep your appointment. If for some reason you are unable to keep your appointment it is equally important that you call our office at [phone] to cancel your appointment and reschedule. Failure to do so may result in your termination from our practice.   MB

## 2020-11-13 NOTE — PROGRESS NOTES
Stew Cavazos is a 70 y.o. female evaluated via telephone on 11/13/2020. Consent:  She and/or health care decision maker is aware that that she may receive a bill for this telephone service, depending on her insurance coverage, and has provided verbal consent to proceed: Yes      Documentation:  No acute issues today  Reports she is taking her meds regularly. Fasting blood sugar 98. Denies any hypoglycemic events. On the basis of positive falls risk screening, assessment and plan is as follows: home safety tips provided. Uses a cane and a walker as well  Constipation  Patient complains of constipation. Onset was several months ago. Patient has been having rare firm stools per week. Defecation has been difficult. Co-Morbid conditions:endocrine disease and obesity. Symptoms have stabilized. Current over the counter/prescription laxative: bulk (psyllium) which has been effective. Diagnosis Orders   1. Type 2 diabetes mellitus without complication, without long-term current use of insulin ([facility])  Hemoglobin A1C   2. Morbid obesity ([facility] [address]  Lipid Panel   3. Stage 3 chronic kidney disease, unspecified whether stage 3a or 3b CKD  Comprehensive Metabolic Panel   4. Gout without tophus  Uric Acid    Vitamin D 25 Hydroxy   5. Iron deficiency anemia, unspecified iron deficiency anemia type  CBC Auto Differential   6. DDD (degenerative disc disease), cervical  diclofenac sodium (VOLTAREN) 1 % GEL   7. Constipation, unspecified constipation type  polyethylene glycol (GLYCOLAX) 17 GM/SCOOP powder    TSH with Reflex   8. At high risk for falls         I affirm this is a Patient Initiated Episode with a Patient who has not had a related appointment within my department in the past 7 days or scheduled within the next 24 hours.     Patient identification was verified at the start of the visit: Yes    Total Time: minutes: 5-10 minutes    Note: not billable if this call serves to triage the patient into an appointment for the relevant concern      Bubba Rodriguez

## 2020-11-18 NOTE — TELEPHONE ENCOUNTER
Request for furosemide, glimepiride, carvedilol, pravastatin, loratadine, lisinopril, omega-3's - meds pended. Please fill if appropriate. Next Visit Date:  No future appointments. Health Maintenance   Topic Date Due    Hepatitis C screen  1945    Pneumococcal 65+ years Vaccine (1 of 1 - PPSV23) 06/21/2012    Diabetic retinal exam  03/01/2017    Diabetic foot exam  08/26/2020    A1C test (Diabetic or Prediabetic)  08/26/2020    Flu vaccine (1) 09/01/2020    Lipid screen  09/16/2020    Potassium monitoring  10/03/2020    Creatinine monitoring  10/03/2020    Breast cancer screen  12/27/2021    Colon cancer screen colonoscopy  10/23/2025    DTaP/Tdap/Td vaccine (2 - Td) 04/01/2026    DEXA (modify frequency per FRAX score)  Completed    Shingles Vaccine  Completed    Hepatitis A vaccine  Aged Out    Hib vaccine  Aged Out    Meningococcal (ACWY) vaccine  Aged Out       Hemoglobin A1C (%)   Date Value   08/26/2019 5.6   02/04/2019 6.3   07/11/2018 7.6             ( goal A1C is < 7)   Microalb/Crt.  Ratio (mcg/mg creat)   Date Value   09/06/2018 19     LDL Cholesterol (mg/dL)   Date Value   09/16/2019 107       (goal LDL is <100)   AST (U/L)   Date Value   07/17/2018 15     ALT (U/L)   Date Value   07/17/2018 9     BUN (mg/dL)   Date Value   10/03/2019 16     BP Readings from Last 3 Encounters:   02/27/20 (!) 149/62   02/03/20 (!) 149/77   01/15/20 (!) 148/88          (goal 120/80)    All Future Testing planned in [facility]  Lab Frequency Next Occurrence   CBC Once 10/06/2020   Protein / Creatinine Ratio, Urine Once 10/06/2020   Hepatitis C Antibody Once 01/01/2021   BEATRIZ DIGITAL DIAGNOSTIC W OR WO CAD BILATERAL Once 02/06/2021   Hemoglobin A1C Once 02/13/2021   Lipid Panel Once 02/13/2021   Comprehensive Metabolic Panel Once 01/35/4274   Uric Acid Once 02/21/2021   Vitamin D 25 Hydroxy Once 11/13/2020   CBC Auto Differential Once 02/21/2021   TSH with Reflex Once 73/98/7918   Basic Metabolic Panel Every 6 Months 3/5/2021         Patient Active Problem List:     Obstructive sleep apnea     Hypertension     Hyperlipidemia     Gout     Asthma     Morbid obesity ([facility] [address]     Vitamin D deficiency     GERD (gastroesophageal reflux disease)     TIA (transient ischemic attack)     Stage 3 chronic kidney disease     Anemia, iron deficiency     DJD (degenerative joint disease) of knee     Primary osteoarthritis of both shoulders     Pain of both shoulder joints     Primary osteoarthritis involving multiple joints     Pain in joint, lower leg     Type 2 diabetes mellitus without complication, without long-term current use of insulin ([facility])     Adjustment disorder with mixed anxiety and depressed mood     Bilateral leg edema     Extensor tenosynovitis of right wrist     Carpal tunnel syndrome on right     Medication monitoring encounter     Primary osteoarthritis of both knees     Postural dizziness     Mild intermittent asthma without complication     Morbid obesity with BMI of 50.0-59.9, adult ([facility] [address]     Chronic use of opiate drugs therapeutic purposes     Nuclear senile cataract

## 2020-11-18 NOTE — TELEPHONE ENCOUNTER
Pt requesting medication refill, allopurinol, omeprazole, amlodipine, aspirin, ferrous sulfate  Next Visit Date:pt on wait list til 5/31/21, last seen 11/13/20  No future appointments. Health Maintenance   Topic Date Due    Hepatitis C screen  1945    Pneumococcal 65+ years Vaccine (1 of 1 - PPSV23) 06/21/2012    Diabetic retinal exam  03/01/2017    Diabetic foot exam  08/26/2020    A1C test (Diabetic or Prediabetic)  08/26/2020    Flu vaccine (1) 09/01/2020    Lipid screen  09/16/2020    Potassium monitoring  10/03/2020    Creatinine monitoring  10/03/2020    Breast cancer screen  12/27/2021    Colon cancer screen colonoscopy  10/23/2025    DTaP/Tdap/Td vaccine (2 - Td) 04/01/2026    DEXA (modify frequency per FRAX score)  Completed    Shingles Vaccine  Completed    Hepatitis A vaccine  Aged Out    Hib vaccine  Aged Out    Meningococcal (ACWY) vaccine  Aged Out       Hemoglobin A1C (%)   Date Value   08/26/2019 5.6   02/04/2019 6.3   07/11/2018 7.6             ( goal A1C is < 7)   Microalb/Crt.  Ratio (mcg/mg creat)   Date Value   09/06/2018 19     LDL Cholesterol (mg/dL)   Date Value   09/16/2019 107   04/09/2018 97       (goal LDL is <100)   AST (U/L)   Date Value   07/17/2018 15     ALT (U/L)   Date Value   07/17/2018 9     BUN (mg/dL)   Date Value   10/03/2019 16     BP Readings from Last 3 Encounters:   02/27/20 (!) 149/62   02/03/20 (!) 149/77   01/15/20 (!) 148/88          (goal 120/80)    All Future Testing planned in [facility]  Lab Frequency Next Occurrence   CBC Once 10/06/2020   Protein / Creatinine Ratio, Urine Once 10/06/2020   Hepatitis C Antibody Once 01/01/2021   BEATRIZ DIGITAL DIAGNOSTIC W OR WO CAD BILATERAL Once 02/06/2021   Hemoglobin A1C Once 02/13/2021   Lipid Panel Once 02/13/2021   Comprehensive Metabolic Panel Once 93/99/2976   Uric Acid Once 02/21/2021   Vitamin D 25 Hydroxy Once 11/13/2020   CBC Auto Differential Once 02/21/2021   TSH with Reflex Once 02/21/2021 Basic Metabolic Panel Every 6 Months 3/5/2021               Patient Active Problem List:     Obstructive sleep apnea     Hypertension     Hyperlipidemia     Gout     Asthma     Morbid obesity ([facility] [address]     Vitamin D deficiency     GERD (gastroesophageal reflux disease)     TIA (transient ischemic attack)     Stage 3 chronic kidney disease     Anemia, iron deficiency     DJD (degenerative joint disease) of knee     Primary osteoarthritis of both shoulders     Pain of both shoulder joints     Primary osteoarthritis involving multiple joints     Pain in joint, lower leg     Type 2 diabetes mellitus without complication, without long-term current use of insulin ([facility])     Adjustment disorder with mixed anxiety and depressed mood     Bilateral leg edema     Extensor tenosynovitis of right wrist     Carpal tunnel syndrome on right     Medication monitoring encounter     Primary osteoarthritis of both knees     Postural dizziness     Mild intermittent asthma without complication     Morbid obesity with BMI of 50.0-59.9, adult ([facility] [address]     Chronic use of opiate drugs therapeutic purposes     Nuclear senile cataract

## 2020-12-01 NOTE — TELEPHONE ENCOUNTER
PC to patient - Informed patient that orders have been placed.  Orders mailed to patient per her request.
Patient calling asking for mammogram order  to be reordered , due to the fact it is the right breast order not left and its tender and heavy,and swollen also please advise
ordered
no back pain, no gout, no musculoskeletal pain, no neck pain, and no weakness.

## 2020-12-18 NOTE — TELEPHONE ENCOUNTER
Refill request for pended medications. If appropriate please send medication(s) to patients pharmacy. Next appt: Patient is currently on wait list for provider. Last appt: 11/13/2020    Health Maintenance   Topic Date Due    Pneumococcal 65+ years Vaccine (1 of 1 - PPSV23) 06/21/2012    Diabetic retinal exam  03/01/2017    Diabetic foot exam  08/26/2020    Flu vaccine (1) 09/01/2020    A1C test (Diabetic or Prediabetic)  12/18/2021    Lipid screen  12/18/2021    Potassium monitoring  12/18/2021    Creatinine monitoring  12/18/2021    Breast cancer screen  12/18/2022    Colon cancer screen colonoscopy  10/23/2025    DTaP/Tdap/Td vaccine (2 - Td) 04/01/2026    DEXA (modify frequency per FRAX score)  Completed    Shingles Vaccine  Completed    Hepatitis C screen  Completed    Hepatitis A vaccine  Aged Out    Hib vaccine  Aged Out    Meningococcal (ACWY) vaccine  Aged Out       Hemoglobin A1C (%)   Date Value   12/18/2020 6.9 (H)   08/26/2019 5.6   02/04/2019 6.3             ( goal A1C is < 7)   Microalb/Crt.  Ratio (mcg/mg creat)   Date Value   09/06/2018 19     LDL Cholesterol (mg/dL)   Date Value   12/18/2020 105       (goal LDL is <100)   AST (U/L)   Date Value   12/18/2020 18     ALT (U/L)   Date Value   12/18/2020 10     BUN (mg/dL)   Date Value   12/18/2020 35 (H)     BP Readings from Last 3 Encounters:   02/27/20 (!) 149/62   02/03/20 (!) 149/77   01/15/20 (!) 148/88          (goal 120/80)          Patient Active Problem List:     Obstructive sleep apnea     Hypertension     Hyperlipidemia     Gout     Asthma     Morbid obesity ([facility] [address]     Vitamin D deficiency     GERD (gastroesophageal reflux disease)     TIA (transient ischemic attack)     Stage 3 chronic kidney disease     Anemia, iron deficiency     DJD (degenerative joint disease) of knee     Primary osteoarthritis of both shoulders     Pain of both shoulder joints     Primary osteoarthritis involving multiple joints     Pain in joint, lower leg     Type 2 diabetes mellitus without complication, without long-term current use of insulin (HCC)     Adjustment disorder with mixed anxiety and depressed mood     Bilateral leg edema     Extensor tenosynovitis of right wrist     Carpal tunnel syndrome on right     Medication monitoring encounter     Primary osteoarthritis of both knees     Postural dizziness     Mild intermittent asthma without complication     Morbid obesity with BMI of 50.0-59.9, adult (HCC)     Chronic use of opiate drugs therapeutic purposes     Nuclear senile cataract

## 2021-01-01 ENCOUNTER — HOSPITAL ENCOUNTER (INPATIENT)
Age: 76
LOS: 8 days | DRG: 871 | End: 2021-04-28
Attending: EMERGENCY MEDICINE | Admitting: INTERNAL MEDICINE
Payer: MEDICARE

## 2021-01-01 ENCOUNTER — APPOINTMENT (OUTPATIENT)
Dept: GENERAL RADIOLOGY | Age: 76
DRG: 871 | End: 2021-01-01
Payer: MEDICARE

## 2021-01-01 ENCOUNTER — VIRTUAL VISIT (OUTPATIENT)
Dept: INTERNAL MEDICINE | Age: 76
End: 2021-01-01
Payer: MEDICARE

## 2021-01-01 ENCOUNTER — APPOINTMENT (OUTPATIENT)
Dept: ULTRASOUND IMAGING | Age: 76
DRG: 871 | End: 2021-01-01
Payer: MEDICARE

## 2021-01-01 VITALS
SYSTOLIC BLOOD PRESSURE: 54 MMHG | OXYGEN SATURATION: 34 % | DIASTOLIC BLOOD PRESSURE: 30 MMHG | WEIGHT: 269.62 LBS | TEMPERATURE: 98.8 F | BODY MASS INDEX: 47.77 KG/M2 | HEIGHT: 63 IN

## 2021-01-01 DIAGNOSIS — J12.82 PNEUMONIA DUE TO COVID-19 VIRUS: Primary | ICD-10-CM

## 2021-01-01 DIAGNOSIS — N64.4 PAIN OF BREAST: ICD-10-CM

## 2021-01-01 DIAGNOSIS — E66.01 MORBID OBESITY (HCC): ICD-10-CM

## 2021-01-01 DIAGNOSIS — I10 ESSENTIAL HYPERTENSION: ICD-10-CM

## 2021-01-01 DIAGNOSIS — E11.9 TYPE 2 DIABETES MELLITUS WITHOUT COMPLICATION, WITHOUT LONG-TERM CURRENT USE OF INSULIN (HCC): ICD-10-CM

## 2021-01-01 DIAGNOSIS — J96.01 ACUTE RESPIRATORY FAILURE WITH HYPOXIA (HCC): ICD-10-CM

## 2021-01-01 DIAGNOSIS — N18.30 STAGE 3 CHRONIC KIDNEY DISEASE, UNSPECIFIED WHETHER STAGE 3A OR 3B CKD (HCC): ICD-10-CM

## 2021-01-01 DIAGNOSIS — E11.22 TYPE 2 DIABETES MELLITUS WITH STAGE 3 CHRONIC KIDNEY DISEASE, WITHOUT LONG-TERM CURRENT USE OF INSULIN, UNSPECIFIED WHETHER STAGE 3A OR 3B CKD (HCC): ICD-10-CM

## 2021-01-01 DIAGNOSIS — N18.30 CKD STAGE 3 SECONDARY TO DIABETES (HCC): ICD-10-CM

## 2021-01-01 DIAGNOSIS — N17.9 AKI (ACUTE KIDNEY INJURY) (HCC): ICD-10-CM

## 2021-01-01 DIAGNOSIS — U07.1 PNEUMONIA DUE TO COVID-19 VIRUS: Primary | ICD-10-CM

## 2021-01-01 DIAGNOSIS — J45.20 MILD INTERMITTENT ASTHMA WITHOUT COMPLICATION: ICD-10-CM

## 2021-01-01 DIAGNOSIS — E11.22 CKD STAGE 3 SECONDARY TO DIABETES (HCC): ICD-10-CM

## 2021-01-01 DIAGNOSIS — N18.30 TYPE 2 DIABETES MELLITUS WITH STAGE 3 CHRONIC KIDNEY DISEASE, WITHOUT LONG-TERM CURRENT USE OF INSULIN, UNSPECIFIED WHETHER STAGE 3A OR 3B CKD (HCC): ICD-10-CM

## 2021-01-01 DIAGNOSIS — R65.20 SEVERE SEPSIS (HCC): ICD-10-CM

## 2021-01-01 DIAGNOSIS — A41.9 SEVERE SEPSIS (HCC): ICD-10-CM

## 2021-01-01 DIAGNOSIS — N64.89 HEMATOMA OF LEFT BREAST: Primary | ICD-10-CM

## 2021-01-01 LAB
-: ABNORMAL
-: ABNORMAL
ABO/RH: NORMAL
ABSOLUTE EOS #: 0 K/UL (ref 0–0.4)
ABSOLUTE EOS #: 0 K/UL (ref 0–0.44)
ABSOLUTE EOS #: 0 K/UL (ref 0–0.44)
ABSOLUTE EOS #: <0.03 K/UL (ref 0–0.44)
ABSOLUTE IMMATURE GRANULOCYTE: 0 K/UL (ref 0–0.3)
ABSOLUTE IMMATURE GRANULOCYTE: 0.04 K/UL (ref 0–0.3)
ABSOLUTE IMMATURE GRANULOCYTE: 0.07 K/UL (ref 0–0.3)
ABSOLUTE IMMATURE GRANULOCYTE: 0.08 K/UL (ref 0–0.3)
ABSOLUTE LYMPH #: 0.46 K/UL (ref 1.1–3.7)
ABSOLUTE LYMPH #: 0.46 K/UL (ref 1.1–3.7)
ABSOLUTE LYMPH #: 0.49 K/UL (ref 1–4.8)
ABSOLUTE LYMPH #: 0.84 K/UL (ref 1.1–3.7)
ABSOLUTE MONO #: 0.23 K/UL (ref 0.2–0.8)
ABSOLUTE MONO #: 0.32 K/UL (ref 0.1–1.2)
ABSOLUTE MONO #: 0.39 K/UL (ref 0.1–1.2)
ABSOLUTE MONO #: 0.46 K/UL (ref 0.1–1.2)
ALBUMIN (CALCULATED): 3.4 G/DL (ref 3.2–5.2)
ALBUMIN PERCENT: 56 % (ref 45–65)
ALBUMIN SERPL-MCNC: 2.8 G/DL (ref 3.5–5.2)
ALBUMIN SERPL-MCNC: 2.8 G/DL (ref 3.5–5.2)
ALBUMIN SERPL-MCNC: 2.9 G/DL (ref 3.5–5.2)
ALBUMIN SERPL-MCNC: 2.9 G/DL (ref 3.5–5.2)
ALBUMIN SERPL-MCNC: 3.1 G/DL (ref 3.5–5.2)
ALBUMIN SERPL-MCNC: 3.6 G/DL (ref 3.5–5.2)
ALBUMIN/GLOBULIN RATIO: ABNORMAL (ref 1–2.5)
ALP BLD-CCNC: 102 U/L (ref 35–104)
ALP BLD-CCNC: 109 U/L (ref 35–104)
ALP BLD-CCNC: 112 U/L (ref 35–104)
ALP BLD-CCNC: 134 U/L (ref 35–104)
ALP BLD-CCNC: 139 U/L (ref 35–104)
ALP BLD-CCNC: 150 U/L (ref 35–104)
ALP BLD-CCNC: 80 U/L (ref 35–104)
ALP BLD-CCNC: 91 U/L (ref 35–104)
ALP BLD-CCNC: 99 U/L (ref 35–104)
ALPHA 1 PERCENT: 4 % (ref 3–6)
ALPHA 2 PERCENT: 11 % (ref 6–13)
ALPHA-1-GLOBULIN: 0.2 G/DL (ref 0.1–0.4)
ALPHA-2-GLOBULIN: 0.7 G/DL (ref 0.5–0.9)
ALT SERPL-CCNC: 40 U/L (ref 5–33)
ALT SERPL-CCNC: 50 U/L (ref 5–33)
ALT SERPL-CCNC: 56 U/L (ref 5–33)
ALT SERPL-CCNC: 58 U/L (ref 5–33)
ALT SERPL-CCNC: 58 U/L (ref 5–33)
ALT SERPL-CCNC: 59 U/L (ref 5–33)
ALT SERPL-CCNC: 63 U/L (ref 5–33)
ALT SERPL-CCNC: 64 U/L (ref 5–33)
ALT SERPL-CCNC: 75 U/L (ref 5–33)
AMORPHOUS: ABNORMAL
AMORPHOUS: ABNORMAL
ANION GAP SERPL CALCULATED.3IONS-SCNC: 10 MMOL/L (ref 9–17)
ANION GAP SERPL CALCULATED.3IONS-SCNC: 11 MMOL/L (ref 9–17)
ANION GAP SERPL CALCULATED.3IONS-SCNC: 12 MMOL/L (ref 9–17)
ANION GAP SERPL CALCULATED.3IONS-SCNC: 13 MMOL/L (ref 9–17)
ANION GAP SERPL CALCULATED.3IONS-SCNC: 14 MMOL/L (ref 9–17)
ANION GAP SERPL CALCULATED.3IONS-SCNC: 15 MMOL/L (ref 9–17)
ANION GAP SERPL CALCULATED.3IONS-SCNC: 18 MMOL/L (ref 9–17)
ANION GAP SERPL CALCULATED.3IONS-SCNC: 18 MMOL/L (ref 9–17)
ANTI-XA UNFRAC HEPARIN: 0.21 IU/L (ref 0.3–0.7)
ANTI-XA UNFRAC HEPARIN: 0.23 IU/L (ref 0.3–0.7)
ANTI-XA UNFRAC HEPARIN: 0.3 IU/L (ref 0.3–0.7)
ANTI-XA UNFRAC HEPARIN: 0.34 IU/L (ref 0.3–0.7)
ANTI-XA UNFRAC HEPARIN: 0.41 IU/L (ref 0.3–0.7)
ANTI-XA UNFRAC HEPARIN: 0.49 IU/L (ref 0.3–0.7)
ANTI-XA UNFRAC HEPARIN: 0.55 IU/L (ref 0.3–0.7)
ANTI-XA UNFRAC HEPARIN: 0.55 IU/L (ref 0.3–0.7)
ANTI-XA UNFRAC HEPARIN: 0.76 IU/L (ref 0.3–0.7)
ANTI-XA UNFRAC HEPARIN: 0.76 IU/L (ref 0.3–0.7)
ANTI-XA UNFRAC HEPARIN: >1.1 IU/L (ref 0.3–0.7)
ANTIBODY SCREEN: NEGATIVE
ARM BAND NUMBER: NORMAL
AST SERPL-CCNC: 105 U/L
AST SERPL-CCNC: 108 U/L
AST SERPL-CCNC: 111 U/L
AST SERPL-CCNC: 155 U/L
AST SERPL-CCNC: 204 U/L
AST SERPL-CCNC: 291 U/L
AST SERPL-CCNC: 334 U/L
AST SERPL-CCNC: 70 U/L
AST SERPL-CCNC: 91 U/L
BACTERIA: ABNORMAL
BACTERIA: ABNORMAL
BASOPHILS # BLD: 0 %
BASOPHILS # BLD: 0 % (ref 0–2)
BASOPHILS ABSOLUTE: 0 K/UL (ref 0–0.2)
BASOPHILS ABSOLUTE: <0.03 K/UL (ref 0–0.2)
BETA GLOBULIN: 1 G/DL (ref 0.5–1.1)
BETA PERCENT: 16 % (ref 11–19)
BILIRUB SERPL-MCNC: 0.31 MG/DL (ref 0.3–1.2)
BILIRUB SERPL-MCNC: 0.32 MG/DL (ref 0.3–1.2)
BILIRUB SERPL-MCNC: 0.33 MG/DL (ref 0.3–1.2)
BILIRUB SERPL-MCNC: 0.33 MG/DL (ref 0.3–1.2)
BILIRUB SERPL-MCNC: 0.34 MG/DL (ref 0.3–1.2)
BILIRUB SERPL-MCNC: 0.36 MG/DL (ref 0.3–1.2)
BILIRUB SERPL-MCNC: 0.4 MG/DL (ref 0.3–1.2)
BILIRUB SERPL-MCNC: 0.41 MG/DL (ref 0.3–1.2)
BILIRUB SERPL-MCNC: 0.43 MG/DL (ref 0.3–1.2)
BILIRUBIN DIRECT: 0.14 MG/DL
BILIRUBIN URINE: NEGATIVE
BILIRUBIN URINE: NEGATIVE
BILIRUBIN, INDIRECT: 0.29 MG/DL (ref 0–1)
BNP INTERPRETATION: NORMAL
BUN BLDV-MCNC: 55 MG/DL (ref 8–23)
BUN BLDV-MCNC: 58 MG/DL (ref 8–23)
BUN BLDV-MCNC: 64 MG/DL (ref 8–23)
BUN BLDV-MCNC: 66 MG/DL (ref 8–23)
BUN BLDV-MCNC: 69 MG/DL (ref 8–23)
BUN BLDV-MCNC: 71 MG/DL (ref 8–23)
BUN BLDV-MCNC: 74 MG/DL (ref 8–23)
BUN BLDV-MCNC: 75 MG/DL (ref 8–23)
BUN BLDV-MCNC: 75 MG/DL (ref 8–23)
BUN BLDV-MCNC: 78 MG/DL (ref 8–23)
BUN BLDV-MCNC: 81 MG/DL (ref 8–23)
BUN/CREAT BLD: 21 (ref 9–20)
BUN/CREAT BLD: 22 (ref 9–20)
BUN/CREAT BLD: 22 (ref 9–20)
BUN/CREAT BLD: 28 (ref 9–20)
BUN/CREAT BLD: 35 (ref 9–20)
BUN/CREAT BLD: 35 (ref 9–20)
BUN/CREAT BLD: 38 (ref 9–20)
BUN/CREAT BLD: 39 (ref 9–20)
BUN/CREAT BLD: 40 (ref 9–20)
BUN/CREAT BLD: 40 (ref 9–20)
BUN/CREAT BLD: 42 (ref 9–20)
C-REACTIVE PROTEIN: 131.6 MG/L (ref 0–5)
C-REACTIVE PROTEIN: 44.3 MG/L (ref 0–5)
C-REACTIVE PROTEIN: 75.1 MG/L (ref 0–5)
CALCIUM SERPL-MCNC: 8.6 MG/DL (ref 8.6–10.4)
CALCIUM SERPL-MCNC: 8.7 MG/DL (ref 8.6–10.4)
CALCIUM SERPL-MCNC: 8.8 MG/DL (ref 8.6–10.4)
CALCIUM SERPL-MCNC: 8.8 MG/DL (ref 8.6–10.4)
CALCIUM SERPL-MCNC: 9 MG/DL (ref 8.6–10.4)
CALCIUM SERPL-MCNC: 9.2 MG/DL (ref 8.6–10.4)
CALCIUM SERPL-MCNC: 9.2 MG/DL (ref 8.6–10.4)
CALCIUM SERPL-MCNC: 9.3 MG/DL (ref 8.6–10.4)
CALCIUM SERPL-MCNC: 9.3 MG/DL (ref 8.6–10.4)
CALCIUM SERPL-MCNC: 9.4 MG/DL (ref 8.6–10.4)
CALCIUM SERPL-MCNC: 9.5 MG/DL (ref 8.6–10.4)
CASTS UA: ABNORMAL /LPF
CHLORIDE BLD-SCNC: 101 MMOL/L (ref 98–107)
CHLORIDE BLD-SCNC: 102 MMOL/L (ref 98–107)
CHLORIDE BLD-SCNC: 104 MMOL/L (ref 98–107)
CHLORIDE BLD-SCNC: 106 MMOL/L (ref 98–107)
CHLORIDE BLD-SCNC: 108 MMOL/L (ref 98–107)
CHLORIDE BLD-SCNC: 110 MMOL/L (ref 98–107)
CHLORIDE BLD-SCNC: 110 MMOL/L (ref 98–107)
CHLORIDE BLD-SCNC: 113 MMOL/L (ref 98–107)
CHLORIDE BLD-SCNC: 114 MMOL/L (ref 98–107)
CO2: 15 MMOL/L (ref 20–31)
CO2: 20 MMOL/L (ref 20–31)
CO2: 21 MMOL/L (ref 20–31)
CO2: 22 MMOL/L (ref 20–31)
CO2: 23 MMOL/L (ref 20–31)
CO2: 23 MMOL/L (ref 20–31)
CO2: 25 MMOL/L (ref 20–31)
COLOR: YELLOW
COLOR: YELLOW
COMMENT UA: ABNORMAL
COMMENT UA: ABNORMAL
COMPLEMENT C3: 130 MG/DL (ref 90–180)
COMPLEMENT C4: 53 MG/DL (ref 10–40)
CREAT SERPL-MCNC: 1.32 MG/DL (ref 0.5–0.9)
CREAT SERPL-MCNC: 1.49 MG/DL (ref 0.5–0.9)
CREAT SERPL-MCNC: 1.66 MG/DL (ref 0.5–0.9)
CREAT SERPL-MCNC: 1.79 MG/DL (ref 0.5–0.9)
CREAT SERPL-MCNC: 1.81 MG/DL (ref 0.5–0.9)
CREAT SERPL-MCNC: 2.04 MG/DL (ref 0.5–0.9)
CREAT SERPL-MCNC: 2.3 MG/DL (ref 0.5–0.9)
CREAT SERPL-MCNC: 2.6 MG/DL (ref 0.5–0.9)
CREAT SERPL-MCNC: 3.29 MG/DL (ref 0.5–0.9)
CREAT SERPL-MCNC: 3.39 MG/DL (ref 0.5–0.9)
CREAT SERPL-MCNC: 3.46 MG/DL (ref 0.5–0.9)
CREATININE URINE: 75.9 MG/DL (ref 28–217)
CRYSTALS, UA: ABNORMAL /HPF
CRYSTALS, UA: ABNORMAL /HPF
CULTURE: NORMAL
CULTURE: NORMAL
D-DIMER QUANTITATIVE: 7.18 MG/L FEU (ref 0–0.59)
D-DIMER QUANTITATIVE: 9.6 MG/L FEU (ref 0–0.59)
D-DIMER QUANTITATIVE: >20 MG/L FEU (ref 0–0.59)
DIFFERENTIAL TYPE: ABNORMAL
EKG ATRIAL RATE: 86 BPM
EKG P AXIS: 59 DEGREES
EKG P-R INTERVAL: 188 MS
EKG Q-T INTERVAL: 342 MS
EKG QRS DURATION: 76 MS
EKG QTC CALCULATION (BAZETT): 409 MS
EKG R AXIS: -31 DEGREES
EKG T AXIS: 48 DEGREES
EKG VENTRICULAR RATE: 86 BPM
EOSINOPHILS RELATIVE PERCENT: 0 % (ref 1–4)
EPITHELIAL CELLS UA: ABNORMAL /HPF (ref 0–5)
EPITHELIAL CELLS UA: ABNORMAL /HPF (ref 0–5)
EXPIRATION DATE: NORMAL
FERRITIN: 2860 UG/L (ref 13–150)
FERRITIN: 3230 UG/L (ref 13–150)
FERRITIN: 5870 UG/L (ref 13–150)
FERRITIN: 6232 UG/L (ref 13–150)
FIBRINOGEN: 598 MG/DL (ref 179–518)
FIO2: 80
FIO2: 80
FREE KAPPA/LAMBDA RATIO: 1.44 (ref 0.26–1.65)
GAMMA GLOBULIN %: 12 % (ref 9–20)
GAMMA GLOBULIN: 0.7 G/DL (ref 0.5–1.5)
GFR AFRICAN AMERICAN: 16 ML/MIN
GFR AFRICAN AMERICAN: 16 ML/MIN
GFR AFRICAN AMERICAN: 17 ML/MIN
GFR AFRICAN AMERICAN: 22 ML/MIN
GFR AFRICAN AMERICAN: 25 ML/MIN
GFR AFRICAN AMERICAN: 29 ML/MIN
GFR AFRICAN AMERICAN: 33 ML/MIN
GFR AFRICAN AMERICAN: 34 ML/MIN
GFR AFRICAN AMERICAN: 37 ML/MIN
GFR AFRICAN AMERICAN: 42 ML/MIN
GFR AFRICAN AMERICAN: 48 ML/MIN
GFR NON-AFRICAN AMERICAN: 13 ML/MIN
GFR NON-AFRICAN AMERICAN: 13 ML/MIN
GFR NON-AFRICAN AMERICAN: 14 ML/MIN
GFR NON-AFRICAN AMERICAN: 18 ML/MIN
GFR NON-AFRICAN AMERICAN: 21 ML/MIN
GFR NON-AFRICAN AMERICAN: 24 ML/MIN
GFR NON-AFRICAN AMERICAN: 27 ML/MIN
GFR NON-AFRICAN AMERICAN: 28 ML/MIN
GFR NON-AFRICAN AMERICAN: 30 ML/MIN
GFR NON-AFRICAN AMERICAN: 34 ML/MIN
GFR NON-AFRICAN AMERICAN: 39 ML/MIN
GFR SERPL CREATININE-BSD FRML MDRD: ABNORMAL ML/MIN/{1.73_M2}
GLOBULIN: ABNORMAL G/DL (ref 1.5–3.8)
GLUCOSE BLD-MCNC: 112 MG/DL (ref 65–105)
GLUCOSE BLD-MCNC: 121 MG/DL (ref 70–99)
GLUCOSE BLD-MCNC: 135 MG/DL (ref 65–105)
GLUCOSE BLD-MCNC: 136 MG/DL (ref 65–105)
GLUCOSE BLD-MCNC: 149 MG/DL (ref 65–105)
GLUCOSE BLD-MCNC: 158 MG/DL (ref 65–105)
GLUCOSE BLD-MCNC: 158 MG/DL (ref 65–105)
GLUCOSE BLD-MCNC: 160 MG/DL (ref 65–105)
GLUCOSE BLD-MCNC: 166 MG/DL (ref 65–105)
GLUCOSE BLD-MCNC: 168 MG/DL (ref 65–105)
GLUCOSE BLD-MCNC: 184 MG/DL (ref 65–105)
GLUCOSE BLD-MCNC: 193 MG/DL (ref 70–99)
GLUCOSE BLD-MCNC: 203 MG/DL (ref 65–105)
GLUCOSE BLD-MCNC: 210 MG/DL (ref 65–105)
GLUCOSE BLD-MCNC: 211 MG/DL (ref 65–105)
GLUCOSE BLD-MCNC: 211 MG/DL (ref 70–99)
GLUCOSE BLD-MCNC: 211 MG/DL (ref 70–99)
GLUCOSE BLD-MCNC: 212 MG/DL (ref 65–105)
GLUCOSE BLD-MCNC: 213 MG/DL (ref 65–105)
GLUCOSE BLD-MCNC: 217 MG/DL (ref 65–105)
GLUCOSE BLD-MCNC: 234 MG/DL (ref 65–105)
GLUCOSE BLD-MCNC: 245 MG/DL (ref 65–105)
GLUCOSE BLD-MCNC: 256 MG/DL (ref 70–99)
GLUCOSE BLD-MCNC: 263 MG/DL (ref 70–99)
GLUCOSE BLD-MCNC: 267 MG/DL (ref 65–105)
GLUCOSE BLD-MCNC: 268 MG/DL (ref 65–105)
GLUCOSE BLD-MCNC: 272 MG/DL (ref 65–105)
GLUCOSE BLD-MCNC: 279 MG/DL (ref 65–105)
GLUCOSE BLD-MCNC: 285 MG/DL (ref 65–105)
GLUCOSE BLD-MCNC: 287 MG/DL (ref 70–99)
GLUCOSE BLD-MCNC: 292 MG/DL (ref 65–105)
GLUCOSE BLD-MCNC: 296 MG/DL (ref 65–105)
GLUCOSE BLD-MCNC: 296 MG/DL (ref 65–105)
GLUCOSE BLD-MCNC: 309 MG/DL (ref 65–105)
GLUCOSE BLD-MCNC: 311 MG/DL (ref 70–99)
GLUCOSE BLD-MCNC: 315 MG/DL (ref 65–105)
GLUCOSE BLD-MCNC: 322 MG/DL (ref 65–105)
GLUCOSE BLD-MCNC: 328 MG/DL (ref 65–105)
GLUCOSE BLD-MCNC: 335 MG/DL (ref 70–99)
GLUCOSE BLD-MCNC: 345 MG/DL (ref 65–105)
GLUCOSE BLD-MCNC: 349 MG/DL (ref 70–99)
GLUCOSE BLD-MCNC: 381 MG/DL (ref 65–105)
GLUCOSE BLD-MCNC: 387 MG/DL (ref 65–105)
GLUCOSE BLD-MCNC: 395 MG/DL (ref 65–105)
GLUCOSE BLD-MCNC: 81 MG/DL (ref 70–99)
GLUCOSE BLD-MCNC: 91 MG/DL (ref 65–105)
GLUCOSE URINE: NEGATIVE
GLUCOSE URINE: NEGATIVE
HAV IGM SER IA-ACNC: NONREACTIVE
HCO3 VENOUS: 20.3 MMOL/L (ref 24–30)
HCT VFR BLD CALC: 34 % (ref 36.3–47.1)
HCT VFR BLD CALC: 34.1 % (ref 36.3–47.1)
HCT VFR BLD CALC: 34.3 % (ref 36.3–47.1)
HCT VFR BLD CALC: 35.5 % (ref 36.3–47.1)
HCT VFR BLD CALC: 36.8 % (ref 36.3–47.1)
HCT VFR BLD CALC: 37.1 % (ref 36.3–47.1)
HCT VFR BLD CALC: 37.3 % (ref 36.3–47.1)
HCT VFR BLD CALC: 37.8 % (ref 36.3–47.1)
HEMOGLOBIN: 10.8 G/DL (ref 11.9–15.1)
HEMOGLOBIN: 10.9 G/DL (ref 11.9–15.1)
HEMOGLOBIN: 11.2 G/DL (ref 11.9–15.1)
HEMOGLOBIN: 11.5 G/DL (ref 11.9–15.1)
HEMOGLOBIN: 11.6 G/DL (ref 11.9–15.1)
HEMOGLOBIN: 11.8 G/DL (ref 11.9–15.1)
HEMOGLOBIN: 12.1 G/DL (ref 11.9–15.1)
HEMOGLOBIN: 12.3 G/DL (ref 11.9–15.1)
HEPATITIS B CORE IGM ANTIBODY: NONREACTIVE
HEPATITIS B SURFACE ANTIGEN: NONREACTIVE
HEPATITIS C ANTIBODY: NONREACTIVE
IMMATURE GRANULOCYTES: 0 %
IMMATURE GRANULOCYTES: 1 %
INR BLD: 1.2
KAPPA FREE LIGHT CHAINS QNT: 6.47 MG/DL (ref 0.37–1.94)
KETONES, URINE: NEGATIVE
KETONES, URINE: NEGATIVE
LACTATE DEHYDROGENASE: 700 U/L (ref 135–214)
LACTIC ACID, SEPSIS WHOLE BLOOD: NORMAL MMOL/L (ref 0.5–1.9)
LACTIC ACID, SEPSIS WHOLE BLOOD: NORMAL MMOL/L (ref 0.5–1.9)
LACTIC ACID, SEPSIS: 0.8 MMOL/L (ref 0.5–1.9)
LACTIC ACID, SEPSIS: 1.7 MMOL/L (ref 0.5–1.9)
LAMBDA FREE LIGHT CHAINS QNT: 4.49 MG/DL (ref 0.57–2.63)
LEGIONELLA PNEUMOPHILIA AG, URINE: NEGATIVE
LEUKOCYTE ESTERASE, URINE: NEGATIVE
LEUKOCYTE ESTERASE, URINE: NEGATIVE
LIPASE: 114 U/L (ref 13–60)
LV EF: 55 %
LVEF MODALITY: NORMAL
LYMPHOCYTES # BLD: 12 % (ref 24–43)
LYMPHOCYTES # BLD: 17 % (ref 24–44)
LYMPHOCYTES # BLD: 6 % (ref 24–43)
LYMPHOCYTES # BLD: 7 % (ref 24–43)
Lab: NORMAL
Lab: NORMAL
MCH RBC QN AUTO: 29.8 PG (ref 25.2–33.5)
MCH RBC QN AUTO: 29.9 PG (ref 25.2–33.5)
MCH RBC QN AUTO: 30.1 PG (ref 25.2–33.5)
MCH RBC QN AUTO: 30.1 PG (ref 25.2–33.5)
MCH RBC QN AUTO: 30.2 PG (ref 25.2–33.5)
MCH RBC QN AUTO: 30.4 PG (ref 25.2–33.5)
MCH RBC QN AUTO: 30.4 PG (ref 25.2–33.5)
MCHC RBC AUTO-ENTMCNC: 31 G/DL (ref 28.4–34.8)
MCHC RBC AUTO-ENTMCNC: 31.5 G/DL (ref 28.4–34.8)
MCHC RBC AUTO-ENTMCNC: 32 G/DL (ref 28.4–34.8)
MCHC RBC AUTO-ENTMCNC: 32.1 G/DL (ref 28.4–34.8)
MCHC RBC AUTO-ENTMCNC: 32.1 G/DL (ref 28.4–34.8)
MCHC RBC AUTO-ENTMCNC: 32.8 G/DL (ref 28.4–34.8)
MCHC RBC AUTO-ENTMCNC: 33 G/DL (ref 28.4–34.8)
MCV RBC AUTO: 92.3 FL (ref 82.6–102.9)
MCV RBC AUTO: 92.4 FL (ref 82.6–102.9)
MCV RBC AUTO: 93.9 FL (ref 82.6–102.9)
MCV RBC AUTO: 94 FL (ref 82.6–102.9)
MCV RBC AUTO: 94.2 FL (ref 82.6–102.9)
MCV RBC AUTO: 94.8 FL (ref 82.6–102.9)
MCV RBC AUTO: 96.4 FL (ref 82.6–102.9)
MONOCYTES # BLD: 5 % (ref 3–12)
MONOCYTES # BLD: 6 % (ref 3–12)
MONOCYTES # BLD: 6 % (ref 3–12)
MONOCYTES # BLD: 8 % (ref 1–7)
MUCUS: ABNORMAL
MUCUS: ABNORMAL
NEGATIVE BASE EXCESS, ART: ABNORMAL (ref 0–2)
NEGATIVE BASE EXCESS, VEN: 5 (ref 0–2)
NITRITE, URINE: NEGATIVE
NITRITE, URINE: NEGATIVE
NRBC AUTOMATED: 0 PER 100 WBC
NRBC AUTOMATED: 0.2 PER 100 WBC
O2 DEVICE/FLOW/%: ABNORMAL
O2 DEVICE/FLOW/%: ABNORMAL
O2 SAT, VEN: 65 %
OTHER OBSERVATIONS UA: ABNORMAL
OTHER OBSERVATIONS UA: ABNORMAL
PARTIAL THROMBOPLASTIN TIME: 30.6 SEC (ref 23.9–33.8)
PARTIAL THROMBOPLASTIN TIME: 33.4 SEC (ref 23.9–33.8)
PATHOLOGIST: ABNORMAL
PATHOLOGIST: NORMAL
PATIENT TEMP: 37
PATIENT TEMP: 37
PCO2, VEN: 34 MM HG (ref 39–55)
PDW BLD-RTO: 13.2 % (ref 11.8–14.4)
PDW BLD-RTO: 13.2 % (ref 11.8–14.4)
PDW BLD-RTO: 13.4 % (ref 11.8–14.4)
PDW BLD-RTO: 13.4 % (ref 11.8–14.4)
PDW BLD-RTO: 13.5 % (ref 11.8–14.4)
PDW BLD-RTO: 13.6 % (ref 11.8–14.4)
PDW BLD-RTO: 13.9 % (ref 11.8–14.4)
PH UA: 5 (ref 5–8)
PH UA: 5.5 (ref 5–8)
PH VENOUS: 7.39 (ref 7.32–7.42)
PLATELET # BLD: 121 K/UL (ref 138–453)
PLATELET # BLD: 130 K/UL (ref 138–453)
PLATELET # BLD: 153 K/UL (ref 138–453)
PLATELET # BLD: 157 K/UL (ref 138–453)
PLATELET # BLD: 162 K/UL (ref 138–453)
PLATELET # BLD: 168 K/UL (ref 138–453)
PLATELET # BLD: 181 K/UL (ref 138–453)
PLATELET ESTIMATE: ABNORMAL
PMV BLD AUTO: 11 FL (ref 8.1–13.5)
PMV BLD AUTO: 11.2 FL (ref 8.1–13.5)
PMV BLD AUTO: 11.3 FL (ref 8.1–13.5)
PMV BLD AUTO: 11.3 FL (ref 8.1–13.5)
PMV BLD AUTO: 11.6 FL (ref 8.1–13.5)
PMV BLD AUTO: 11.6 FL (ref 8.1–13.5)
PMV BLD AUTO: 12.1 FL (ref 8.1–13.5)
PO2, VEN: 34 MM HG (ref 30–50)
POC HCO3: 28.2 MMOL/L (ref 22–27)
POC O2 SATURATION: 95 %
POC PCO2 TEMP: ABNORMAL MM HG
POC PCO2 TEMP: ABNORMAL MM HG
POC PCO2: 49 MM HG (ref 32–45)
POC PH TEMP: ABNORMAL
POC PH TEMP: ABNORMAL
POC PH: 7.37 (ref 7.35–7.45)
POC PO2 TEMP: ABNORMAL MM HG
POC PO2 TEMP: ABNORMAL MM HG
POC PO2: 78 MM HG (ref 75–95)
POSITIVE BASE EXCESS, ART: 3 (ref 0–2)
POSITIVE BASE EXCESS, VEN: ABNORMAL (ref 0–2)
POTASSIUM SERPL-SCNC: 3.8 MMOL/L (ref 3.7–5.3)
POTASSIUM SERPL-SCNC: 4.2 MMOL/L (ref 3.7–5.3)
POTASSIUM SERPL-SCNC: 4.3 MMOL/L (ref 3.7–5.3)
POTASSIUM SERPL-SCNC: 4.3 MMOL/L (ref 3.7–5.3)
POTASSIUM SERPL-SCNC: 4.4 MMOL/L (ref 3.7–5.3)
POTASSIUM SERPL-SCNC: 4.5 MMOL/L (ref 3.7–5.3)
POTASSIUM SERPL-SCNC: 4.8 MMOL/L (ref 3.7–5.3)
POTASSIUM SERPL-SCNC: 4.9 MMOL/L (ref 3.7–5.3)
POTASSIUM SERPL-SCNC: 4.9 MMOL/L (ref 3.7–5.3)
PRO-BNP: 269 PG/ML
PROCALCITONIN: 0.51 NG/ML
PROCALCITONIN: 1.79 NG/ML
PROCALCITONIN: 2.3 NG/ML
PROCALCITONIN: 3.87 NG/ML
PROTEIN ELECTROPHORESIS, SERUM: ABNORMAL
PROTEIN UA: ABNORMAL
PROTEIN UA: ABNORMAL
PROTHROMBIN TIME: 15.1 SEC (ref 11.5–14.2)
RBC # BLD: 3.61 M/UL (ref 3.95–5.11)
RBC # BLD: 3.62 M/UL (ref 3.95–5.11)
RBC # BLD: 3.69 M/UL (ref 3.95–5.11)
RBC # BLD: 3.85 M/UL (ref 3.95–5.11)
RBC # BLD: 3.92 M/UL (ref 3.95–5.11)
RBC # BLD: 4.02 M/UL (ref 3.95–5.11)
RBC # BLD: 4.04 M/UL (ref 3.95–5.11)
RBC # BLD: ABNORMAL 10*6/UL
RBC UA: ABNORMAL /HPF (ref 0–2)
RBC UA: ABNORMAL /HPF (ref 0–2)
RENAL EPITHELIAL, UA: ABNORMAL /HPF
RENAL EPITHELIAL, UA: ABNORMAL /HPF
SARS-COV-2, RAPID: DETECTED
SEG NEUTROPHILS: 75 % (ref 36–66)
SEG NEUTROPHILS: 82 % (ref 36–65)
SEG NEUTROPHILS: 86 % (ref 36–65)
SEG NEUTROPHILS: 87 % (ref 36–65)
SEGMENTED NEUTROPHILS ABSOLUTE COUNT: 2.18 K/UL (ref 1.8–7.7)
SEGMENTED NEUTROPHILS ABSOLUTE COUNT: 5.58 K/UL (ref 1.5–8.1)
SEGMENTED NEUTROPHILS ABSOLUTE COUNT: 5.62 K/UL (ref 1.5–8.1)
SEGMENTED NEUTROPHILS ABSOLUTE COUNT: 6.6 K/UL (ref 1.5–8.1)
SERUM IFX INTERP: NORMAL
SODIUM BLD-SCNC: 134 MMOL/L (ref 135–144)
SODIUM BLD-SCNC: 134 MMOL/L (ref 135–144)
SODIUM BLD-SCNC: 135 MMOL/L (ref 135–144)
SODIUM BLD-SCNC: 139 MMOL/L (ref 135–144)
SODIUM BLD-SCNC: 141 MMOL/L (ref 135–144)
SODIUM BLD-SCNC: 141 MMOL/L (ref 135–144)
SODIUM BLD-SCNC: 144 MMOL/L (ref 135–144)
SODIUM BLD-SCNC: 148 MMOL/L (ref 135–144)
SODIUM BLD-SCNC: 148 MMOL/L (ref 135–144)
SODIUM BLD-SCNC: 149 MMOL/L (ref 135–144)
SODIUM BLD-SCNC: 150 MMOL/L (ref 135–144)
SODIUM,UR: 42 MMOL/L
SOURCE: NORMAL
SPECIFIC GRAVITY UA: 1.01 (ref 1–1.03)
SPECIFIC GRAVITY UA: 1.02 (ref 1–1.03)
SPECIMEN DESCRIPTION: ABNORMAL
SPECIMEN DESCRIPTION: NORMAL
SPECIMEN DESCRIPTION: NORMAL
STREP PNEUMONIAE ANTIGEN: NEGATIVE
TCO2 (CALC), ART: 30 MMOL/L (ref 23–28)
TOTAL CO2, VENOUS: 21 MMOL/L (ref 25–31)
TOTAL PROT. SUM,%: 99 % (ref 98–102)
TOTAL PROT. SUM: 6 G/DL (ref 6.3–8.2)
TOTAL PROTEIN, URINE: 41 MG/DL
TOTAL PROTEIN: 5.6 G/DL (ref 6.4–8.3)
TOTAL PROTEIN: 5.9 G/DL (ref 6.4–8.3)
TOTAL PROTEIN: 6 G/DL (ref 6.4–8.3)
TOTAL PROTEIN: 6 G/DL (ref 6.4–8.3)
TOTAL PROTEIN: 6.2 G/DL (ref 6.4–8.3)
TOTAL PROTEIN: 6.4 G/DL (ref 6.4–8.3)
TOTAL PROTEIN: 6.8 G/DL (ref 6.4–8.3)
TOTAL PROTEIN: 7.2 G/DL (ref 6.4–8.3)
TRICHOMONAS: ABNORMAL
TRICHOMONAS: ABNORMAL
TROPONIN INTERP: ABNORMAL
TROPONIN INTERP: ABNORMAL
TROPONIN T: ABNORMAL NG/ML
TROPONIN T: ABNORMAL NG/ML
TROPONIN, HIGH SENSITIVITY: 69 NG/L (ref 0–14)
TROPONIN, HIGH SENSITIVITY: 82 NG/L (ref 0–14)
TURBIDITY: ABNORMAL
TURBIDITY: CLEAR
URINE HGB: ABNORMAL
URINE HGB: ABNORMAL
UROBILINOGEN, URINE: NORMAL
UROBILINOGEN, URINE: NORMAL
VITAMIN D 25-HYDROXY: 21.8 NG/ML (ref 30–100)
WBC # BLD: 10 K/UL (ref 3.5–11.3)
WBC # BLD: 2.9 K/UL (ref 3.5–11.3)
WBC # BLD: 6.5 K/UL (ref 3.5–11.3)
WBC # BLD: 6.8 K/UL (ref 3.5–11.3)
WBC # BLD: 7.6 K/UL (ref 3.5–11.3)
WBC # BLD: 9.1 K/UL (ref 3.5–11.3)
WBC # BLD: 9.2 K/UL (ref 3.5–11.3)
WBC # BLD: ABNORMAL 10*3/UL
WBC UA: ABNORMAL /HPF (ref 0–5)
WBC UA: ABNORMAL /HPF (ref 0–5)
YEAST: ABNORMAL
YEAST: ABNORMAL

## 2021-01-01 PROCEDURE — 2500000003 HC RX 250 WO HCPCS: Performed by: INTERNAL MEDICINE

## 2021-01-01 PROCEDURE — 36415 COLL VENOUS BLD VENIPUNCTURE: CPT

## 2021-01-01 PROCEDURE — 2000000000 HC ICU R&B

## 2021-01-01 PROCEDURE — 6360000002 HC RX W HCPCS: Performed by: INTERNAL MEDICINE

## 2021-01-01 PROCEDURE — 99232 SBSQ HOSP IP/OBS MODERATE 35: CPT | Performed by: INTERNAL MEDICINE

## 2021-01-01 PROCEDURE — 6370000000 HC RX 637 (ALT 250 FOR IP): Performed by: NURSE PRACTITIONER

## 2021-01-01 PROCEDURE — 82728 ASSAY OF FERRITIN: CPT

## 2021-01-01 PROCEDURE — 2580000003 HC RX 258: Performed by: NURSE PRACTITIONER

## 2021-01-01 PROCEDURE — 83883 ASSAY NEPHELOMETRY NOT SPEC: CPT

## 2021-01-01 PROCEDURE — 85730 THROMBOPLASTIN TIME PARTIAL: CPT

## 2021-01-01 PROCEDURE — 2580000003 HC RX 258: Performed by: INTERNAL MEDICINE

## 2021-01-01 PROCEDURE — 85520 HEPARIN ASSAY: CPT

## 2021-01-01 PROCEDURE — 6360000002 HC RX W HCPCS: Performed by: NURSE PRACTITIONER

## 2021-01-01 PROCEDURE — 86900 BLOOD TYPING SEROLOGIC ABO: CPT

## 2021-01-01 PROCEDURE — 94660 CPAP INITIATION&MGMT: CPT

## 2021-01-01 PROCEDURE — C9113 INJ PANTOPRAZOLE SODIUM, VIA: HCPCS | Performed by: INTERNAL MEDICINE

## 2021-01-01 PROCEDURE — 80053 COMPREHEN METABOLIC PANEL: CPT

## 2021-01-01 PROCEDURE — 82947 ASSAY GLUCOSE BLOOD QUANT: CPT

## 2021-01-01 PROCEDURE — 86850 RBC ANTIBODY SCREEN: CPT

## 2021-01-01 PROCEDURE — 2580000003 HC RX 258: Performed by: EMERGENCY MEDICINE

## 2021-01-01 PROCEDURE — 85014 HEMATOCRIT: CPT

## 2021-01-01 PROCEDURE — 2700000000 HC OXYGEN THERAPY PER DAY

## 2021-01-01 PROCEDURE — 6370000000 HC RX 637 (ALT 250 FOR IP): Performed by: INTERNAL MEDICINE

## 2021-01-01 PROCEDURE — 94761 N-INVAS EAR/PLS OXIMETRY MLT: CPT

## 2021-01-01 PROCEDURE — 85027 COMPLETE CBC AUTOMATED: CPT

## 2021-01-01 PROCEDURE — 99233 SBSQ HOSP IP/OBS HIGH 50: CPT | Performed by: INTERNAL MEDICINE

## 2021-01-01 PROCEDURE — 71045 X-RAY EXAM CHEST 1 VIEW: CPT

## 2021-01-01 PROCEDURE — 93306 TTE W/DOPPLER COMPLETE: CPT

## 2021-01-01 PROCEDURE — 85025 COMPLETE CBC W/AUTO DIFF WBC: CPT

## 2021-01-01 PROCEDURE — 84300 ASSAY OF URINE SODIUM: CPT

## 2021-01-01 PROCEDURE — 84145 PROCALCITONIN (PCT): CPT

## 2021-01-01 PROCEDURE — 87635 SARS-COV-2 COVID-19 AMP PRB: CPT

## 2021-01-01 PROCEDURE — 87070 CULTURE OTHR SPECIMN AEROBIC: CPT

## 2021-01-01 PROCEDURE — 85384 FIBRINOGEN ACTIVITY: CPT

## 2021-01-01 PROCEDURE — 81001 URINALYSIS AUTO W/SCOPE: CPT

## 2021-01-01 PROCEDURE — 94640 AIRWAY INHALATION TREATMENT: CPT

## 2021-01-01 PROCEDURE — 96365 THER/PROPH/DIAG IV INF INIT: CPT

## 2021-01-01 PROCEDURE — 86140 C-REACTIVE PROTEIN: CPT

## 2021-01-01 PROCEDURE — 97163 PT EVAL HIGH COMPLEX 45 MIN: CPT

## 2021-01-01 PROCEDURE — 85018 HEMOGLOBIN: CPT

## 2021-01-01 PROCEDURE — 80076 HEPATIC FUNCTION PANEL: CPT

## 2021-01-01 PROCEDURE — 97530 THERAPEUTIC ACTIVITIES: CPT

## 2021-01-01 PROCEDURE — 84156 ASSAY OF PROTEIN URINE: CPT

## 2021-01-01 PROCEDURE — 86160 COMPLEMENT ANTIGEN: CPT

## 2021-01-01 PROCEDURE — 6370000000 HC RX 637 (ALT 250 FOR IP): Performed by: EMERGENCY MEDICINE

## 2021-01-01 PROCEDURE — 87449 NOS EACH ORGANISM AG IA: CPT

## 2021-01-01 PROCEDURE — 85379 FIBRIN DEGRADATION QUANT: CPT

## 2021-01-01 PROCEDURE — 99223 1ST HOSP IP/OBS HIGH 75: CPT | Performed by: INTERNAL MEDICINE

## 2021-01-01 PROCEDURE — 87899 AGENT NOS ASSAY W/OPTIC: CPT

## 2021-01-01 PROCEDURE — 83690 ASSAY OF LIPASE: CPT

## 2021-01-01 PROCEDURE — 83880 ASSAY OF NATRIURETIC PEPTIDE: CPT

## 2021-01-01 PROCEDURE — 84484 ASSAY OF TROPONIN QUANT: CPT

## 2021-01-01 PROCEDURE — 99497 ADVNCD CARE PLAN 30 MIN: CPT | Performed by: INTERNAL MEDICINE

## 2021-01-01 PROCEDURE — 99284 EMERGENCY DEPT VISIT MOD MDM: CPT

## 2021-01-01 PROCEDURE — 84155 ASSAY OF PROTEIN SERUM: CPT

## 2021-01-01 PROCEDURE — 93005 ELECTROCARDIOGRAM TRACING: CPT | Performed by: EMERGENCY MEDICINE

## 2021-01-01 PROCEDURE — 76770 US EXAM ABDO BACK WALL COMP: CPT

## 2021-01-01 PROCEDURE — 6360000002 HC RX W HCPCS: Performed by: EMERGENCY MEDICINE

## 2021-01-01 PROCEDURE — 93010 ELECTROCARDIOGRAM REPORT: CPT | Performed by: INTERNAL MEDICINE

## 2021-01-01 PROCEDURE — 97535 SELF CARE MNGMENT TRAINING: CPT

## 2021-01-01 PROCEDURE — 97116 GAIT TRAINING THERAPY: CPT

## 2021-01-01 PROCEDURE — 82306 VITAMIN D 25 HYDROXY: CPT

## 2021-01-01 PROCEDURE — 82570 ASSAY OF URINE CREATININE: CPT

## 2021-01-01 PROCEDURE — 82803 BLOOD GASES ANY COMBINATION: CPT

## 2021-01-01 PROCEDURE — 99233 SBSQ HOSP IP/OBS HIGH 50: CPT | Performed by: NURSE PRACTITIONER

## 2021-01-01 PROCEDURE — 80048 BASIC METABOLIC PNL TOTAL CA: CPT

## 2021-01-01 PROCEDURE — 36600 WITHDRAWAL OF ARTERIAL BLOOD: CPT

## 2021-01-01 PROCEDURE — 85610 PROTHROMBIN TIME: CPT

## 2021-01-01 PROCEDURE — 86334 IMMUNOFIX E-PHORESIS SERUM: CPT

## 2021-01-01 PROCEDURE — 99222 1ST HOSP IP/OBS MODERATE 55: CPT | Performed by: INTERNAL MEDICINE

## 2021-01-01 PROCEDURE — 87205 SMEAR GRAM STAIN: CPT

## 2021-01-01 PROCEDURE — 84165 PROTEIN E-PHORESIS SERUM: CPT

## 2021-01-01 PROCEDURE — 51702 INSERT TEMP BLADDER CATH: CPT

## 2021-01-01 PROCEDURE — 97167 OT EVAL HIGH COMPLEX 60 MIN: CPT

## 2021-01-01 PROCEDURE — 93970 EXTREMITY STUDY: CPT

## 2021-01-01 PROCEDURE — 99212 OFFICE O/P EST SF 10 MIN: CPT | Performed by: INTERNAL MEDICINE

## 2021-01-01 PROCEDURE — 87040 BLOOD CULTURE FOR BACTERIA: CPT

## 2021-01-01 PROCEDURE — 80074 ACUTE HEPATITIS PANEL: CPT

## 2021-01-01 PROCEDURE — 99222 1ST HOSP IP/OBS MODERATE 55: CPT | Performed by: NURSE PRACTITIONER

## 2021-01-01 PROCEDURE — 83605 ASSAY OF LACTIC ACID: CPT

## 2021-01-01 PROCEDURE — 83615 LACTATE (LD) (LDH) ENZYME: CPT

## 2021-01-01 PROCEDURE — 86901 BLOOD TYPING SEROLOGIC RH(D): CPT

## 2021-01-01 RX ORDER — FUROSEMIDE 20 MG/1
TABLET ORAL
Qty: 30 TABLET | Refills: 0 | Status: SHIPPED | OUTPATIENT
Start: 2021-01-01 | End: 2021-01-01

## 2021-01-01 RX ORDER — LORATADINE 10 MG/1
TABLET ORAL
Qty: 30 TABLET | Refills: 0 | Status: SHIPPED | OUTPATIENT
Start: 2021-01-01 | End: 2021-01-01

## 2021-01-01 RX ORDER — ACETAMINOPHEN 500 MG
1000 TABLET ORAL ONCE
Status: COMPLETED | OUTPATIENT
Start: 2021-01-01 | End: 2021-01-01

## 2021-01-01 RX ORDER — LORAZEPAM 2 MG/ML
0.5 INJECTION INTRAMUSCULAR
Status: DISCONTINUED | OUTPATIENT
Start: 2021-01-01 | End: 2021-01-01 | Stop reason: HOSPADM

## 2021-01-01 RX ORDER — FUROSEMIDE 10 MG/ML
80 INJECTION INTRAMUSCULAR; INTRAVENOUS ONCE
Status: DISCONTINUED | OUTPATIENT
Start: 2021-01-01 | End: 2021-01-01

## 2021-01-01 RX ORDER — BUDESONIDE 0.5 MG/2ML
0.5 INHALANT ORAL 2 TIMES DAILY
Status: DISCONTINUED | OUTPATIENT
Start: 2021-01-01 | End: 2021-01-01 | Stop reason: HOSPADM

## 2021-01-01 RX ORDER — NICOTINE POLACRILEX 4 MG
15 LOZENGE BUCCAL PRN
Status: DISCONTINUED | OUTPATIENT
Start: 2021-01-01 | End: 2021-01-01 | Stop reason: HOSPADM

## 2021-01-01 RX ORDER — SODIUM CHLORIDE 9 MG/ML
INJECTION, SOLUTION INTRAVENOUS CONTINUOUS
Status: DISCONTINUED | OUTPATIENT
Start: 2021-01-01 | End: 2021-01-01

## 2021-01-01 RX ORDER — VITAMIN B COMPLEX
2000 TABLET ORAL DAILY
Status: DISCONTINUED | OUTPATIENT
Start: 2021-01-01 | End: 2021-01-01 | Stop reason: HOSPADM

## 2021-01-01 RX ORDER — NICOTINE 21 MG/24HR
1 PATCH, TRANSDERMAL 24 HOURS TRANSDERMAL DAILY PRN
Status: DISCONTINUED | OUTPATIENT
Start: 2021-01-01 | End: 2021-01-01

## 2021-01-01 RX ORDER — AMLODIPINE BESYLATE 10 MG/1
10 TABLET ORAL DAILY
Status: DISCONTINUED | OUTPATIENT
Start: 2021-01-01 | End: 2021-01-01

## 2021-01-01 RX ORDER — CHLOROTHIAZIDE SODIUM 500 MG/1
500 INJECTION INTRAVENOUS ONCE
Status: COMPLETED | OUTPATIENT
Start: 2021-01-01 | End: 2021-01-01

## 2021-01-01 RX ORDER — ARFORMOTEROL TARTRATE 15 UG/2ML
15 SOLUTION RESPIRATORY (INHALATION) 2 TIMES DAILY
Status: DISCONTINUED | OUTPATIENT
Start: 2021-01-01 | End: 2021-01-01 | Stop reason: HOSPADM

## 2021-01-01 RX ORDER — MORPHINE SULFATE 2 MG/ML
2 INJECTION, SOLUTION INTRAMUSCULAR; INTRAVENOUS
Status: DISCONTINUED | OUTPATIENT
Start: 2021-01-01 | End: 2021-01-01 | Stop reason: HOSPADM

## 2021-01-01 RX ORDER — BUDESONIDE AND FORMOTEROL FUMARATE DIHYDRATE 160; 4.5 UG/1; UG/1
2 AEROSOL RESPIRATORY (INHALATION) 2 TIMES DAILY
Status: DISCONTINUED | OUTPATIENT
Start: 2021-01-01 | End: 2021-01-01

## 2021-01-01 RX ORDER — 0.9 % SODIUM CHLORIDE 0.9 %
30 INTRAVENOUS SOLUTION INTRAVENOUS ONCE
Status: COMPLETED | OUTPATIENT
Start: 2021-01-01 | End: 2021-01-01

## 2021-01-01 RX ORDER — PRAVASTATIN SODIUM 40 MG
TABLET ORAL
Qty: 30 TABLET | Refills: 0 | Status: SHIPPED | OUTPATIENT
Start: 2021-01-01 | End: 2021-01-01

## 2021-01-01 RX ORDER — SODIUM CHLORIDE 0.9 % (FLUSH) 0.9 %
10 SYRINGE (ML) INJECTION PRN
Status: DISCONTINUED | OUTPATIENT
Start: 2021-01-01 | End: 2021-01-01 | Stop reason: HOSPADM

## 2021-01-01 RX ORDER — PANTOPRAZOLE SODIUM 40 MG/10ML
40 INJECTION, POWDER, LYOPHILIZED, FOR SOLUTION INTRAVENOUS DAILY
Status: DISCONTINUED | OUTPATIENT
Start: 2021-01-01 | End: 2021-01-01 | Stop reason: HOSPADM

## 2021-01-01 RX ORDER — INSULIN GLARGINE 100 [IU]/ML
20 INJECTION, SOLUTION SUBCUTANEOUS NIGHTLY
Status: DISCONTINUED | OUTPATIENT
Start: 2021-01-01 | End: 2021-01-01

## 2021-01-01 RX ORDER — MORPHINE SULFATE 2 MG/ML
2 INJECTION, SOLUTION INTRAMUSCULAR; INTRAVENOUS
Status: COMPLETED | OUTPATIENT
Start: 2021-01-01 | End: 2021-01-01

## 2021-01-01 RX ORDER — SODIUM CHLORIDE 450 MG/100ML
INJECTION, SOLUTION INTRAVENOUS CONTINUOUS
Status: DISCONTINUED | OUTPATIENT
Start: 2021-01-01 | End: 2021-01-01

## 2021-01-01 RX ORDER — CARVEDILOL 12.5 MG/1
TABLET ORAL
Qty: 60 TABLET | Refills: 0 | Status: SHIPPED | OUTPATIENT
Start: 2021-01-01 | End: 2021-01-01

## 2021-01-01 RX ORDER — FUROSEMIDE 20 MG/1
TABLET ORAL
Qty: 30 TABLET | Refills: 0 | Status: SHIPPED | OUTPATIENT
Start: 2021-01-01

## 2021-01-01 RX ORDER — IPRATROPIUM BROMIDE AND ALBUTEROL SULFATE 2.5; .5 MG/3ML; MG/3ML
1 SOLUTION RESPIRATORY (INHALATION)
Status: DISCONTINUED | OUTPATIENT
Start: 2021-01-01 | End: 2021-01-01 | Stop reason: HOSPADM

## 2021-01-01 RX ORDER — HEPARIN SODIUM 10000 [USP'U]/100ML
5-30 INJECTION, SOLUTION INTRAVENOUS CONTINUOUS
Status: DISCONTINUED | OUTPATIENT
Start: 2021-01-01 | End: 2021-01-01 | Stop reason: HOSPADM

## 2021-01-01 RX ORDER — BUDESONIDE 0.5 MG/2ML
0.5 INHALANT ORAL 2 TIMES DAILY
Status: COMPLETED | OUTPATIENT
Start: 2021-01-01 | End: 2021-01-01

## 2021-01-01 RX ORDER — ALLOPURINOL 100 MG/1
100 TABLET ORAL NIGHTLY
Status: DISCONTINUED | OUTPATIENT
Start: 2021-01-01 | End: 2021-01-01 | Stop reason: HOSPADM

## 2021-01-01 RX ORDER — GLIMEPIRIDE 2 MG/1
TABLET ORAL
Qty: 60 TABLET | Refills: 0 | Status: SHIPPED | OUTPATIENT
Start: 2021-01-01 | End: 2021-01-01

## 2021-01-01 RX ORDER — LISINOPRIL 30 MG/1
TABLET ORAL
Qty: 30 TABLET | Refills: 0 | Status: SHIPPED | OUTPATIENT
Start: 2021-01-01 | End: 2021-01-01

## 2021-01-01 RX ORDER — SODIUM CHLORIDE 0.9 % (FLUSH) 0.9 %
5-40 SYRINGE (ML) INJECTION EVERY 12 HOURS SCHEDULED
Status: DISCONTINUED | OUTPATIENT
Start: 2021-01-01 | End: 2021-01-01 | Stop reason: SDUPTHER

## 2021-01-01 RX ORDER — METOPROLOL TARTRATE 5 MG/5ML
5 INJECTION INTRAVENOUS ONCE
Status: COMPLETED | OUTPATIENT
Start: 2021-01-01 | End: 2021-01-01

## 2021-01-01 RX ORDER — PRAVASTATIN SODIUM 40 MG
TABLET ORAL
Qty: 30 TABLET | Refills: 0 | Status: SHIPPED | OUTPATIENT
Start: 2021-01-01

## 2021-01-01 RX ORDER — DEXAMETHASONE 4 MG/1
6 TABLET ORAL DAILY
Status: DISCONTINUED | OUTPATIENT
Start: 2021-01-01 | End: 2021-01-01

## 2021-01-01 RX ORDER — BLOOD SUGAR DIAGNOSTIC
STRIP MISCELLANEOUS
Qty: 100 EACH | Refills: 0 | Status: SHIPPED | OUTPATIENT
Start: 2021-01-01

## 2021-01-01 RX ORDER — DEXTROSE MONOHYDRATE 25 G/50ML
12.5 INJECTION, SOLUTION INTRAVENOUS PRN
Status: DISCONTINUED | OUTPATIENT
Start: 2021-01-01 | End: 2021-01-01 | Stop reason: HOSPADM

## 2021-01-01 RX ORDER — HEPARIN SODIUM 1000 [USP'U]/ML
40 INJECTION, SOLUTION INTRAVENOUS; SUBCUTANEOUS PRN
Status: DISCONTINUED | OUTPATIENT
Start: 2021-01-01 | End: 2021-01-01 | Stop reason: HOSPADM

## 2021-01-01 RX ORDER — AMLODIPINE BESYLATE 5 MG/1
5 TABLET ORAL DAILY
Status: DISCONTINUED | OUTPATIENT
Start: 2021-01-01 | End: 2021-01-01 | Stop reason: HOSPADM

## 2021-01-01 RX ORDER — LISINOPRIL 30 MG/1
TABLET ORAL
Qty: 30 TABLET | Refills: 0 | Status: SHIPPED | OUTPATIENT
Start: 2021-01-01

## 2021-01-01 RX ORDER — METHYLPREDNISOLONE SODIUM SUCCINATE 125 MG/2ML
60 INJECTION, POWDER, LYOPHILIZED, FOR SOLUTION INTRAMUSCULAR; INTRAVENOUS ONCE
Status: COMPLETED | OUTPATIENT
Start: 2021-01-01 | End: 2021-01-01

## 2021-01-01 RX ORDER — GLIMEPIRIDE 4 MG/1
TABLET ORAL
Qty: 26 TABLET | Refills: 4 | Status: SHIPPED | OUTPATIENT
Start: 2021-01-01

## 2021-01-01 RX ORDER — DIGOXIN 0.25 MG/ML
500 INJECTION INTRAMUSCULAR; INTRAVENOUS ONCE
Status: COMPLETED | OUTPATIENT
Start: 2021-01-01 | End: 2021-01-01

## 2021-01-01 RX ORDER — GLIMEPIRIDE 4 MG/1
TABLET ORAL
Qty: 60 TABLET | Refills: 5 | Status: SHIPPED | OUTPATIENT
Start: 2021-01-01 | End: 2021-01-01

## 2021-01-01 RX ORDER — SODIUM CHLORIDE 9 MG/ML
25 INJECTION, SOLUTION INTRAVENOUS PRN
Status: DISCONTINUED | OUTPATIENT
Start: 2021-01-01 | End: 2021-01-01 | Stop reason: HOSPADM

## 2021-01-01 RX ORDER — MORPHINE SULFATE 4 MG/ML
4 INJECTION, SOLUTION INTRAMUSCULAR; INTRAVENOUS
Status: COMPLETED | OUTPATIENT
Start: 2021-01-01 | End: 2021-01-01

## 2021-01-01 RX ORDER — ACETYLCYSTEINE 200 MG/ML
600 SOLUTION ORAL; RESPIRATORY (INHALATION) 2 TIMES DAILY
Status: DISCONTINUED | OUTPATIENT
Start: 2021-01-01 | End: 2021-01-01 | Stop reason: HOSPADM

## 2021-01-01 RX ORDER — CARVEDILOL 12.5 MG/1
TABLET ORAL
Qty: 60 TABLET | Refills: 0 | Status: SHIPPED | OUTPATIENT
Start: 2021-01-01

## 2021-01-01 RX ORDER — PANTOPRAZOLE SODIUM 40 MG/1
40 TABLET, DELAYED RELEASE ORAL
Status: DISCONTINUED | OUTPATIENT
Start: 2021-01-01 | End: 2021-01-01

## 2021-01-01 RX ORDER — POLYETHYLENE GLYCOL 3350 17 G/17G
17 POWDER, FOR SOLUTION ORAL DAILY
Status: DISCONTINUED | OUTPATIENT
Start: 2021-01-01 | End: 2021-01-01 | Stop reason: HOSPADM

## 2021-01-01 RX ORDER — ASPIRIN 81 MG/1
81 TABLET ORAL EVERY EVENING
Status: DISCONTINUED | OUTPATIENT
Start: 2021-01-01 | End: 2021-01-01 | Stop reason: HOSPADM

## 2021-01-01 RX ORDER — HEPARIN SODIUM 1000 [USP'U]/ML
80 INJECTION, SOLUTION INTRAVENOUS; SUBCUTANEOUS ONCE
Status: COMPLETED | OUTPATIENT
Start: 2021-01-01 | End: 2021-01-01

## 2021-01-01 RX ORDER — DEXAMETHASONE SODIUM PHOSPHATE 10 MG/ML
6 INJECTION, SOLUTION INTRAMUSCULAR; INTRAVENOUS EVERY 24 HOURS
Status: DISCONTINUED | OUTPATIENT
Start: 2021-01-01 | End: 2021-01-01 | Stop reason: HOSPADM

## 2021-01-01 RX ORDER — ONDANSETRON 2 MG/ML
4 INJECTION INTRAMUSCULAR; INTRAVENOUS EVERY 6 HOURS PRN
Status: DISCONTINUED | OUTPATIENT
Start: 2021-01-01 | End: 2021-01-01 | Stop reason: HOSPADM

## 2021-01-01 RX ORDER — DEXMEDETOMIDINE HYDROCHLORIDE 4 UG/ML
.2-1.4 INJECTION, SOLUTION INTRAVENOUS CONTINUOUS
Status: DISCONTINUED | OUTPATIENT
Start: 2021-01-01 | End: 2021-01-01 | Stop reason: HOSPADM

## 2021-01-01 RX ORDER — INSULIN GLARGINE 100 [IU]/ML
40 INJECTION, SOLUTION SUBCUTANEOUS NIGHTLY
Status: DISCONTINUED | OUTPATIENT
Start: 2021-01-01 | End: 2021-01-01 | Stop reason: HOSPADM

## 2021-01-01 RX ORDER — CARVEDILOL 12.5 MG/1
12.5 TABLET ORAL 2 TIMES DAILY
Status: DISCONTINUED | OUTPATIENT
Start: 2021-01-01 | End: 2021-01-01 | Stop reason: HOSPADM

## 2021-01-01 RX ORDER — DEXAMETHASONE SODIUM PHOSPHATE 10 MG/ML
10 INJECTION, SOLUTION INTRAMUSCULAR; INTRAVENOUS ONCE
Status: COMPLETED | OUTPATIENT
Start: 2021-01-01 | End: 2021-01-01

## 2021-01-01 RX ORDER — SODIUM CHLORIDE 9 MG/ML
10 INJECTION INTRAVENOUS DAILY
Status: DISCONTINUED | OUTPATIENT
Start: 2021-01-01 | End: 2021-01-01 | Stop reason: HOSPADM

## 2021-01-01 RX ORDER — LORATADINE 10 MG/1
TABLET ORAL
Qty: 30 TABLET | Refills: 0 | Status: SHIPPED | OUTPATIENT
Start: 2021-01-01

## 2021-01-01 RX ORDER — GUAIFENESIN/DEXTROMETHORPHAN 100-10MG/5
5 SYRUP ORAL EVERY 4 HOURS PRN
Status: DISCONTINUED | OUTPATIENT
Start: 2021-01-01 | End: 2021-01-01 | Stop reason: HOSPADM

## 2021-01-01 RX ORDER — HEPARIN SODIUM 5000 [USP'U]/ML
5000 INJECTION, SOLUTION INTRAVENOUS; SUBCUTANEOUS EVERY 8 HOURS SCHEDULED
Status: DISCONTINUED | OUTPATIENT
Start: 2021-01-01 | End: 2021-01-01

## 2021-01-01 RX ORDER — PRAVASTATIN SODIUM 40 MG
40 TABLET ORAL NIGHTLY
Status: DISCONTINUED | OUTPATIENT
Start: 2021-01-01 | End: 2021-01-01 | Stop reason: HOSPADM

## 2021-01-01 RX ORDER — PROMETHAZINE HYDROCHLORIDE 12.5 MG/1
12.5 TABLET ORAL EVERY 6 HOURS PRN
Status: DISCONTINUED | OUTPATIENT
Start: 2021-01-01 | End: 2021-01-01 | Stop reason: HOSPADM

## 2021-01-01 RX ORDER — DEXTROSE MONOHYDRATE 50 MG/ML
100 INJECTION, SOLUTION INTRAVENOUS PRN
Status: DISCONTINUED | OUTPATIENT
Start: 2021-01-01 | End: 2021-01-01 | Stop reason: HOSPADM

## 2021-01-01 RX ORDER — LANOLIN ALCOHOL/MO/W.PET/CERES
325 CREAM (GRAM) TOPICAL NIGHTLY
Status: DISCONTINUED | OUTPATIENT
Start: 2021-01-01 | End: 2021-01-01

## 2021-01-01 RX ORDER — DEXTROSE MONOHYDRATE 50 MG/ML
INJECTION, SOLUTION INTRAVENOUS CONTINUOUS
Status: DISCONTINUED | OUTPATIENT
Start: 2021-01-01 | End: 2021-01-01 | Stop reason: HOSPADM

## 2021-01-01 RX ORDER — HEPARIN SODIUM 1000 [USP'U]/ML
80 INJECTION, SOLUTION INTRAVENOUS; SUBCUTANEOUS PRN
Status: DISCONTINUED | OUTPATIENT
Start: 2021-01-01 | End: 2021-01-01 | Stop reason: HOSPADM

## 2021-01-01 RX ORDER — SODIUM CHLORIDE 0.9 % (FLUSH) 0.9 %
5-40 SYRINGE (ML) INJECTION PRN
Status: DISCONTINUED | OUTPATIENT
Start: 2021-01-01 | End: 2021-01-01 | Stop reason: SDUPTHER

## 2021-01-01 RX ORDER — HALOPERIDOL 5 MG/ML
5 INJECTION INTRAMUSCULAR EVERY 4 HOURS PRN
Status: DISCONTINUED | OUTPATIENT
Start: 2021-01-01 | End: 2021-01-01 | Stop reason: HOSPADM

## 2021-01-01 RX ORDER — ALBUTEROL SULFATE 90 UG/1
2 AEROSOL, METERED RESPIRATORY (INHALATION) EVERY 6 HOURS PRN
Status: DISCONTINUED | OUTPATIENT
Start: 2021-01-01 | End: 2021-01-01 | Stop reason: HOSPADM

## 2021-01-01 RX ORDER — SODIUM CHLORIDE 0.9 % (FLUSH) 0.9 %
5-40 SYRINGE (ML) INJECTION EVERY 12 HOURS SCHEDULED
Status: DISCONTINUED | OUTPATIENT
Start: 2021-01-01 | End: 2021-01-01 | Stop reason: HOSPADM

## 2021-01-01 RX ORDER — INSULIN GLARGINE 100 [IU]/ML
30 INJECTION, SOLUTION SUBCUTANEOUS NIGHTLY
Status: DISCONTINUED | OUTPATIENT
Start: 2021-01-01 | End: 2021-01-01

## 2021-01-01 RX ORDER — ACETAMINOPHEN 650 MG/1
650 SUPPOSITORY RECTAL EVERY 6 HOURS PRN
Status: DISCONTINUED | OUTPATIENT
Start: 2021-01-01 | End: 2021-01-01 | Stop reason: HOSPADM

## 2021-01-01 RX ORDER — ACETAMINOPHEN 325 MG/1
650 TABLET ORAL EVERY 6 HOURS PRN
Status: DISCONTINUED | OUTPATIENT
Start: 2021-01-01 | End: 2021-01-01 | Stop reason: HOSPADM

## 2021-01-01 RX ADMIN — BUDESONIDE 500 MCG: 0.5 SUSPENSION RESPIRATORY (INHALATION) at 06:00

## 2021-01-01 RX ADMIN — SODIUM CHLORIDE, PRESERVATIVE FREE 10 ML: 5 INJECTION INTRAVENOUS at 20:09

## 2021-01-01 RX ADMIN — SODIUM CHLORIDE, PRESERVATIVE FREE 10 ML: 5 INJECTION INTRAVENOUS at 08:49

## 2021-01-01 RX ADMIN — ARFORMOTEROL TARTRATE 15 MCG: 15 SOLUTION RESPIRATORY (INHALATION) at 09:17

## 2021-01-01 RX ADMIN — DEXMEDETOMIDINE HYDROCHLORIDE 0.7 MCG/KG/HR: 4 INJECTION, SOLUTION INTRAVENOUS at 02:36

## 2021-01-01 RX ADMIN — SODIUM BICARBONATE: 84 INJECTION INTRAVENOUS at 12:55

## 2021-01-01 RX ADMIN — ACETAMINOPHEN 650 MG: 325 TABLET ORAL at 22:01

## 2021-01-01 RX ADMIN — SODIUM CHLORIDE, PRESERVATIVE FREE 10 ML: 5 INJECTION INTRAVENOUS at 09:51

## 2021-01-01 RX ADMIN — SODIUM CHLORIDE: 9 INJECTION, SOLUTION INTRAVENOUS at 14:48

## 2021-01-01 RX ADMIN — INSULIN LISPRO 6 UNITS: 100 INJECTION, SOLUTION INTRAVENOUS; SUBCUTANEOUS at 12:23

## 2021-01-01 RX ADMIN — ARFORMOTEROL TARTRATE 15 MCG: 15 SOLUTION RESPIRATORY (INHALATION) at 06:00

## 2021-01-01 RX ADMIN — IPRATROPIUM BROMIDE AND ALBUTEROL SULFATE 1 AMPULE: .5; 3 SOLUTION RESPIRATORY (INHALATION) at 05:26

## 2021-01-01 RX ADMIN — BUDESONIDE 500 MCG: 0.5 SUSPENSION RESPIRATORY (INHALATION) at 20:39

## 2021-01-01 RX ADMIN — DEXMEDETOMIDINE HYDROCHLORIDE 0.3 MCG/KG/HR: 4 INJECTION, SOLUTION INTRAVENOUS at 09:42

## 2021-01-01 RX ADMIN — HEPARIN SODIUM 5000 UNITS: 5000 INJECTION INTRAVENOUS; SUBCUTANEOUS at 06:07

## 2021-01-01 RX ADMIN — Medication 2000 UNITS: at 08:37

## 2021-01-01 RX ADMIN — CEFTRIAXONE 1000 MG: 1 INJECTION, POWDER, FOR SOLUTION INTRAMUSCULAR; INTRAVENOUS at 06:30

## 2021-01-01 RX ADMIN — ARFORMOTEROL TARTRATE 15 MCG: 15 SOLUTION RESPIRATORY (INHALATION) at 20:33

## 2021-01-01 RX ADMIN — IPRATROPIUM BROMIDE AND ALBUTEROL SULFATE 1 AMPULE: .5; 3 SOLUTION RESPIRATORY (INHALATION) at 06:09

## 2021-01-01 RX ADMIN — HEPARIN SODIUM 5000 UNITS: 5000 INJECTION INTRAVENOUS; SUBCUTANEOUS at 05:11

## 2021-01-01 RX ADMIN — INSULIN LISPRO 1 UNITS: 100 INJECTION, SOLUTION INTRAVENOUS; SUBCUTANEOUS at 12:21

## 2021-01-01 RX ADMIN — IPRATROPIUM BROMIDE AND ALBUTEROL SULFATE 1 AMPULE: .5; 3 SOLUTION RESPIRATORY (INHALATION) at 15:12

## 2021-01-01 RX ADMIN — HALOPERIDOL LACTATE 5 MG: 5 INJECTION, SOLUTION INTRAMUSCULAR at 02:31

## 2021-01-01 RX ADMIN — PANTOPRAZOLE SODIUM 40 MG: 40 INJECTION, POWDER, FOR SOLUTION INTRAVENOUS at 08:50

## 2021-01-01 RX ADMIN — METHYLPREDNISOLONE SODIUM SUCCINATE 60 MG: 125 INJECTION, POWDER, FOR SOLUTION INTRAMUSCULAR; INTRAVENOUS at 16:57

## 2021-01-01 RX ADMIN — ACETAMINOPHEN 650 MG: 325 TABLET ORAL at 21:06

## 2021-01-01 RX ADMIN — BUDESONIDE 500 MCG: 0.5 SUSPENSION RESPIRATORY (INHALATION) at 17:53

## 2021-01-01 RX ADMIN — IPRATROPIUM BROMIDE AND ALBUTEROL SULFATE 1 AMPULE: .5; 3 SOLUTION RESPIRATORY (INHALATION) at 20:39

## 2021-01-01 RX ADMIN — Medication 81 MG: at 16:38

## 2021-01-01 RX ADMIN — INSULIN LISPRO 6 UNITS: 100 INJECTION, SOLUTION INTRAVENOUS; SUBCUTANEOUS at 09:30

## 2021-01-01 RX ADMIN — AMLODIPINE BESYLATE 10 MG: 10 TABLET ORAL at 08:13

## 2021-01-01 RX ADMIN — SODIUM CHLORIDE, PRESERVATIVE FREE 10 ML: 5 INJECTION INTRAVENOUS at 08:19

## 2021-01-01 RX ADMIN — CEFTRIAXONE 1000 MG: 1 INJECTION, POWDER, FOR SOLUTION INTRAMUSCULAR; INTRAVENOUS at 05:02

## 2021-01-01 RX ADMIN — AZITHROMYCIN MONOHYDRATE 500 MG: 500 INJECTION, POWDER, LYOPHILIZED, FOR SOLUTION INTRAVENOUS at 06:09

## 2021-01-01 RX ADMIN — IPRATROPIUM BROMIDE AND ALBUTEROL SULFATE 1 AMPULE: .5; 3 SOLUTION RESPIRATORY (INHALATION) at 20:13

## 2021-01-01 RX ADMIN — IPRATROPIUM BROMIDE AND ALBUTEROL SULFATE 1 AMPULE: .5; 3 SOLUTION RESPIRATORY (INHALATION) at 20:33

## 2021-01-01 RX ADMIN — INSULIN LISPRO 10 UNITS: 100 INJECTION, SOLUTION INTRAVENOUS; SUBCUTANEOUS at 13:53

## 2021-01-01 RX ADMIN — DEXMEDETOMIDINE HYDROCHLORIDE 0.8 MCG/KG/HR: 4 INJECTION, SOLUTION INTRAVENOUS at 22:28

## 2021-01-01 RX ADMIN — INSULIN GLARGINE 40 UNITS: 100 INJECTION, SOLUTION SUBCUTANEOUS at 21:47

## 2021-01-01 RX ADMIN — HEPARIN SODIUM 4860 UNITS: 1000 INJECTION INTRAVENOUS; SUBCUTANEOUS at 08:51

## 2021-01-01 RX ADMIN — DEXAMETHASONE SODIUM PHOSPHATE 6 MG: 10 INJECTION, SOLUTION INTRAMUSCULAR; INTRAVENOUS at 09:35

## 2021-01-01 RX ADMIN — FERROUS SULFATE TAB EC 325 MG (65 MG FE EQUIVALENT) 325 MG: 325 (65 FE) TABLET DELAYED RESPONSE at 21:08

## 2021-01-01 RX ADMIN — CEFTRIAXONE SODIUM 1000 MG: 1 INJECTION, POWDER, FOR SOLUTION INTRAMUSCULAR; INTRAVENOUS at 05:02

## 2021-01-01 RX ADMIN — DEXAMETHASONE 6 MG: 4 TABLET ORAL at 10:12

## 2021-01-01 RX ADMIN — ACETYLCYSTEINE 600 MG: 200 SOLUTION ORAL; RESPIRATORY (INHALATION) at 20:33

## 2021-01-01 RX ADMIN — INSULIN LISPRO 4 UNITS: 100 INJECTION, SOLUTION INTRAVENOUS; SUBCUTANEOUS at 17:32

## 2021-01-01 RX ADMIN — HEPARIN SODIUM 5000 UNITS: 5000 INJECTION INTRAVENOUS; SUBCUTANEOUS at 21:09

## 2021-01-01 RX ADMIN — INSULIN GLARGINE 30 UNITS: 100 INJECTION, SOLUTION SUBCUTANEOUS at 21:05

## 2021-01-01 RX ADMIN — DEXMEDETOMIDINE HYDROCHLORIDE 0.9 MCG/KG/HR: 4 INJECTION, SOLUTION INTRAVENOUS at 05:04

## 2021-01-01 RX ADMIN — DEXAMETHASONE SODIUM PHOSPHATE 6 MG: 10 INJECTION, SOLUTION INTRAMUSCULAR; INTRAVENOUS at 08:13

## 2021-01-01 RX ADMIN — INSULIN LISPRO 8 UNITS: 100 INJECTION, SOLUTION INTRAVENOUS; SUBCUTANEOUS at 16:51

## 2021-01-01 RX ADMIN — SODIUM CHLORIDE, PRESERVATIVE FREE 5 ML: 5 INJECTION INTRAVENOUS at 09:31

## 2021-01-01 RX ADMIN — PANTOPRAZOLE SODIUM 40 MG: 40 INJECTION, POWDER, FOR SOLUTION INTRAVENOUS at 09:41

## 2021-01-01 RX ADMIN — IPRATROPIUM BROMIDE AND ALBUTEROL SULFATE 1 AMPULE: .5; 3 SOLUTION RESPIRATORY (INHALATION) at 07:42

## 2021-01-01 RX ADMIN — IPRATROPIUM BROMIDE AND ALBUTEROL SULFATE 1 AMPULE: .5; 3 SOLUTION RESPIRATORY (INHALATION) at 14:10

## 2021-01-01 RX ADMIN — SODIUM CHLORIDE, PRESERVATIVE FREE 10 ML: 5 INJECTION INTRAVENOUS at 08:37

## 2021-01-01 RX ADMIN — DEXMEDETOMIDINE HYDROCHLORIDE 1.3 MCG/KG/HR: 4 INJECTION, SOLUTION INTRAVENOUS at 01:40

## 2021-01-01 RX ADMIN — CARVEDILOL 12.5 MG: 12.5 TABLET, FILM COATED ORAL at 12:22

## 2021-01-01 RX ADMIN — MORPHINE SULFATE 2 MG: 2 INJECTION, SOLUTION INTRAMUSCULAR; INTRAVENOUS at 22:58

## 2021-01-01 RX ADMIN — SODIUM CHLORIDE, PRESERVATIVE FREE 10 ML: 5 INJECTION INTRAVENOUS at 08:36

## 2021-01-01 RX ADMIN — SODIUM BICARBONATE: 84 INJECTION INTRAVENOUS at 23:27

## 2021-01-01 RX ADMIN — DEXMEDETOMIDINE HYDROCHLORIDE 1.4 MCG/KG/HR: 4 INJECTION, SOLUTION INTRAVENOUS at 09:56

## 2021-01-01 RX ADMIN — ALLOPURINOL 100 MG: 100 TABLET ORAL at 20:05

## 2021-01-01 RX ADMIN — IPRATROPIUM BROMIDE AND ALBUTEROL SULFATE 1 AMPULE: .5; 3 SOLUTION RESPIRATORY (INHALATION) at 17:53

## 2021-01-01 RX ADMIN — DEXAMETHASONE SODIUM PHOSPHATE 6 MG: 10 INJECTION, SOLUTION INTRAMUSCULAR; INTRAVENOUS at 08:38

## 2021-01-01 RX ADMIN — ARFORMOTEROL TARTRATE 15 MCG: 15 SOLUTION RESPIRATORY (INHALATION) at 18:13

## 2021-01-01 RX ADMIN — Medication 2000 UNITS: at 08:13

## 2021-01-01 RX ADMIN — DEXMEDETOMIDINE HYDROCHLORIDE 0.4 MCG/KG/HR: 4 INJECTION, SOLUTION INTRAVENOUS at 20:00

## 2021-01-01 RX ADMIN — LORAZEPAM 0.5 MG: 2 INJECTION, SOLUTION INTRAMUSCULAR; INTRAVENOUS at 07:44

## 2021-01-01 RX ADMIN — Medication 81 MG: at 22:00

## 2021-01-01 RX ADMIN — IPRATROPIUM BROMIDE AND ALBUTEROL SULFATE 1 AMPULE: .5; 3 SOLUTION RESPIRATORY (INHALATION) at 07:32

## 2021-01-01 RX ADMIN — HEPARIN SODIUM 5000 UNITS: 5000 INJECTION INTRAVENOUS; SUBCUTANEOUS at 23:11

## 2021-01-01 RX ADMIN — CEFTRIAXONE 1000 MG: 1 INJECTION, POWDER, FOR SOLUTION INTRAMUSCULAR; INTRAVENOUS at 05:13

## 2021-01-01 RX ADMIN — DEXMEDETOMIDINE HYDROCHLORIDE 0.8 MCG/KG/HR: 4 INJECTION, SOLUTION INTRAVENOUS at 13:51

## 2021-01-01 RX ADMIN — HEPARIN SODIUM 4860 UNITS: 1000 INJECTION INTRAVENOUS; SUBCUTANEOUS at 05:15

## 2021-01-01 RX ADMIN — INSULIN GLARGINE 20 UNITS: 100 INJECTION, SOLUTION SUBCUTANEOUS at 21:10

## 2021-01-01 RX ADMIN — Medication 2000 UNITS: at 10:11

## 2021-01-01 RX ADMIN — BUDESONIDE 500 MCG: 0.5 SUSPENSION RESPIRATORY (INHALATION) at 09:17

## 2021-01-01 RX ADMIN — ACETYLCYSTEINE 600 MG: 200 SOLUTION ORAL; RESPIRATORY (INHALATION) at 11:50

## 2021-01-01 RX ADMIN — IPRATROPIUM BROMIDE AND ALBUTEROL SULFATE 1 AMPULE: .5; 3 SOLUTION RESPIRATORY (INHALATION) at 06:00

## 2021-01-01 RX ADMIN — ARFORMOTEROL TARTRATE 15 MCG: 15 SOLUTION RESPIRATORY (INHALATION) at 07:32

## 2021-01-01 RX ADMIN — POLYETHYLENE GLYCOL 3350 17 G: 17 POWDER, FOR SOLUTION ORAL at 08:20

## 2021-01-01 RX ADMIN — IPRATROPIUM BROMIDE AND ALBUTEROL SULFATE 1 AMPULE: .5; 3 SOLUTION RESPIRATORY (INHALATION) at 07:23

## 2021-01-01 RX ADMIN — TOCILIZUMAB 800 MG: 20 INJECTION, SOLUTION, CONCENTRATE INTRAVENOUS at 13:25

## 2021-01-01 RX ADMIN — CEFTRIAXONE 1000 MG: 1 INJECTION, POWDER, FOR SOLUTION INTRAMUSCULAR; INTRAVENOUS at 05:15

## 2021-01-01 RX ADMIN — DEXMEDETOMIDINE HYDROCHLORIDE 1.3 MCG/KG/HR: 4 INJECTION, SOLUTION INTRAVENOUS at 07:44

## 2021-01-01 RX ADMIN — HEPARIN SODIUM 5000 UNITS: 5000 INJECTION INTRAVENOUS; SUBCUTANEOUS at 22:00

## 2021-01-01 RX ADMIN — INSULIN LISPRO 4 UNITS: 100 INJECTION, SOLUTION INTRAVENOUS; SUBCUTANEOUS at 17:15

## 2021-01-01 RX ADMIN — PRAVASTATIN SODIUM 40 MG: 40 TABLET ORAL at 21:09

## 2021-01-01 RX ADMIN — DEXAMETHASONE SODIUM PHOSPHATE 6 MG: 10 INJECTION, SOLUTION INTRAMUSCULAR; INTRAVENOUS at 08:50

## 2021-01-01 RX ADMIN — PANTOPRAZOLE SODIUM 40 MG: 40 INJECTION, POWDER, FOR SOLUTION INTRAVENOUS at 08:19

## 2021-01-01 RX ADMIN — ACETAMINOPHEN 1000 MG: 500 TABLET ORAL at 03:33

## 2021-01-01 RX ADMIN — DEXMEDETOMIDINE HYDROCHLORIDE 0.9 MCG/KG/HR: 4 INJECTION, SOLUTION INTRAVENOUS at 09:16

## 2021-01-01 RX ADMIN — BUDESONIDE 500 MCG: 0.5 SUSPENSION RESPIRATORY (INHALATION) at 05:26

## 2021-01-01 RX ADMIN — DEXMEDETOMIDINE HYDROCHLORIDE 0.9 MCG/KG/HR: 4 INJECTION, SOLUTION INTRAVENOUS at 14:43

## 2021-01-01 RX ADMIN — INSULIN LISPRO 3 UNITS: 100 INJECTION, SOLUTION INTRAVENOUS; SUBCUTANEOUS at 21:00

## 2021-01-01 RX ADMIN — SODIUM CHLORIDE: 4.5 INJECTION, SOLUTION INTRAVENOUS at 02:45

## 2021-01-01 RX ADMIN — INSULIN LISPRO 8 UNITS: 100 INJECTION, SOLUTION INTRAVENOUS; SUBCUTANEOUS at 17:40

## 2021-01-01 RX ADMIN — CEFTRIAXONE 1000 MG: 1 INJECTION, POWDER, FOR SOLUTION INTRAMUSCULAR; INTRAVENOUS at 05:30

## 2021-01-01 RX ADMIN — DIGOXIN 500 MCG: 0.25 INJECTION INTRAMUSCULAR; INTRAVENOUS at 10:43

## 2021-01-01 RX ADMIN — AMLODIPINE BESYLATE 10 MG: 10 TABLET ORAL at 12:22

## 2021-01-01 RX ADMIN — PANTOPRAZOLE SODIUM 40 MG: 40 TABLET, DELAYED RELEASE ORAL at 12:22

## 2021-01-01 RX ADMIN — MORPHINE SULFATE 2 MG: 2 INJECTION, SOLUTION INTRAMUSCULAR; INTRAVENOUS at 11:28

## 2021-01-01 RX ADMIN — BUDESONIDE 500 MCG: 0.5 SUSPENSION RESPIRATORY (INHALATION) at 06:09

## 2021-01-01 RX ADMIN — SODIUM BICARBONATE: 84 INJECTION INTRAVENOUS at 13:38

## 2021-01-01 RX ADMIN — Medication 2000 UNITS: at 12:21

## 2021-01-01 RX ADMIN — HEPARIN SODIUM 9710 UNITS: 1000 INJECTION INTRAVENOUS; SUBCUTANEOUS at 12:38

## 2021-01-01 RX ADMIN — SODIUM CHLORIDE 75 ML/HR: 4.5 INJECTION, SOLUTION INTRAVENOUS at 14:26

## 2021-01-01 RX ADMIN — METHYLPREDNISOLONE SODIUM SUCCINATE 60 MG: 125 INJECTION, POWDER, FOR SOLUTION INTRAMUSCULAR; INTRAVENOUS at 12:23

## 2021-01-01 RX ADMIN — PRAVASTATIN SODIUM 40 MG: 40 TABLET ORAL at 20:05

## 2021-01-01 RX ADMIN — SODIUM CHLORIDE, PRESERVATIVE FREE 10 ML: 5 INJECTION INTRAVENOUS at 21:00

## 2021-01-01 RX ADMIN — INSULIN LISPRO 5 UNITS: 100 INJECTION, SOLUTION INTRAVENOUS; SUBCUTANEOUS at 08:25

## 2021-01-01 RX ADMIN — CARVEDILOL 12.5 MG: 12.5 TABLET, FILM COATED ORAL at 08:12

## 2021-01-01 RX ADMIN — SODIUM CHLORIDE, PRESERVATIVE FREE 10 ML: 5 INJECTION INTRAVENOUS at 09:35

## 2021-01-01 RX ADMIN — INSULIN LISPRO 6 UNITS: 100 INJECTION, SOLUTION INTRAVENOUS; SUBCUTANEOUS at 09:35

## 2021-01-01 RX ADMIN — CARVEDILOL 12.5 MG: 12.5 TABLET, FILM COATED ORAL at 08:38

## 2021-01-01 RX ADMIN — CARVEDILOL 12.5 MG: 12.5 TABLET, FILM COATED ORAL at 20:05

## 2021-01-01 RX ADMIN — DEXAMETHASONE SODIUM PHOSPHATE 6 MG: 10 INJECTION, SOLUTION INTRAMUSCULAR; INTRAVENOUS at 08:19

## 2021-01-01 RX ADMIN — SODIUM BICARBONATE: 84 INJECTION INTRAVENOUS at 09:53

## 2021-01-01 RX ADMIN — CEFTRIAXONE 1000 MG: 1 INJECTION, POWDER, FOR SOLUTION INTRAMUSCULAR; INTRAVENOUS at 05:11

## 2021-01-01 RX ADMIN — ARFORMOTEROL TARTRATE 15 MCG: 15 SOLUTION RESPIRATORY (INHALATION) at 20:13

## 2021-01-01 RX ADMIN — MORPHINE SULFATE 2 MG: 2 INJECTION, SOLUTION INTRAMUSCULAR; INTRAVENOUS at 17:06

## 2021-01-01 RX ADMIN — AZITHROMYCIN MONOHYDRATE 500 MG: 500 INJECTION, POWDER, LYOPHILIZED, FOR SOLUTION INTRAVENOUS at 05:41

## 2021-01-01 RX ADMIN — ARFORMOTEROL TARTRATE 15 MCG: 15 SOLUTION RESPIRATORY (INHALATION) at 05:26

## 2021-01-01 RX ADMIN — ARFORMOTEROL TARTRATE 15 MCG: 15 SOLUTION RESPIRATORY (INHALATION) at 07:23

## 2021-01-01 RX ADMIN — DEXAMETHASONE SODIUM PHOSPHATE 10 MG: 10 INJECTION, SOLUTION INTRAMUSCULAR; INTRAVENOUS at 06:08

## 2021-01-01 RX ADMIN — INSULIN LISPRO 4 UNITS: 100 INJECTION, SOLUTION INTRAVENOUS; SUBCUTANEOUS at 17:22

## 2021-01-01 RX ADMIN — IPRATROPIUM BROMIDE AND ALBUTEROL SULFATE 1 AMPULE: .5; 3 SOLUTION RESPIRATORY (INHALATION) at 10:25

## 2021-01-01 RX ADMIN — HEPARIN SODIUM AND DEXTROSE 4.3 UNITS/KG/HR: 10000; 5 INJECTION INTRAVENOUS at 05:35

## 2021-01-01 RX ADMIN — HEPARIN SODIUM AND DEXTROSE 7.3 UNITS/KG/HR: 10000; 5 INJECTION INTRAVENOUS at 21:14

## 2021-01-01 RX ADMIN — IPRATROPIUM BROMIDE AND ALBUTEROL SULFATE 1 AMPULE: .5; 3 SOLUTION RESPIRATORY (INHALATION) at 19:21

## 2021-01-01 RX ADMIN — FERROUS SULFATE TAB EC 325 MG (65 MG FE EQUIVALENT) 325 MG: 325 (65 FE) TABLET DELAYED RESPONSE at 21:06

## 2021-01-01 RX ADMIN — IPRATROPIUM BROMIDE AND ALBUTEROL SULFATE 1 AMPULE: .5; 3 SOLUTION RESPIRATORY (INHALATION) at 07:30

## 2021-01-01 RX ADMIN — IPRATROPIUM BROMIDE AND ALBUTEROL SULFATE 1 AMPULE: .5; 3 SOLUTION RESPIRATORY (INHALATION) at 15:47

## 2021-01-01 RX ADMIN — DEXAMETHASONE SODIUM PHOSPHATE 6 MG: 10 INJECTION, SOLUTION INTRAMUSCULAR; INTRAVENOUS at 09:42

## 2021-01-01 RX ADMIN — HEPARIN SODIUM 5000 UNITS: 5000 INJECTION INTRAVENOUS; SUBCUTANEOUS at 14:47

## 2021-01-01 RX ADMIN — ARFORMOTEROL TARTRATE 15 MCG: 15 SOLUTION RESPIRATORY (INHALATION) at 07:30

## 2021-01-01 RX ADMIN — POLYETHYLENE GLYCOL 3350 17 G: 17 POWDER, FOR SOLUTION ORAL at 12:21

## 2021-01-01 RX ADMIN — AMLODIPINE BESYLATE 10 MG: 10 TABLET ORAL at 08:14

## 2021-01-01 RX ADMIN — PANTOPRAZOLE SODIUM 40 MG: 40 INJECTION, POWDER, FOR SOLUTION INTRAVENOUS at 08:14

## 2021-01-01 RX ADMIN — BUDESONIDE 500 MCG: 0.5 SUSPENSION RESPIRATORY (INHALATION) at 07:23

## 2021-01-01 RX ADMIN — Medication 2000 UNITS: at 08:14

## 2021-01-01 RX ADMIN — DEXMEDETOMIDINE HYDROCHLORIDE 0.5 MCG/KG/HR: 4 INJECTION, SOLUTION INTRAVENOUS at 19:17

## 2021-01-01 RX ADMIN — SODIUM CHLORIDE, PRESERVATIVE FREE 10 ML: 5 INJECTION INTRAVENOUS at 21:56

## 2021-01-01 RX ADMIN — IPRATROPIUM BROMIDE AND ALBUTEROL SULFATE 1 AMPULE: .5; 3 SOLUTION RESPIRATORY (INHALATION) at 11:50

## 2021-01-01 RX ADMIN — FERROUS SULFATE TAB EC 325 MG (65 MG FE EQUIVALENT) 325 MG: 325 (65 FE) TABLET DELAYED RESPONSE at 20:05

## 2021-01-01 RX ADMIN — IPRATROPIUM BROMIDE AND ALBUTEROL SULFATE 1 AMPULE: .5; 3 SOLUTION RESPIRATORY (INHALATION) at 09:17

## 2021-01-01 RX ADMIN — DEXMEDETOMIDINE HYDROCHLORIDE 0.8 MCG/KG/HR: 4 INJECTION, SOLUTION INTRAVENOUS at 18:02

## 2021-01-01 RX ADMIN — INSULIN LISPRO 2 UNITS: 100 INJECTION, SOLUTION INTRAVENOUS; SUBCUTANEOUS at 18:13

## 2021-01-01 RX ADMIN — MORPHINE SULFATE 2 MG: 2 INJECTION, SOLUTION INTRAMUSCULAR; INTRAVENOUS at 05:08

## 2021-01-01 RX ADMIN — METOPROLOL TARTRATE 5 MG: 5 INJECTION INTRAVENOUS at 09:20

## 2021-01-01 RX ADMIN — SODIUM CHLORIDE, PRESERVATIVE FREE 10 ML: 5 INJECTION INTRAVENOUS at 09:44

## 2021-01-01 RX ADMIN — INSULIN LISPRO 3 UNITS: 100 INJECTION, SOLUTION INTRAVENOUS; SUBCUTANEOUS at 17:46

## 2021-01-01 RX ADMIN — SODIUM CHLORIDE, PRESERVATIVE FREE 15 ML: 5 INJECTION INTRAVENOUS at 23:52

## 2021-01-01 RX ADMIN — BUDESONIDE 500 MCG: 0.5 SUSPENSION RESPIRATORY (INHALATION) at 18:12

## 2021-01-01 RX ADMIN — SODIUM CHLORIDE, PRESERVATIVE FREE 5 ML: 5 INJECTION INTRAVENOUS at 08:49

## 2021-01-01 RX ADMIN — FUROSEMIDE: 10 INJECTION, SOLUTION INTRAMUSCULAR; INTRAVENOUS at 16:07

## 2021-01-01 RX ADMIN — PANTOPRAZOLE SODIUM 40 MG: 40 INJECTION, POWDER, FOR SOLUTION INTRAVENOUS at 08:37

## 2021-01-01 RX ADMIN — ALLOPURINOL 100 MG: 100 TABLET ORAL at 21:06

## 2021-01-01 RX ADMIN — SODIUM CHLORIDE, PRESERVATIVE FREE 10 ML: 5 INJECTION INTRAVENOUS at 21:06

## 2021-01-01 RX ADMIN — INSULIN GLARGINE 30 UNITS: 100 INJECTION, SOLUTION SUBCUTANEOUS at 22:32

## 2021-01-01 RX ADMIN — MORPHINE SULFATE 2 MG: 2 INJECTION, SOLUTION INTRAMUSCULAR; INTRAVENOUS at 01:08

## 2021-01-01 RX ADMIN — BUDESONIDE 500 MCG: 0.5 SUSPENSION RESPIRATORY (INHALATION) at 20:33

## 2021-01-01 RX ADMIN — AMLODIPINE BESYLATE 10 MG: 10 TABLET ORAL at 10:12

## 2021-01-01 RX ADMIN — INSULIN LISPRO 8 UNITS: 100 INJECTION, SOLUTION INTRAVENOUS; SUBCUTANEOUS at 08:13

## 2021-01-01 RX ADMIN — ARFORMOTEROL TARTRATE 15 MCG: 15 SOLUTION RESPIRATORY (INHALATION) at 19:21

## 2021-01-01 RX ADMIN — CHLOROTHIAZIDE SODIUM 500 MG: 500 INJECTION, POWDER, LYOPHILIZED, FOR SOLUTION INTRAVENOUS at 16:31

## 2021-01-01 RX ADMIN — AZITHROMYCIN MONOHYDRATE 500 MG: 500 INJECTION, POWDER, LYOPHILIZED, FOR SOLUTION INTRAVENOUS at 05:44

## 2021-01-01 RX ADMIN — BUDESONIDE 500 MCG: 0.5 SUSPENSION RESPIRATORY (INHALATION) at 19:21

## 2021-01-01 RX ADMIN — SODIUM CHLORIDE, PRESERVATIVE FREE 10 ML: 5 INJECTION INTRAVENOUS at 12:26

## 2021-01-01 RX ADMIN — BUDESONIDE 500 MCG: 0.5 SUSPENSION RESPIRATORY (INHALATION) at 07:30

## 2021-01-01 RX ADMIN — DEXAMETHASONE SODIUM PHOSPHATE 6 MG: 10 INJECTION, SOLUTION INTRAMUSCULAR; INTRAVENOUS at 08:14

## 2021-01-01 RX ADMIN — LORAZEPAM 0.5 MG: 2 INJECTION, SOLUTION INTRAMUSCULAR; INTRAVENOUS at 00:35

## 2021-01-01 RX ADMIN — INSULIN LISPRO 6 UNITS: 100 INJECTION, SOLUTION INTRAVENOUS; SUBCUTANEOUS at 11:18

## 2021-01-01 RX ADMIN — IPRATROPIUM BROMIDE AND ALBUTEROL SULFATE 1 AMPULE: .5; 3 SOLUTION RESPIRATORY (INHALATION) at 18:12

## 2021-01-01 RX ADMIN — PANTOPRAZOLE SODIUM 40 MG: 40 INJECTION, POWDER, FOR SOLUTION INTRAVENOUS at 08:12

## 2021-01-01 RX ADMIN — INSULIN LISPRO 2 UNITS: 100 INJECTION, SOLUTION INTRAVENOUS; SUBCUTANEOUS at 21:48

## 2021-01-01 RX ADMIN — PANTOPRAZOLE SODIUM 40 MG: 40 INJECTION, POWDER, FOR SOLUTION INTRAVENOUS at 09:35

## 2021-01-01 RX ADMIN — PRAVASTATIN SODIUM 40 MG: 40 TABLET ORAL at 21:06

## 2021-01-01 RX ADMIN — ACETYLCYSTEINE 600 MG: 200 SOLUTION ORAL; RESPIRATORY (INHALATION) at 07:33

## 2021-01-01 RX ADMIN — INSULIN LISPRO 4 UNITS: 100 INJECTION, SOLUTION INTRAVENOUS; SUBCUTANEOUS at 09:30

## 2021-01-01 RX ADMIN — CARVEDILOL 12.5 MG: 12.5 TABLET, FILM COATED ORAL at 21:08

## 2021-01-01 RX ADMIN — IPRATROPIUM BROMIDE AND ALBUTEROL SULFATE 1 AMPULE: .5; 3 SOLUTION RESPIRATORY (INHALATION) at 15:16

## 2021-01-01 RX ADMIN — ARFORMOTEROL TARTRATE 15 MCG: 15 SOLUTION RESPIRATORY (INHALATION) at 06:09

## 2021-01-01 RX ADMIN — SODIUM CHLORIDE, PRESERVATIVE FREE 10 ML: 5 INJECTION INTRAVENOUS at 09:52

## 2021-01-01 RX ADMIN — INSULIN LISPRO 2 UNITS: 100 INJECTION, SOLUTION INTRAVENOUS; SUBCUTANEOUS at 08:39

## 2021-01-01 RX ADMIN — SODIUM CHLORIDE, PRESERVATIVE FREE 10 ML: 5 INJECTION INTRAVENOUS at 08:51

## 2021-01-01 RX ADMIN — SODIUM BICARBONATE: 84 INJECTION INTRAVENOUS at 04:19

## 2021-01-01 RX ADMIN — HALOPERIDOL LACTATE 5 MG: 5 INJECTION, SOLUTION INTRAMUSCULAR at 21:00

## 2021-01-01 RX ADMIN — CARVEDILOL 12.5 MG: 12.5 TABLET, FILM COATED ORAL at 08:15

## 2021-01-01 RX ADMIN — SODIUM CHLORIDE, PRESERVATIVE FREE 10 ML: 5 INJECTION INTRAVENOUS at 08:55

## 2021-01-01 RX ADMIN — INSULIN LISPRO 10 UNITS: 100 INJECTION, SOLUTION INTRAVENOUS; SUBCUTANEOUS at 11:37

## 2021-01-01 RX ADMIN — HEPARIN SODIUM AND DEXTROSE 7.3 UNITS/KG/HR: 10000; 5 INJECTION INTRAVENOUS at 17:45

## 2021-01-01 RX ADMIN — MORPHINE SULFATE 2 MG: 2 INJECTION, SOLUTION INTRAMUSCULAR; INTRAVENOUS at 00:15

## 2021-01-01 RX ADMIN — METOPROLOL TARTRATE 5 MG: 5 INJECTION INTRAVENOUS at 08:11

## 2021-01-01 RX ADMIN — SODIUM CHLORIDE, PRESERVATIVE FREE 10 ML: 5 INJECTION INTRAVENOUS at 08:14

## 2021-01-01 RX ADMIN — ALLOPURINOL 100 MG: 100 TABLET ORAL at 21:08

## 2021-01-01 RX ADMIN — IPRATROPIUM BROMIDE AND ALBUTEROL SULFATE 1 AMPULE: .5; 3 SOLUTION RESPIRATORY (INHALATION) at 14:32

## 2021-01-01 RX ADMIN — DEXMEDETOMIDINE HYDROCHLORIDE 0.7 MCG/KG/HR: 4 INJECTION, SOLUTION INTRAVENOUS at 00:51

## 2021-01-01 RX ADMIN — INSULIN LISPRO 2 UNITS: 100 INJECTION, SOLUTION INTRAVENOUS; SUBCUTANEOUS at 20:47

## 2021-01-01 RX ADMIN — INSULIN LISPRO 4 UNITS: 100 INJECTION, SOLUTION INTRAVENOUS; SUBCUTANEOUS at 11:33

## 2021-01-01 RX ADMIN — CARVEDILOL 12.5 MG: 12.5 TABLET, FILM COATED ORAL at 20:46

## 2021-01-01 RX ADMIN — IPRATROPIUM BROMIDE AND ALBUTEROL SULFATE 1 AMPULE: .5; 3 SOLUTION RESPIRATORY (INHALATION) at 11:04

## 2021-01-01 RX ADMIN — ARFORMOTEROL TARTRATE 15 MCG: 15 SOLUTION RESPIRATORY (INHALATION) at 18:05

## 2021-01-01 RX ADMIN — MORPHINE SULFATE 2 MG: 2 INJECTION, SOLUTION INTRAMUSCULAR; INTRAVENOUS at 09:22

## 2021-01-01 RX ADMIN — GUAIFENESIN AND DEXTROMETHORPHAN 5 ML: 100; 10 SYRUP ORAL at 08:14

## 2021-01-01 RX ADMIN — DEXMEDETOMIDINE HYDROCHLORIDE 0.2 MCG/KG/HR: 4 INJECTION, SOLUTION INTRAVENOUS at 12:05

## 2021-01-01 RX ADMIN — IPRATROPIUM BROMIDE AND ALBUTEROL SULFATE 1 AMPULE: .5; 3 SOLUTION RESPIRATORY (INHALATION) at 10:47

## 2021-01-01 RX ADMIN — ARFORMOTEROL TARTRATE 15 MCG: 15 SOLUTION RESPIRATORY (INHALATION) at 20:39

## 2021-01-01 RX ADMIN — SODIUM CHLORIDE 2304 ML: 9 INJECTION, SOLUTION INTRAVENOUS at 03:34

## 2021-01-01 RX ADMIN — INSULIN GLARGINE 30 UNITS: 100 INJECTION, SOLUTION SUBCUTANEOUS at 21:43

## 2021-01-01 RX ADMIN — HEPARIN SODIUM 5000 UNITS: 5000 INJECTION INTRAVENOUS; SUBCUTANEOUS at 16:26

## 2021-01-01 RX ADMIN — HEPARIN SODIUM 5000 UNITS: 5000 INJECTION INTRAVENOUS; SUBCUTANEOUS at 13:53

## 2021-01-01 RX ADMIN — DEXMEDETOMIDINE HYDROCHLORIDE 0.9 MCG/KG/HR: 4 INJECTION, SOLUTION INTRAVENOUS at 06:10

## 2021-01-01 RX ADMIN — BUDESONIDE 500 MCG: 0.5 SUSPENSION RESPIRATORY (INHALATION) at 20:13

## 2021-01-01 RX ADMIN — HEPARIN SODIUM 5000 UNITS: 5000 INJECTION INTRAVENOUS; SUBCUTANEOUS at 08:17

## 2021-01-01 RX ADMIN — AZITHROMYCIN MONOHYDRATE 500 MG: 500 INJECTION, POWDER, LYOPHILIZED, FOR SOLUTION INTRAVENOUS at 06:07

## 2021-01-01 RX ADMIN — BUDESONIDE 500 MCG: 0.5 SUSPENSION RESPIRATORY (INHALATION) at 07:32

## 2021-01-01 RX ADMIN — IPRATROPIUM BROMIDE AND ALBUTEROL SULFATE 1 AMPULE: .5; 3 SOLUTION RESPIRATORY (INHALATION) at 10:46

## 2021-01-01 RX ADMIN — INSULIN LISPRO 4 UNITS: 100 INJECTION, SOLUTION INTRAVENOUS; SUBCUTANEOUS at 22:32

## 2021-01-01 RX ADMIN — AMLODIPINE BESYLATE 5 MG: 5 TABLET ORAL at 08:38

## 2021-01-01 RX ADMIN — MORPHINE SULFATE 2 MG: 2 INJECTION, SOLUTION INTRAMUSCULAR; INTRAVENOUS at 10:33

## 2021-01-01 RX ADMIN — INSULIN LISPRO 2 UNITS: 100 INJECTION, SOLUTION INTRAVENOUS; SUBCUTANEOUS at 21:43

## 2021-01-01 RX ADMIN — DEXMEDETOMIDINE HYDROCHLORIDE 0.9 MCG/KG/HR: 4 INJECTION, SOLUTION INTRAVENOUS at 10:00

## 2021-01-01 RX ADMIN — DEXMEDETOMIDINE HYDROCHLORIDE 1.3 MCG/KG/HR: 4 INJECTION, SOLUTION INTRAVENOUS at 05:04

## 2021-01-01 RX ADMIN — AZITHROMYCIN MONOHYDRATE 500 MG: 500 INJECTION, POWDER, LYOPHILIZED, FOR SOLUTION INTRAVENOUS at 05:30

## 2021-01-01 RX ADMIN — INSULIN LISPRO 4 UNITS: 100 INJECTION, SOLUTION INTRAVENOUS; SUBCUTANEOUS at 08:19

## 2021-01-01 RX ADMIN — MORPHINE SULFATE 4 MG: 4 INJECTION, SOLUTION INTRAMUSCULAR; INTRAVENOUS at 03:13

## 2021-01-01 RX ADMIN — IPRATROPIUM BROMIDE AND ALBUTEROL SULFATE 1 AMPULE: .5; 3 SOLUTION RESPIRATORY (INHALATION) at 14:11

## 2021-01-01 RX ADMIN — Medication 81 MG: at 17:40

## 2021-01-01 RX ADMIN — IPRATROPIUM BROMIDE AND ALBUTEROL SULFATE 1 AMPULE: .5; 3 SOLUTION RESPIRATORY (INHALATION) at 10:32

## 2021-01-01 RX ADMIN — DEXTROSE MONOHYDRATE: 50 INJECTION, SOLUTION INTRAVENOUS at 11:29

## 2021-01-01 RX ADMIN — DEXMEDETOMIDINE HYDROCHLORIDE 0.2 MCG/KG/HR: 4 INJECTION, SOLUTION INTRAVENOUS at 22:50

## 2021-01-01 RX ADMIN — CARVEDILOL 12.5 MG: 12.5 TABLET, FILM COATED ORAL at 10:11

## 2021-01-01 RX ADMIN — HEPARIN SODIUM AND DEXTROSE 17.3 UNITS/KG/HR: 10000; 5 INJECTION INTRAVENOUS at 12:39

## 2021-01-01 SDOH — ECONOMIC STABILITY: FOOD INSECURITY: WITHIN THE PAST 12 MONTHS, YOU WORRIED THAT YOUR FOOD WOULD RUN OUT BEFORE YOU GOT MONEY TO BUY MORE.: NEVER TRUE

## 2021-01-01 SDOH — ECONOMIC STABILITY: INCOME INSECURITY: HOW HARD IS IT FOR YOU TO PAY FOR THE VERY BASICS LIKE FOOD, HOUSING, MEDICAL CARE, AND HEATING?: NOT HARD AT ALL

## 2021-01-01 SDOH — ECONOMIC STABILITY: TRANSPORTATION INSECURITY
IN THE PAST 12 MONTHS, HAS LACK OF TRANSPORTATION KEPT YOU FROM MEETINGS, WORK, OR FROM GETTING THINGS NEEDED FOR DAILY LIVING?: NOT ASKED

## 2021-01-01 SDOH — ECONOMIC STABILITY: TRANSPORTATION INSECURITY
IN THE PAST 12 MONTHS, HAS THE LACK OF TRANSPORTATION KEPT YOU FROM MEDICAL APPOINTMENTS OR FROM GETTING MEDICATIONS?: NOT ASKED

## 2021-01-01 SDOH — ECONOMIC STABILITY: FOOD INSECURITY: WITHIN THE PAST 12 MONTHS, THE FOOD YOU BOUGHT JUST DIDN'T LAST AND YOU DIDN'T HAVE MONEY TO GET MORE.: NEVER TRUE

## 2021-01-01 ASSESSMENT — ENCOUNTER SYMPTOMS
NAUSEA: 0
SHORTNESS OF BREATH: 1
SHORTNESS OF BREATH: 1
GASTROINTESTINAL NEGATIVE: 1
SHORTNESS OF BREATH: 1
SORE THROAT: 0
DIARRHEA: 1
EYE DISCHARGE: 0
COUGH: 1
DIARRHEA: 1
SHORTNESS OF BREATH: 1
EYE REDNESS: 0
SHORTNESS OF BREATH: 1
GASTROINTESTINAL NEGATIVE: 1
VOMITING: 0
COLOR CHANGE: 0
GASTROINTESTINAL NEGATIVE: 1
SHORTNESS OF BREATH: 1
RHINORRHEA: 0
DIARRHEA: 0

## 2021-01-01 ASSESSMENT — PAIN SCALES - GENERAL
PAINLEVEL_OUTOF10: 0
PAINLEVEL_OUTOF10: 0
PAINLEVEL_OUTOF10: 4
PAINLEVEL_OUTOF10: 0
PAINLEVEL_OUTOF10: 2
PAINLEVEL_OUTOF10: 0
PAINLEVEL_OUTOF10: 3
PAINLEVEL_OUTOF10: 0
PAINLEVEL_OUTOF10: 8
PAINLEVEL_OUTOF10: 0
PAINLEVEL_OUTOF10: 0
PAINLEVEL_OUTOF10: 3
PAINLEVEL_OUTOF10: 5
PAINLEVEL_OUTOF10: 0
PAINLEVEL_OUTOF10: 4
PAINLEVEL_OUTOF10: 3
PAINLEVEL_OUTOF10: 0
PAINLEVEL_OUTOF10: 0
PAINLEVEL_OUTOF10: 8
PAINLEVEL_OUTOF10: 4
PAINLEVEL_OUTOF10: 0
PAINLEVEL_OUTOF10: 0
PAINLEVEL_OUTOF10: 3

## 2021-01-01 ASSESSMENT — PAIN DESCRIPTION - PAIN TYPE
TYPE: ACUTE PAIN
TYPE: ACUTE PAIN

## 2021-01-01 ASSESSMENT — PAIN DESCRIPTION - DIRECTION: RADIATING_TOWARDS: RIGHT LEG

## 2021-01-01 ASSESSMENT — PAIN DESCRIPTION - ORIENTATION: ORIENTATION: RIGHT

## 2021-01-01 ASSESSMENT — PAIN DESCRIPTION - DESCRIPTORS
DESCRIPTORS: SHARP;SHOOTING
DESCRIPTORS: ACHING

## 2021-01-01 ASSESSMENT — PATIENT HEALTH QUESTIONNAIRE - PHQ9
SUM OF ALL RESPONSES TO PHQ QUESTIONS 1-9: 0
SUM OF ALL RESPONSES TO PHQ QUESTIONS 1-9: 0
SUM OF ALL RESPONSES TO PHQ9 QUESTIONS 1 & 2: 0

## 2021-01-01 ASSESSMENT — PAIN DESCRIPTION - FREQUENCY: FREQUENCY: INTERMITTENT

## 2021-01-01 ASSESSMENT — PAIN DESCRIPTION - LOCATION: LOCATION: GENERALIZED

## 2021-01-07 PROBLEM — N18.30 STAGE 3 CHRONIC KIDNEY DISEASE (HCC): Status: ACTIVE | Noted: 2021-01-01

## 2021-01-07 NOTE — PATIENT INSTRUCTIONS
Medications e-scribe to pharmacy of pt's choice. Order for bilateral breast ultrasound faxed to [address] [place] [address] they will call pt for appt. Please call [phone] in not heard within 2 weeks. Patient to return to clinic 3 months (office will call and schedule appt). AVS reviewed and mailed to pt. It is very important for your care that you keep your appointment. If for some reason you are unable to keep your appointment it is equally important that you call our office at [phone] to cancel your appointment and reschedule. Failure to do so may result in your termination from our practice.   MB

## 2021-01-07 NOTE — PROGRESS NOTES
Joey Doss is a 70 y.o. female evaluated via telephone on 1/7/2021. Consent:  She and/or health care decision maker is aware that that she may receive a bill for this telephone service, depending on her insurance coverage, and has provided verbal consent to proceed: Yes      Documentation:  Discuss labs and mammogram.  She reports breast pain in right is still present. Nothing seen on mammogram. Hematoma of left breast improved but did need repeat Ulsx. Her labs are deranged and A1C, lipids and Cr have all worsened slightly. She has been more sedentary and eating more at home during [facility] [place]. She is compliant with all her medications. Diagnosis Orders   1. Hematoma of left breast  US BREAST COMPLETE RIGHT    US BREAST COMPLETE LEFT   2. Type 2 diabetes mellitus with stage 3 chronic kidney disease, without long-term current use of insulin, unspecified whether stage 3a or 3b CKD ([address]  Inc amaryl to 4mg . Unable to tolerate metformin. The patient is asked to make an attempt to improve diet and exercise patterns to aid in medical management of this problem. 3. Morbid obesity ([address]     4. Mild intermittent asthma without complication     5. CKD stage 3 secondary to diabetes [facility])  Follows with Dr Layton Soto Nephrology   6. Pain of breast  US BREAST COMPLETE RIGHT   rtc in 3 months    I affirm this is a Patient Initiated Episode with a Patient who has not had a related appointment within my department in the past 7 days or scheduled within the next 24 hours.     Patient identification was verified at the start of the visit: Yes    Total Time: minutes: 11-20 minutes    Note: not billable if this call serves to triage the patient into an appointment for the relevant concern      Yaz Conception Patient does not check blood sugars on a regular basis. Patient doesn't know the names of her medications states they are bubble packed and she just takes them.   Will call [facility] to get updated medication list.

## 2021-01-20 NOTE — TELEPHONE ENCOUNTER
Request for pended medications, please refill if appropriate. Next Visit Date: Patient last seen 1/7/2021 was asked to follow up in 3 months. No future appointments. Health Maintenance   Topic Date Due    Pneumococcal 65+ years Vaccine (1 of 1 - PPSV23) 06/21/2012    Diabetic retinal exam  03/01/2017    Diabetic foot exam  08/26/2020    Flu vaccine (1) 09/01/2020    A1C test (Diabetic or Prediabetic)  12/18/2021    Lipid screen  12/18/2021    Potassium monitoring  12/18/2021    Creatinine monitoring  12/18/2021    Breast cancer screen  12/18/2022    Colon cancer screen colonoscopy  10/23/2025    DTaP/Tdap/Td vaccine (2 - Td) 04/01/2026    DEXA (modify frequency per FRAX score)  Completed    Shingles Vaccine  Completed    Hepatitis C screen  Completed    Hepatitis A vaccine  Aged Out    Hib vaccine  Aged Out    Meningococcal (ACWY) vaccine  Aged Out       Hemoglobin A1C (%)   Date Value   12/18/2020 6.9 (H)   08/26/2019 5.6   02/04/2019 6.3             ( goal A1C is < 7)   Microalb/Crt.  Ratio (mcg/mg creat)   Date Value   09/06/2018 19     LDL Cholesterol (mg/dL)   Date Value   12/18/2020 105       (goal LDL is <100)   AST (U/L)   Date Value   12/18/2020 18     ALT (U/L)   Date Value   12/18/2020 10     BUN (mg/dL)   Date Value   12/18/2020 35 (H)     BP Readings from Last 3 Encounters:   02/27/20 (!) 149/62   02/03/20 (!) 149/77   01/15/20 (!) 148/88          (goal 120/80)    All Future Testing planned in [facility]  Lab Frequency Next Occurrence   CBC Once 10/06/2020   Protein / Creatinine Ratio, Urine Once 10/06/2020   BEATRIZ DIGITAL DIAGNOSTIC W OR WO CAD RIGHT Once 03/16/2021   US BREAST COMPLETE RIGHT Once 03/16/2021   US BREAST COMPLETE RIGHT Once 01/07/2021   US BREAST COMPLETE LEFT Once 84/10/7654   Basic Metabolic Panel Every 6 Months 3/5/2021         Patient Active Problem List:     Obstructive sleep apnea     Hypertension     Hyperlipidemia     Gout     Asthma     Morbid obesity ([facility]     Vitamin D deficiency     GERD (gastroesophageal reflux disease)     TIA (transient ischemic attack)     CKD stage 3 secondary to diabetes ([facility])     Anemia, iron deficiency     DJD (degenerative joint disease) of knee     Primary osteoarthritis of both shoulders     Pain of both shoulder joints     Primary osteoarthritis involving multiple joints     Pain in joint, lower leg     Type 2 diabetes mellitus without complication, without long-term current use of insulin ([facility])     Adjustment disorder with mixed anxiety and depressed mood     Bilateral leg edema     Extensor tenosynovitis of right wrist     Carpal tunnel syndrome on right     Medication monitoring encounter     Primary osteoarthritis of both knees     Postural dizziness     Mild intermittent asthma without complication     Morbid obesity with BMI of 50.0-59.9, adult ([facility]     Chronic use of opiate drugs therapeutic purposes     Nuclear senile cataract     Stage 3 chronic kidney disease

## 2021-02-18 NOTE — TELEPHONE ENCOUNTER
Request for multiple medications pended. Next Visit Date:  No future appointments. Health Maintenance   Topic Date Due    COVID-19 Vaccine (1 of 2) 06/21/1961    Pneumococcal 65+ years Vaccine (1 of 1 - PPSV23) 06/21/2012    Diabetic retinal exam  03/01/2017    Diabetic foot exam  08/26/2020    Flu vaccine (1) 09/01/2020    A1C test (Diabetic or Prediabetic)  12/18/2021    Lipid screen  12/18/2021    Potassium monitoring  12/18/2021    Creatinine monitoring  12/18/2021    Breast cancer screen  12/18/2022    Colon cancer screen colonoscopy  10/23/2025    DTaP/Tdap/Td vaccine (2 - Td) 04/01/2026    DEXA (modify frequency per FRAX score)  Completed    Shingles Vaccine  Completed    Hepatitis C screen  Completed    Hepatitis A vaccine  Aged Out    Hib vaccine  Aged Out    Meningococcal (ACWY) vaccine  Aged Out       Hemoglobin A1C (%)   Date Value   12/18/2020 6.9 (H)   08/26/2019 5.6   02/04/2019 6.3             ( goal A1C is < 7)   Microalb/Crt.  Ratio (mcg/mg creat)   Date Value   09/06/2018 19     LDL Cholesterol (mg/dL)   Date Value   12/18/2020 105       (goal LDL is <100)   AST (U/L)   Date Value   12/18/2020 18     ALT (U/L)   Date Value   12/18/2020 10     BUN (mg/dL)   Date Value   12/18/2020 35 (H)     BP Readings from Last 3 Encounters:   02/27/20 (!) 149/62   02/03/20 (!) 149/77   01/15/20 (!) 148/88          (goal 120/80)    All Future Testing planned in [facility]  Lab Frequency Next Occurrence   CBC Once 10/06/2020   Protein / Creatinine Ratio, Urine Once 10/06/2020   BEATRIZ DIGITAL DIAGNOSTIC W OR WO CAD RIGHT Once 03/16/2021   US BREAST COMPLETE RIGHT Once 03/16/2021   US BREAST COMPLETE RIGHT Once 04/22/2021   US BREAST COMPLETE LEFT Once 70/50/5454   Basic Metabolic Panel Every 6 Months 3/5/2021         Patient Active Problem List:     Obstructive sleep apnea     Hypertension     Hyperlipidemia     Gout     Asthma     Morbid obesity ([address]     Vitamin D deficiency     GERD (gastroesophageal reflux disease)     TIA (transient ischemic attack)     CKD stage 3 secondary to diabetes ([facility])     Anemia, iron deficiency     DJD (degenerative joint disease) of knee     Primary osteoarthritis of both shoulders     Pain of both shoulder joints     Primary osteoarthritis involving multiple joints     Pain in joint, lower leg     Type 2 diabetes mellitus without complication, without long-term current use of insulin ([facility])     Adjustment disorder with mixed anxiety and depressed mood     Bilateral leg edema     Extensor tenosynovitis of right wrist     Carpal tunnel syndrome on right     Medication monitoring encounter     Primary osteoarthritis of both knees     Postural dizziness     Mild intermittent asthma without complication     Morbid obesity with BMI of 50.0-59.9, adult ([facility]     Chronic use of opiate drugs therapeutic purposes     Nuclear senile cataract     Stage 3 chronic kidney disease

## 2021-03-12 NOTE — TELEPHONE ENCOUNTER
Multiple meds pended    Pt on wait list        Next Visit Date:  No future appointments. Health Maintenance   Topic Date Due    COVID-19 Vaccine (1) Never done    Pneumococcal 65+ years Vaccine (1 of 1 - PPSV23) Never done    Diabetic retinal exam  03/01/2017    Diabetic foot exam  08/26/2020    Flu vaccine (1) Never done    A1C test (Diabetic or Prediabetic)  12/18/2021    Lipid screen  12/18/2021    Potassium monitoring  12/18/2021    Creatinine monitoring  12/18/2021    Breast cancer screen  12/18/2022    Colon cancer screen colonoscopy  10/23/2025    DTaP/Tdap/Td vaccine (2 - Td) 04/01/2026    DEXA (modify frequency per FRAX score)  Completed    Shingles Vaccine  Completed    Hepatitis C screen  Completed    Hepatitis A vaccine  Aged Out    Hib vaccine  Aged Out    Meningococcal (ACWY) vaccine  Aged Out       Hemoglobin A1C (%)   Date Value   12/18/2020 6.9 (H)   08/26/2019 5.6   02/04/2019 6.3             ( goal A1C is < 7)   Microalb/Crt.  Ratio (mcg/mg creat)   Date Value   09/06/2018 19     LDL Cholesterol (mg/dL)   Date Value   12/18/2020 105   09/16/2019 107       (goal LDL is <100)   AST (U/L)   Date Value   12/18/2020 18     ALT (U/L)   Date Value   12/18/2020 10     BUN (mg/dL)   Date Value   12/18/2020 35 (H)     BP Readings from Last 3 Encounters:   02/27/20 (!) 149/62   02/03/20 (!) 149/77   01/15/20 (!) 148/88          (goal 120/80)    All Future Testing planned in [facility]  Lab Frequency Next Occurrence   CBC Once 10/06/2020   Protein / Creatinine Ratio, Urine Once 10/06/2020   BEATRIZ DIGITAL DIAGNOSTIC W OR WO CAD RIGHT Once 03/16/2021   US BREAST COMPLETE RIGHT Once 03/16/2021   US BREAST COMPLETE RIGHT Once 04/22/2021   US BREAST COMPLETE LEFT Once 14/80/3712   Basic Metabolic Panel Every 6 Months                Patient Active Problem List:     Obstructive sleep apnea     Hypertension     Hyperlipidemia     Gout     Asthma     Morbid obesity ([address]     Vitamin D deficiency GERD (gastroesophageal reflux disease)     TIA (transient ischemic attack)     CKD stage 3 secondary to diabetes ([facility])     Anemia, iron deficiency     DJD (degenerative joint disease) of knee     Primary osteoarthritis of both shoulders     Pain of both shoulder joints     Primary osteoarthritis involving multiple joints     Pain in joint, lower leg     Type 2 diabetes mellitus without complication, without long-term current use of insulin ([facility])     Adjustment disorder with mixed anxiety and depressed mood     Bilateral leg edema     Extensor tenosynovitis of right wrist     Carpal tunnel syndrome on right     Medication monitoring encounter     Primary osteoarthritis of both knees     Postural dizziness     Mild intermittent asthma without complication     Morbid obesity with BMI of 50.0-59.9, adult ([facility]     Chronic use of opiate drugs therapeutic purposes     Nuclear senile cataract     Stage 3 chronic kidney disease

## 2021-04-12 NOTE — TELEPHONE ENCOUNTER
E-scribe request from [facility] for One Touch Verio test strips. Patient has an appt on 4/16/21. Health Maintenance   Topic Date Due    COVID-19 Vaccine (1) Never done    Pneumococcal 65+ years Vaccine (1 of 1 - PPSV23) Never done    Diabetic retinal exam  03/01/2017    Diabetic foot exam  08/26/2020    Flu vaccine (Season Ended) 09/01/2021    A1C test (Diabetic or Prediabetic)  12/18/2021    Lipid screen  12/18/2021    Potassium monitoring  12/18/2021    Creatinine monitoring  12/18/2021    Breast cancer screen  12/18/2022    Colon cancer screen colonoscopy  10/23/2025    DTaP/Tdap/Td vaccine (2 - Td) 04/01/2026    DEXA (modify frequency per FRAX score)  Completed    Shingles Vaccine  Completed    Hepatitis C screen  Completed    Hepatitis A vaccine  Aged Out    Hib vaccine  Aged Out    Meningococcal (ACWY) vaccine  Aged Out             (applicable per patient's age: Cancer Screenings, Depression Screening, Fall Risk Screening, Immunizations)    Hemoglobin A1C (%)   Date Value   12/18/2020 6.9 (H)   08/26/2019 5.6   02/04/2019 6.3     Microalb/Crt.  Ratio (mcg/mg creat)   Date Value   09/06/2018 19     LDL Cholesterol (mg/dL)   Date Value   12/18/2020 105     AST (U/L)   Date Value   12/18/2020 18     ALT (U/L)   Date Value   12/18/2020 10     BUN (mg/dL)   Date Value   12/18/2020 35 (H)      (goal A1C is < 7)   (goal LDL is <100) need 30-50% reduction from baseline     BP Readings from Last 3 Encounters:   02/27/20 (!) 149/62   02/03/20 (!) 149/77   01/15/20 (!) 148/88    (goal /80)      All Future Testing planned in [facility]:  Lab Frequency Next Occurrence   BEATRIZ DIGITAL DIAGNOSTIC W OR WO CAD RIGHT Once 06/30/2021   US BREAST COMPLETE RIGHT Once 06/30/2021   US BREAST COMPLETE RIGHT Once 04/22/2021   US BREAST COMPLETE LEFT Once 04/22/2021       Next Visit Date:  Future Appointments   Date Time Provider Husam Bowden   4/16/2021 10:20 AM Ally Mike MD [facility] Skip Jonathan Patient Active Problem List:     Obstructive sleep apnea     Hypertension     Hyperlipidemia     Gout     Asthma     Morbid obesity ([facility]     Vitamin D deficiency     GERD (gastroesophageal reflux disease)     TIA (transient ischemic attack)     CKD stage 3 secondary to diabetes ([facility])     Anemia, iron deficiency     DJD (degenerative joint disease) of knee     Primary osteoarthritis of both shoulders     Pain of both shoulder joints     Primary osteoarthritis involving multiple joints     Pain in joint, lower leg     Type 2 diabetes mellitus without complication, without long-term current use of insulin ([facility])     Adjustment disorder with mixed anxiety and depressed mood     Bilateral leg edema     Extensor tenosynovitis of right wrist     Carpal tunnel syndrome on right     Medication monitoring encounter     Primary osteoarthritis of both knees     Postural dizziness     Mild intermittent asthma without complication     Morbid obesity with BMI of 50.0-59.9, adult ([facility]     Chronic use of opiate drugs therapeutic purposes     Nuclear senile cataract     Stage 3 chronic kidney disease

## 2021-04-20 PROBLEM — J12.82 PNEUMONIA DUE TO COVID-19 VIRUS: Status: ACTIVE | Noted: 2021-01-01

## 2021-04-20 PROBLEM — U07.1 SEPSIS DUE TO COVID-19 (HCC): Status: ACTIVE | Noted: 2021-01-01

## 2021-04-20 PROBLEM — J96.01 ACUTE HYPOXEMIC RESPIRATORY FAILURE DUE TO COVID-19 (HCC): Status: ACTIVE | Noted: 2021-01-01

## 2021-04-20 PROBLEM — U07.1 ACUTE HYPOXEMIC RESPIRATORY FAILURE DUE TO COVID-19 (HCC): Status: ACTIVE | Noted: 2021-01-01

## 2021-04-20 PROBLEM — U07.1 PNEUMONIA DUE TO COVID-19 VIRUS: Status: ACTIVE | Noted: 2021-01-01

## 2021-04-20 PROBLEM — A41.89 SEPSIS DUE TO COVID-19 (HCC): Status: ACTIVE | Noted: 2021-01-01

## 2021-04-20 PROBLEM — N17.9 AKI (ACUTE KIDNEY INJURY) (HCC): Status: ACTIVE | Noted: 2021-01-01

## 2021-04-20 NOTE — CONSULTS
Pulmonary Medicine and Critical Care Consult  Yanna Jasmine MD      Patient - Yvon Canales   MRN -  [id number]   [facility] # - [de-identified]   - 1945      Date of Admission -  2021  2:42 AM  Date of evaluation -  2021  Room - 1105/1105-01   [address] [address] Blood, DO Primary Care Physician - Darian Atkinson MD     Reason for Consult    Acute hypoxic respiratory failure    Assessment   · Acute hypoxic respiratory failure requiring oxygen by high flow nasal cannula  · COVID-19 viral pneumonia  · Asthma  · COPD/30-pack-year smoking history quit   · CKD  · Obstructive sleep apnea/morbid obesity on home CPAP  · Hypertension, DM II, GERD    Recommendations   · Airborne isolation  · Oxygen via high flow nasal cannula 60L/85%, keep oxygen saturation greater than 92%  · BiPAP for sleep and as needed during the day  · Continue IV Zithromax/Rocephin  · Decadron 6 mg p.o. daily  · Consult pharmacy to start Actemra  · Albuterol and Ipratropium Q 4 hours and prn  · Symbicort 160  · X-ray chest in am  · Labs: CBC and BMP in am, inflammatory markers  · Protonix for PUD prophylaxis  · DVT prophylaxis with low molecular weight heparin  · Follows with Dr. Clarice Howell  · Will follow with you  ·     HPI     Yvon Canales is 70 y.o.,  female, admitted because of shortness of breath. She has complaints of worsening shortness of breath over the last few days, noted to have elevated temperature. Her oxygen saturation was noted to be in the 60s by EMS, requiring supplemental oxygen. She quit smoking in  with approximately 30-pack-year smoking history.     PMHx   Past Medical History      Diagnosis Date    Anemia, iron deficiency     Asthma     Carpal tunnel syndrome on right 2016    Chronic kidney disease     COPD (chronic obstructive pulmonary disease) (HCC)     Edema     unspecified type    Former smoker     GERD (gastroesophageal reflux disease)     Glucose intolerance (impaired glucose tolerance)     History of anemia     Normocytic    History of corneal abrasion     Left    History of dyspnea     History of pneumonia     Hyperlipidemia     Hypertension     Idiopathic gout     Morbid obesity (HCC)     BMI of 50.0-59.9, adult    Neck strain     Obstructive sleep apnea     on CPAP    Osteoarthritis     DJD of knees    Pain     bilateral shoulders, Lt hip    Shoulder strain     History of bilateral shoulder strain    Systemic hypertension     TIA (transient ischemic attack)     Type II or unspecified type diabetes mellitus without mention of complication, not stated as uncontrolled     Vitamin D deficiency     Wears dentures     upper and lower dentures    Wears glasses       Past Surgical History        Procedure Laterality Date    COLONOSCOPY  10/23/15    per Dr. Underwood Ip  9/21/15    empi select    NERVE BLOCK Bilateral 08/07/2018    diamond shoulder injection, decadron 10 mg, lido 1%, isovue m-200, naropin 0.5%       Meds    Current Medications    allopurinol  100 mg Oral Nightly    amLODIPine  10 mg Oral Daily    aspirin  81 mg Oral QPM    carvedilol  12.5 mg Oral BID    ferrous sulfate  325 mg Oral Nightly    pantoprazole  40 mg Oral QAM AC    polyethylene glycol  17 g Oral Daily    pravastatin  40 mg Oral Nightly    sodium chloride flush  5-40 mL Intravenous 2 times per day    [START ON 4/21/2021] azithromycin  500 mg Intravenous Q24H    And    [START ON 4/21/2021] cefTRIAXone (ROCEPHIN) IV  1,000 mg Intravenous Q24H    heparin (porcine)  5,000 Units Subcutaneous 3 times per day    insulin lispro  0-6 Units Subcutaneous TID WC    insulin lispro  0-3 Units Subcutaneous Nightly    [START ON 4/21/2021] dexamethasone  6 mg Oral Daily    Vitamin D  2,000 Units Oral Daily     sodium chloride, albuterol sulfate HFA, glucose, dextrose, glucagon (rDNA), dextrose, sodium chloride flush, sodium chloride, promethazine **OR** ondansetron, acetaminophen **OR** acetaminophen, guaiFENesin-dextromethorphan  IV Drips/Infusions   sodium chloride      dextrose      sodium chloride       Home Medications  Medications Prior to Admission: pravastatin (PRAVACHOL) 40 MG tablet, TAKE 1 TABLET AT BEDTIME  furosemide (LASIX) 20 MG tablet, TAKE 1 TABLET EVERY MORNING  lisinopril (PRINIVIL;ZESTRIL) 30 MG tablet, TAKE 1 TABLET BY MOUTH EVERY MORNING  carvedilol (COREG) 12.5 MG tablet, TAKE 1 TABLET TWICE DAILY(AM/PM)  glimepiride (AMARYL) 4 MG tablet, TAKE 1 TABLET TWICE DAILY(AM/PM)  Omega-3 Fatty Acids (GNP FISH OIL) 1000 MG CAPS, TAKE 1 CAPSULE BY MOUTH DAILY IN THE EVENING  allopurinol (ZYLOPRIM) 100 MG tablet, TAKE 1 TABLET BY MOUTH AT BEDTIME  amLODIPine (NORVASC) 10 MG tablet, TAKE 1 TABLET BY MOUTH DAILY IN THE MORNING  aspirin (ASPIRIN LOW DOSE) 81 MG EC tablet, TAKE 1 TABLET BY MOUTH DAILY IN THE EVENING  omeprazole (PRILOSEC) 40 MG delayed release capsule, TAKE 1 CAPSULE BY MOUTH DAILY IN THE MORNING  ferrous sulfate (FEROSUL) 325 (65 Fe) MG tablet, TAKE 1 TABLET BY MOUTH DAILY IN THE EVENING  diclofenac sodium (VOLTAREN) 1 % GEL, Apply topically 2 times daily  polyethylene glycol (GLYCOLAX) 17 GM/SCOOP powder, Take 17 g by mouth daily  albuterol sulfate  (90 Base) MCG/ACT inhaler, Inhale 2 puffs into the lungs every 6 hours as needed for Wheezing or Shortness of Breath  Multiple Vitamins-Minerals (MULTIVITAMIN) tablet, TAKE 1 TABLET EVERY MORNING  acetaminophen (APAP EXTRA STRENGTH) 500 MG tablet, Take 1 tablet by mouth every 6 hours as needed for Pain  loratadine (CLARITIN) 10 MG tablet, TAKE 1 TABLET EVERY MORNING  blood glucose test strips (ONETOUCH VERIO) strip, TEST 3 TIMES A DAY & AS NEEDED FOR SYMPTOMS OF IRREGULAR BLOOD GLUCOSE  Blood Glucose Monitoring Suppl (ONETOUCH VERIO FLEX SYSTEM) w/Device KIT,   ONETOUCH VERIO strip, TEST BLOOD SUGAR 3 TIMES DAILY AS DIRECTED  [employer] Ultra Thin Lancets MISC, USE AS DIRECTED WITH INSULIN USE    Allergies    Nsaids and Proair respiclick [albuterol sulfate]  Social History     Social History     Tobacco Use    Smoking status: Former Smoker     Packs/day: 1.00     Years: 30.00     Pack years: 30.00     Types: Cigarettes     Start date: 1960     Quit date: 3/6/1990     Years since quittin.1    Smokeless tobacco: Never Used   Substance Use Topics    Alcohol use: No     Alcohol/week: 0.0 standard drinks     Comment: occassional     Family History          Problem Relation Age of Onset    Asthma Mother     Stroke Sister     Diabetes Sister     Other Neg Hx         Sleep disorders or narcolepsy     ROS - 11 systems   General Denies any fever or chills  HEENT Denies any diplopia, tinnitus or vertigo  Resp positive for  dyspnea  Cardiac Denies any chest pain, palpitations, claudication or edema  GI Denies any melena, hematochezia, hematemesis or pyrosis   Denies any frequency, urgency, hesitancy or incontinence  Heme Denies bruising or bleeding easily  Endocrine Denies any history of diabetes or thyroid disease  Neuro Denies any focal motor or sensory deficits  Psychiatric Denies anxiety, depression, suicidal ideation  Skin Denies rashes, itching, open sores    Vitals     height is 5' 3\" (1.6 m) and weight is 440 lb 0.6 oz (199.6 kg) (abnormal). Her axillary temperature is 97 °F (36.1 °C). Her blood pressure is 128/69 and her pulse is 73. Her respiration is 26 and oxygen saturation is 93%. Body mass index is 77.95 kg/m². I/O        Intake/Output Summary (Last 24 hours) at 2021 1221  Last data filed at 2021 0920  Gross per 24 hour   Intake    Output 325 ml   Net -325 ml     No intake/output data recorded. Patient Vitals for the past 96 hrs (Last 3 readings):   Weight   21 0920 (!) 440 lb 0.6 oz (199.6 kg)   21 0254 265 lb (120.2 kg)     Exam   General Appearance Awake, alert, oriented, in no acute distress  HEENT - Head is normocephalic, atraumatic.  Pupil

## 2021-04-20 NOTE — H&P
[facility]  Office: [address], DO, Alissa Brown, DO, Gregg Long, DO, Yuko Brendan Blood, DO, Alec Zaldivar MD, Milly Sargent MD, Kodi Duff MD, Deedee Brunner, MD, Manjeet Singleton MD, Melodie Casarez MD, Randy Jaime MD, Phylicia Ramirez MD, Stuart Ware DO, Elisabeth Owens MD, Ana Laura Lay DO, Batsheva Collazo MD,  Chris Ferris DO, Ra Xiong MD, Nikkie Smith MD, Dennis Cid MD, Ruben Eller MD, Beulah Redman, [facility], [facility], [facility], Brandonarsalan Galloway, [facility], Castro San, CNS, Nathen Garvey, CNP, Nola Phalen, CNP, [place] Lo, CNP, Slim Ortega, CNP, Donavan Moore, CNP, Olga Allen PA-C, Clay Patel DNP, Ludmila Levine, CNP, Adore Peña, CNP, Marzena Dent, CNP, Petrona Tejeda, CNP, Alexys Ruiz, CNP, Padma Valladares, CNP         [facility]   [address] [facility]    HISTORY AND PHYSICAL EXAMINATION            Date:   4/20/2021  Patient name:  Nasir Butt  Date of admission:  4/20/2021  2:42 AM  MRN:   [id number]  Account:  [de-identified]  YOB: 1945  PCP:    Rosey Carrasquillo MD  Room:   [address] [place] [address]  Code Status:    Full Code    Chief Complaint:     Chief Complaint   Patient presents with    Shortness of Breath       History Obtained From:     patient, electronic medical record, Quality of history:  Difficult as she is dependent on bipap currently    History of Present Illness:     Nasir Butt is a 70 y.o. AA female who presents with Shortness of Breath   and is admitted to the hospital for the management of Sepsis due to COVID-19 [facility]). This 70 yof presents with sob for about a week pta. She has also had a cough with white phlegm and has had fevers, chills, sweats, shakes. Denies change in taste or smell, n/v/d or abd pain.     Past Medical History:     Past Medical History:   Diagnosis Date    Anemia, iron deficiency     Asthma     Carpal tunnel syndrome on right 8/29/2016    Chronic kidney Radha Silver MD   allopurinol (ZYLOPRIM) 100 MG tablet TAKE 1 TABLET BY MOUTH AT BEDTIME 11/18/20  Yes Radha Silver MD   amLODIPine (NORVASC) 10 MG tablet TAKE 1 TABLET BY MOUTH DAILY IN THE MORNING 11/18/20  Yes Radha Silver MD   aspirin (ASPIRIN LOW DOSE) 81 MG EC tablet TAKE 1 TABLET BY MOUTH DAILY IN THE EVENING 11/18/20  Yes Radha Silver MD   omeprazole (PRILOSEC) 40 MG delayed release capsule TAKE 1 CAPSULE BY MOUTH DAILY IN THE MORNING 11/18/20  Yes Radha Silver MD   ferrous sulfate (FEROSUL) 325 (65 Fe) MG tablet TAKE 1 TABLET BY MOUTH DAILY IN THE EVENING 11/18/20  Yes Radha Silver MD   diclofenac sodium (VOLTAREN) 1 % GEL Apply topically 2 times daily 11/13/20  Yes Radha Silver MD   polyethylene glycol (GLYCOLAX) 17 GM/SCOOP powder Take 17 g by mouth daily 11/13/20  Yes Radha Silver MD   albuterol sulfate  (90 Base) MCG/ACT inhaler Inhale 2 puffs into the lungs every 6 hours as needed for Wheezing or Shortness of Breath 7/23/20  Yes Sho Espitia MD   Multiple Vitamins-Minerals (MULTIVITAMIN) tablet TAKE 1 TABLET EVERY MORNING 7/1/20  Yes Radha Silver MD   acetaminophen (APAP EXTRA STRENGTH) 500 MG tablet Take 1 tablet by mouth every 6 hours as needed for Pain 4/23/20  Yes Radha Silver MD   loratadine (CLARITIN) 10 MG tablet TAKE 1 TABLET EVERY MORNING 4/14/21   Radha Silver MD   blood glucose test strips (ONETOUCH VERIO) strip TEST 3 TIMES A DAY & AS NEEDED FOR SYMPTOMS OF IRREGULAR BLOOD GLUCOSE 4/13/21   Radha Silver MD   Blood Glucose Monitoring Suppl (Kim Gennaro FLEX SYSTEM) w/Device KIT  10/26/20   Historical ProviderMD Kim strip TEST BLOOD SUGAR 3 TIMES DAILY AS DIRECTED 9/22/20   Radha Silver MD   Aimsco Ultra Thin Lancets MISC USE AS DIRECTED WITH INSULIN USE 6/25/19   Radha Silver MD        Allergies:     Nsaids and Proair respiclick [albuterol sulfate]    Social History:     Tobacco:    reports that she quit smoking about 31 years ago.  Her smoking use included cigarettes. She started smoking about 61 years ago. She has a 30.00 pack-year smoking history. She has never used smokeless tobacco.  Alcohol:      reports no history of alcohol use. Drug Use:  reports no history of drug use. Family History:     Family History   Problem Relation Age of Onset    Asthma Mother     Stroke Sister     Diabetes Sister     Other Neg Hx         Sleep disorders or narcolepsy       Review of Systems:     unobtainable  Due to bipap    Physical Exam:   /78   Pulse 79   Temp 97.2 °F (36.2 °C) (Axillary)   Resp 21   Ht 5' 3\" (1.6 m)   Wt (!) 440 lb 0.6 oz (199.6 kg)   SpO2 (!) 9%   BMI 77.95 kg/m²   Temp (24hrs), Av.7 °F (37.6 °C), Min:97 °F (36.1 °C), Max:103.4 °F (39.7 °C)    Recent Labs     21  0826 21  1200   POCGLU 112* 160*       Intake/Output Summary (Last 24 hours) at 2021 1334  Last data filed at 2021 1232  Gross per 24 hour   Intake 240 ml   Output 325 ml   Net -85 ml       General Appearance:  alert, well appearing, and in no acute distress  Mental status: oriented to person, place, and time  Head:  normocephalic, atraumatic  Eye: no icterus, redness, pupils equal and reactive, extraocular eye movements intact, conjunctiva clear  Ear: normal external ear, no discharge, hearing intact  Nose:  no drainage noted  Mouth: mucous membranes moist  Neck: supple, no carotid bruits, thyroid not palpable  Lungs: Bilateral equal air entry, clear to auscultation, no wheezing, rales or rhonchi, mildly labored effort on bipap  Cardiovascular: normal rate, regular rhythm, no murmur, gallop, rub.   Abdomen: Soft, nontender, nondistended, normal bowel sounds, no hepatomegaly or splenomegaly; obese  Neurologic: There are no new focal motor or sensory deficits, normal muscle tone and bulk, no abnormal sensation, normal speech, cranial nerves II through XII grossly intact  Skin: No gross lesions, rashes, bruising or bleeding on exposed skin area Extremities:  peripheral pulses palpable, no pedal edema or calf pain with palpation  Psych: normal affect     Investigations:      Laboratory Testing:  Recent Results (from the past 24 hour(s))   EKG 12 lead    Collection Time: 04/20/21  2:47 AM   Result Value Ref Range    Ventricular Rate 86 BPM    Atrial Rate 86 BPM    P-R Interval 188 ms    QRS Duration 76 ms    Q-T Interval 342 ms    QTc Calculation (Bazett) 409 ms    P Axis 59 degrees    R Axis -31 degrees    T Axis 48 degrees   COVID-19, Rapid    Collection Time: 04/20/21  2:56 AM    Specimen: Nasopharyngeal Swab   Result Value Ref Range    Specimen Description . NASOPHARYNGEAL SWAB     SARS-CoV-2, Rapid DETECTED (A) Not Detected   Lactate, Sepsis    Collection Time: 04/20/21  2:58 AM   Result Value Ref Range    Lactic Acid, Sepsis 1.7 0.5 - 1.9 mmol/L    Lactic Acid, Sepsis, Whole Blood NOT REPORTED 0.5 - 1.9 mmol/L   Lipase    Collection Time: 04/20/21  2:58 AM   Result Value Ref Range    Lipase 114 (H) 13 - 60 U/L   Liver Profile    Collection Time: 04/20/21  2:58 AM   Result Value Ref Range    Albumin 3.6 3.5 - 5.2 g/dL    Alkaline Phosphatase 91 35 - 104 U/L    ALT 40 (H) 5 - 33 U/L     (H) <32 U/L    Total Bilirubin 0.43 0.3 - 1.2 mg/dL    Bilirubin, Direct 0.14 <0.31 mg/dL    Bilirubin, Indirect 0.29 0.00 - 1.00 mg/dL    Total Protein 7.2 6.4 - 8.3 g/dL    Globulin NOT REPORTED 1.5 - 3.8 g/dL    Albumin/Globulin Ratio NOT REPORTED 1.0 - 2.5   Troponin    Collection Time: 04/20/21  2:58 AM   Result Value Ref Range    Troponin, High Sensitivity 69 (HH) 0 - 14 ng/L    Troponin T NOT REPORTED <0.03 ng/mL    Troponin Interp NOT REPORTED    CBC Auto Differential    Collection Time: 04/20/21  2:58 AM   Result Value Ref Range    WBC 6.8 3.5 - 11.3 k/uL    RBC 4.04 3.95 - 5.11 m/uL    Hemoglobin 12.3 11.9 - 15.1 g/dL    Hematocrit 37.3 36.3 - 47.1 %    MCV 92.3 82.6 - 102.9 fL    MCH 30.4 25.2 - 33.5 pg    MCHC 33.0 28.4 - 34.8 g/dL    RDW 13.2 11.8 - 14.4 %    Platelets 191 (L) 203 - 453 k/uL    MPV 11.6 8.1 - 13.5 fL    NRBC Automated 0.0 0.0 per 100 WBC    Differential Type NOT REPORTED     WBC Morphology NOT REPORTED     RBC Morphology NOT REPORTED     Platelet Estimate NOT REPORTED     Seg Neutrophils 82 (H) 36 - 65 %    Lymphocytes 12 (L) 24 - 43 %    Monocytes 5 3 - 12 %    Eosinophils % 0 (L) 1 - 4 %    Basophils 0 0 - 2 %    Immature Granulocytes 1 (H) 0 %    Segs Absolute 5.62 1.50 - 8.10 k/uL    Absolute Lymph # 0.84 (L) 1.10 - 3.70 k/uL    Absolute Mono # 0.32 0.10 - 1.20 k/uL    Absolute Eos # <0.03 0.00 - 0.44 k/uL    Basophils Absolute <0.03 0.00 - 0.20 k/uL    Absolute Immature Granulocyte 0.04 0.00 - 0.30 k/uL   Basic Metabolic Panel    Collection Time: 04/20/21  2:58 AM   Result Value Ref Range    Glucose 81 70 - 99 mg/dL    BUN 69 (H) 8 - 23 mg/dL    CREATININE 3.29 (H) 0.50 - 0.90 mg/dL    Bun/Cre Ratio 21 (H) 9 - 20    Calcium 8.8 8.6 - 10.4 mg/dL    Sodium 134 (L) 135 - 144 mmol/L    Potassium 4.5 3.7 - 5.3 mmol/L    Chloride 101 98 - 107 mmol/L    CO2 15 (L) 20 - 31 mmol/L    Anion Gap 18 (H) 9 - 17 mmol/L    GFR Non-African American 14 (L) >60 mL/min    GFR  17 (L) >60 mL/min    GFR Comment          GFR Staging NOT REPORTED    Urinalysis    Collection Time: 04/20/21  3:31 AM   Result Value Ref Range    Color, UA YELLOW YELLOW    Turbidity UA CLEAR CLEAR    Glucose, Ur NEGATIVE NEGATIVE    Bilirubin Urine NEGATIVE NEGATIVE    Ketones, Urine NEGATIVE NEGATIVE    Specific Gravity, UA 1.025 1.005 - 1.030    Urine Hgb 1+ (A) NEGATIVE    pH, UA 5.0 5.0 - 8.0    Protein, UA 1+ (A) NEGATIVE    Urobilinogen, Urine Normal Normal    Nitrite, Urine NEGATIVE NEGATIVE    Leukocyte Esterase, Urine NEGATIVE NEGATIVE    Urinalysis Comments NOT REPORTED    Microscopic Urinalysis    Collection Time: 04/20/21  3:31 AM   Result Value Ref Range    -          WBC, UA None 0 - 5 /HPF    RBC, UA 2 TO 5 0 - 2 /HPF    Casts UA 0 TO 2 /LPF    Casts UA HYALINE /LPF    Crystals, UA NOT REPORTED None /HPF    Epithelial Cells UA 2 TO 5 0 - 5 /HPF    Renal Epithelial, UA NOT REPORTED 0 /HPF    Bacteria, UA MANY (A) None    Mucus, UA NOT REPORTED None    Trichomonas, UA NOT REPORTED None    Amorphous, UA NOT REPORTED None    Other Observations UA NOT REPORTED NOT REQ.     Yeast, UA NOT REPORTED None   POC PANEL (G3)-KAPIL    Collection Time: 04/20/21  3:48 AM   Result Value Ref Range    pH, Kapil 7.39 7.32 - 7.42    pCO2, Kapil 34 (L) 39 - 55 mm Hg    pO2, Kapil 34 30 - 50 mm Hg    Total CO2, Venous 21 (L) 25 - 31 mmol/L    HCO3, Venous 20.3 (L) 24 - 30 mmol/L    Negative Base Excess, Kapil 5 (H) 0.0 - 2.0    Positive Base Excess, Kapil NOT REPORTED 0.0 - 2.0    O2 Sat, Kapil 65 %    Pt Temp 37.0     O2 Device/Flow/% NOT REPORTED     FIO2 80.0     POC pH Temp NOT REPORTED     POC pCO2 Temp NOT REPORTED mm Hg    POC pO2 Temp NOT REPORTED mm Hg   Lactate, Sepsis    Collection Time: 04/20/21  5:18 AM   Result Value Ref Range    Lactic Acid, Sepsis 0.8 0.5 - 1.9 mmol/L    Lactic Acid, Sepsis, Whole Blood NOT REPORTED 0.5 - 1.9 mmol/L   Troponin    Collection Time: 04/20/21  5:18 AM   Result Value Ref Range    Troponin, High Sensitivity 82 (HH) 0 - 14 ng/L    Troponin T NOT REPORTED <0.03 ng/mL    Troponin Interp NOT REPORTED    Procalcitonin    Collection Time: 04/20/21  5:18 AM   Result Value Ref Range    Procalcitonin 1.79 (H) <0.09 ng/mL   TYPE AND SCREEN    Collection Time: 04/20/21  7:55 AM   Result Value Ref Range    Expiration Date 04/23/2021,2359     Arm Band Number [id number]     ABO/Rh O POSITIVE     Antibody Screen NEGATIVE    POC Glucose Fingerstick    Collection Time: 04/20/21  8:26 AM   Result Value Ref Range    POC Glucose 112 (H) 65 - 105 mg/dL   POC Glucose Fingerstick    Collection Time: 04/20/21 12:00 PM   Result Value Ref Range    POC Glucose 160 (H) 65 - 105 mg/dL       Imaging/Diagnostics:  Xr Chest Portable    Result Date: 4/20/2021  Patchy bilateral pneumonia concerning for COVID pneumonia. Mild stable cardiomegaly. Severe osteoarthritic changes at the bilateral glenohumeral joints. Assessment :      Hospital Problems           Last Modified POA    * [facility]Principal) [facility] 4/20/2021 Yes    Obstructive sleep apnea 4/20/2021 Yes    Morbid obesity with BMI of 70 and over, adult ([facility] [address] 4/20/2021 Yes    Type 2 diabetes mellitus with stage 3a chronic kidney disease, without long-term current use of insulin ([facility] [address] 4/20/2021 Yes    SHANAE (acute kidney injury) ([facility] [address] 4/20/2021 Yes    Essential hypertension 4/20/2021 Yes    Pneumonia due to COVID-19 virus 4/20/2021 Yes    Acute hypoxemic respiratory failure [facility]) 4/20/2021 Yes          Plan:     Patient status inpatient in the Medical ICU    1. pulm consult-d/w Dr Pk Franks  2. ID consult  3. Good candidate for actemra  4. Will continue antibiotics for now as procalcitonin is elevated  5. Wean bipap as able  6. Decadron per protocol, day 1/10  7. Monitor and control glucose levels  8. Gentle ivf for shanae,monitor renal function; recheck this pm  9. Avoid nephrotoxic meds  10. dvt prophylaxis with heparin  11. Guarded prognosis    Consultations:   IP CONSULT TO HOSPITALIST  IP CONSULT TO INFECTIOUS DISEASES  IP CONSULT TO PULMONOLOGY  IP CONSULT TO PHARMACY     Patient is admitted as inpatient status because of co-morbidities listed above, severity of signs and symptoms as outlined, requirement for current medical therapies and most importantly because of direct risk to patient if care not provided in a hospital setting. Expected length of stay > 48 hours.     Eduardo Aguilera DO  4/20/2021  1:34 PM    Copy sent to Dr. Claudene Guise, MD

## 2021-04-20 NOTE — PROGRESS NOTES
Physical Therapy  DATE: 2021    NAME: Olga Torres  MRN: [id number]   : 1945    Patient not seen this date for Physical Therapy due to:  [] Blood transfusion in progress  [] Cancel by RN  [] Hemodialysis  []  Refusal by Patient   [] Spine Precautions   [] Strict Bedrest  [] Surgery  [] Testing      [x] Other - pt currently on BiPAP. Will continue to follow. [] PT being discontinued at this time. Patient independent. No further needs. [] PT being discontinued at this time as the patient has been transferred to hospice care. No further needs.     Thaddeus Song, PT

## 2021-04-20 NOTE — ED PROVIDER NOTES
EMERGENCY DEPARTMENT ENCOUNTER    Pt Name: Donah Pallas  MRN: [id number]  [place] 1945  Date of evaluation: 4/20/21  CHIEF COMPLAINT       Chief Complaint   Patient presents with    Shortness of Breath     HISTORY OF PRESENT ILLNESS   This is a 24-year-old female that presents the emergency department with complaints of weakness generally not feeling well. EMS states that they were called out on the patient earlier and she had some hypoglycemia. The patient presents this evening with fevers, dyspnea and hypoxemia. She was satting in the 62s for EMS. Patient denies any chest pain or lower extremity edema, she denies any abdominal pain. REVIEW OF SYSTEMS     Review of Systems   Constitutional: Positive for chills, fatigue and fever. HENT: Negative for rhinorrhea and sore throat. Eyes: Negative for discharge, redness and visual disturbance. Respiratory: Positive for cough and shortness of breath. Cardiovascular: Negative for chest pain, palpitations and leg swelling. Gastrointestinal: Negative for diarrhea, nausea and vomiting. Musculoskeletal: Negative for arthralgias, myalgias and neck pain. Skin: Negative for color change and rash. Neurological: Positive for weakness. Negative for seizures and headaches. Psychiatric/Behavioral: Negative for hallucinations, self-injury and suicidal ideas.      PASTMEDICAL HISTORY     Past Medical History:   Diagnosis Date    Anemia, iron deficiency     Asthma     Carpal tunnel syndrome on right 8/29/2016    Chronic kidney disease     Edema     unspecified type    Former smoker     GERD (gastroesophageal reflux disease)     Glucose intolerance (impaired glucose tolerance)     History of anemia     Normocytic    History of corneal abrasion     Left    History of dyspnea     History of pneumonia     Hyperlipidemia     Hypertension     Idiopathic gout     Morbid obesity (HCC)     BMI of 50.0-59.9, adult    Neck strain    Idris Zacarias Obstructive sleep apnea     on CPAP    Osteoarthritis     DJD of knees    Pain     bilateral shoulders, Lt hip    Shoulder strain     History of bilateral shoulder strain    Systemic hypertension     TIA (transient ischemic attack)     Type II or unspecified type diabetes mellitus without mention of complication, not stated as uncontrolled     Vitamin D deficiency     Wears dentures     upper and lower dentures    Wears glasses      Past Problem List  Patient Active Problem List   Diagnosis Code    Obstructive sleep apnea G47.33    Hypertension I10    Hyperlipidemia E78.5    Gout M10.9    Asthma J45.909    Morbid obesity ([facility] [address] E66.01    Vitamin D deficiency E55.9    GERD (gastroesophageal reflux disease) K21.9    TIA (transient ischemic attack) G45.9    CKD stage 3 secondary to diabetes ([facility] [address] E11.22, N18.30    Anemia, iron deficiency D50.9    DJD (degenerative joint disease) of knee M17.10    Primary osteoarthritis of both shoulders M19.011, M19.012    Pain of both shoulder joints M25.511, M25.512    Primary osteoarthritis involving multiple joints M89.49    Pain in joint, lower leg M25.569    Type 2 diabetes mellitus without complication, without long-term current use of insulin (formerly [facility]) E11.9    Adjustment disorder with mixed anxiety and depressed mood F43.23    Bilateral leg edema R60.0    Extensor tenosynovitis of right wrist M65.831    Carpal tunnel syndrome on right G56.01    Medication monitoring encounter Z51.81    Primary osteoarthritis of both knees M17.0    Postural dizziness R42    Mild intermittent asthma without complication V11.77    Morbid obesity with BMI of 50.0-59.9, adult (formerly [facility]) E66.01, Z68.43    Chronic use of opiate drugs therapeutic purposes Z79.891    Nuclear senile cataract H25.10    Stage 3 chronic kidney disease N18.30     SURGICAL HISTORY       Past Surgical History:   Procedure Laterality Date    COLONOSCOPY  10/23/15    per Dr. Jesus Landa 9/21/15    empi select    NERVE BLOCK Bilateral 08/07/2018    diamond shoulder injection, decadron 10 mg, lido 1%, isovue m-200, naropin 0.5%     CURRENT MEDICATIONS       Previous Medications    ACETAMINOPHEN (APAP EXTRA STRENGTH) 500 MG TABLET    Take 1 tablet by mouth every 6 hours as needed for Pain    AIMSCO ULTRA THIN LANCETS MISC    USE AS DIRECTED WITH INSULIN USE    ALBUTEROL SULFATE  (90 BASE) MCG/ACT INHALER    Inhale 2 puffs into the lungs every 6 hours as needed for Wheezing or Shortness of Breath    ALLOPURINOL (ZYLOPRIM) 100 MG TABLET    TAKE 1 TABLET BY MOUTH AT BEDTIME    AMLODIPINE (NORVASC) 10 MG TABLET    TAKE 1 TABLET BY MOUTH DAILY IN THE MORNING    ASPIRIN (ASPIRIN LOW DOSE) 81 MG EC TABLET    TAKE 1 TABLET BY MOUTH DAILY IN THE EVENING    BLOOD GLUCOSE MONITORING SUPPL (ONETOUCH VERIO FLEX SYSTEM) W/DEVICE KIT        BLOOD GLUCOSE TEST STRIPS (ONETOUCH VERIO) STRIP    TEST 3 TIMES A DAY & AS NEEDED FOR SYMPTOMS OF IRREGULAR BLOOD GLUCOSE    CARVEDILOL (COREG) 12.5 MG TABLET    TAKE 1 TABLET TWICE DAILY(AM/PM)    DICLOFENAC SODIUM (VOLTAREN) 1 % GEL    Apply topically 2 times daily    FERROUS SULFATE (FEROSUL) 325 (65 FE) MG TABLET    TAKE 1 TABLET BY MOUTH DAILY IN THE EVENING    FUROSEMIDE (LASIX) 20 MG TABLET    TAKE 1 TABLET EVERY MORNING    GLIMEPIRIDE (AMARYL) 4 MG TABLET    TAKE 1 TABLET TWICE DAILY(AM/PM)    LISINOPRIL (PRINIVIL;ZESTRIL) 30 MG TABLET    TAKE 1 TABLET BY MOUTH EVERY MORNING    LORATADINE (CLARITIN) 10 MG TABLET    TAKE 1 TABLET EVERY MORNING    MULTIPLE VITAMINS-MINERALS (MULTIVITAMIN) TABLET    TAKE 1 TABLET EVERY MORNING    OMEGA-3 FATTY ACIDS (GNP FISH OIL) 1000 MG CAPS    TAKE 1 CAPSULE BY MOUTH DAILY IN THE EVENING    OMEPRAZOLE (PRILOSEC) 40 MG DELAYED RELEASE CAPSULE    TAKE 1 CAPSULE BY MOUTH DAILY IN THE MORNING    ONETOUCH VERIO STRIP    TEST BLOOD SUGAR 3 TIMES DAILY AS DIRECTED    POLYETHYLENE GLYCOL (GLYCOLAX) 17 GM/SCOOP POWDER    Take 17 g by mouth daily    PRAVASTATIN (PRAVACHOL) 40 MG TABLET    TAKE 1 TABLET AT BEDTIME     ALLERGIES     is allergic to nsaids and proair respiclick [albuterol sulfate]. FAMILY HISTORY     She indicated that the status of her mother is unknown. She indicated that the status of her neg hx is unknown. SOCIAL HISTORY       Social History     Tobacco Use    Smoking status: Former Smoker     Packs/day: 1.00     Years: 30.00     Pack years: 30.00     Types: Cigarettes     Start date: 1960     Quit date: 3/6/1990     Years since quittin.1    Smokeless tobacco: Never Used   Substance Use Topics    Alcohol use: No     Alcohol/week: 0.0 standard drinks     Comment: occassional    Drug use: No     PHYSICAL EXAM     INITIAL VITALS: BP (!) 141/55   Pulse 89   Temp 103.4 °F (39.7 °C) (Axillary)   Resp 30   Ht 5' 1\" (1.549 m)   Wt 265 lb (120.2 kg)   SpO2 (!) 89%   BMI 50.07 kg/m²    Physical Exam  Constitutional:       Appearance: Normal appearance. She is well-developed. She is ill-appearing and toxic-appearing. HENT:      Head: Normocephalic and atraumatic. Eyes:      Conjunctiva/sclera: Conjunctivae normal.      Pupils: Pupils are equal, round, and reactive to light. Neck:      Musculoskeletal: Normal range of motion and neck supple. Trachea: Trachea normal.   Cardiovascular:      Rate and Rhythm: Normal rate and regular rhythm. Heart sounds: S1 normal and S2 normal. No murmur. Pulmonary:      Effort: Tachypnea, accessory muscle usage and respiratory distress present. Breath sounds: Normal breath sounds. Chest:      Chest wall: No deformity or tenderness. Abdominal:      General: Bowel sounds are normal. There is no distension or abdominal bruit. Palpations: Abdomen is not rigid. Tenderness: There is no abdominal tenderness. There is no guarding or rebound. Skin:     General: Skin is warm. Findings: No rash.    Neurological:      Mental Status: She is alert and oriented to person, place, and time. GCS: GCS eye subscore is 4. GCS verbal subscore is 5. GCS motor subscore is 6. Psychiatric:         Speech: Speech normal.         MEDICAL DECISION MAKIN-year-old female presents with complaints of fevers, cough, shortness of breath. Patient was in significant respiratory distress with hypoxemia and tachypnea, she was not satting well on the nonrebreather so she was started on BiPAP. We will continue to monitor. We will obtain basic labs cardiac enzymes Covid screen blood cultures and reevaluate. 4:22 AM EDT  Patient has evidence of Covid pneumonia, acute kidney injury, she will be given IV antibiotics and admitted to the ICU for further evaluation. CRITICAL CARE:       Critical Care  Performed by: Donald Allen MD  Authorized by: Donald Allen MD     Critical care provider statement:     Critical care time (minutes):  36    Critical care time was exclusive of:  Separately billable procedures and treating other patients and teaching time    Critical care was necessary to treat or prevent imminent or life-threatening deterioration of the following conditions:  Sepsis and respiratory failure    Critical care was time spent personally by me on the following activities:  Examination of patient, ordering and performing treatments and interventions, ordering and review of laboratory studies, ordering and review of radiographic studies, pulse oximetry and re-evaluation of patient's condition        DIAGNOSTIC RESULTS   EKG:All EKG's are interpreted by the Emergency Department Physician who either signs or Co-signs this chart in the absence of a cardiologist.    Patient's EKG shows sinus rhythm with a rate of 86, OR QRS QTC intervals unremarkable and the patient has left axis deviation no ST elevation or depressions, no significant T wave changes. Nonspecific EKG.     RADIOLOGY:All plain film, CT, MRI, and formal ultrasound images (except ED bedside ultrasound) are read by the radiologist, see reports below, unless otherwisenoted in MDM or here. XR CHEST PORTABLE   Final Result   Patchy bilateral pneumonia concerning for COVID pneumonia. Mild stable cardiomegaly. Severe osteoarthritic changes at the bilateral glenohumeral joints. LABS: All lab results were reviewed by myself, and all abnormals are listed below.   Labs Reviewed   COVID-19, RAPID - Abnormal; Notable for the following components:       Result Value    SARS-CoV-2, Rapid DETECTED (*)     All other components within normal limits   LIPASE - Abnormal; Notable for the following components:    Lipase 114 (*)     All other components within normal limits   HEPATIC FUNCTION PANEL - Abnormal; Notable for the following components:    ALT 40 (*)      (*)     All other components within normal limits   TROPONIN - Abnormal; Notable for the following components:    Troponin, High Sensitivity 69 (*)     All other components within normal limits   CBC WITH AUTO DIFFERENTIAL - Abnormal; Notable for the following components:    Platelets 620 (*)     Seg Neutrophils 82 (*)     Lymphocytes 12 (*)     Eosinophils % 0 (*)     Immature Granulocytes 1 (*)     Absolute Lymph # 0.84 (*)     All other components within normal limits   BASIC METABOLIC PANEL - Abnormal; Notable for the following components:    BUN 69 (*)     CREATININE 3.29 (*)     Bun/Cre Ratio 21 (*)     Sodium 134 (*)     CO2 15 (*)     Anion Gap 18 (*)     GFR Non- 14 (*)     GFR  17 (*)     All other components within normal limits   POC PANEL (G3)-KAPIL - Abnormal; Notable for the following components:    pCO2, Kapil 34 (*)     Total CO2, Venous 21 (*)     HCO3, Venous 20.3 (*)     Negative Base Excess, Kapil 5 (*)     All other components within normal limits   CULTURE, BLOOD 1   CULTURE, BLOOD 2   LACTATE, SEPSIS   LACTATE, SEPSIS   TROPONIN   URINALYSIS       EMERGENCY DEPARTMENTCOURSE:         Vitals: Vitals:    04/20/21 0300 04/20/21 0311 04/20/21 0326 04/20/21 0348   BP:  (!) 130/94 (!) 141/55    Pulse:  89 89    Resp:  (!) 31 (!) 34 30   Temp: 103.4 °F (39.7 °C)      TempSrc: Axillary      SpO2:  92% 92% (!) 89%   Weight:       Height:           The patient was given the following medications while in the emergency department:  Orders Placed This Encounter   Medications    sodium chloride flush 0.9 % injection 5-40 mL    sodium chloride flush 0.9 % injection 5-40 mL    0.9 % sodium chloride infusion    0.9 % sodium chloride IV bolus 2,304 mL    acetaminophen (TYLENOL) tablet 1,000 mg    cefTRIAXone (ROCEPHIN) 1000 mg IVPB in 50 mL D5W minibag     Order Specific Question:   Antimicrobial Indications     Answer:   Pneumonia (CAP)    azithromycin (ZITHROMAX) 500 mg in D5W 250ml addavial     Order Specific Question:   Antimicrobial Indications     Answer:   Pneumonia (CAP)     CONSULTS:  IP CONSULT TO HOSPITALIST    FINAL IMPRESSION      1. Pneumonia due to COVID-19 virus    2. SHANAE (acute kidney injury) ([facility] [address]    3. Severe sepsis ([facility] [address]    4. Acute respiratory failure with hypoxia [facility])          DISPOSITION/PLAN   DISPOSITION Decision To Admit 04/20/2021 04:17:49 AM      PATIENT REFERRED TO:  No follow-up provider specified.   DISCHARGE MEDICATIONS:  New Prescriptions    No medications on file     Therese Simpson MD  Attending Emergency Physician                   Therese Simpson MD  04/20/21 7891

## 2021-04-20 NOTE — FLOWSHEET NOTE
[facility]. Writer says prayer outside of door. Chaplains will remain available to offer spiritual and emotional support as needed.        04/20/21 1635   Encounter Summary   Services provided to: Patient   Referral/Consult From: Sarah   Continue Visiting   (4/20/21 resting on bipap)   Routine   Type Initial   Assessment Sleeping   Intervention Prayer     Electronically signed by Grazyna Rodriguez, on 4/20/2021 at 4:36 PM.  [id number] [facility]  [phone]

## 2021-04-21 NOTE — CONSULTS
[facility]  Initial Consult Note  Date: 4/21/2021    Hospital day :1     Impression:   1. Acute hypoxic respiratory failure due to COVID-19 virus pneumonia  2. Acute kidney injury on chronic kidney disease  3. Elevated inflammatory markers  4. Pancytopenia  5. Abnormal LFTs    Recommendations   · Continue steroid therapy with Decadron per COVID-19 protocol. · The patient is status post 1 dose of Actemra 4/20/2021. · The patient is not a candidate for remdesivir as she is out of the window and also has acute kidney injury. · Continue Rocephin and azithromycin for possible bacterial coinfection and I will plan a 5-day course of therapy  · The patient cannot have a CT angiogram to work-up for a pulmonary embolism at this time due to the renal issues. · We will continue to trend the inflammatory markers    Chief complaint/reason for consultation:   COVID-19 virus pneumonia, hypoxia    History of Present Illness:   Verónica Sue is a 70y.o.-year-old female who was initially admitted on 4/20/2021. Roney Webster lives at home alone and does not know of any known COVID-19 virus exposures. She reports that last week on Tuesday she started feeling generalized malaise/fatigue, shortness of breath without any significant cough. She did have some intermittent fevers but no chills, no chest pains or palpitations, no abdominal pain nausea vomiting or diarrhea. Her appetite did decrease and she continued to have progressive symptoms of shortness of breath which ended up prompting her to come into the emergency room for evaluation yesterday. The patient was worked up at chest x-ray findings as suggested bilateral infiltrates and COVID-19 testing was done that was positive. The patient was placed on BiPAP/high flow O2 by nasal cannula actually did not tolerate the high flow O2 very well and is currently on BiPAP at 90% FiO2.   The patient was started on antimicrobial therapy with Rocephin and azithromycin, chloride (PF)  10 mL Intravenous Daily    insulin glargine  20 Units Subcutaneous Nightly    insulin lispro  0-12 Units Subcutaneous TID WC    insulin lispro  0-6 Units Subcutaneous Nightly    allopurinol  100 mg Oral Nightly    amLODIPine  10 mg Oral Daily    aspirin  81 mg Oral QPM    carvedilol  12.5 mg Oral BID    ferrous sulfate  325 mg Oral Nightly    polyethylene glycol  17 g Oral Daily    pravastatin  40 mg Oral Nightly    sodium chloride flush  5-40 mL Intravenous 2 times per day    azithromycin  500 mg Intravenous Q24H    And    cefTRIAXone (ROCEPHIN) IV  1,000 mg Intravenous Q24H    heparin (porcine)  5,000 Units Subcutaneous 3 times per day    Vitamin D  2,000 Units Oral Daily    budesonide-formoterol  2 puff Inhalation BID    ipratropium-albuterol  1 ampule Inhalation Q4H WA     Social History:     Social History     Socioeconomic History    Marital status: Single     Spouse name: Not on file    Number of children: Not on file    Years of education: Not on file    Highest education level: Not on file   Occupational History    Not on file   Social Needs    Financial resource strain: Not hard at all   [employer] insecurity     Worry: Never true     Inability: Never true    Transportation needs     Medical: No     Non-medical: No   Tobacco Use    Smoking status: Former Smoker     Packs/day: 1.00     Years: 30.00     Pack years: 30.00     Types: Cigarettes     Start date: 1960     Quit date: 3/6/1990     Years since quittin.1    Smokeless tobacco: Never Used   Substance and Sexual Activity    Alcohol use: No     Alcohol/week: 0.0 standard drinks     Comment: occassional    Drug use: No    Sexual activity: Never   Lifestyle    Physical activity     Days per week: Not on file     Minutes per session: Not on file    Stress: Not on file   Relationships    Social connections     Talks on phone: Not on file     Gets together: Not on file     Attends Yarsani service: Not on file     Active member of club or organization: Not on file     Attends meetings of clubs or organizations: Not on file     Relationship status: Not on file    Intimate partner violence     Fear of current or ex partner: Not on file     Emotionally abused: Not on file     Physically abused: Not on file     Forced sexual activity: Not on file   Other Topics Concern    Not on file   Social History Narrative    Not on file     Family History:     Family History   Problem Relation Age of Onset    Asthma Mother     Stroke Sister     Diabetes Sister     Other Neg Hx         Sleep disorders or narcolepsy      Allergies:   Nsaids and Proair respiclick [albuterol sulfate]     Review of Systems:   General: She did have some fever but no chills, appetite has been poor  Eyes: No double vision or blurry vision. ENT: No sore throat or runny nose. Cardiovascular: She reports some chest pains but no palpitations. Lung: She has had a cough and some shortness of breath  Abdomen: No nausea, vomiting, diarrhea, or abdominal pain. Genitourinary: No increased urinary frequency, or dysuria. Musculoskeletal: No muscle aches or pains. Hematologic: No bleeding or bruising. Neurologic: No headache, weakness, numbness, or tingling. Physical Examination :   /69   Pulse 67   Temp 98.4 °F (36.9 °C) (Axillary)   Resp 23   Ht 5' 3\" (1.6 m)   Wt 267 lb 10.2 oz (121.4 kg)   SpO2 (!) 88%   BMI 47.41 kg/m²     Temperature Range: Temp: 98.4 °F (36.9 °C) Temp  Av.5 °F (36.9 °C)  Min: 97.3 °F (36.3 °C)  Max: 99.4 °F (37.4 °C)  General Appearance: Awake, alert, and in t distress mild respiratory distress   Head: Normocephalic, without obvious abnormality, atraumatic  Eyes: Pupils equal, round, reactive, to light and accommodation; extraocular movements intact; sclera anicteric; conjunctivae pink  ENT: Oropharynx clear, without erythema, exudate, or thrush. Neck: Supple, without lymphadenopathy.    Pulmonary/Chest: Clear to auscultation, without wheezes, rales, or rhonchi  Cardiovascular: Regular rate and rhythm without murmurs, rubs, or gallops. Abdomen: Obese abdomen positive bowel sounds in all 4 quadrants and Soft, nontender, nondistended. Extremities: No cyanosis, clubbing, edema, or effusions. Neurologic: No gross sensory or motor deficits. Skin: Warm and dry with no rash. Medical Decision Making:   I have independently reviewed/ordered the following labs:  CBC with Differential:   Recent Labs     04/20/21  0258 04/21/21  0506   WBC 6.8 2.9*   HGB 12.3 10.9*   HCT 37.3 34.0*   * 121*   LYMPHOPCT 12* 17*   MONOPCT 5 8*     BMP:   Recent Labs     04/20/21  1716 04/21/21  0506    134*   K 4.8 4.9    104   CO2 15* 15*   BUN 75* 75*   CREATININE 3.46* 3.39*     Hepatic Function Panel:   Recent Labs     04/20/21  0258 04/21/21  0506   PROT 7.2 6.4   LABALBU 3.6 2.8*   BILIDIR 0.14  --    IBILI 0.29  --    BILITOT 0.43 0.31   ALKPHOS 91 80   ALT 40* 58*   * 291*       Lab Results   Component Value Date    PROCAL 3.87 04/21/2021    PROCAL 1.79 04/20/2021       Lab Results   Component Value Date    .6 04/21/2021     Lab Results   Component Value Date    FERRITIN 6,232 04/20/2021     Lab Results   Component Value Date    FIBRINOGEN 598 04/21/2021     Lab Results   Component Value Date    DDIMER 7.18 04/21/2021    DDIMER 1.89 08/09/2015     Lab Results   Component Value Date     04/20/2021       Lab Results   Component Value Date    SEDRATE 25 (H) 03/30/2015       Lab Results   Component Value Date    COVID19 DETECTED 04/20/2021     No results found for requested labs within last 30 days. Imaging Studies:   RETROPERITONEAL ULTRASOUND OF THE KIDNEYS AND URINARY BLADDER 4/21/2021   FINDINGS:   Unremarkable sonographic appearance of the kidneys. Urinary bladder not seen due to catheterization.        ONE XRAY VIEW OF THE CHEST 4/21/2021 2:24 am     FINDINGS:  Stable exam since yesterday. ONE XRAY VIEW OF THE CHEST 4/20/2021 3:09 am   FINDINGS:   Patchy bilateral pneumonia concerning for COVID pneumonia. Mild stable cardiomegaly. Severe osteoarthritic changes at the bilateral glenohumeral joints. Cultures:     Culture, Blood 2 [[id number]] Collected: 04/20/21 0356   Order Status: Completed Specimen: Blood Updated: 04/21/21 1318    Specimen Description . BLOOD    Special Requests LT [facility]    Culture NO GROWTH 1 DAY   Culture, Blood 1 [[id number]] Collected: 04/20/21 0326   Order Status: Completed Specimen: Blood Updated: 04/21/21 1318    Specimen Description . BLOOD    Special Requests RT HAND    Culture NO GROWTH 1 DAY   Strep Pneumoniae Antigen [[id number]] Collected: 04/20/21 0331   Order Status: Completed Specimen: Urine, clean catch Updated: 04/20/21 2347    Source . CLEAN CATCH URINE    Strep pneumo Ag NEGATIVE    Comment: Strep pneumoniae antigen not detected      Legionella antigen, urine [[id number]] Collected: 04/20/21 0331   Order Status: Completed Specimen: Urine, clean catch Updated: 04/20/21 2346    Legionella Pneumophilia Ag, Urine NEGATIVE    Comment: L. pneumophila serogroup 1 antigen not detected. A negative result does not exclude infection with Leginella pnemophila serogroup 1 nor does   it rule out other microbial-caused respiratory infections of disease caused by other   serogroups of Legionella pneumophila. COVID-19, Rapid [[id number]] (Abnormal) Collected: 04/20/21 0256   Order Status: Completed Specimen: Nasopharyngeal Swab Updated: 04/20/21 0333    Specimen Description . NASOPHARYNGEAL SWAB    SARS-CoV-2, Rapid DETECTEDAbnormal     Comment:        Rapid NAAT: The specimen is POSITIVE for SARS-Cov-2, the novel coronavirus associated with   COVID-19.         This test has been authorized by the FDA under an Emergency Use Authorization (EUA) for use   by authorized laboratories.         The ID NOW COVID-19 assay is designed to detect the virus that causes COVID-19 in patients   with signs and symptoms of infection who are suspected of COVID-19. An individual without symptoms of COVID-19 and who is not shedding SARS-CoV-2 virus would   expect to have a negative (not detected) result in this assay. Fact sheet for Healthcare Providers: [email or web address]   Fact sheet for Patients: [email or web address]           Methodology: Isothermal Nucleic Acid Amplification         Results reported to the appropriate Health Department                 Thank you for allowing us to participate in the care of this patient. Please call with questions. Electronically signed by Jerome Daley MD on 4/21/2021 at 1:46 PM      [facility]  [address]  OFFICE: [phone]      This note is created with the assistance of a speech recognition program.  While intending to generate a document that actually reflects the content of the visit, the document can still have some errors including those of syntax and sound a like substitutions which may escape proof reading. In such instances, actual meaning can be extrapolated by contextual diversion.

## 2021-04-21 NOTE — PROGRESS NOTES
Physical Therapy  DATE: 2021    NAME: Bonilla Baldwin  MRN: [id number]   : 1945    Patient not seen this date for Physical Therapy due to:  [] Blood transfusion in progress  [x] Cancel by RN: Patient not medically appropriate. Was on Bipap 95% this morning, now attempting trial of Bipap 85%. [] Hemodialysis  []  Refusal by Patient   [] Spine Precautions   [] Strict Bedrest  [] Surgery  [] Testing      [] Other        [] PT being discontinued at this time. Patient independent. No further needs. [] PT being discontinued at this time as the patient has been transferred to hospice care. No further needs.     Fred Gary, PT

## 2021-04-21 NOTE — FLOWSHEET NOTE
Patient is in isolation for COVID-19. Writer facilitates a [employer] with patient and her family membes. Fa     04/21/21 1800   Encounter Summary   Services provided to: Patient   Continue Visiting   (4/21/21)   Complexity of Encounter Moderate   Routine   Type Follow up   Intervention Complementary therapies    Outcome Expressed gratitude   rachel expresses appreciation.

## 2021-04-21 NOTE — PLAN OF CARE
Problem: Airway Clearance - Ineffective  Goal: Achieve or maintain patent airway  Outcome: Ongoing   Monitor Resp.  Status

## 2021-04-21 NOTE — CONSULTS
NEPHROLOGY     REASON FOR CONSULT:     SHANAE (BUN/Creat 75/3.46           with baseline creatinine 1-1.2              )  Metabolic Acidosis    Risk Factors for SHANAE:  COVID-19 infections #2 CKD stage III #3 ACE inhibitor use #4 diuretic use #5 poor glycemic state    HISTORY OF PRESENTING ILLNESS                 This is a 70 y.o. female who presented with 1 week history of  Asthenia and easy fatigability  Fever  Worsening shortness of breath  Mucoid expectoration without hemoptysis  No history of chest pain/syncope/GI symptoms    Assessment in ER showed hypoxic lady needing noninvasive ventilation with imaging studies consistent with pneumonia. Further work-up showed COVID-19 pneumonia positivity. Started on treatment along with noninvasive ventilation. Overnight has been managed with IV fluids. Has indwelling Choi catheter in place. Urine output decent. Nephrology has been consulted for worsening of renal function. She has underlying chronic kidney disease stage III from diabetic nephropathy. Baseline creatinine 1-1.2. Follows up with our practice.   On questioning,  Urine output excellent  No history of contrast exposure  No history of hypotension  On diuretics and ACE inhibitor therapy at home  No history suggestive of obstruction  No history of nausea/vomiting/diarrhoea  No history of SHANAE in past  No history of recurrent UTI infection/surgeries to KUB        PAST MEDICAL HISTORY         Diagnosis Date    Anemia, iron deficiency     Asthma     Carpal tunnel syndrome on right 8/29/2016    Chronic kidney disease     COPD (chronic obstructive pulmonary disease) (HCC)     Edema     unspecified type    Former smoker     GERD (gastroesophageal reflux disease)     Glucose intolerance (impaired glucose tolerance)     History of anemia     Normocytic    History of corneal abrasion     Left    History of dyspnea     History of pneumonia     Hyperlipidemia     Hypertension     Idiopathic gout     Morbid obesity (HCC)     BMI of 50.0-59.9, adult    Neck strain     Obstructive sleep apnea     on CPAP    Osteoarthritis     DJD of knees    Pain     bilateral shoulders, Lt hip    Shoulder strain     History of bilateral shoulder strain    Systemic hypertension     TIA (transient ischemic attack)     Type II or unspecified type diabetes mellitus without mention of complication, not stated as uncontrolled     Vitamin D deficiency     Wears dentures     upper and lower dentures    Wears glasses        PAST SURGICAL HISTORY          Procedure Laterality Date    COLONOSCOPY  10/23/15    per Dr. Anderson Raymond  9/21/15    empi select    NERVE BLOCK Bilateral 08/07/2018    diamond shoulder injection, decadron 10 mg, lido 1%, isovue m-200, naropin 0.5%       MEDICATIONS     Home Meds:                Medications Prior to Admission: pravastatin (PRAVACHOL) 40 MG tablet, TAKE 1 TABLET AT BEDTIME  furosemide (LASIX) 20 MG tablet, TAKE 1 TABLET EVERY MORNING  lisinopril (PRINIVIL;ZESTRIL) 30 MG tablet, TAKE 1 TABLET BY MOUTH EVERY MORNING  carvedilol (COREG) 12.5 MG tablet, TAKE 1 TABLET TWICE DAILY(AM/PM)  glimepiride (AMARYL) 4 MG tablet, TAKE 1 TABLET TWICE DAILY(AM/PM)  Omega-3 Fatty Acids (GNP FISH OIL) 1000 MG CAPS, TAKE 1 CAPSULE BY MOUTH DAILY IN THE EVENING  allopurinol (ZYLOPRIM) 100 MG tablet, TAKE 1 TABLET BY MOUTH AT BEDTIME  amLODIPine (NORVASC) 10 MG tablet, TAKE 1 TABLET BY MOUTH DAILY IN THE MORNING  aspirin (ASPIRIN LOW DOSE) 81 MG EC tablet, TAKE 1 TABLET BY MOUTH DAILY IN THE EVENING  omeprazole (PRILOSEC) 40 MG delayed release capsule, TAKE 1 CAPSULE BY MOUTH DAILY IN THE MORNING  ferrous sulfate (FEROSUL) 325 (65 Fe) MG tablet, TAKE 1 TABLET BY MOUTH DAILY IN THE EVENING  diclofenac sodium (VOLTAREN) 1 % GEL, Apply topically 2 times daily  polyethylene glycol (GLYCOLAX) 17 GM/SCOOP powder, Take 17 g by mouth daily  albuterol sulfate  (90 Base) MCG/ACT inhaler, Inhale 2 puffs into the lungs every 6 hours as needed for Wheezing or Shortness of Breath  Multiple Vitamins-Minerals (MULTIVITAMIN) tablet, TAKE 1 TABLET EVERY MORNING  acetaminophen (APAP EXTRA STRENGTH) 500 MG tablet, Take 1 tablet by mouth every 6 hours as needed for Pain  loratadine (CLARITIN) 10 MG tablet, TAKE 1 TABLET EVERY MORNING  blood glucose test strips (ONETOUCH VERIO) strip, TEST 3 TIMES A DAY & AS NEEDED FOR SYMPTOMS OF IRREGULAR BLOOD GLUCOSE  Blood Glucose Monitoring Suppl (ONETOUCH VERIO FLEX SYSTEM) w/Device KIT,   ONETOUCH VERIO strip, TEST BLOOD SUGAR 3 TIMES DAILY AS DIRECTED  [employer] Ultra Thin Lancets MISC, USE AS DIRECTED WITH INSULIN USE  Scheduled Meds:    allopurinol  100 mg Oral Nightly    amLODIPine  10 mg Oral Daily    aspirin  81 mg Oral QPM    carvedilol  12.5 mg Oral BID    ferrous sulfate  325 mg Oral Nightly    pantoprazole  40 mg Oral QAM AC    polyethylene glycol  17 g Oral Daily    pravastatin  40 mg Oral Nightly    sodium chloride flush  5-40 mL Intravenous 2 times per day    azithromycin  500 mg Intravenous Q24H    And    cefTRIAXone (ROCEPHIN) IV  1,000 mg Intravenous Q24H    heparin (porcine)  5,000 Units Subcutaneous 3 times per day    insulin lispro  0-6 Units Subcutaneous TID WC    insulin lispro  0-3 Units Subcutaneous Nightly    dexamethasone  6 mg Oral Daily    Vitamin D  2,000 Units Oral Daily    budesonide-formoterol  2 puff Inhalation BID    ipratropium-albuterol  1 ampule Inhalation Q4H WA     Continuous Infusions:    IV infusion builder      sodium chloride 75 mL/hr at 04/21/21 0800    dextrose      sodium chloride       PRN Meds:  sodium chloride, albuterol sulfate HFA, glucose, dextrose, glucagon (rDNA), dextrose, sodium chloride flush, sodium chloride, promethazine **OR** ondansetron, acetaminophen **OR** acetaminophen, guaiFENesin-dextromethorphan    ALLERGY     Nsaids and Proair respiclick [albuterol sulfate]       SOCIAL HISTORY     Social History Socioeconomic History    Marital status: Single     Spouse name: Not on file    Number of children: Not on file    Years of education: Not on file    Highest education level: Not on file   Occupational History    Not on file   Social Needs    Financial resource strain: Not hard at all    Food insecurity     Worry: Never true     Inability: Never true   [employer] needs     Medical: No     Non-medical: No   Tobacco Use    Smoking status: Former Smoker     Packs/day: 1.00     Years: 30.00     Pack years: 30.00     Types: Cigarettes     Start date: 1960     Quit date: 3/6/1990     Years since quittin.1    Smokeless tobacco: Never Used   Substance and Sexual Activity    Alcohol use: No     Alcohol/week: 0.0 standard drinks     Comment: occassional    Drug use: No    Sexual activity: Never   Lifestyle    Physical activity     Days per week: Not on file     Minutes per session: Not on file    Stress: Not on file   Relationships    Social connections     Talks on phone: Not on file     Gets together: Not on file     Attends Cheondoism service: Not on file     Active member of club or organization: Not on file     Attends meetings of clubs or organizations: Not on file     Relationship status: Not on file    Intimate partner violence     Fear of current or ex partner: Not on file     Emotionally abused: Not on file     Physically abused: Not on file     Forced sexual activity: Not on file   Other Topics Concern    Not on file   Social History Narrative    Not on file       FAMILY HISTORY     Family History   Problem Relation Age of Onset    Asthma Mother     Stroke Sister     Diabetes Sister     Other Neg Hx         Sleep disorders or narcolepsy          REVIEW OF SYSTEM     Constitutional: Present asthenia/no weight loss/anorexia    HEENT : No epistaxis/visual blurriness/rhinorrhoea/sorethroat/trauma  Cardiovascular:No chest pain/palpitation/present SOB  Respiratory: Present cough/fever/SOB/no wheezing    Gastrointestinal: No abdominal pain/nausea/vomiting/diarrhoea/constipation  Genitourinary: No dysuria/pyuria/hematuria/incomplete emptying of bladder  Musculoskeletal:  No gait disturbance/weakness or joint complaints  Integumentary: No rash or pruritis. Neurological: No headache/diplopia/change in muscle strength/numbness or tingling. No change in gait, balance, coordination, mood, affect, memory, mentation, behavior. Psychiatric: No anxiety/depression. Endocrine: No temperature intolerance. No excessive thirst, fluid intake, or urination. No tremor. Hematologic/Lymphatic: No abnormal bruising or bleeding, blood clots or swolle lymph nodes. Allergic/Immunologic: No nasal congestion or hives      PHYSICAL EXAM     Vitals:    04/21/21 1100 04/21/21 1104 04/21/21 1118 04/21/21 1142   BP: (!) 117/58      Pulse: 70  70 68   Resp: 30 28     Temp: 98.4 °F (36.9 °C)      TempSrc: Axillary      SpO2: 90% 91%     Weight:       Height:         24HR INTAKE/OUTPUT:      Intake/Output Summary (Last 24 hours) at 4/21/2021 1143  Last data filed at 4/21/2021 1100  Gross per 24 hour   Intake 3471 ml   Output 2200 ml   Net 1271 ml       General appearance:Awake, alert, on noninvasive ventilation  Skin: warm and dry, no rash or erythema  Eyes: conjunctivae normal and sclera anicteric  ENT: no thrush no pharyngeal congestion  orodental hygiene   Neck: No JVD, Lymphadenopathty or thyromegaly  Respiratory: vesicular breath sounds, reduced air entry  Cardiovascular: S1 S2 normal,no gallop or organic murmur. No carotid bruit  Abdomen:Non tender/non distended. Bowel sounds present  Extremities: No Cyanosis or Clubbing,Lower extremity edema  Neurological:Alert and oriented. No abnormalities of mood, affect, memory, mentation, or behavior are noted    INVESTIGATIONS      CBC:   Recent Labs     04/20/21  0258 04/21/21  0506   WBC 6.8 2.9*   RBC 4.04 3.61*   HGB 12.3 10.9*   HCT 37.3 34.0*   MCV 92.3 94.2   MCH 30.4 30.2   MCHC 33.0 32.1   RDW 13.2 13.4   * 121*   MPV 11.6 12.1      BMP:   Recent Labs     04/20/21  0258 04/20/21  1716 04/21/21  0506   * 135 134*   K 4.5 4.8 4.9    102 104   CO2 15* 15* 15*   BUN 69* 75* 75*   CREATININE 3.29* 3.46* 3.39*   GLUCOSE 81 311* 349*   CALCIUM 8.8 8.6 8.8        Phosphorus:  No results for input(s): PHOS in the last 72 hours. Magnesium: No results for input(s): MG in the last 72 hours. Albumin:   Recent Labs     04/20/21  0258 04/21/21  0506   LABALBU 3.6 2.8*       Radiology:  Reviewed as available. ASSESSMENT     1. Acute kidney injury nonoliguric secondary to ATIN from COVID 19 infection and aggravated further by concomitant ACE inhibitor and diuretic use -evolving  Creatinine peaking to 3.46  2. High anion gap anabolic acidosis secondary to acute kidney injury  3. Chronic kidney disease stage III secondary diabetic nephrosclerosis with baseline creatinine 1-1. 2. DrAbidi  4. COVID 19 pneumonia  5. Acute hypoxemic respiratory failure secondary to pneumonia on noninvasive ventilation  6. Type 2 diabetes  7. Essential hypertension  8. Sleep apnea     PLAN:     #1 IV fluids with bicarb as ordered  #2 antibiotics as per GFR less than 15  #3 avoid nephrotoxins and hold lisinopril and diuretics  #4 urine studies/serologies/ultrasound as ordered  #5 high risk of needing intubation  #6 we will follow       Thank you for the consultation. Please do not hesitate to call with questions. This note is created with the assistance of a speech-recognition program. While intending to generate a document that actually reflects the content of the visit, no guarantees can be provided that every mistake has been identified and corrected by editing.     Ronaldo Hickman MD, MRCP Alberta Johnson, FACP   4/21/2021 11:43 AM    [facility]

## 2021-04-21 NOTE — PROGRESS NOTES
[facility]  Office: [address], DO, Octavia Jules, DO, Katty Duffy, DO, Josselin Pinto Blood, DO, William Betancourt MD, Nika Lan MD, Jammie Vaughan MD, James Marcum MD, Kristi Shaikh MD, Osvaldo Lee MD, Sherwin Dwyer MD, Mariia Kelly MD, James Jacobson DO, Barbie Shen MD, Kenna Jacobo DO, Josse Barrios MD,  Carolin Nassar, DO, Ana Cristina Lo MD, Isauro Carrillo MD, Abdelrahman Joya MD, Toña Leon MD, Daniel Arrington, [facility], [facility] ([place], [place]), [facility], Rhiannon Rahman, CNP, Deng Mai, CNS, Raffaele Brock, CNP, Harry Jordan, CNP, Jorge Grullon, CNP, Kavon Cutler, CNP, Chava Mckeon, CNP, Smita Hartley PA-C, Arleen Acuña, [facility], [place]), Capri Cisneros, CNP, Enio Grimm, CNP, Srini Castanon, CNP, Anali Murphy, CNP, Coco Reynoso, [facility], Steph Balderrama, [facility]    Progress Note    4/21/2021    12:24 PM    Name:   Carol Boudreaux  MRN:     [id number]     Acct:      [de-identified]   Room:   [address] [place] [address] Day:  1  Admit Date:  4/20/2021  2:42 AM    PCP:   Tabatha Polanco MD  Code Status:  Full Code    Subjective:     C/C:   Chief Complaint   Patient presents with    Shortness of Breath     Interval History Status: not changed. Remains bipap dependent  On 85% fio2, was on 95% yesterday    Not too sob, just a little    Brief History:     Carol Boudreaux is a 70 y.o. AA female who presents with Shortness of Breath   and is admitted to the hospital for the management of Sepsis due to COVID-19 [facility]).    This 70 yof presents with sob for about a week pta. She has also had a cough with white phlegm and has had fevers, chills, sweats, shakes. Denies change in taste or smell, n/v/d or abd pain.     Review of Systems:     Constitutional:  negative for chills, fevers, sweats  Respiratory:  negative for cough,  wheezing  Cardiovascular:  negative for chest pain, chest pressure/discomfort, lower extremity edema, palpitations Gastrointestinal:  negative for abdominal pain, constipation, diarrhea, nausea, vomiting  Neurological:  negative for dizziness, headache    Medications: Allergies:     Allergies   Allergen Reactions    Nsaids Other (See Comments)     Chronic kidney disease    Proair Respiclick [Albuterol Sulfate]        Current Meds:   Scheduled Meds:    allopurinol  100 mg Oral Nightly    amLODIPine  10 mg Oral Daily    aspirin  81 mg Oral QPM    carvedilol  12.5 mg Oral BID    ferrous sulfate  325 mg Oral Nightly    pantoprazole  40 mg Oral QAM AC    polyethylene glycol  17 g Oral Daily    pravastatin  40 mg Oral Nightly    sodium chloride flush  5-40 mL Intravenous 2 times per day    azithromycin  500 mg Intravenous Q24H    And    cefTRIAXone (ROCEPHIN) IV  1,000 mg Intravenous Q24H    heparin (porcine)  5,000 Units Subcutaneous 3 times per day    insulin lispro  0-6 Units Subcutaneous TID WC    insulin lispro  0-3 Units Subcutaneous Nightly    dexamethasone  6 mg Oral Daily    Vitamin D  2,000 Units Oral Daily    budesonide-formoterol  2 puff Inhalation BID    ipratropium-albuterol  1 ampule Inhalation Q4H WA     Continuous Infusions:    IV infusion builder      sodium chloride 75 mL/hr at 04/21/21 0800    dextrose      sodium chloride       PRN Meds: sodium chloride, albuterol sulfate HFA, glucose, dextrose, glucagon (rDNA), dextrose, sodium chloride flush, sodium chloride, promethazine **OR** ondansetron, acetaminophen **OR** acetaminophen, guaiFENesin-dextromethorphan    Data:     Past Medical History:   has a past medical history of Anemia, iron deficiency, Asthma, Carpal tunnel syndrome on right, Chronic kidney disease, COPD (chronic obstructive pulmonary disease) ([facility] [address] Edema, Former smoker, GERD (gastroesophageal reflux disease), Glucose intolerance (impaired glucose tolerance), History of anemia, History of corneal abrasion, History of dyspnea, History of pneumonia, Hyperlipidemia, Hypertension, Idiopathic gout, Morbid obesity ([address] Neck strain, Obstructive sleep apnea, Osteoarthritis, Pain, Shoulder strain, Systemic hypertension, TIA (transient ischemic attack), Type II or unspecified type diabetes mellitus without mention of complication, not stated as uncontrolled, Vitamin D deficiency, Wears dentures, and Wears glasses. Social History:   reports that she quit smoking about 31 years ago. Her smoking use included cigarettes. She started smoking about 61 years ago. She has a 30.00 pack-year smoking history. She has never used smokeless tobacco. She reports that she does not drink alcohol or use drugs. Family History:   Family History   Problem Relation Age of Onset   Stephen Galla Asthma Mother     Stroke Sister     Diabetes Sister     Other Neg Hx         Sleep disorders or narcolepsy       Vitals:  /74   Pulse 71   Temp 98.4 °F (36.9 °C) (Axillary)   Resp 29   Ht 5' 3\" (1.6 m)   Wt 267 lb 10.2 oz (121.4 kg)   SpO2 (!) 88%   BMI 47.41 kg/m²   Temp (24hrs), Av.3 °F (36.8 °C), Min:97.2 °F (36.2 °C), Max:99.4 °F (37.4 °C)    Recent Labs     21  1920 21  2357 21  0739 21  1203   POCGLU 322* 296* 381* 395*       I/O (24Hr):     Intake/Output Summary (Last 24 hours) at 2021 1224  Last data filed at 2021 1100  Gross per 24 hour   Intake 3471 ml   Output 2200 ml   Net 1271 ml       Labs:  Hematology:  Recent Labs     21  0258 21  0506   WBC 6.8 2.9*   RBC 4.04 3.61*   HGB 12.3 10.9*   HCT 37.3 34.0*   MCV 92.3 94.2   MCH 30.4 30.2   MCHC 33.0 32.1   RDW 13.2 13.4   * 121*   MPV 11.6 12.1   CRP  --  131.6*   DDIMER  --  7.18*     Chemistry:  Recent Labs     21  0258 21  0518 21  1716 21  0506   *  --  135 134*   K 4.5  --  4.8 4.9     --  102 104   CO2 15*  --  15* 15*   GLUCOSE 81  --  311* 349*   BUN 69*  --  75* 75*   CREATININE 3.29*  --  3.46* 3.39*   ANIONGAP 18*  --  18* 15   LABGLOM 14*  -- 13* 13*   GFRAA 17*  --  16* 16*   CALCIUM 8.8  --  8.6 8.8   TROPHS 69* 82*  --   --      Recent Labs     04/20/21  0258 04/20/21  0258 04/20/21  1200 04/20/21  1621 04/20/21  1716 04/20/21  1920 04/20/21  2357 04/21/21  0506 04/21/21  0739 04/21/21  1203   PROT 7.2  --   --   --   --   --   --  6.4  --   --    LABALBU 3.6  --   --   --   --   --   --  2.8*  --   --    *  --   --   --   --   --   --  291*  --   --    ALT 40*  --   --   --   --   --   --  58*  --   --    LDH  --   --   --   --  700*  --   --   --   --   --    ALKPHOS 91  --   --   --   --   --   --  80  --   --    BILITOT 0.43  --   --   --   --   --   --  0.31  --   --    BILIDIR 0.14  --   --   --   --   --   --   --   --   --    LIPASE 114*  --   --   --   --   --   --   --   --   --    POCGLU  --    < > 160* 279*  --  322* 296*  --  381* 395*    < > = values in this interval not displayed. ABG:  Lab Results   Component Value Date    FIO2 80.0 04/20/2021     Lab Results   Component Value Date/Time    [facility] 04/20/2021 03:56 AM     Lab Results   Component Value Date/Time    CULTURE NO GROWTH 23 HOURS 04/20/2021 03:56 AM       Radiology:  Xr Chest Portable    Result Date: 4/21/2021  Stable exam since yesterday. Xr Chest Portable    Result Date: 4/20/2021  Patchy bilateral pneumonia concerning for COVID pneumonia. Mild stable cardiomegaly. Severe osteoarthritic changes at the bilateral glenohumeral joints.        Physical Examination:        General appearance:  alert, cooperative and no distress  Mental Status:  oriented to person, place and time and normal affect  Lungs:  clear to auscultation bilaterally, normal effort on bipap  Heart:  regular rate and rhythm, no murmur  Abdomen:  soft, nontender, nondistended, normal bowel sounds, no masses, hepatomegaly, splenomegaly  Extremities:  no edema, redness, tenderness in the calves  Skin:  no gross lesions, rashes, induration    Assessment:        Hospital Problems

## 2021-04-21 NOTE — PLAN OF CARE
Problem: Airway Clearance - Ineffective  Goal: Achieve or maintain patent airway  Outcome: Ongoing     Problem: Gas Exchange - Impaired  Goal: Absence of hypoxia  Outcome: Ongoing  Goal: Promote optimal lung function  Outcome: Ongoing     Problem: Breathing Pattern - Ineffective  Goal: Ability to achieve and maintain a regular respiratory rate  Outcome: Ongoing   LS diminished t/o, remains on Bipap 20/10 @ 85%, sao2 89-93%, SOB with activity, encouraged to prone  Problem:  Body Temperature -  Risk of, Imbalanced  Goal: Ability to maintain a body temperature within defined limits  Outcome: Ongoing     Problem: Isolation Precautions - Risk of Spread of Infection  Goal: Prevent transmission of infection  Outcome: Ongoing   Afebrile, continue to monitor WBC  Problem: Nutrition Deficits  Goal: Optimize nutritional status  Outcome: Ongoing   Decreased d/t work of breathing  Problem: Risk for Fluid Volume Deficit  Goal: Maintain normal heart rhythm  Outcome: Ongoing   NSR  Problem: Risk for Fluid Volume Deficit  Goal: Maintain normal serum potassium, sodium, calcium, phosphorus, and pH  Outcome: Ongoing   Continue to monitor BMP  Problem: Fatigue  Goal: Verbalize increase energy and improved vitality  Outcome: Ongoing     Problem: Skin Integrity:  Goal: Will show no infection signs and symptoms  Description: Will show no infection signs and symptoms  Outcome: Ongoing   No s/s of skin breakdown, turn every 2 hours

## 2021-04-21 NOTE — PROGRESS NOTES
BiPAP for sleep and as needed during the day  · Continue IV Zithromax/Rocephin  · Decadron 6 mg p.o. daily  · S/p Actemra on 4/20/2021  · Albuterol and Ipratropium Q 4 hours and prn  · Discontinue Symbicort 160  · Start budesonide aerosol treatment  · Start Brovana aerosol treatment  · X-ray chest in am  · Labs: CBC and BMP in am, inflammatory markers  · Protonix for PUD prophylaxis  · DVT prophylaxis with low molecular weight heparin  · Follows with Dr. Claude Gonzales  · Will follow with dilan Vivas MD, [facility]  Pulmonary Critical Care and Sleep Medicine,  [facility]  Cell: [phone]  Office: [phone]

## 2021-04-21 NOTE — CARE COORDINATION
Case Management Initial Discharge Plan  Stuartevelyn Plascencia,             Met with:family member granddarrickughter to discuss discharge plans. Information verified: address, contacts, phone number, , insurance Yes    Emergency Contact/Next of Kin name & number: Марина    [phone]    PCP: Melissa Martínez MD  Date of last visit: Had an appointment for tomorrow that is cx    Insurance Provider: [facility]    Discharge Planning    Living Arrangements:  Alone   Support Systems:  Family Members, Juliann Marquez has 1 stories  1 stairs to climb to get into front door, 0stairs to climb to reach second floor  Location of bedroom/bathroom in home main    Patient able to perform ADL's:Independent    Current Services (outpatient & in home) none  DME equipment: has walker and cane  DME provider: -    Receiving oral anticoagulation therapy? No    If indicated:   Physician managing anticoagulation treatment:   Where does patient obtain lab work for ATC treatment? Potential Assistance Needed:  Home Care    Patient agreeable to home care: Yes  Freedom of choice provided:  yes    Prior SNF/Rehab Placement and Facility: n/a  Agreeable to SNF/Rehab: No  [place] of choice provided: n/a     Evaluation: no    Expected Discharge date:  21    Patient expects to be discharged to:  home  Follow Up Appointment: Best Day/ Time:      Transportation provider: family or [facility]  Transportation arrangements needed for discharge: probable    Readmission Risk              Risk of Unplanned Readmission:        20             Does patient have a readmission risk score greater than 14?: Yes  If yes, follow-up appointment must be made within 7 days of discharge. Goals of Care:       Discharge Plan: DG: Pneumonia d/t COVID  Spoke with granddaughter Nicki Quinonez    [phone]  Patient lives alone in a 1 story apartment with 1 step to enter.   When speaking with granddaughter Nicki Quinonez, she states that she is over at

## 2021-04-21 NOTE — PROGRESS NOTES
Occupational Therapy  [address]  Occupational Therapy Not Seen Note    Patient not available for Occupational Therapy due to:    [] Testing:    [] Hemodialysis    [x] Cancelled by RN: 4/21: Cx per RN aSnket Mendoza, pt is not medically appropriate for therapy.     []Refusal by Patient:    [] Surgery:     [] Intubation:     [] Pain Medication:    [] Sedation:     [] Spine Precautions :    [] Medical Instability:    [] Other:      Mary Wooten OTR/L

## 2021-04-21 NOTE — PROGRESS NOTES
Transitions of Care Pharmacy Service   Medication Review    The patient's list of current home medications has been reviewed. Her PCP office prescribes in Epic; refill dates reflect compliance with prescription medications. Source(s) of information: Epic, Surescripts refill report      Please feel free to call me with any questions about this encounter. Thank you.     Doug Bryan [facility]   Transitions of Care Pharmacy Service  Phone:  [phone]  Fax: [phone]      Electronically signed by Doug Adams [facility] on 4/21/2021 at 6:06 PM           Medications Prior to Admission:   pravastatin (PRAVACHOL) 40 MG tablet, TAKE 1 TABLET AT BEDTIME  loratadine (CLARITIN) 10 MG tablet, TAKE 1 TABLET EVERY MORNING  furosemide (LASIX) 20 MG tablet, TAKE 1 TABLET EVERY MORNING  lisinopril (PRINIVIL;ZESTRIL) 30 MG tablet, TAKE 1 TABLET BY MOUTH EVERY MORNING  carvedilol (COREG) 12.5 MG tablet, TAKE 1 TABLET TWICE DAILY(AM/PM)  glimepiride (AMARYL) 4 MG tablet, TAKE 1 TABLET TWICE DAILY(AM/PM)  Omega-3 Fatty Acids (GNP FISH OIL) 1000 MG CAPS, TAKE 1 CAPSULE BY MOUTH DAILY IN THE EVENING  allopurinol (ZYLOPRIM) 100 MG tablet, TAKE 1 TABLET BY MOUTH AT BEDTIME  amLODIPine (NORVASC) 10 MG tablet, TAKE 1 TABLET BY MOUTH DAILY IN THE MORNING  aspirin (ASPIRIN LOW DOSE) 81 MG EC tablet, TAKE 1 TABLET BY MOUTH DAILY IN THE EVENING  omeprazole (PRILOSEC) 40 MG delayed release capsule, TAKE 1 CAPSULE BY MOUTH DAILY IN THE MORNING  ferrous sulfate (FEROSUL) 325 (65 Fe) MG tablet, TAKE 1 TABLET BY MOUTH DAILY IN THE EVENING  diclofenac sodium (VOLTAREN) 1 % GEL, Apply topically 2 times daily  polyethylene glycol (GLYCOLAX) 17 GM/SCOOP powder, Take 17 g by mouth daily  albuterol sulfate  (90 Base) MCG/ACT inhaler, Inhale 2 puffs into the lungs every 6 hours as needed for Wheezing or Shortness of Breath  Multiple Vitamins-Minerals (MULTIVITAMIN) tablet, TAKE 1 TABLET EVERY MORNING  acetaminophen (APAP EXTRA STRENGTH) 500 MG tablet, Take 1 tablet by mouth every 6 hours as needed for Pain

## 2021-04-22 NOTE — PROGRESS NOTES
[facility]  Office: [address], DO, Mattysantiago Woodward, DO, Conradootto Bradley, DO, Joellen Aguilera, DO, Adam Buck MD, Darren Lopez MD, Adam English MD, Mireille Rasheed MD, Isaiah Rodriguez MD, Rakesh Ramos MD, Lionel Diego MD, Tangela Chino MD, Robin Porras, DO, Walter Stallworth MD, Sana Sanchez DO, Mirian Grullon MD,  Juan Jose Magallanes DO, Joseluis Yoder MD, Derald Jeans, MD, Randolph Brown MD, Shawna Palma MD, Kamar Moreno, [facility], [facility], [facility], José Miguel Barksdale, CNP, Darrell Foster, CNS, Luz Maria Lockett, CNP, Estephania Champion, CNP, Yuliana Loo, CNP, Hermes Abreu, CNP, Reed Boggs, CNP, Gertrude Roblero PA-C, Thomas Owens, AMANDA, Brooklyn Mosher, CNP, Daniel Mccall, CNP, Lobito Estrada, CNP, Antoine Holter, CNP, Robin Grayson, CNP, [facility]    Progress Note    4/22/2021    12:25 PM    Name:   John Arroyo  MRN:     [id number]     Acct:      [de-identified]   Room:   [address] [place] [address] Day:  2  Admit Date:  4/20/2021  2:42 AM    PCP:   David Martínez MD  Code Status:  Full Code    Subjective:     C/C:   Chief Complaint   Patient presents with    Shortness of Breath     Interval History Status: improved. Remains bipap dependent  On 80% fio2, was on 85% yesterday    Not too sob, just a little and feels a little better than yesterday    Brief History:     John Arroyo is a 70 y.o. AA female who presents with Shortness of Breath   and is admitted to the hospital for the management of Sepsis due to COVID-19 [facility]).    This 70 yof presents with sob for about a week pta. She has also had a cough with white phlegm and has had fevers, chills, sweats, shakes. Denies change in taste or smell, n/v/d or abd pain.     Review of Systems:     Constitutional:  negative for chills, fevers, sweats  Respiratory:  negative for cough,  wheezing  Cardiovascular:  negative for chest pain, chest pressure/discomfort, lower extremity edema, palpitations  Gastrointestinal:  negative for abdominal pain, constipation, diarrhea, nausea, vomiting  Neurological:  negative for dizziness, headache    Medications: Allergies:     Allergies   Allergen Reactions    Nsaids Other (See Comments)     Chronic kidney disease    Proair Respiclick [Albuterol Sulfate]        Current Meds:   Scheduled Meds:    dexamethasone  6 mg Intravenous Q24H    pantoprazole  40 mg Intravenous Daily    And    sodium chloride (PF)  10 mL Intravenous Daily    insulin glargine  20 Units Subcutaneous Nightly    insulin lispro  0-12 Units Subcutaneous TID WC    insulin lispro  0-6 Units Subcutaneous Nightly    Arformoterol Tartrate  15 mcg Nebulization BID    budesonide  0.5 mg Nebulization BID    allopurinol  100 mg Oral Nightly    amLODIPine  10 mg Oral Daily    aspirin  81 mg Oral QPM    carvedilol  12.5 mg Oral BID    ferrous sulfate  325 mg Oral Nightly    polyethylene glycol  17 g Oral Daily    pravastatin  40 mg Oral Nightly    sodium chloride flush  5-40 mL Intravenous 2 times per day    azithromycin  500 mg Intravenous Q24H    And    cefTRIAXone (ROCEPHIN) IV  1,000 mg Intravenous Q24H    heparin (porcine)  5,000 Units Subcutaneous 3 times per day    Vitamin D  2,000 Units Oral Daily    ipratropium-albuterol  1 ampule Inhalation Q4H WA     Continuous Infusions:    IV infusion builder 75 mL/hr at 04/22/21 0953    sodium chloride 75 mL/hr at 04/21/21 0800    dextrose      sodium chloride       PRN Meds: sodium chloride, albuterol sulfate HFA, glucose, dextrose, glucagon (rDNA), dextrose, sodium chloride flush, sodium chloride, promethazine **OR** ondansetron, acetaminophen **OR** acetaminophen, guaiFENesin-dextromethorphan    Data:     Past Medical History:   has a past medical history of Anemia, iron deficiency, Asthma, Carpal tunnel syndrome on right, Chronic kidney disease, COPD (chronic obstructive pulmonary disease) ([facility] [address] Edema, Former smoker, GERD (gastroesophageal reflux disease), Glucose intolerance (impaired glucose tolerance), History of anemia, History of corneal abrasion, History of dyspnea, History of pneumonia, Hyperlipidemia, Hypertension, Idiopathic gout, Morbid obesity ([address] Neck strain, Obstructive sleep apnea, Osteoarthritis, Pain, Shoulder strain, Systemic hypertension, TIA (transient ischemic attack), Type II or unspecified type diabetes mellitus without mention of complication, not stated as uncontrolled, Vitamin D deficiency, Wears dentures, and Wears glasses. Social History:   reports that she quit smoking about 31 years ago. Her smoking use included cigarettes. She started smoking about 61 years ago. She has a 30.00 pack-year smoking history. She has never used smokeless tobacco. She reports that she does not drink alcohol or use drugs. Family History:   Family History   Problem Relation Age of Onset    Asthma Mother     Stroke Sister     Diabetes Sister     Other Neg Hx         Sleep disorders or narcolepsy       Vitals:  /63   Pulse 62   Temp 97.1 °F (36.2 °C)   Resp 21   Ht 5' 3\" (1.6 m)   Wt 267 lb 10.2 oz (121.4 kg)   SpO2 (!) 89%   BMI 47.41 kg/m²   Temp (24hrs), Av.2 °F (36.8 °C), Min:97.1 °F (36.2 °C), Max:99 °F (37.2 °C)    Recent Labs     21  1611 21  2102 21  0729 21  1113   POCGLU 345* 296* 328* 387*       I/O (24Hr):     Intake/Output Summary (Last 24 hours) at 2021 1225  Last data filed at 2021 2219  Gross per 24 hour   Intake 877.5 ml   Output 900 ml   Net -22.5 ml       Labs:  Hematology:  Recent Labs     21  0258 21  0506 21  0502   WBC 6.8 2.9* 6.5   RBC 4.04 3.61* 3.92*   HGB 12.3 10.9* 11.8*   HCT 37.3 34.0* 36.8   MCV 92.3 94.2 93.9   MCH 30.4 30.2 30.1   MCHC 33.0 32.1 32.1   RDW 13.2 13.4 13.4   * 121* 153   MPV 11.6 12.1 11.2   CRP  --  131.6* 75.1*   DDIMER  --  7.18* 9.60*     Chemistry:  Recent Labs alert, cooperative and no distress  Mental Status:  oriented to person, place and time and normal affect  Lungs:  clear to auscultation bilaterally, normal effort on bipap  Heart:  regular rate and rhythm, no murmur  Abdomen:  soft, nontender, nondistended, normal bowel sounds, no masses, hepatomegaly, splenomegaly; obese  Extremities:  no edema, redness, tenderness in the calves  Skin:  no gross lesions, rashes, induration    Assessment:        Hospital Problems           Last Modified POA    * (Principal) Sepsis due to COVID-19 [facility]) 4/20/2021 Yes    Obstructive sleep apnea 4/20/2021 Yes    Morbid obesity with BMI of 70 and over, adult ([facility] [address] 4/20/2021 Yes    Type 2 diabetes mellitus with stage 3a chronic kidney disease, without long-term current use of insulin ([facility] [address] 4/20/2021 Yes    SHANAE (acute kidney injury) ([facility] [address] 4/20/2021 Yes    Essential hypertension 4/20/2021 Yes    Pneumonia due to COVID-19 virus 4/20/2021 Yes    Acute hypoxemic respiratory failure due to COVID-19 ([facility] [address] 4/20/2021 Yes          Plan:        1. On decadron per protocol day 3/10   2. Received actemra 4/20  3. increase lantus for hyperglycemia due to steroids  4. Change what meds can be over to iv  5. Wean bipap as able, but currently on 80%, required 85% yesterday  6. At risk for need for intubation  7. Cont rocephin/zithromax for now  8.  Monitor renal function: little better today    Eduardo DUMONT Blood, DO  4/22/2021  12:25 PM

## 2021-04-22 NOTE — PROGRESS NOTES
Pulmonary Critical Care Progress Note  Remedios Bermudez MD     Patient seen for the follow up of acute hypoxic respiratory failure, Covid 19 pneumonia, asthma, COPD, YANA, Sepsis due to COVID-19 [facility])     Subjective:  Patient had uneventful night. She is on BiPAP at this time. She denies any chest pain. She does have cough. Her shortness of breath not much change. She is tolerating orals. Examination:  Vitals: /63   Pulse 62   Temp 97.1 °F (36.2 °C)   Resp 21   Ht 5' 3\" (1.6 m)   Wt 267 lb 10.2 oz (121.4 kg)   SpO2 (!) 89%   BMI 47.41 kg/m²   General appearance:  alert and cooperative with exam  Neck: No JVD  Lungs: Bilateral crackles, no wheezing, moderate air exchange  Heart: regular rate and rhythm, S1, S2 normal, no gallop  Abdomen: Soft, non tender, + BS  Extremities: no cyanosis or clubbing.   Trace edema    LABs:  CBC:   Recent Labs     04/21/21  0506 04/22/21  0502   WBC 2.9* 6.5   HGB 10.9* 11.8*   HCT 34.0* 36.8   * 153     BMP:   Recent Labs     04/21/21  0506 04/22/21  0502   * 141   K 4.9 4.9   CO2 15* 20   BUN 75* 74*   CREATININE 3.39* 2.60*   LABGLOM 13* 18*   GLUCOSE 349* 256*     APTT:  Recent Labs     04/21/21  0506   APTT 33.4     LIVER PROFILE:  Recent Labs     04/21/21  0506 04/22/21  0502   * 334*   ALT 58* 75*   LABALBU 2.8* 3.1*     Radiology:  4/22/2021      Impression:  · Acute hypoxic respiratory failure requiring oxygen by high flow nasal cannula  · COVID-19 viral pneumonia  · Asthma  · COPD/30-pack-year smoking history quit 1990  · CKD  · Obstructive sleep apnea/morbid obesity on home CPAP  · Hypertension, DM II, GERD    Recommendations:  · Airborne isolation  · Oxygen via high flow nasal cannula 60L/80%, keep oxygen saturation greater than 92%  · BiPAP for sleep and as needed during the day  · Continue IV Zithromax/Rocephin  · Decadron 6 mg p.o. daily  · S/p Actemra on 4/20/2021  · Albuterol and Ipratropium Q 4 hours and prn  · Continue budesonide aerosol treatment  · Continue Brovana aerosol treatment  · X-ray chest in am  · Labs: CBC and BMP in am, inflammatory markers  · Protonix for PUD prophylaxis  · DVT prophylaxis with low molecular weight heparin  · Follows with Dr. Rafa Lyles  · Will follow with you    Aida Shepherd MD, [facility]  Pulmonary Critical Care and Sleep Medicine,  [facility]  Cell: [phone]  Office: [phone]

## 2021-04-22 NOTE — PROGRESS NOTES
BiPAP/high flow O2 by nasal cannula actually did not tolerate the high flow O2 very well and is currently on BiPAP at 90% FiO2. The patient was started on antimicrobial therapy with Rocephin and azithromycin, also received Decadron, and 1 dose of Actemra given 2021. I was asked to evaluate and help with antibiotic management.     She is awake and alert currently on BiPAP at 90% and today did not tolerate high flow O2 by nasal cannula at all. Current evaluation:2021    /63   Pulse 62   Temp 97.1 °F (36.2 °C)   Resp 26   Ht 5' 3\" (1.6 m)   Wt 267 lb 10.2 oz (121.4 kg)   SpO2 (!) 88%   BMI 47.41 kg/m²     Temperature Range: Temp: 97.1 °F (36.2 °C) Temp  Av.2 °F (36.8 °C)  Min: 97.1 °F (36.2 °C)  Max: 99 °F (37.2 °C)  The patient is seen and evaluated at bedside she is lying in bed on BiPAP at 75% FiO2. There was an attempt to put her in the chair on high flow but states she did not tolerate this at all and even on BiPAP she had significant hypoxia/desaturation and had to be put back in bed. She did not tolerate trying to eat her breakfast this morning. Review of Systems   Constitutional: Negative. Respiratory: Positive for shortness of breath. Cardiovascular: Negative. Gastrointestinal: Negative. Genitourinary: Negative. Musculoskeletal: Negative. Skin: Negative. Neurological: Negative. Psychiatric/Behavioral: Negative. Physical Examination :     Physical Exam  Constitutional:       Appearance: She is well-developed. She is obese. HENT:      Head: Normocephalic and atraumatic. Neck:      Musculoskeletal: Normal range of motion and neck supple. Cardiovascular:      Rate and Rhythm: Regular rhythm. Heart sounds: Normal heart sounds. Pulmonary:      Effort: Pulmonary effort is normal.      Breath sounds: Rales present. Abdominal:      General: Bowel sounds are normal.      Palpations: Abdomen is soft.    Skin:     General: Skin is warm and dry.   Neurological:      Mental Status: She is alert and oriented to person, place, and time. Laboratory data:   I have independently reviewed the followinglabs:  CBC with Differential:   Recent Labs     04/21/21  0506 04/22/21  0502   WBC 2.9* 6.5   HGB 10.9* 11.8*   HCT 34.0* 36.8   * 153   LYMPHOPCT 17* 7*   MONOPCT 8* 6     BMP:   Recent Labs     04/21/21  0506 04/22/21  0502   * 141   K 4.9 4.9    108*   CO2 15* 20   BUN 75* 74*   CREATININE 3.39* 2.60*     Hepatic Function Panel:   Recent Labs     04/20/21  0258 04/21/21  0506 04/22/21  0502   PROT 7.2 6.4 6.8   LABALBU 3.6 2.8* 3.1*   BILIDIR 0.14  --   --    IBILI 0.29  --   --    BILITOT 0.43 0.31 0.32   ALKPHOS 91 80 102   ALT 40* 58* 75*   * 291* 334*         Lab Results   Component Value Date    PROCAL 3.87 04/21/2021    PROCAL 1.79 04/20/2021     Lab Results   Component Value Date    .6 04/21/2021     Lab Results   Component Value Date    SEDRATE 25 (H) 03/30/2015         Lab Results   Component Value Date    DDIMER 9.60 04/22/2021    DDIMER 7.18 04/21/2021    DDIMER 1.89 08/09/2015     Lab Results   Component Value Date    FERRITIN 5,870 04/22/2021    FERRITIN 6,232 04/20/2021     Lab Results   Component Value Date     04/20/2021     Lab Results   Component Value Date    FIBRINOGEN 598 04/21/2021       Results in Past 30 Days  Result Component Current Result Ref Range Previous Result Ref Range   SARS-CoV-2, Rapid DETECTED (A) (4/20/2021) Not Detected Not in Time Range      Lab Results   Component Value Date    COVID19 DETECTED 04/20/2021       No results for input(s): [facility] in the last 72 hours.     Imaging Studies:   ONE XRAY VIEW OF THE CHEST 4/22/2021 4:48 am     Impression   Stable chest       Bilateral asymmetric infiltrates related to edema and/or pneumonia       Cultures:     Culture, Blood 2 [[id number]] Collected: 04/20/21 0356   Order Status: Completed Specimen: Blood Updated: 04/22/21 [id number]    Specimen Description . BLOOD    Special Requests [facility]    Culture NO GROWTH 2 DAYS   Culture, Blood 1 [[id number]] Collected: 04/20/21 0326   Order Status: Completed Specimen: Blood Updated: 04/22/21 0013    Specimen Description . BLOOD    Special Requests RT HAND    Culture NO GROWTH 2 DAYS   Strep Pneumoniae Antigen [[id number]] Collected: 04/20/21 0331   Order Status: Completed Specimen: Urine, clean catch Updated: 04/20/21 2347    Source . CLEAN CATCH URINE    Strep pneumo Ag NEGATIVE    Comment: Strep pneumoniae antigen not detected      Legionella antigen, urine [[id number]] Collected: 04/20/21 0331   Order Status: Completed Specimen: Urine, clean catch Updated: 04/20/21 2346    Legionella Pneumophilia Ag, Urine NEGATIVE    Comment: L. pneumophila serogroup 1 antigen not detected. A negative result does not exclude infection with Leginella pnemophila serogroup 1 nor does   it rule out other microbial-caused respiratory infections of disease caused by other   serogroups of Legionella pneumophila. COVID-19, Rapid [[id number]] (Abnormal) Collected: 04/20/21 0256   Order Status: Completed Specimen: Nasopharyngeal Swab Updated: 04/20/21 0333    Specimen Description . NASOPHARYNGEAL SWAB    SARS-CoV-2, Rapid DETECTEDAbnormal     Comment:        Rapid NAAT: The specimen is POSITIVE for SARS-Cov-2, the novel coronavirus associated with   COVID-19.         This test has been authorized by the FDA under an Emergency Use Authorization (EUA) for use   by authorized laboratories.         The ID NOW COVID-19 assay is designed to detect the virus that causes COVID-19 in patients   with signs and symptoms of infection who are suspected of COVID-19. An individual without symptoms of COVID-19 and who is not shedding SARS-CoV-2 virus would   expect to have a negative (not detected) result in this assay.    Fact sheet for Healthcare Providers: Chandni   Fact sheet for Patients: Chandni           Methodology: Isothermal Nucleic Acid Amplification         Results reported to the appropriate Health Department           Medications:      dexamethasone  6 mg Intravenous Q24H    pantoprazole  40 mg Intravenous Daily    And    sodium chloride (PF)  10 mL Intravenous Daily    insulin glargine  20 Units Subcutaneous Nightly    insulin lispro  0-12 Units Subcutaneous TID WC    insulin lispro  0-6 Units Subcutaneous Nightly    Arformoterol Tartrate  15 mcg Nebulization BID    budesonide  0.5 mg Nebulization BID    allopurinol  100 mg Oral Nightly    amLODIPine  10 mg Oral Daily    aspirin  81 mg Oral QPM    carvedilol  12.5 mg Oral BID    ferrous sulfate  325 mg Oral Nightly    polyethylene glycol  17 g Oral Daily    pravastatin  40 mg Oral Nightly    sodium chloride flush  5-40 mL Intravenous 2 times per day    azithromycin  500 mg Intravenous Q24H    And    cefTRIAXone (ROCEPHIN) IV  1,000 mg Intravenous Q24H    heparin (porcine)  5,000 Units Subcutaneous 3 times per day    Vitamin D  2,000 Units Oral Daily    ipratropium-albuterol  1 ampule Inhalation Q4H WA           [facility]  [address]  [employer] messaging  OFFICE: [phone]      Electronically signed by [address] [place] [address] Street, MD on 4/22/2021 at 10:40 AM  Thank you for allowing us to participate in the care of this patient. Please call with questions. This note iscreated with the assistance of a speech recognition program.  While intending to generate a document that actually reflects the content of the visit, the document can still have some errors including those of syntax andsound a like substitutions which may escape proof reading. In such instances, actual meaning can be extrapolated by contextual diversion.

## 2021-04-22 NOTE — PLAN OF CARE
Pt remains safe with no falls or injuries. Pt able to get into chair this am but unable to maintain sats and only able to stay in chair for an hour. BS remain elevated this shift but drs notified and insulin increased. Will continue to monitor patient and try to prone patient and remain on bipap as much as possible.

## 2021-04-22 NOTE — PROGRESS NOTES
Physical Therapy    Facility/Department: [facility]  Initial Assessment    NAME: Mc Loco  : 1945  MRN: [id number]    Date of Service: 2021    Discharge Recommendations:  Subacute/Skilled Nursing Facility, Continue to assess pending progress        Assessment   Body structures, Functions, Activity limitations: Decreased functional mobility ; Decreased endurance;Decreased balance  Prognosis: Good  Decision Making: High Complexity  PT Education: General Safety;PT Role  REQUIRES PT FOLLOW UP: Yes  Activity Tolerance  Activity Tolerance: Patient limited by endurance;Treatment limited secondary to medical complications (free text)       Patient Diagnosis(es): The primary encounter diagnosis was Pneumonia due to COVID-19 virus. Diagnoses of SHANAE (acute kidney injury) ([facility] [address] Severe sepsis ([address] and Acute respiratory failure with hypoxia ([address] were also pertinent to this visit. has a past medical history of Anemia, iron deficiency, Asthma, Carpal tunnel syndrome on right, Chronic kidney disease, COPD (chronic obstructive pulmonary disease) ([address] Edema, Former smoker, GERD (gastroesophageal reflux disease), Glucose intolerance (impaired glucose tolerance), History of anemia, History of corneal abrasion, History of dyspnea, History of pneumonia, Hyperlipidemia, Hypertension, Idiopathic gout, Morbid obesity ([address] Neck strain, Obstructive sleep apnea, Osteoarthritis, Pain, Shoulder strain, Systemic hypertension, TIA (transient ischemic attack), Type II or unspecified type diabetes mellitus without mention of complication, not stated as uncontrolled, Vitamin D deficiency, Wears dentures, and Wears glasses. has a past surgical history that includes Nerve Block (9/21/15); Colonoscopy (10/23/15); and Nerve Block (Bilateral, 2018).     Restrictions  Restrictions/Precautions  Restrictions/Precautions: Isolation, General Precautions, Fall Risk, Contact Precautions  Required Braces or Orthoses?: No  Position Activity Restriction  Other position/activity restrictions: Pt on Bipap  Vision/Hearing        Subjective  General  Patient assessed for rehabilitation services?: Yes  Response To Previous Treatment: Not applicable  Family / Caregiver Present: No  Follows Commands: Within Functional Limits  General Comment  Comments: OK for PT per Rita BROWN  Pain Screening  Patient Currently in Pain: Yes  Pain Assessment  Pain Assessment: 0-10  Pain Level: 10  Pain Type: Acute pain  Pain Location: Chest(From coughing per pt)  Vital Signs  Patient Currently in Pain: Yes       Orientation  Orientation  Overall Orientation Status: Within Functional Limits  Social/Functional History  Social/Functional History  Lives With: Alone  Type of Home: Apartment  Home Layout: One level  Home Access: Level entry  Home Equipment: Rolling walker, Cane, Oxygen(wears CPap at night)  ADL Assistance: Independent  Homemaking Assistance: Independent  Ambulation Assistance: Independent(cane or RW as needed)  Transfer Assistance: Independent  Active : No  Patient's  Info:   Additional Comments: h.o. falls (4-5 in last year). Pt having difficulty talking d/t SOB so full history not obtained, but generally, it appears pt was independent prior to admission with daughter a good support. Cognition        Objective     Observation/Palpation  Posture: Fair(Sitting)    AROM RLE (degrees)  RLE AROM: WFL  AROM LLE (degrees)  LLE AROM : WFL  AROM RUE (degrees)  RUE General AROM: See OT eval for B UE ROM  Strength RLE  Strength RLE: WFL  Strength LLE  Strength LLE: WFL  Strength RUE  Comment: See OT eval for B UE MMT  Tone RLE  RLE Tone: Normotonic  Tone LLE  LLE Tone: Normotonic  Sensation  Overall Sensation Status: Impaired(Paresthesia B hands/feet)  Bed mobility  Rolling to Right: Stand by assistance  Transfers  Sit to Stand:  Moderate Assistance;2 Person Assistance  Stand to sit: Moderate Assistance;2 Person Assistance  Stand Pivot Transfers: Moderate Assistance;2 Person Assistance  Ambulation  Ambulation?: Yes  Ambulation 1  Surface: level tile  Device: No Device  Other Apparatus: (BiPap)  Assistance:  Moderate assistance;2 Person assistance  Distance: 2 steps to bed from chair  Comments: BiPap 7.0/14, 75%  Stairs/Curb  Stairs?: No     Balance  Posture: Fair  Sitting - Static: Good  Sitting - Dynamic: Good  Standing - Static: Fair;+  Standing - Dynamic: [address]  Times per week: 1-2x/day,5-6 days/week  Current Treatment Recommendations: Balance Training, Transfer Training, Gait Training, Endurance Training  Safety Devices  Type of devices: Left in bed, Call light within reach, Bed alarm in place, Nurse notified  Restraints  Initially in place: No    G-Code       OutComes Score                                                  AM-PAC Score  AM-PAC Inpatient Mobility Raw Score : 12 (04/22/21 1448)  AM-PAC Inpatient T-Scale Score : 35.33 (04/22/21 1448)  Mobility Inpatient CMS 0-100% Score: 68.66 (04/22/21 1448)  Mobility Inpatient CMS G-Code Modifier : CL (04/22/21 1448)          Goals  Short term goals  Time Frame for Short term goals: 12 treatments  Short term goal 1: Independent bed mobility/transfers  Short term goal 2: Ambulate 50' x 1 w/ approrpiate assistive device  Short term goal 3: Good standing balance  Short term goal 4: Tolerate 30 min ther act  Patient Goals   Patient goals : None stated       Therapy Time   Individual Concurrent Group Co-treatment   Time In [address]         Time Out 1109         Minutes [address] [place] [address]

## 2021-04-22 NOTE — PROGRESS NOTES
Patient was not moved from bed today due to concern of high FiO2 needs. Discussed with patient and her family via video call about benefits of proning. Patient encouraged by family and agrees to try proning. Pt wrapped in sheets via burrito wrap and flipped to prone position. Took several minutes to get patient comfortable but SpO2 responded immediately. Will continue to monitor with goal to get patient in chair for AM and possibly take off BiPAP and trial HFNC and patient would like opportunity to eat also.

## 2021-04-22 NOTE — PROGRESS NOTES
Type of Home: Apartment  Home Layout: One level  Home Access: Level entry  Home Equipment: Rolling walker, Cane, Oxygen(wears CPap at night)  ADL Assistance: Independent  Homemaking Assistance: Independent  Ambulation Assistance: Independent(cane or RW as needed)  Transfer Assistance: Independent  Active : No  Patient's  Info:   Additional Comments: h.o. falls (4-5 in last year). Pt having difficulty talking d/t SOB so full history not obtained, but generally, it appears pt was independent prior to admission with daughter a good support. Objective   Vision: Impaired  Vision Exceptions: Wears glasses for reading  Hearing: Within functional limits    Orientation  Overall Orientation Status: Within Functional Limits  Observation/Palpation  Posture: Fair(Sitting)  Observation: pt on bipap. Pt up in chair. Per RN, pt on high level of O2 (bipap) and needing to get back to bed. PT/OT assisted pt back to bed  Balance  Sitting Balance: Minimal assistance  Standing Balance: Moderate assistance(x2)  Functional Mobility  Functional - Mobility Device: No device  Assist Level: Moderate assistance(x2 to take ~2-3 steps chair to bed. Pt is on bipap and has very poor activity tolerance)  ADL  Feeding: (unable to assess. Pt on bipap and not tolerating being up in chair well.)  Grooming: Maximum assistance  UE Bathing: Maximum assistance  LE Bathing: Maximum assistance  UE Dressing: Maximum assistance  LE Dressing: Maximum assistance  Toileting: Maximum assistance  Additional Comments: pt with poor tolerance for ADLs this session. Pt on Bipap in chair and needing to lie back down. Pt barely able to speak and needs max A for all tasks. Tone RUE  RUE Tone: Normotonic  Tone LUE  LUE Tone: Normotonic  Coordination  Movements Are Fluid And Coordinated: Yes     Bed mobility  Sit to Supine: Moderate assistance;2 Person assistance  Scooting:  Moderate assistance;2 Person assistance  Comment: pt needing inc. time for bed mob to get back into bed and position self in center of bed. Pt lying on side once in bed, propped with pillows. Ed on positioning, importance of prone if tolerated/side lying otherwise  Transfers  Sit to stand: Moderate assistance;2 Person assistance;Minimal assistance  Stand to sit: 2 Person assistance;Minimal assistance; Moderate assistance  Transfer Comments: max A to manage all lines, bipap, telemetry, etc     Cognition  Overall Cognitive Status: WFL(appears WFLs but pt not able to speak much d/t SOB.  Will continue to assess)  Perception  Overall Perceptual Status: WFL     Sensation  Overall Sensation Status: Impaired(Paresthesia B hands/feet)        LUE AROM (degrees)  LUE AROM : WFL  RUE AROM (degrees)  RUE AROM : WFL  LUE Strength  LUE Strength Comment: Unalbe to tolerate MMT                   Plan   Plan  Times per week: 4-5x/week, 1-2x/day  Current Treatment Recommendations: Strengthening, Balance Training, Functional Mobility Training, Endurance Training, Safety Education & Training, Self-Care / ADL, Patient/Caregiver Education & Training, Equipment Evaluation, Education, & procurement, Positioning       AM-PAC Inpatient Daily Activity Raw Score: 12 (04/22/21 1444)  AM-PAC Inpatient ADL T-Scale Score : 30.6 (04/22/21 1444)  ADL Inpatient CMS 0-100% Score: 66.57 (04/22/21 1444)  ADL Inpatient CMS G-Code Modifier : CL (04/22/21 1444)    Goals  Short term goals  Time Frame for Short term goals: by discharge, pt will  Short term goal 1: demo CGA with ADL transfers with good safety and DME/AD as approp  Short term goal 2: demo CGA with functional mob in room distances for ADL completion with good safety/pacing, approp AD  Short term goal 3: demo CGA with toileting routine with good safety/pacing  Short term goal 4: demo SBA with UB ADLs and min A with LB ADLs with AE/DME as needed and good safety/pacing  Short term goal 5: demo and verb good understanding of fall prevention techs, EC/WS techs, breathing

## 2021-04-23 NOTE — PROGRESS NOTES
extremity edema, palpitations  Gastrointestinal:  negative for abdominal pain, constipation, diarrhea, nausea, vomiting  Neurological:  negative for dizziness, headache    Medications: Allergies:     Allergies   Allergen Reactions    Nsaids Other (See Comments)     Chronic kidney disease    Proair Respiclick [Albuterol Sulfate]        Current Meds:   Scheduled Meds:    amLODIPine  5 mg Oral Daily    insulin glargine  30 Units Subcutaneous Nightly    dexamethasone  6 mg Intravenous Q24H    pantoprazole  40 mg Intravenous Daily    And    sodium chloride (PF)  10 mL Intravenous Daily    insulin lispro  0-12 Units Subcutaneous TID WC    insulin lispro  0-6 Units Subcutaneous Nightly    Arformoterol Tartrate  15 mcg Nebulization BID    budesonide  0.5 mg Nebulization BID    allopurinol  100 mg Oral Nightly    aspirin  81 mg Oral QPM    carvedilol  12.5 mg Oral BID    ferrous sulfate  325 mg Oral Nightly    polyethylene glycol  17 g Oral Daily    pravastatin  40 mg Oral Nightly    sodium chloride flush  5-40 mL Intravenous 2 times per day    azithromycin  500 mg Intravenous Q24H    And    cefTRIAXone (ROCEPHIN) IV  1,000 mg Intravenous Q24H    Vitamin D  2,000 Units Oral Daily    ipratropium-albuterol  1 ampule Inhalation Q4H WA     Continuous Infusions:    heparin (PORCINE) Infusion 17.3 Units/kg/hr (04/23/21 1239)    IV infusion builder 75 mL/hr at 04/23/21 1255    sodium chloride 75 mL/hr at 04/21/21 0800    dextrose      sodium chloride       PRN Meds: heparin (porcine), heparin (porcine), sodium chloride, albuterol sulfate HFA, glucose, dextrose, glucagon (rDNA), dextrose, sodium chloride flush, sodium chloride, promethazine **OR** ondansetron, acetaminophen **OR** acetaminophen, guaiFENesin-dextromethorphan    Data:     Past Medical History:   has a past medical history of Anemia, iron deficiency, Asthma, Carpal tunnel syndrome on right, Chronic kidney disease, COPD (chronic obstructive 12.1 11.2 11.3 11.3   .6* 75.1* 44.3*  --    INR  --   --   --  1.2   DDIMER 7.18* 9.60* >20.00*  --      Chemistry:  Recent Labs     04/22/21  0502 04/22/21  1831 04/23/21  0315    139 141   K 4.9 4.5 4.3   * 106 110*   CO2 20 20 20   GLUCOSE 256* 335* 211*   BUN 74* 81* 78*   CREATININE 2.60* 2.30* 2.04*   ANIONGAP 13 13 11   LABGLOM 18* 21* 24*   GFRAA 22* 25* 29*   CALCIUM 9.2 9.5 9.3     Recent Labs     04/20/21  1716 04/20/21  1716 04/21/21  0506 04/21/21  0506 04/21/21  1247 04/21/21  1247 04/21/21  2102 04/22/21  0502 04/22/21  0729 04/22/21  1113 04/22/21  1645 04/22/21  1920 04/23/21  0315 04/23/21  0726   PROT  --    < > 6.4  --  6.0*  --   --  6.8  --   --   --   --  6.2*  --    LABALBU  --   --  2.8*  --   --   --   --  3.1*  --   --   --   --  3.1*  --    AST  --   --  291*  --   --   --   --  334*  --   --   --   --  204*  --    ALT  --   --  58*  --   --   --   --  75*  --   --   --   --  64*  --    *  --   --   --   --   --   --   --   --   --   --   --   --   --    ALKPHOS  --   --  80  --   --   --   --  102  --   --   --   --  99  --    BILITOT  --   --  0.31  --   --   --   --  0.32  --   --   --   --  0.33  --    POCGLU  --    < >  --    < >  --    < > 296*  --  328* 387* 315* 309*  --  210*    < > = values in this interval not displayed. ABG:  Lab Results   Component Value Date    FIO2 80.0 04/20/2021     Lab Results   Component Value Date/Time    SPECIAL LT [facility] 04/20/2021 03:56 AM     Lab Results   Component Value Date/Time    CULTURE NO GROWTH 3 DAYS 04/20/2021 03:56 AM       Radiology:  Dana [facility]as Chest Portable    Result Date: 4/21/2021  Stable exam since yesterday. Xr Chest Portable    Result Date: 4/20/2021  Patchy bilateral pneumonia concerning for COVID pneumonia. Mild stable cardiomegaly. Severe osteoarthritic changes at the bilateral glenohumeral joints.        Physical Examination:        General appearance:  alert, cooperative and no distress  Mental Status: oriented to person, place and time and normal affect  Lungs:  clear to auscultation bilaterally, normal effort on bipap  Heart:  regular rate and rhythm, no murmur  Abdomen:  soft, nontender, nondistended, normal bowel sounds, no masses, hepatomegaly, splenomegaly; obese  Extremities:  no edema, redness, tenderness in the calves  Skin:  no gross lesions, rashes, induration    Assessment:        Hospital Problems           Last Modified POA    * (Principal) Sepsis due to COVID-19 [facility]) 4/20/2021 Yes    Obstructive sleep apnea 4/20/2021 Yes    Morbid obesity with BMI of 70 and over, adult ([facility] [address] 4/20/2021 Yes    Type 2 diabetes mellitus with stage 3a chronic kidney disease, without long-term current use of insulin ([facility] [address] 4/20/2021 Yes    SHANAE (acute kidney injury) ([facility] [address] 4/20/2021 Yes    Essential hypertension 4/20/2021 Yes    Pneumonia due to COVID-19 virus 4/20/2021 Yes    Acute hypoxemic respiratory failure due to COVID-19 ([facility] [address] 4/20/2021 Yes      markedly elevated d-dimer    Plan:        1. On decadron per protocol day 4/10   2. Received actemra 4/20  3. increased lantus for hyperglycemia due to steroids  4. Wean bipap as able, but currently on 80%, same as yesterday  5. At risk for need for intubation  6. Cont rocephin/zithromax  7. Monitor renal function: little better today  8. Agree with heparin drip and ble venous dopplers to eval for dvt given very high d-dimer; cannot go for cta chest due to shanae and resp status  9.  D/w daughter and updated on diagnosis and prognosis, which is still guarded    Theadore Peeling Blood, DO  4/23/2021  1:29 PM

## 2021-04-23 NOTE — FLOWSHEET NOTE
Writer facilitates [facility] and family members. Writer stops to get zoom cart and return it to the office.        04/23/21 1600   Encounter Summary   Services provided to: Family   Referral/Consult From: Nurse   Support System Children;Family members   Continue Visiting   (4/23/21)   Complexity of Encounter Moderate   Length of Encounter 30 minutes   Routine   Type Follow up   Intervention Complementary therapies    Outcome Expressed gratitude

## 2021-04-23 NOTE — PROGRESS NOTES
worked up at chest x-ray findings as suggested bilateral infiltrates and COVID-19 testing was done that was positive. The patient was placed on BiPAP/high flow O2 by nasal cannula actually did not tolerate the high flow O2 very well and is currently on BiPAP at 90% FiO2. The patient was started on antimicrobial therapy with Rocephin and azithromycin, also received Decadron, and 1 dose of Actemra given 2021. I was asked to evaluate and help with antibiotic management.     She is awake and alert currently on BiPAP at 90% and today did not tolerate high flow O2 by nasal cannula at all. Current evaluation:2021    BP (!) 142/80   Pulse 80   Temp 98.4 °F (36.9 °C)   Resp 30   Ht 5' 3\" (1.6 m)   Wt 267 lb 10.2 oz (121.4 kg)   SpO2 (!) 89%   BMI 47.41 kg/m²     Temperature Range: Temp: 98.4 °F (36.9 °C) Temp  Av.8 °F (37.1 °C)  Min: 98.4 °F (36.9 °C)  Max: 99.1 °F (37.3 °C)  The patient is BiPAP dependent and is at 90% FiO2 currently. The care was discussed with the nurse who reports that the patient cannot tolerate coming off the BiPAP even for a few minutes. The patient reports generalized weakness/fatigue. She is currently sitting in the chair at the time of my evaluation. Review of Systems   Constitutional: Positive for fatigue. Respiratory: Positive for shortness of breath. Cardiovascular: Negative. Gastrointestinal: Negative. Genitourinary: Negative. Musculoskeletal: Negative. Skin: Negative. Neurological: Negative. Psychiatric/Behavioral: Negative. Physical Examination :     Physical Exam  Constitutional:       Appearance: She is well-developed. She is obese. HENT:      Head: Normocephalic and atraumatic. Neck:      Musculoskeletal: Normal range of motion and neck supple. Cardiovascular:      Rate and Rhythm: Regular rhythm. Heart sounds: Normal heart sounds.    Pulmonary:      Effort: Pulmonary effort is normal.      Breath sounds: Rales present. Abdominal:      General: Bowel sounds are normal.      Palpations: Abdomen is soft. Skin:     General: Skin is warm and dry. Neurological:      Mental Status: She is alert and oriented to person, place, and time. Laboratory data:   I have independently reviewed the followinglabs:  CBC with Differential:   Recent Labs     04/22/21  0502 04/23/21  0315   WBC 6.5 7.6   HGB 11.8* 11.2*   HCT 36.8 34.1*    162   LYMPHOPCT 7* 6*   MONOPCT 6 6     BMP:   Recent Labs     04/22/21  1831 04/23/21  0315    141   K 4.5 4.3    110*   CO2 20 20   BUN 81* 78*   CREATININE 2.30* 2.04*     Hepatic Function Panel:   Recent Labs     04/22/21  0502 04/23/21  0315   PROT 6.8 6.2*   LABALBU 3.1* 3.1*   BILITOT 0.32 0.33   ALKPHOS 102 99   ALT 75* 64*   * 204*         Lab Results   Component Value Date    PROCAL 2.30 04/22/2021    PROCAL 3.87 04/21/2021    PROCAL 1.79 04/20/2021     Lab Results   Component Value Date    CRP 75.1 04/22/2021    .6 04/21/2021     Lab Results   Component Value Date    SEDRATE 25 (H) 03/30/2015         Lab Results   Component Value Date    DDIMER >20.00 04/23/2021    DDIMER 9.60 04/22/2021    DDIMER 7.18 04/21/2021    DDIMER 1.89 08/09/2015     Lab Results   Component Value Date    FERRITIN 5,870 04/22/2021    FERRITIN 6,232 04/20/2021     Lab Results   Component Value Date     04/20/2021     Lab Results   Component Value Date    FIBRINOGEN 598 04/21/2021       Results in Past 30 Days  Result Component Current Result Ref Range Previous Result Ref Range   SARS-CoV-2, Rapid DETECTED (A) (4/20/2021) Not Detected Not in Time Range      Lab Results   Component Value Date    COVID19 DETECTED 04/20/2021       No results for input(s): [facility] in the last 72 hours.     Imaging Studies:   ONE XRAY VIEW OF THE CHEST 4/22/2021 4:48 am     Impression   Stable chest       Bilateral asymmetric infiltrates related to edema and/or pneumonia       Cultures:

## 2021-04-23 NOTE — PROGRESS NOTES
Pulmonary Critical Care Progress Note  Elisha Paulson MD     Patient seen for the follow up of acute hypoxic respiratory failure, Covid 19 pneumonia, asthma, COPD, YANA, Sepsis due to COVID-19 [facility])     Subjective:  No significant overnight events noted. She remains on BiPAP, 80% FiO2. She denies any chest pain. She does have cough. Her shortness of breath not much changed. She is tolerating orals. Examination:  Vitals: BP (!) 142/80   Pulse 80   Temp 98.4 °F (36.9 °C)   Resp (!) 31   Ht 5' 3\" (1.6 m)   Wt 267 lb 10.2 oz (121.4 kg)   SpO2 (!) 89%   BMI 47.41 kg/m²   General appearance:  alert and cooperative with exam, on BiPAP  Neck: No JVD  Lungs: Bilateral crackles, no wheezing, moderate air exchange  Heart: regular rate and rhythm, S1, S2 normal, no gallop  Abdomen: Soft, non tender, + BS  Extremities: no cyanosis or clubbing.   Trace edema    LABs:  CBC:   Recent Labs     04/22/21  0502 04/23/21  0315   WBC 6.5 7.6   HGB 11.8* 11.2*   HCT 36.8 34.1*    162     BMP:   Recent Labs     04/22/21  1831 04/23/21  0315    141   K 4.5 4.3   CO2 20 20   BUN 81* 78*   CREATININE 2.30* 2.04*   LABGLOM 21* 24*   GLUCOSE 335* 211*     APTT:  Recent Labs     04/21/21  0506   APTT 33.4     LIVER PROFILE:  Recent Labs     04/22/21  0502 04/23/21  0315   * 204*   ALT 75* 64*   LABALBU 3.1* 3.1*     Radiology:  4/22/2021      Impression:  · Acute hypoxic respiratory failure requiring oxygen by high flow nasal cannula  · COVID-19 viral pneumonia  · Asthma  · COPD/30-pack-year smoking history quit 1990  · CKD  · Obstructive sleep apnea/morbid obesity on home CPAP  · Hypertension, DM II, GERD    Recommendations:  · Airborne isolation  · Oxygen via high flow nasal cannula 60L/80%, keep oxygen saturation greater than 92%  · BiPAP for sleep and as needed during the day  · Continue IV Zithromax/Rocephin  · Continue bicarb drip  · Decadron 6 mg p.o. daily  · S/p Actemra on 4/20/2021  · Albuterol and Ipratropium Q 4 hours and prn  · Continue budesonide aerosol treatment  · Continue Brovana aerosol treatment  · X-ray chest in am  · Labs: CBC and BMP in am, inflammatory markers  · Protonix for PUD prophylaxis  · DVT prophylaxis. Will discontinue subcu heparin and start heparin drip secondary to worsening oxygen needs and elevated D-dimer as she is not a candidate for CTA of the chest secondary to her renal function to rule out PE and is unable to safely go for VQ scan at this time.    · Check bilateral lower extremity Dopplers  · Check echo  · Discussed with RN  · Follows with Dr. Lisy Gibbons  · Will follow with you    Mau Schmitz MD, [facility]  Pulmonary Critical Care and Sleep Medicine,  [facility]  Cell: [phone]  Office: [phone]

## 2021-04-23 NOTE — PROGRESS NOTES
Physical Therapy  Facility/Department: [facility] ICU  Daily Treatment Note  NAME: Jacob Xiao  : 1945  MRN: [id number]    Date of Service: 2021    Discharge Recommendations:  Patient would benefit from continued therapy after discharge       RN reports patient is medically stable for therapy treatment this date. Chart reviewed prior to treatment and patient is agreeable for therapy. All lines intact and patient positioned comfortably at end of treatment. All patient needs addressed prior to ending therapy session. Assessment   Body structures, Functions, Activity limitations: Decreased functional mobility ; Decreased endurance;Decreased balance;Decreased strength  Assessment: Pt will benefit from continued skilled PT to address balance, strength, safe mobility and activity tolerance. Pt on BiPAP this date, RT/RN asking for PT to get pt into chair. Prognosis: Good  Clinical Presentation: unstable  PT Education: PT Role;Plan of Care;Energy Conservation;General Safety; Functional Mobility Training;Transfer Training  REQUIRES PT FOLLOW UP: Yes  Activity Tolerance  Activity Tolerance: Patient limited by endurance; Patient limited by fatigue     Patient Diagnosis(es): The primary encounter diagnosis was Pneumonia due to COVID-19 virus. Diagnoses of SHANAE (acute kidney injury) ([address] Severe sepsis ([address] and Acute respiratory failure with hypoxia ([address] were also pertinent to this visit.      has a past medical history of Anemia, iron deficiency, Asthma, Carpal tunnel syndrome on right, Chronic kidney disease, COPD (chronic obstructive pulmonary disease) ([address] Edema, Former smoker, GERD (gastroesophageal reflux disease), Glucose intolerance (impaired glucose tolerance), History of anemia, History of corneal abrasion, History of dyspnea, History of pneumonia, Hyperlipidemia, Hypertension, Idiopathic gout, Morbid obesity ([address] Neck strain, Obstructive sleep apnea, Osteoarthritis, Pain, Shoulder strain, modAx2. Multiple lines. Ambulation  Ambulation?: Yes  Ambulation 1  Surface: level tile  Device: Hand-Held Assist  Other Apparatus: O2(BiPAP)  Assistance: Moderate assistance;2 Person assistance  Quality of Gait: forward trunk, guarded steps  Distance: 2 steps to bed>BSC, 3 steps from BSC>recliner  Comments: Pt desat to ~80's after BSC transfer, required RT to increase BiPAP. Once sitting in recliner, pt able to recover SpO2 >89% with increased time. Balance  Posture: Fair  Sitting - Static: Good  Sitting - Dynamic: Good  Standing - Static: Fair;+  Standing - Dynamic: Fair  Comments: Pt able to stand ~1min during ADLs with hand held assist and cues for postural corrections. OutComes Score  AM-PAC Score  -PAC Inpatient Mobility Raw Score : 12 (04/23/21 1001)  AM-PAC Inpatient T-Scale Score : 35.33 (04/23/21 1001)  Mobility Inpatient CMS 0-100% Score: 68.66 (04/23/21 1001)  Mobility Inpatient CMS G-Code Modifier : CL (04/23/21 1001)          Goals  Short term goals  Time Frame for Short term goals: 12 treatments  Short term goal 1: Independent bed mobility/transfers  Short term goal 2: Ambulate 50' x 1 w/ approrpiate assistive device  Short term goal 3: Good standing balance  Short term goal 4: Tolerate 30 min ther act  Patient Goals   Patient goals : None stated    Plan    Plan  Times per week: 1-2x/day,5-6 days/week  Current Treatment Recommendations: Balance Training, Transfer Training, Gait Training, Endurance Training, Strengthening, Neuromuscular Re-education, Patient/Caregiver Education & Training, Positioning, Safety Education & Training, Functional Mobility Training  Safety Devices  Type of devices:  All fall risk precautions in place, Call light within reach, Nurse notified, Left in chair  Restraints  Initially in place: No     Therapy Time   Individual Concurrent Group Co-treatment   Time In 0830         Time Out 0911         Minutes Rona Contreras, PT

## 2021-04-23 NOTE — PROGRESS NOTES
Renal Progress Note    Patient :  Ambrocio Robison; 70 y.o. MRN# [id number]  Location:  [facility]5/1105-01  Attending:  Marcin Aguilera DO  Admit Date:  4/20/2021   Hospital Day: 3      Subjective:     IV fluids continue. Bicarbonate infusion continues. Oral intake poor. Continues on BiPAP support with 90% oxygen. Maintaining adequate saturations. Chest x-ray from today pending. Creatinine has come down to 2.0. No fever or chills. Blood pressures fluctuate some readings as low as 132 systolic. Drops particularly after she gets all her antihypertensives. Continues on Decadron, also on insulin as needed. Has also been started on Rocephin and azithromycin for superadded bacterial infection. Weights are stable. Fluid balance even.     Outpatient Medications:     Medications Prior to Admission: pravastatin (PRAVACHOL) 40 MG tablet, TAKE 1 TABLET AT BEDTIME  loratadine (CLARITIN) 10 MG tablet, TAKE 1 TABLET EVERY MORNING  furosemide (LASIX) 20 MG tablet, TAKE 1 TABLET EVERY MORNING  lisinopril (PRINIVIL;ZESTRIL) 30 MG tablet, TAKE 1 TABLET BY MOUTH EVERY MORNING  carvedilol (COREG) 12.5 MG tablet, TAKE 1 TABLET TWICE DAILY(AM/PM)  glimepiride (AMARYL) 4 MG tablet, TAKE 1 TABLET TWICE DAILY(AM/PM)  Omega-3 Fatty Acids (GNP FISH OIL) 1000 MG CAPS, TAKE 1 CAPSULE BY MOUTH DAILY IN THE EVENING  allopurinol (ZYLOPRIM) 100 MG tablet, TAKE 1 TABLET BY MOUTH AT BEDTIME  amLODIPine (NORVASC) 10 MG tablet, TAKE 1 TABLET BY MOUTH DAILY IN THE MORNING  aspirin (ASPIRIN LOW DOSE) 81 MG EC tablet, TAKE 1 TABLET BY MOUTH DAILY IN THE EVENING  omeprazole (PRILOSEC) 40 MG delayed release capsule, TAKE 1 CAPSULE BY MOUTH DAILY IN THE MORNING  ferrous sulfate (FEROSUL) 325 (65 Fe) MG tablet, TAKE 1 TABLET BY MOUTH DAILY IN THE EVENING  diclofenac sodium (VOLTAREN) 1 % GEL, Apply topically 2 times daily  polyethylene glycol (GLYCOLAX) 17 GM/SCOOP powder, Take 17 g by mouth daily  albuterol sulfate  (90 Base) MCG/ACT inhaler, Inhale 2 puffs into the lungs every 6 hours as needed for Wheezing or Shortness of Breath  Multiple Vitamins-Minerals (MULTIVITAMIN) tablet, TAKE 1 TABLET EVERY MORNING  acetaminophen (APAP EXTRA STRENGTH) 500 MG tablet, Take 1 tablet by mouth every 6 hours as needed for Pain  blood glucose test strips (ONETOUCH VERIO) strip, TEST 3 TIMES A DAY & AS NEEDED FOR SYMPTOMS OF IRREGULAR BLOOD GLUCOSE  Blood Glucose Monitoring Suppl (ONETOUCH VERIO FLEX SYSTEM) w/Device KIT,   ONETOUCH VERIO strip, TEST BLOOD SUGAR 3 TIMES DAILY AS DIRECTED  Aimsco Ultra Thin Lancets MISC, USE AS DIRECTED WITH INSULIN USE    Current Medications:     Scheduled Meds:    amLODIPine  5 mg Oral Daily    insulin glargine  30 Units Subcutaneous Nightly    dexamethasone  6 mg Intravenous Q24H    pantoprazole  40 mg Intravenous Daily    And    sodium chloride (PF)  10 mL Intravenous Daily    insulin lispro  0-12 Units Subcutaneous TID WC    insulin lispro  0-6 Units Subcutaneous Nightly    Arformoterol Tartrate  15 mcg Nebulization BID    budesonide  0.5 mg Nebulization BID    allopurinol  100 mg Oral Nightly    aspirin  81 mg Oral QPM    carvedilol  12.5 mg Oral BID    ferrous sulfate  325 mg Oral Nightly    polyethylene glycol  17 g Oral Daily    pravastatin  40 mg Oral Nightly    sodium chloride flush  5-40 mL Intravenous 2 times per day    azithromycin  500 mg Intravenous Q24H    And    cefTRIAXone (ROCEPHIN) IV  1,000 mg Intravenous Q24H    heparin (porcine)  5,000 Units Subcutaneous 3 times per day    Vitamin D  2,000 Units Oral Daily    ipratropium-albuterol  1 ampule Inhalation Q4H WA     Continuous Infusions:    IV infusion builder 75 mL/hr at 04/22/21 2327    sodium chloride 75 mL/hr at 04/21/21 0800    dextrose      sodium chloride       PRN Meds:  sodium chloride, albuterol sulfate HFA, glucose, dextrose, glucagon (rDNA), dextrose, sodium chloride flush, sodium chloride, promethazine **OR** ondansetron, acetaminophen **OR** acetaminophen, guaiFENesin-dextromethorphan    Input/Output:       I/O last 3 completed shifts: In: 9483 [P.O.:320; I.V.:1171]  Out: 6261 [Urine:1375]. Patient Vitals for the past 96 hrs (Last 3 readings):   Weight   21 0900 267 lb 10.2 oz (121.4 kg)   21 0920 263 lb 10.7 oz (119.6 kg)   21 0254 265 lb (120.2 kg)       Vital Signs:   Temperature:  Temp: 98.4 °F (36.9 °C)  TMax:   Temp (24hrs), Av.8 °F (37.1 °C), Min:98.4 °F (36.9 °C), Max:99.1 °F (37.3 °C)    Respirations:  Resp: 30  Pulse:   Pulse: 80  BP:    BP: (!) 142/80  BP Range: Systolic (56QJI), YVP:698 , Min:106 , KFM:843       Diastolic (12LMH), KSD:88, Min:62, Max:94      Physical Examination:     General:  Awake, follows commands, not able to complete sentences, tracheal tug evident  HEENT: Atraumatic, normocephalic, no throat congestion, dry mucosa. Eyes:   Pupils equal, round and reactive to light, EOMI. Neck:   No JVD, no thyromegaly, no lymphadenopathy. Chest:  Bilateral vesicular breath sounds, decreased air entry bilaterally  Cardiac:  S1 S2 RR, no murmurs, gallops or rubs, JVP not raised. Abdomen: Soft, non-tender, no masses or organomegaly, BS audible. :   No suprapubic or flank tenderness. Neuro:  AAO x 3, No FND. SKIN:  No rashes, good skin turgor. Extremities:  Trace edema, palpable peripheral pulses, no calf tenderness.     Labs:       Recent Labs     21  0506 21  0502 21  0315   WBC 2.9* 6.5 7.6   RBC 3.61* 3.92* 3.69*   HGB 10.9* 11.8* 11.2*   HCT 34.0* 36.8 34.1*   MCV 94.2 93.9 92.4   MCH 30.2 30.1 30.4   MCHC 32.1 32.1 32.8   RDW 13.4 13.4 13.2   * 153 162   MPV 12.1 11.2 11.3      BMP:   Recent Labs     21  0502 21  1831 21  0315    139 141   K 4.9 4.5 4.3   * 106 110*   CO2 20 20 20   BUN 74* 81* 78*   CREATININE 2.60* 2.30* 2.04*   GLUCOSE 256* 335* 211*   CALCIUM 9.2 9.5 9.3      Phosphorus:   No results for input(s): PHOS in the last 72 hours. Magnesium:  No results for input(s): MG in the last 72 hours.   Albumin:    Recent Labs     04/21/21  0506 04/22/21  0502 04/23/21  0315   LABALBU 2.8* 3.1* 3.1*     BNP:      Lab Results   Component Value Date    BNP 44 11/13/2012     ADILIA:    No results found for: ADILIA  SPEP:  Lab Results   Component Value Date    PROT 6.2 04/23/2021    ALBCAL PENDING 04/21/2021    ALBPCT PENDING 04/21/2021    LABALPH PENDING 04/21/2021    LABALPH PENDING 04/21/2021    A1PCT PENDING 04/21/2021    A2PCT PENDING 04/21/2021    LABBETA PENDING 04/21/2021    BETAPCT PENDING 04/21/2021    GAMGLOB PENDING 04/21/2021    GGPCT PENDING 04/21/2021    PATH PENDING 04/21/2021    PATH PENDING 04/21/2021     UPEP:   No results found for: LABPE  C3:     Lab Results   Component Value Date    C3 130 04/21/2021     C4:     Lab Results   Component Value Date    C4 53 04/21/2021     MPO ANCA:   No results found for: MPO  PR3 ANCA:   No results found for: PR3  Anti-GBM:   No results found for: GBMABIGG  Hep BsAg:         Lab Results   Component Value Date    HEPBSAG NONREACTIVE 04/21/2021     Hep C AB:          Lab Results   Component Value Date    HEPCAB NONREACTIVE 04/21/2021       Urinalysis/Chemistries:      Lab Results   Component Value Date    NITRU NEGATIVE 04/21/2021    COLORU YELLOW 04/21/2021    PHUR 5.5 04/21/2021    WBCUA 2 TO 5 04/21/2021    RBCUA 20 TO 50 04/21/2021    MUCUS NOT REPORTED 04/21/2021    TRICHOMONAS NOT REPORTED 04/21/2021    YEAST FEW 04/21/2021    BACTERIA NOT REPORTED 04/21/2021    SPECGRAV 1.015 04/21/2021    LEUKOCYTESUR NEGATIVE 04/21/2021    UROBILINOGEN Normal 04/21/2021    BILIRUBINUR NEGATIVE 04/21/2021    BILIRUBINUR NEGATIVE 12/12/2011    GLUCOSEU NEGATIVE 04/21/2021    GLUCOSEU NEGATIVE 12/12/2011    KETUA NEGATIVE 04/21/2021    AMORPHOUS NOT REPORTED 04/21/2021     Urine Sodium:     Lab Results   Component Value Date    SORAYA 42 04/21/2021     Urine Potassium:  No results found for: MATTEO  Urine Chloride: No results found for: CLUR  Urine Osmolarity: No results found for: OSMOU  Urine Protein:   No components found for: TOTALPROTEIN, URINE   Urine Creatinine:     Lab Results   Component Value Date    LABCREA 75.9 04/21/2021     Urine Eosinophils:  No components found for: UEOS    Radiology:     CXR:     Assessment:     1. Acute Kidney Injury: Secondary to ischemic ATN from intravascular depletion related to insensible losses decreased intake, loop diuretic use ACE inhibitor use, systemic inflammatory response syndrome leading to hypotension from COVID-19 pneumonia Baseline creatinine 1.21.4 peaked up to 3.4 now resolving  2. Chronic kidney disease stage III from diabetic nephrosclerosis baseline 1.21.4, proteinuria 0.40.5 range follows up with Dr. Erik Mason in the office  3. COVID-19 pneumonia with respiratory failure on BiPAP  4. Type 2 diabetes  5. Anion gap metabolic acidosis secondary acute kidney injury doing better on bicarbonate infusion  6. Morbid obesity  7. Episodic hypotension  Plan:   1. Continue half saline with 75 of bicarb at 75 an hour  2. Decrease Norvasc to 5 daily  3. Continue Coreg at the current dose  4. Keep systolic pressure around 200167 range  5. We will follow renal function    Nutrition   Please ensure that patient is on a renal diet/TF. Avoid nephrotoxic drugs/contrast exposure. We will continue to follow along with you.

## 2021-04-24 NOTE — PLAN OF CARE
Pt remains safe with no falls or injuries this shift. Pt required more oxygen at times throughout the shift. Able to prone twice this shift and other times have patient all way over on left side. Will continue to monitor pt.

## 2021-04-24 NOTE — PROGRESS NOTES
SYSTEM) w/Device KIT,   ONETOUCH VERIO strip, TEST BLOOD SUGAR 3 TIMES DAILY AS DIRECTED  Aimsco Ultra Thin Lancets MISC, USE AS DIRECTED WITH INSULIN USE    Current Medications:     Scheduled Meds:    methylPREDNISolone  60 mg Intravenous Once    amLODIPine  5 mg Oral Daily    insulin glargine  30 Units Subcutaneous Nightly    dexamethasone  6 mg Intravenous Q24H    pantoprazole  40 mg Intravenous Daily    And    sodium chloride (PF)  10 mL Intravenous Daily    insulin lispro  0-12 Units Subcutaneous TID WC    insulin lispro  0-6 Units Subcutaneous Nightly    Arformoterol Tartrate  15 mcg Nebulization BID    budesonide  0.5 mg Nebulization BID    allopurinol  100 mg Oral Nightly    aspirin  81 mg Oral QPM    carvedilol  12.5 mg Oral BID    ferrous sulfate  325 mg Oral Nightly    polyethylene glycol  17 g Oral Daily    pravastatin  40 mg Oral Nightly    sodium chloride flush  5-40 mL Intravenous 2 times per day    cefTRIAXone (ROCEPHIN) IV  1,000 mg Intravenous Q24H    Vitamin D  2,000 Units Oral Daily    ipratropium-albuterol  1 ampule Inhalation Q4H WA     Continuous Infusions:    dexmedetomidine HCl in NaCl 0.3 mcg/kg/hr (21 1345)    sodium chloride      heparin (PORCINE) Infusion Stopped (21 1332)    sodium chloride 75 mL/hr at 21 0800    dextrose      sodium chloride       PRN Meds:  heparin (porcine), heparin (porcine), morphine **OR** morphine, sodium chloride, albuterol sulfate HFA, glucose, dextrose, glucagon (rDNA), dextrose, sodium chloride flush, sodium chloride, promethazine **OR** ondansetron, acetaminophen **OR** acetaminophen, guaiFENesin-dextromethorphan    Input/Output:       I/O last 3 completed shifts: In: 922 [I.V.:922]  Out: 1732 [Urine:1275].       Patient Vitals for the past 96 hrs (Last 3 readings):   Weight   21 0900 267 lb 10.2 oz (121.4 kg)       Vital Signs:   Temperature:  Temp: 98.8 °F (37.1 °C)  TMax:   Temp (24hrs), Av.1 °F (36.7 °C), Min:97.4 °F (36.3 °C), Max:98.8 °F (37.1 °C)    Respirations:  Resp: 25  Pulse:   Pulse: 84  BP:    BP: 96/81  BP Range: Systolic (85KLG), KUA:652 , Min:96 , VW       Diastolic (33XYN), HJV:48, Min:56, Max:114      Physical Examination:     General:  Awake, on bipap   HEENT: Atraumatic, normocephalic,    Observed from hallway, exam deferred due to COVID isolation, resting on bipap    Labs:       Recent Labs     21  0502 21  0315 21  1223 21  0021   WBC 6.5 7.6 9.1  --    RBC 3.92* 3.69* 4.02  --    HGB 11.8* 11.2* 12.1 11.6*   HCT 36.8 34.1* 37.8 35.5*   MCV 93.9 92.4 94.0  --    MCH 30.1 30.4 30.1  --    MCHC 32.1 32.8 32.0  --    RDW 13.4 13.2 13.5  --     162 168  --    MPV 11.2 11.3 11.3  --       BMP:   Recent Labs     21  1831 21  0315 21  0640    141 148*   K 4.5 4.3 3.8    110* 113*   CO2 20 20 25   BUN 81* 78* 64*   CREATININE 2.30* 2.04* 1.81*   GLUCOSE 335* 211* 193*   CALCIUM 9.5 9.3 9.0      Phosphorus:   No results for input(s): PHOS in the last 72 hours. Magnesium:  No results for input(s): MG in the last 72 hours. Albumin:    Recent Labs     21  0502 21  0315 21  0640   LABALBU 3.1* 3.1* 3.1*     BNP:      Lab Results   Component Value Date    BNP 44 2012     ADILIA:    No results found for: ADILIA  SPEP:  Lab Results   Component Value Date    PROT 6.0 2021    ALBCAL 3.4 2021    ALBPCT 56 2021    LABALPH 0.2 2021    LABALPH 0.7 2021    A1PCT 4 2021    A2PCT 11 2021    LABBETA 1.0 2021    BETAPCT 16 2021    GAMGLOB 0.7 2021    GGPCT 12 2021    PATH ELECTRONICALLY SIGNED. Delonte Eastman M.D. 2021    PATH ELECTRONICALLY SIGNED.  Delonte Eastman M.D. 2021     UPEP:   No results found for: LABPE  C3:     Lab Results   Component Value Date    C3 130 2021     C4:     Lab Results   Component Value Date    C4 53 2021     MPO ANCA:   No results found for: MPO  PR3 ANCA:   No results found for: PR3  Anti-GBM:   No results found for: GBMABIGG  Hep BsAg:         Lab Results   Component Value Date    HEPBSAG NONREACTIVE 04/21/2021     Hep C AB:          Lab Results   Component Value Date    HEPCAB NONREACTIVE 04/21/2021       Urinalysis/Chemistries:      Lab Results   Component Value Date    NITRU NEGATIVE 04/21/2021    COLORU YELLOW 04/21/2021    PHUR 5.5 04/21/2021    WBCUA 2 TO 5 04/21/2021    RBCUA 20 TO 50 04/21/2021    MUCUS NOT REPORTED 04/21/2021    TRICHOMONAS NOT REPORTED 04/21/2021    YEAST FEW 04/21/2021    BACTERIA NOT REPORTED 04/21/2021    SPECGRAV 1.015 04/21/2021    LEUKOCYTESUR NEGATIVE 04/21/2021    UROBILINOGEN Normal 04/21/2021    BILIRUBINUR NEGATIVE 04/21/2021    BILIRUBINUR NEGATIVE 12/12/2011    GLUCOSEU NEGATIVE 04/21/2021    GLUCOSEU NEGATIVE 12/12/2011    KETUA NEGATIVE 04/21/2021    AMORPHOUS NOT REPORTED 04/21/2021     Urine Sodium:     Lab Results   Component Value Date    SORAYA 42 04/21/2021     Urine Potassium:  No results found for: KUR  Urine Chloride:  No results found for: CLUR  Urine Osmolarity: No results found for: OSMOU  Urine Protein:   No components found for: TOTALPROTEIN, URINE   Urine Creatinine:     Lab Results   Component Value Date    LABCREA 75.9 04/21/2021     Urine Eosinophils:  No components found for: UEOS    Radiology:     CXR:     Assessment:     1. Acute Kidney Injury: Secondary to ischemic ATN from intravascular depletion related to insensible losses decreased intake, loop diuretic use ACE inhibitor use, systemic inflammatory response syndrome leading to hypotension from COVID-19 pneumonia Baseline creatinine 1.21.4 peaked up to 3.4 now resolving  2. Chronic kidney disease stage III from diabetic nephrosclerosis baseline 1.21.4, proteinuria 0.40.5 range follows up with Dr. Millie Culver in the office  3. COVID-19 pneumonia with respiratory failure on BiPAP  4. Type 2 diabetes  5.   Anion gap metabolic acidosis secondary acute kidney injury doing better on bicarbonate infusion  6. Morbid obesity  7. Episodic hypotension  Plan:   1. Change IVF to 1/2 NS at 75 ml/hr, bicarb now normal  2. Decreased Norvasc to 5 daily  3. Continue Coreg at the current dose  4. Keep systolic pressure around 036443 range  5. We will follow renal function, Cr improving daily, good UOP, keep bran for now    Nutrition   Please ensure that patient is on a renal diet/TF. Avoid nephrotoxic drugs/contrast exposure. We will continue to follow along with you.

## 2021-04-24 NOTE — PROGRESS NOTES
[facility]  Office: [address], DO, Isis Wilcox, DO, Marquis Chaudhry, DO, Sylvia Akers Blood, DO, Mark Downing MD, Katie King MD, Edelmira Sheehan MD, Rupinder Gloria MD, Melisa Goldmann, MD, Khloe Jefferson MD, Rock Caballero MD, Tara Eddy MD, Elie Nguyen DO, Apryl Kramer MD, Christopher Gregory DO, Jeffrey Luna MD,  Ayse Myers DO, Jasmine Blackwell MD, Kayla Mandel MD, Kenneth Laughlin MD, Ebony Farias MD, Keenan So [facility], [address], [facility], Dawson Rouse, CNP, Mejia Ibarra, CNS, Laurie Short, CNP, Evelia Goff, CNP, Giuliana Zepeda, CNP, Namrata Choudhury, CNP, Harry Lopez, CNP, Isamar Reed PA-C, Jim Hagen, [facility], Raman Velasquez, CNP, Harman Worthy, CNP, Roni Null, CNP, Laurie Neal, CNP, Julien Tidwell, CNP, Claudette David, [facility]    Progress Note    4/24/2021    1:19 PM    Name:   Odette Jo  MRN:     [id number]     Acct:      [de-identified]   Room:   [address] [place] [address] Day:  4  Admit Date:  4/20/2021  2:42 AM    PCP:   Praveen Marcano MD  Code Status:  Full Code    Subjective:     C/C:   Chief Complaint   Patient presents with    Shortness of Breath     Interval History Status: not changed. Remains bipap dependent  On 80% fio2, was on 80% yesterday    Still sob    Now tells rn and I she does not want to be intubated    Brief History:     Odette Jo is a 70 y.o. AA female who presents with Shortness of Breath   and is admitted to the hospital for the management of Sepsis due to COVID-19 [facility]).    This 70 yof presents with sob for about a week pta. She has also had a cough with white phlegm and has had fevers, chills, sweats, shakes. Denies change in taste or smell, n/v/d or abd pain.     Review of Systems:     Constitutional:  negative for fevers, sweats  Respiratory:  negative for cough,  wheezing  Cardiovascular:  negative for chest pain, chest pressure/discomfort, lower disease, COPD (chronic obstructive pulmonary disease) ([facility] [address] Edema, Former smoker, GERD (gastroesophageal reflux disease), Glucose intolerance (impaired glucose tolerance), History of anemia, History of corneal abrasion, History of dyspnea, History of pneumonia, Hyperlipidemia, Hypertension, Idiopathic gout, Morbid obesity ([facility] [address] Neck strain, Obstructive sleep apnea, Osteoarthritis, Pain, Shoulder strain, Systemic hypertension, TIA (transient ischemic attack), Type II or unspecified type diabetes mellitus without mention of complication, not stated as uncontrolled, Vitamin D deficiency, Wears dentures, and Wears glasses. Social History:   reports that she quit smoking about 31 years ago. Her smoking use included cigarettes. She started smoking about 61 years ago. She has a 30.00 pack-year smoking history. She has never used smokeless tobacco. She reports that she does not drink alcohol or use drugs. Family History:   Family History   Problem Relation Age of Onset   Angelia Worthy Asthma Mother     Stroke Sister     Diabetes Sister     Other Neg Hx         Sleep disorders or narcolepsy       Vitals:  /65   Pulse 68   Temp 98.8 °F (37.1 °C) (Axillary)   Resp 24   Ht 5' 3\" (1.6 m)   Wt 267 lb 10.2 oz (121.4 kg)   SpO2 90%   BMI 47.41 kg/m²   Temp (24hrs), Av.1 °F (36.7 °C), Min:97.4 °F (36.3 °C), Max:98.8 °F (37.1 °C)    Recent Labs     21  1643 21  1900 21  0816 21  1200   POCGLU 245* 268* 149* 135*       I/O (24Hr):     Intake/Output Summary (Last 24 hours) at 2021 1319  Last data filed at 2021 0400  Gross per 24 hour   Intake 922 ml   Output 1275 ml   Net -353 ml       Labs:  Hematology:  Recent Labs     21  0502 21  0315 21  1223 21  0021   WBC 6.5 7.6 9.1  --    RBC 3.92* 3.69* 4.02  --    HGB 11.8* 11.2* 12.1 11.6*   HCT 36.8 34.1* 37.8 35.5*   MCV 93.9 92.4 94.0  --    MCH 30.1 30.4 30.1  --    MCHC 32.1 32.8 32.0  --    RDW 13.4 13.2 13.5 --     162 168  --    MPV 11.2 11.3 11.3  --    CRP 75.1* 44.3*  --   --    INR  --   --  1.2  --    DDIMER 9.60* >20.00*  --   --      Chemistry:  Recent Labs     04/22/21  1831 04/23/21  0315 04/24/21  0640    141 148*   K 4.5 4.3 3.8    110* 113*   CO2 20 20 25   GLUCOSE 335* 211* 193*   BUN 81* 78* 64*   CREATININE 2.30* 2.04* 1.81*   ANIONGAP 13 11 10   LABGLOM 21* 24* 27*   GFRAA 25* 29* 33*   CALCIUM 9.5 9.3 9.0     Recent Labs     04/22/21  0502 04/22/21  0502 04/23/21  0315 04/23/21  0726 04/23/21  1249 04/23/21  1643 04/23/21  1900 04/24/21  0640 04/24/21  0816 04/24/21  1200   PROT 6.8  --  6.2*  --   --   --   --  6.0*  --   --    LABALBU 3.1*  --  3.1*  --   --   --   --  3.1*  --   --    *  --  204*  --   --   --   --  155*  --   --    ALT 75*  --  64*  --   --   --   --  63*  --   --    ALKPHOS 102  --  99  --   --   --   --  109*  --   --    BILITOT 0.32  --  0.33  --   --   --   --  0.34  --   --    POCGLU  --    < >  --  210* 168* 245* 268*  --  149* 135*    < > = values in this interval not displayed. ABG:  Lab Results   Component Value Date    FIO2 80.0 04/20/2021     Lab Results   Component Value Date/Time    SPECIAL [facility] 04/20/2021 03:56 AM     Lab Results   Component Value Date/Time    CULTURE NO GROWTH 4 DAYS 04/20/2021 03:56 AM       Radiology:  Gloria Arreguin Chest Portable    Result Date: 4/21/2021  Stable exam since yesterday. Xr Chest Portable    Result Date: 4/20/2021  Patchy bilateral pneumonia concerning for COVID pneumonia. Mild stable cardiomegaly. Severe osteoarthritic changes at the bilateral glenohumeral joints.        Physical Examination:        General appearance:  alert, cooperative and no distress  Mental Status:  oriented to person, place and time and normal affect  Lungs:  clear to auscultation bilaterally, normal effort on bipap  Heart:  regular rate and rhythm, no murmur  Abdomen:  soft, nontender, nondistended, normal bowel sounds, no masses, hepatomegaly, splenomegaly; obese  Extremities:  no edema, redness, tenderness in the calves  Skin:  no gross lesions, rashes, induration    Assessment:        Hospital Problems           Last Modified POA    * (Principal) Sepsis due to COVID-19 [facility]) 4/20/2021 Yes    Obstructive sleep apnea 4/20/2021 Yes    Morbid obesity with BMI of 70 and over, adult ([facility] [address] 4/20/2021 Yes    Type 2 diabetes mellitus with stage 3a chronic kidney disease, without long-term current use of insulin ([facility] [address] 4/20/2021 Yes    SHANAE (acute kidney injury) ([facility] [address] 4/20/2021 Yes    Essential hypertension 4/20/2021 Yes    Pneumonia due to COVID-19 virus 4/20/2021 Yes    Acute hypoxemic respiratory failure due to COVID-19 ([facility] [address] 4/20/2021 Yes      markedly elevated d-dimer    Plan:        1. On decadron per protocol day 5/10   2. Received actemra 4/20  3. increased lantus for hyperglycemia due to steroids  4. Wean bipap as able, but currently on 80%, same as yesterday (was 90% earlier this am)  5. At risk for need for intubation  6. Cont rocephin; completed zithromax  7. Monitor renal function: little better today  8. Agree with heparin drip and ble venous dopplers to eval for dvt given very high d-dimer; cannot go for cta chest due to shanae and resp status  9. Dnrcca no intubation per patient wishes  10.  D/w brijesh Aguilera, DO  4/24/2021  1:19 PM

## 2021-04-24 NOTE — PROGRESS NOTES
[facility]  Progress Note    Verónica Sue  MRN: [id number]  Date: 4/24/2021  LOS: 4     Reason for F/U :   Acute hypoxic respiratory failure, COVID-19 virus pneumonia    Impression :   1. Acute hypoxic respiratory failure due to COVID-19 virus pneumonia  · Currently BiPAP dependent  2. Possible bacterial coinfection  3. Acute kidney injury on chronic kidney diseaseimproving  4. Elevated inflammatory markers  5. Pancytopeniaresolving  6. Abnormal LFTs    Recommendations:   · The patient is not a candidate for remdesivir. · Continue on Decadron per COVID-19 protocol  · The patient is status post Actemra 4/20/2021  · Continue Rocephin and azithromycin day #5/5  · Patient continues significant hypoxia and is on BiPAP at this 90%  · The patient is currently working very hard to breathe and is hypoxic but this is after moving from the bed into the chair  · Continue supportive therapy    Infection Control Recommendations:   Droplet plus precautions    Discharge Planning:   Estimated Length of IV antimicrobials: 5 days  Patient will need Midline Catheter Insertion/ PICC line Insertion: No  Patient will need: Home IV , [place],  SNF,  LTAC: Undetermined  Patient willneed outpatient wound care: No    Medical Decision making / Summary of Stay:   Verónica Sue is a 70y.o.-year-old female who was initially admitted on 4/20/2021. Roney Webster lives at home alone and does not know of any known COVID-19 virus exposures. She reports that last week on Tuesday she started feeling generalized malaise/fatigue, shortness of breath without any significant cough. She did have some intermittent fevers but no chills, no chest pains or palpitations, no abdominal pain nausea vomiting or diarrhea. Her appetite did decrease and she continued to have progressive symptoms of shortness of breath which ended up prompting her to come into the emergency room for evaluation yesterday.   The patient was worked up at chest x-ray findings as suggested bilateral infiltrates and COVID-19 testing was done that was positive. The patient was placed on BiPAP/high flow O2 by nasal cannula actually did not tolerate the high flow O2 very well and is currently on BiPAP at 90% FiO2. The patient was started on antimicrobial therapy with Rocephin and azithromycin, also received Decadron, and 1 dose of Actemra given 2021. I was asked to evaluate and help with antibiotic management.     She is awake and alert currently on BiPAP at 90% and today did not tolerate high flow O2 by nasal cannula at all. Current evaluation:2021    /77   Pulse 82   Temp 98.8 °F (37.1 °C) (Axillary)   Resp 22   Ht 5' 3\" (1.6 m)   Wt 267 lb 10.2 oz (121.4 kg)   SpO2 (!) 87%   BMI 47.41 kg/m²     Temperature Range: Temp: 98.8 °F (37.1 °C) Temp  Av.2 °F (36.8 °C)  Min: 97.4 °F (36.3 °C)  Max: 98.8 °F (37.1 °C)  The patient is seen and evaluated at bedside she remains BiPAP dependent and currently is mildly hypoxic after moving from the bed to the chair. She has had some diarrhea. No fevers, remains fatigued. Review of Systems   Constitutional: Positive for fatigue. Respiratory: Positive for shortness of breath. Cardiovascular: Negative. Gastrointestinal: Negative. Genitourinary: Negative. Musculoskeletal: Negative. Skin: Negative. Neurological: Negative. Psychiatric/Behavioral: Negative. Physical Examination :     Physical Exam  Constitutional:       Appearance: She is well-developed. She is obese. HENT:      Head: Normocephalic and atraumatic. Neck:      Musculoskeletal: Normal range of motion and neck supple. Cardiovascular:      Rate and Rhythm: Regular rhythm. Heart sounds: Murmur present. Pulmonary:      Effort: Pulmonary effort is normal.      Breath sounds: Rales present. Abdominal:      General: Bowel sounds are normal.      Palpations: Abdomen is soft.    Skin:     General: Skin is warm and dry. Neurological:      Mental Status: She is alert and oriented to person, place, and time. Laboratory data:   I have independently reviewed the followinglabs:  CBC with Differential:   Recent Labs     04/22/21  0502 04/23/21  0315 04/23/21  1223 04/24/21  0021   WBC 6.5 7.6 9.1  --    HGB 11.8* 11.2* 12.1 11.6*   HCT 36.8 34.1* 37.8 35.5*    162 168  --    LYMPHOPCT 7* 6*  --   --    MONOPCT 6 6  --   --      BMP:   Recent Labs     04/23/21  0315 04/24/21  0640    148*   K 4.3 3.8   * 113*   CO2 20 25   BUN 78* 64*   CREATININE 2.04* 1.81*     Hepatic Function Panel:   Recent Labs     04/23/21  0315 04/24/21  0640   PROT 6.2* 6.0*   LABALBU 3.1* 3.1*   BILITOT 0.33 0.34   ALKPHOS 99 109*   ALT 64* 63*   * 155*         Lab Results   Component Value Date    PROCAL 2.30 04/22/2021    PROCAL 3.87 04/21/2021    PROCAL 1.79 04/20/2021     Lab Results   Component Value Date    CRP 44.3 04/23/2021    CRP 75.1 04/22/2021    .6 04/21/2021     Lab Results   Component Value Date    SEDRATE 25 (H) 03/30/2015         Lab Results   Component Value Date    DDIMER >20.00 04/23/2021    DDIMER 9.60 04/22/2021    DDIMER 7.18 04/21/2021    DDIMER 1.89 08/09/2015     Lab Results   Component Value Date    FERRITIN 3,230 04/24/2021    FERRITIN 5,870 04/22/2021    FERRITIN 6,232 04/20/2021     Lab Results   Component Value Date     04/20/2021     Lab Results   Component Value Date    FIBRINOGEN 598 04/21/2021       Results in Past 30 Days  Result Component Current Result Ref Range Previous Result Ref Range   SARS-CoV-2, Rapid DETECTED (A) (4/20/2021) Not Detected Not in Time Range      Lab Results   Component Value Date    COVID19 DETECTED 04/20/2021       No results for input(s): VANCOTROUGH in the last 72 hours.     Imaging Studies:   ONE XRAY VIEW OF THE CHEST 4/23/2021 5:10 am  Impression   Slight progression in moderate to marked diffuse bilateral alveolar   infiltrates related to progression in edema and/or multifocal pneumonia       Cultures:     Culture, Blood 2 [[id number]] Collected: 04/20/21 0356   Order Status: Completed Specimen: Blood Updated: 04/24/21 0009    Specimen Description . BLOOD    Special Requests [facility]    Culture NO GROWTH 4 DAYS   Culture, Blood 1 [[id number]] Collected: 04/20/21 0326   Order Status: Completed Specimen: Blood Updated: 04/24/21 0009    Specimen Description . BLOOD    Special Requests [facility]    Culture NO GROWTH 4 DAYS       Strep Pneumoniae Antigen [[id number]] Collected: 04/20/21 0331   Order Status: Completed Specimen: Urine, clean catch Updated: 04/20/21 2347    Source . CLEAN CATCH URINE    Strep pneumo Ag NEGATIVE    Comment: Strep pneumoniae antigen not detected      Legionella antigen, urine [[id number]] Collected: 04/20/21 0331   Order Status: Completed Specimen: Urine, clean catch Updated: 04/20/21 2346    Legionella Pneumophilia Ag, Urine NEGATIVE    Comment: L. pneumophila serogroup 1 antigen not detected. A negative result does not exclude infection with Leginella pnemophila serogroup 1 nor does   it rule out other microbial-caused respiratory infections of disease caused by other   serogroups of Legionella pneumophila. COVID-19, Rapid [[id number]] (Abnormal) Collected: 04/20/21 0256   Order Status: Completed Specimen: Nasopharyngeal Swab Updated: 04/20/21 0333    Specimen Description . NASOPHARYNGEAL SWAB    SARS-CoV-2, Rapid DETECTEDAbnormal     Comment:        Rapid NAAT: The specimen is POSITIVE for SARS-Cov-2, the novel coronavirus associated with   COVID-19.         This test has been authorized by the FDA under an Emergency Use Authorization (EUA) for use   by authorized laboratories.         The ID NOW COVID-19 assay is designed to detect the virus that causes COVID-19 in patients   with signs and symptoms of infection who are suspected of COVID-19.    An individual without symptoms of COVID-19 and who is not shedding

## 2021-04-24 NOTE — PROGRESS NOTES
Pulmonary Critical Care Progress Note       Patient seen for the follow up of acute hypoxic respiratory failure, Covid 19 pneumonia, asthma, COPD, YANA, Sepsis due to COVID-19 [facility])     Subjective:   she requires BiPAP  24/16 at 90% FiO2. She has desaturation on ambulation. She has labored sitting in the chair. She is not able to speak in full sentences. She denies any chest pain. She does have cough. Her shortness of breath not much changed. Examination:  Vitals: /65   Pulse 68   Temp 98.8 °F (37.1 °C) (Axillary)   Resp 24   Ht 5' 3\" (1.6 m)   Wt 267 lb 10.2 oz (121.4 kg)   SpO2 90%   BMI 47.41 kg/m²   General appearance:  alert and  Labored on BiPAP  Neck: No JVD  Lungs:  Decreased breath sound mild basilar crackles  Heart: regular rate and rhythm, S1, S2 normal, no gallop  Abdomen: Soft, non tender, + BS  Extremities: no cyanosis or clubbing.   Trace edema    LABs:  CBC:   Recent Labs     04/23/21  0315 04/23/21  1223 04/24/21  0021   WBC 7.6 9.1  --    HGB 11.2* 12.1 11.6*   HCT 34.1* 37.8 35.5*    168  --      BMP:   Recent Labs     04/23/21  0315 04/24/21  0640    148*   K 4.3 3.8   CO2 20 25   BUN 78* 64*   CREATININE 2.04* 1.81*   LABGLOM 24* 27*   GLUCOSE 211* 193*     APTT:  Recent Labs     04/23/21  1223   APTT 30.6     LIVER PROFILE:  Recent Labs     04/23/21  0315 04/24/21  0640   * 155*   ALT 64* 63*   LABALBU 3.1* 3.1*     Radiology:  CXR 4/23  Slight progression in moderate to marked diffuse bilateral alveolar   infiltrates related to progression in edema and/or multifocal pneumonia               4/22/2021      Impression:    · Acute hypoxic respiratory failure requiring oxygen by high flow nasal cannula  · COVID-19 viral pneumonia  · Asthma  · COPD/30-pack-year smoking history quit 1990  · CKD  · Obstructive sleep apnea/morbid obesity on home CPAP  · Hypertension, DM II, GERD    Recommendations:    · Airborne isolation  · Oxygen via high flow nasal cannula 60L/80%, keep oxygen saturation greater than 92%  · BiPAP for sleep and as needed during the day  · precedex drip for agitation  · Albuterol and Ipratropium Q 4 hours and prn  ·  budesonide aerosol treatment  · Continue Brovana aerosol treatment  · Continue IV Zithromax/Rocephin  · ABG/ bicarb drip  · Decadron 6 mg p.o. daily/ solumedrol 60 IV x1   · S/p Actemra on 4/20/2021  · X-ray chest   · heparin drip empirically ; not a candidate for CTA of the chest secondary to her renal function to rule out PE and is unable to safely go for VQ scan at this time.    · Check bilateral lower extremity Dopplers  · Check echo  · Discussed with RN  · Follows with Dr. Ramakrishna Pardo  ·  patient wants to be DNR CCA and no intubation   · called and discussed with tanmay Rondon per patient request ; I advised him about poor prognosis; he will   ·  DVT prophylaxis on heparin drip    Teressa Galvan MD, [facility]  Pulmonary Critical Care and Sleep Medicine,  [address]  Office: [phone]

## 2021-04-25 NOTE — PROGRESS NOTES
ended up prompting her to come into the emergency room for evaluation yesterday. The patient was worked up at chest x-ray findings as suggested bilateral infiltrates and COVID-19 testing was done that was positive. The patient was placed on BiPAP/high flow O2 by nasal cannula actually did not tolerate the high flow O2 very well and is currently on BiPAP at 90% FiO2. The patient was started on antimicrobial therapy with Rocephin and azithromycin, also received Decadron, and 1 dose of Actemra given 2021. I was asked to evaluate and help with antibiotic management.     She is awake and alert currently on BiPAP at 90% and today did not tolerate high flow O2 by nasal cannula at all. Current evaluation:2021    /61   Pulse 60   Temp 98.2 °F (36.8 °C) (Oral)   Resp 23   Ht 5' 3\" (1.6 m)   Wt 268 lb 8.3 oz (121.8 kg)   SpO2 (!) 86%   BMI 47.57 kg/m²     Temperature Range: Temp: 98.2 °F (36.8 °C) Temp  Av.9 °F (36.6 °C)  Min: 97.6 °F (36.4 °C)  Max: 98.2 °F (36.8 °C)  Pramod is seen and evaluated at bedside she is awake and alert remains on BiPAP at 100% and is on Precedex drip. She continues to have significant desaturations with minimal movement and she essentially has remained BiPAP dependent. She continues to have some diarrhea, remains weak, and discussions were had about potential intubation. The patient did change her CODE STATUS to a DNR CC arrest and did not want intubation. Review of Systems   Constitutional: Positive for fatigue. Respiratory: Positive for shortness of breath. Cardiovascular: Negative. Gastrointestinal: Positive for diarrhea. Genitourinary: Negative. Musculoskeletal: Negative. Skin: Negative. Neurological: Negative. Psychiatric/Behavioral: Negative. Physical Examination :     Physical Exam  Constitutional:       Appearance: She is well-developed. She is obese. HENT:      Head: Normocephalic and atraumatic.    Neck: Musculoskeletal: Normal range of motion and neck supple. Cardiovascular:      Rate and Rhythm: Regular rhythm. Heart sounds: Murmur present. Pulmonary:      Effort: Pulmonary effort is normal.      Breath sounds: Rales present. Abdominal:      General: Bowel sounds are normal.      Palpations: Abdomen is soft. Skin:     General: Skin is warm and dry. Neurological:      Mental Status: She is alert and oriented to person, place, and time.          Laboratory data:   I have independently reviewed the followinglabs:  CBC with Differential:   Recent Labs     04/23/21  0315 04/23/21  1223 04/24/21  0021 04/25/21  0753   WBC 7.6 9.1  --  9.2   HGB 11.2* 12.1 11.6* 10.8*   HCT 34.1* 37.8 35.5* 34.3*    168  --  157   LYMPHOPCT 6*  --   --   --    MONOPCT 6  --   --   --      BMP:   Recent Labs     04/24/21  0640 04/25/21  0753   * 149*   K 3.8 4.4   * 113*   CO2 25 23   BUN 64* 58*   CREATININE 1.81* 1.49*     Hepatic Function Panel:   Recent Labs     04/24/21  0640 04/25/21  0753   PROT 6.0* 5.6*   LABALBU 3.1* 2.8*   BILITOT 0.34 0.33   ALKPHOS 109* 112*   ALT 63* 56*   * 111*         Lab Results   Component Value Date    PROCAL 0.51 04/24/2021    PROCAL 2.30 04/22/2021    PROCAL 3.87 04/21/2021     Lab Results   Component Value Date    CRP 44.3 04/23/2021    CRP 75.1 04/22/2021    .6 04/21/2021     Lab Results   Component Value Date    SEDRATE 25 (H) 03/30/2015         Lab Results   Component Value Date    DDIMER >20.00 04/23/2021    DDIMER 9.60 04/22/2021    DDIMER 7.18 04/21/2021    DDIMER 1.89 08/09/2015     Lab Results   Component Value Date    FERRITIN 3,230 04/24/2021    FERRITIN 5,870 04/22/2021    FERRITIN 6,232 04/20/2021     Lab Results   Component Value Date     04/20/2021     Lab Results   Component Value Date    FIBRINOGEN 598 04/21/2021       Results in Past 30 Days  Result Component Current Result Ref Range Previous Result Ref Range   SARS-CoV-2, Rapid catch Updated: 04/20/21 9054    Legionella Pneumophilia Ag, Urine NEGATIVE    Comment: L. pneumophila serogroup 1 antigen not detected. A negative result does not exclude infection with Leginella pnemophila serogroup 1 nor does   it rule out other microbial-caused respiratory infections of disease caused by other   serogroups of Legionella pneumophila. COVID-19, Rapid [[id number]] (Abnormal) Collected: 04/20/21 0256   Order Status: Completed Specimen: Nasopharyngeal Swab Updated: 04/20/21 0333    Specimen Description . NASOPHARYNGEAL SWAB    SARS-CoV-2, Rapid DETECTEDAbnormal     Comment:        Rapid NAAT: The specimen is POSITIVE for SARS-Cov-2, the novel coronavirus associated with   COVID-19.         This test has been authorized by the FDA under an Emergency Use Authorization (EUA) for use   by authorized laboratories.         The ID NOW COVID-19 assay is designed to detect the virus that causes COVID-19 in patients   with signs and symptoms of infection who are suspected of COVID-19. An individual without symptoms of COVID-19 and who is not shedding SARS-CoV-2 virus would   expect to have a negative (not detected) result in this assay.    Fact sheet for Healthcare Providers: BuildHer.es   Fact sheet for Patients: BuildHer.es           Methodology: Isothermal Nucleic Acid Amplification         Results reported to the appropriate Health Department           Medications:      amLODIPine  5 mg Oral Daily    insulin glargine  30 Units Subcutaneous Nightly    dexamethasone  6 mg Intravenous Q24H    pantoprazole  40 mg Intravenous Daily    And    sodium chloride (PF)  10 mL Intravenous Daily    insulin lispro  0-12 Units Subcutaneous TID WC    insulin lispro  0-6 Units Subcutaneous Nightly    Arformoterol Tartrate  15 mcg Nebulization BID    budesonide  0.5 mg Nebulization BID    allopurinol  100 mg Oral Nightly    aspirin  81 mg Oral QPM    carvedilol  12.5 mg Oral BID    ferrous sulfate  325 mg Oral Nightly    polyethylene glycol  17 g Oral Daily    pravastatin  40 mg Oral Nightly    sodium chloride flush  5-40 mL Intravenous 2 times per day    cefTRIAXone (ROCEPHIN) IV  1,000 mg Intravenous Q24H    Vitamin D  2,000 Units Oral Daily    ipratropium-albuterol  1 ampule Inhalation Q4H WA           [facility]  [address]  [employer] messaging  OFFICE: [phone]      Electronically signed by [address] [place] [address] Street, MD on 4/25/2021 at 11:12 AM  Thank you for allowing us to participate in the care of this patient. Please call with questions. This note iscreated with the assistance of a speech recognition program.  While intending to generate a document that actually reflects the content of the visit, the document can still have some errors including those of syntax andsound a like substitutions which may escape proof reading. In such instances, actual meaning can be extrapolated by contextual diversion.

## 2021-04-25 NOTE — PROGRESS NOTES
extremity edema, palpitations  Gastrointestinal:  negative for abdominal pain, constipation, diarrhea, nausea, vomiting  Neurological:  negative for dizziness, headache    Medications: Allergies:     Allergies   Allergen Reactions    Nsaids Other (See Comments)     Chronic kidney disease    Proair Respiclick [Albuterol Sulfate]        Current Meds:   Scheduled Meds:    acetylcysteine  600 mg Inhalation BID    methylPREDNISolone  60 mg Intravenous Once    amLODIPine  5 mg Oral Daily    insulin glargine  30 Units Subcutaneous Nightly    dexamethasone  6 mg Intravenous Q24H    pantoprazole  40 mg Intravenous Daily    And    sodium chloride (PF)  10 mL Intravenous Daily    insulin lispro  0-12 Units Subcutaneous TID WC    insulin lispro  0-6 Units Subcutaneous Nightly    Arformoterol Tartrate  15 mcg Nebulization BID    budesonide  0.5 mg Nebulization BID    allopurinol  100 mg Oral Nightly    aspirin  81 mg Oral QPM    carvedilol  12.5 mg Oral BID    ferrous sulfate  325 mg Oral Nightly    polyethylene glycol  17 g Oral Daily    pravastatin  40 mg Oral Nightly    sodium chloride flush  5-40 mL Intravenous 2 times per day    cefTRIAXone (ROCEPHIN) IV  1,000 mg Intravenous Q24H    Vitamin D  2,000 Units Oral Daily    ipratropium-albuterol  1 ampule Inhalation Q4H WA     Continuous Infusions:    dexmedetomidine HCl in NaCl 0.3 mcg/kg/hr (04/25/21 0942)    sodium chloride 75 mL/hr at 04/25/21 0245    heparin (PORCINE) Infusion 6.3 Units/kg/hr (04/25/21 0858)    sodium chloride 75 mL/hr at 04/21/21 0800    dextrose      sodium chloride       PRN Meds: heparin (porcine), heparin (porcine), morphine **OR** morphine, sodium chloride, albuterol sulfate HFA, glucose, dextrose, glucagon (rDNA), dextrose, sodium chloride flush, sodium chloride, promethazine **OR** ondansetron, acetaminophen **OR** acetaminophen, guaiFENesin-dextromethorphan    Data:     Past Medical History:   has a past medical history of Anemia, iron deficiency, Asthma, Carpal tunnel syndrome on right, Chronic kidney disease, COPD (chronic obstructive pulmonary disease) ([facility] [address] Edema, Former smoker, GERD (gastroesophageal reflux disease), Glucose intolerance (impaired glucose tolerance), History of anemia, History of corneal abrasion, History of dyspnea, History of pneumonia, Hyperlipidemia, Hypertension, Idiopathic gout, Morbid obesity ([address] Neck strain, Obstructive sleep apnea, Osteoarthritis, Pain, Shoulder strain, Systemic hypertension, TIA (transient ischemic attack), Type II or unspecified type diabetes mellitus without mention of complication, not stated as uncontrolled, Vitamin D deficiency, Wears dentures, and Wears glasses. Social History:   reports that she quit smoking about 31 years ago. Her smoking use included cigarettes. She started smoking about 61 years ago. She has a 30.00 pack-year smoking history. She has never used smokeless tobacco. She reports that she does not drink alcohol or use drugs. Family History:   Family History   Problem Relation Age of Onset   Idris Zacarias Asthma Mother     Stroke Sister     Diabetes Sister     Other Neg Hx         Sleep disorders or narcolepsy       Vitals:  /60   Pulse 61   Temp 98.2 °F (36.8 °C) (Oral)   Resp 27   Ht 5' 3\" (1.6 m)   Wt 268 lb 8.3 oz (121.8 kg)   SpO2 91%   BMI 47.57 kg/m²   Temp (24hrs), Av.9 °F (36.6 °C), Min:97.6 °F (36.4 °C), Max:98.2 °F (36.8 °C)    Recent Labs     21  1200 21  1720 21  1901 21  0725   POCGLU 135* 136* 158* 267*       I/O (24Hr):     Intake/Output Summary (Last 24 hours) at 2021 1212  Last data filed at 2021 0405  Gross per 24 hour   Intake 2896.32 ml   Output 1750 ml   Net 1146.32 ml       Labs:  Hematology:  Recent Labs     21  0315 21  1223 21  0021 21  0753   WBC 7.6 9.1  --  9.2   RBC 3.69* 4.02  --  3.62*   HGB 11.2* 12.1 11.6* 10.8*   HCT 34.1* 37.8 35.5* 34.3*   MCV 92.4 94.0  --  94.8   MCH 30.4 30.1  --  29.8   MCHC 32.8 32.0  --  31.5   RDW 13.2 13.5  --  13.6    168  --  157   MPV 11.3 11.3  --  11.0   CRP 44.3*  --   --   --    INR  --  1.2  --   --    DDIMER >20.00*  --   --   --      Chemistry:  Recent Labs     04/23/21 0315 04/24/21  0640 04/25/21  0753    148* 149*   K 4.3 3.8 4.4   * 113* 113*   CO2 20 25 23   GLUCOSE 211* 193* 287*   BUN 78* 64* 58*   CREATININE 2.04* 1.81* 1.49*   ANIONGAP 11 10 13   LABGLOM 24* 27* 34*   GFRAA 29* 33* 42*   CALCIUM 9.3 9.0 8.7   PROBNP  --  269  --      Recent Labs     04/23/21 0315 04/23/21 0315 04/23/21  1900 04/24/21  0640 04/24/21  0816 04/24/21  1200 04/24/21  1720 04/24/21  1901 04/25/21  0725 04/25/21  0753   PROT 6.2*  --   --  6.0*  --   --   --   --   --  5.6*   LABALBU 3.1*  --   --  3.1*  --   --   --   --   --  2.8*   *  --   --  155*  --   --   --   --   --  111*   ALT 64*  --   --  63*  --   --   --   --   --  56*   ALKPHOS 99  --   --  109*  --   --   --   --   --  112*   BILITOT 0.33  --   --  0.34  --   --   --   --   --  0.33   POCGLU  --    < > 268*  --  149* 135* 136* 158* 267*  --     < > = values in this interval not displayed. ABG:  Lab Results   Component Value Date    POCPH 7.37 04/24/2021    POCPCO2 49 04/24/2021    POCPO2 78 04/24/2021    POCHCO3 28.2 04/24/2021    NBEA NOT REPORTED 04/24/2021    PBEA 3 04/24/2021    LSI8NOU 30 04/24/2021    LZKS4OWI 95 04/24/2021    FIO2 80.0 04/24/2021     Lab Results   Component Value Date/Time    [facility] 04/20/2021 03:56 AM     Lab Results   Component Value Date/Time    CULTURE NO GROWTH 5 DAYS 04/20/2021 03:56 AM       Radiology:  Jorge Cortes Chest Portable    Result Date: 4/21/2021  Stable exam since yesterday. Xr Chest Portable    Result Date: 4/20/2021  Patchy bilateral pneumonia concerning for COVID pneumonia. Mild stable cardiomegaly. Severe osteoarthritic changes at the bilateral glenohumeral joints.        Physical Examination:        General appearance:  alert, cooperative and no distress  Mental Status:  oriented to person, place and time and normal affect  Lungs:  clear to auscultation bilaterally, normal effort on bipap  Heart:  regular rate and rhythm, no murmur  Abdomen:  soft, nontender, nondistended, normal bowel sounds, no masses, hepatomegaly, splenomegaly; obese  Extremities:  no edema, redness, tenderness in the calves  Skin:  no gross lesions, rashes, induration    Assessment:        Hospital Problems           Last Modified POA    * (Principal) Sepsis due to COVID-19 ([address] 4/20/2021 Yes    Obstructive sleep apnea 4/20/2021 Yes    Morbid obesity with BMI of 70 and over, adult ([address] 4/20/2021 Yes    Type 2 diabetes mellitus with stage 3a chronic kidney disease, without long-term current use of insulin ([address] 4/20/2021 Yes    SHANAE (acute kidney injury) ([address] 4/20/2021 Yes    Essential hypertension 4/20/2021 Yes    Pneumonia due to COVID-19 virus 4/20/2021 Yes    Acute hypoxemic respiratory failure due to COVID-19 ([address] 4/20/2021 Yes      markedly elevated d-dimer    Plan:        1. On decadron per protocol day 6/10   2. Received actemra 4/20  3. increased lantus for hyperglycemia due to steroids  4. Wean bipap as able, but currently on 100% at 24/18, was on 80% yesterday  5. Patient declines intubation  6. Cont rocephin; completed zithromax  7. Monitor renal function: little better today  8. Agree with heparin drip given very high d-dimer;cannot go for cta chest due to shanae and resp status  9. Dnrcca no intubation per patient wishes  10. D/w rn  11. D/w daughter in detail  15.  Pall care patricia Aguilera, DO  4/25/2021  12:12 PM

## 2021-04-25 NOTE — PROGRESS NOTES
Patient's tanmay Quintero on unit to visit patient per Dr. Lucho Pressley request on 4/24/2021. Writer explained to Tiffany Quintero patient's current status, proper PPE usage, and oriented Tiffany Quintero to the room. Upon Gold's arrival on the unit the writer observed tanmay's phone in front shirt pocket with camera facing outward. Writer inquired as to whether or not tanmay Quintero was video recording to which he stated he was. Tiffany Quintero stated he wished to record his conversation with the patient about her code status so he could show his family members that this was her decision, although camera had been recording since entering the hospital.  RN explained to the grandson that video recording is not allowed per hospital policy and that the video would need to be deleted. Tanmay verbalized understanding and deleted video. RN explained that if he wished to record conversation with patient as Tiffany Quintero had stated earlier that he could do that at the time of the conversation but recording staff without permission is against hospital policy. Again tanmay was kind and verbalized understanding.

## 2021-04-25 NOTE — PROGRESS NOTES
Hypertension, DM II, GERD    Recommendations:    · Airborne isolation  · Oxygen via high flow nasal cannula 60L/80%, keep oxygen saturation greater than 92%  · BiPAP   Increased setting to 24/18  · precedex drip for agitation  · Albuterol and Ipratropium Q 4 hours and prn  ·  budesonide aerosol treatment  · Continue Brovana aerosol treatment  ·  Mucomyst 20% 2 mL t.i.d.  · Continue IV  Rocephin on Zithromax  ·  on half-normal saline at 75 an hour per Nephrology consider diuresis  · Decadron 6 mg p.o. daily/ solumedrol 60 IV x1   · S/p Actemra on 4/20/2021  · X-ray chest  In a.m.  · heparin drip empirically ; not a candidate for CTA of the chest secondary to her renal function to rule out PE and is unable to safely go for VQ scan at this time. · Check bilateral lower extremity Dopplers  · Check echo  · Discussed with RN  · Follows with Dr. Rashid Louder  ·  patient wants to be DNR CCA and no intubation   ·  I previously called and discussed with tanmay Cam per patient request ; I advised him about poor prognosis; he was supposed to come in yesterday.   ·  poor prognosis  ·  DVT prophylaxis on heparin ip    Rupa Chapin MD, [facility]  Pulmonary Critical Care and Sleep Medicine,  [facility]  cc min  Office: [phone]

## 2021-04-25 NOTE — PROGRESS NOTES
Physical Therapy  DATE: 2021    NAME: Andrey Huerta  MRN: [id number]   : 1945    Patient not seen this date for Physical Therapy due to:  [] Blood transfusion in progress  [] Cancel by RN  [] Hemodialysis  []  Refusal by Patient   [] Spine Precautions   [] Strict Bedrest  [] Surgery  [] Testing      [] Medically instability: Patient is currently desating with minimal activity on high levels of [place], will check back        [] PT being discontinued at this time. Patient independent. No further needs. [] PT being discontinued at this time as the patient has been transferred to hospice care. No further needs.     Dada Calvo, PTA

## 2021-04-25 NOTE — PROGRESS NOTES
Renal Progress Note    Patient :  Ambrocio Robison; 70 y.o.  MRN# [id number]  Location:  [facility]/1105-01  Attending:  Marcin Aguilera DO  Admit Date:  4/20/2021   Hospital Day: 5      Subjective:     Cr improved to 1.49, but her respiratory status is declining, up to 100% FiO2 and bipap dependent, working hard to breathe, refusing intubation  Good UOP with bran catheter in place  BP OK  Na up to 149 today    Outpatient Medications:     Medications Prior to Admission: pravastatin (PRAVACHOL) 40 MG tablet, TAKE 1 TABLET AT BEDTIME  loratadine (CLARITIN) 10 MG tablet, TAKE 1 TABLET EVERY MORNING  furosemide (LASIX) 20 MG tablet, TAKE 1 TABLET EVERY MORNING  lisinopril (PRINIVIL;ZESTRIL) 30 MG tablet, TAKE 1 TABLET BY MOUTH EVERY MORNING  carvedilol (COREG) 12.5 MG tablet, TAKE 1 TABLET TWICE DAILY(AM/PM)  glimepiride (AMARYL) 4 MG tablet, TAKE 1 TABLET TWICE DAILY(AM/PM)  Omega-3 Fatty Acids (GNP FISH OIL) 1000 MG CAPS, TAKE 1 CAPSULE BY MOUTH DAILY IN THE EVENING  allopurinol (ZYLOPRIM) 100 MG tablet, TAKE 1 TABLET BY MOUTH AT BEDTIME  amLODIPine (NORVASC) 10 MG tablet, TAKE 1 TABLET BY MOUTH DAILY IN THE MORNING  aspirin (ASPIRIN LOW DOSE) 81 MG EC tablet, TAKE 1 TABLET BY MOUTH DAILY IN THE EVENING  omeprazole (PRILOSEC) 40 MG delayed release capsule, TAKE 1 CAPSULE BY MOUTH DAILY IN THE MORNING  ferrous sulfate (FEROSUL) 325 (65 Fe) MG tablet, TAKE 1 TABLET BY MOUTH DAILY IN THE EVENING  diclofenac sodium (VOLTAREN) 1 % GEL, Apply topically 2 times daily  polyethylene glycol (GLYCOLAX) 17 GM/SCOOP powder, Take 17 g by mouth daily  albuterol sulfate  (90 Base) MCG/ACT inhaler, Inhale 2 puffs into the lungs every 6 hours as needed for Wheezing or Shortness of Breath  Multiple Vitamins-Minerals (MULTIVITAMIN) tablet, TAKE 1 TABLET EVERY MORNING  acetaminophen (APAP EXTRA STRENGTH) 500 MG tablet, Take 1 tablet by mouth every 6 hours as needed for Pain  blood glucose test strips (ONETOUCH VERIO) strip, TEST 3 TIMES A DAY & AS NEEDED FOR SYMPTOMS OF IRREGULAR BLOOD GLUCOSE  Blood Glucose Monitoring Suppl (Jes[employer] FLEX SYSTEM) w/Device KIT,   ONETOUCH VERIO strip, TEST BLOOD SUGAR 3 TIMES DAILY AS DIRECTED  [employer] Ultra Thin Lancets MISC, USE AS DIRECTED WITH INSULIN USE    Current Medications:     Scheduled Meds:    acetylcysteine  600 mg Inhalation BID    chlorothiazide  500 mg Intravenous Once    amLODIPine  5 mg Oral Daily    insulin glargine  30 Units Subcutaneous Nightly    dexamethasone  6 mg Intravenous Q24H    pantoprazole  40 mg Intravenous Daily    And    sodium chloride (PF)  10 mL Intravenous Daily    insulin lispro  0-12 Units Subcutaneous TID WC    insulin lispro  0-6 Units Subcutaneous Nightly    Arformoterol Tartrate  15 mcg Nebulization BID    budesonide  0.5 mg Nebulization BID    allopurinol  100 mg Oral Nightly    aspirin  81 mg Oral QPM    carvedilol  12.5 mg Oral BID    ferrous sulfate  325 mg Oral Nightly    polyethylene glycol  17 g Oral Daily    pravastatin  40 mg Oral Nightly    sodium chloride flush  5-40 mL Intravenous 2 times per day    cefTRIAXone (ROCEPHIN) IV  1,000 mg Intravenous Q24H    Vitamin D  2,000 Units Oral Daily    ipratropium-albuterol  1 ampule Inhalation Q4H WA     Continuous Infusions:    dexmedetomidine HCl in NaCl 0.3 mcg/kg/hr (04/25/21 0942)    heparin (PORCINE) Infusion 6.3 Units/kg/hr (04/25/21 0858)    sodium chloride 75 mL/hr at 04/21/21 0800    dextrose      sodium chloride       PRN Meds:  heparin (porcine), heparin (porcine), morphine **OR** morphine, sodium chloride, albuterol sulfate HFA, glucose, dextrose, glucagon (rDNA), dextrose, sodium chloride flush, sodium chloride, promethazine **OR** ondansetron, acetaminophen **OR** acetaminophen, guaiFENesin-dextromethorphan    Input/Output:       I/O last 3 completed shifts: In: 1564.3 [I.V.:1564.3]  Out: 1750 [Urine:1750].       Patient Vitals for the past 96 hrs (Last 3 readings):   Weight 21 0500 268 lb 8.3 oz (121.8 kg)       Vital Signs:   Temperature:  Temp: 98 °F (36.7 °C)  TMax:   Temp (24hrs), Av.9 °F (36.6 °C), Min:97.6 °F (36.4 °C), Max:98.2 °F (36.8 °C)    Respirations:  Resp: 19  Pulse:   Pulse: 72  BP:    BP: (!) 118/97  BP Range: Systolic (33CBR), NXR:118 , Min:94 , OEP:406       Diastolic (29LIB), GZK:73, Min:58, Max:97      Physical Examination:     General:  Awake, on bipap   HEENT: Atraumatic, normocephalic,    Observed from hallway, exam deferred due to COVID isolation, lying on side on bipap, tachypneic, increased WOB    Labs:       Recent Labs     21  1223 21  0021 21  0753   WBC 7.6 9.1  --  9.2   RBC 3.69* 4.02  --  3.62*   HGB 11.2* 12.1 11.6* 10.8*   HCT 34.1* 37.8 35.5* 34.3*   MCV 92.4 94.0  --  94.8   MCH 30.4 30.1  --  29.8   MCHC 32.8 32.0  --  31.5   RDW 13.2 13.5  --  13.6    168  --  157   MPV 11.3 11.3  --  11.0      BMP:   Recent Labs     21  0640 21  0753    148* 149*   K 4.3 3.8 4.4   * 113* 113*   CO2    BUN 78* 64* 58*   CREATININE 2.04* 1.81* 1.49*   GLUCOSE 211* 193* 287*   CALCIUM 9.3 9.0 8.7      Phosphorus:   No results for input(s): PHOS in the last 72 hours. Magnesium:  No results for input(s): MG in the last 72 hours. Albumin:    Recent Labs     21  0640 21  0753   LABALBU 3.1* 3.1* 2.8*     BNP:      Lab Results   Component Value Date    BNP 44 2012     ADILIA:    No results found for: ADILIA  SPEP:  Lab Results   Component Value Date    PROT 5.6 2021    ALBCAL 3.4 2021    ALBPCT 56 2021    LABALPH 0.2 2021    LABALPH 0.7 2021    A1PCT 4 2021    A2PCT 11 2021    LABBETA 1.0 2021    BETAPCT 16 2021    GAMGLOB 0.7 2021    GGPCT 12 2021    PATH ELECTRONICALLY SIGNED. Adryan Joya M.D. 2021    PATH ELECTRONICALLY SIGNED.  Adryan Joya M.D. 2021 UPEP:   No results found for: LABPE  C3:     Lab Results   Component Value Date    C3 130 04/21/2021     C4:     Lab Results   Component Value Date    C4 53 04/21/2021     MPO ANCA:   No results found for: MPO  PR3 ANCA:   No results found for: PR3  Anti-GBM:   No results found for: GBMABIGG  Hep BsAg:         Lab Results   Component Value Date    HEPBSAG NONREACTIVE 04/21/2021     Hep C AB:          Lab Results   Component Value Date    HEPCAB NONREACTIVE 04/21/2021       Urinalysis/Chemistries:      Lab Results   Component Value Date    NITRU NEGATIVE 04/21/2021    COLORU YELLOW 04/21/2021    PHUR 5.5 04/21/2021    WBCUA 2 TO 5 04/21/2021    RBCUA 20 TO 50 04/21/2021    MUCUS NOT REPORTED 04/21/2021    TRICHOMONAS NOT REPORTED 04/21/2021    YEAST FEW 04/21/2021    BACTERIA NOT REPORTED 04/21/2021    SPECGRAV 1.015 04/21/2021    LEUKOCYTESUR NEGATIVE 04/21/2021    UROBILINOGEN Normal 04/21/2021    BILIRUBINUR NEGATIVE 04/21/2021    BILIRUBINUR NEGATIVE 12/12/2011    GLUCOSEU NEGATIVE 04/21/2021    GLUCOSEU NEGATIVE 12/12/2011    KETUA NEGATIVE 04/21/2021    AMORPHOUS NOT REPORTED 04/21/2021     Urine Sodium:     Lab Results   Component Value Date    SORAYA 42 04/21/2021     Urine Potassium:  No results found for: KUR  Urine Chloride:  No results found for: CLUR  Urine Osmolarity: No results found for: OSMOU  Urine Protein:   No components found for: TOTALPROTEIN, URINE   Urine Creatinine:     Lab Results   Component Value Date    LABCREA 75.9 04/21/2021     Urine Eosinophils:  No components found for: UEOS    Radiology:     CXR:     Assessment:     1. Acute Kidney Injury: Secondary to ischemic ATN from intravascular depletion related to insensible losses decreased intake, loop diuretic use ACE inhibitor use, systemic inflammatory response syndrome leading to hypotension from COVID-19 pneumonia Baseline creatinine 1.21.4 peaked up to 3.4 now resolving  2.   Chronic kidney disease stage III from diabetic nephrosclerosis

## 2021-04-26 NOTE — PLAN OF CARE
Problem: Breathing Pattern - Ineffective  Goal: Ability to achieve and maintain a regular respiratory rate  Outcome: Ongoing     Problem: Fatigue  Goal: Verbalize increase energy and improved vitality  Outcome: Ongoing     Problem: Skin Integrity:  Goal: Will show no infection signs and symptoms  Description: Will show no infection signs and symptoms  Outcome: Ongoing   Skin assessment complete. No new meagan of skin breakdown noted. Pt turned and repositioned. Pressure relief alternating pressure mattress in place. Linens remain as clean and dry as possible. Will continue to assess skin and intervene as necessary.

## 2021-04-26 NOTE — ACP (ADVANCE CARE PLANNING)
arsalan Garvey. Patient had not completed paperwork for DPOA or living will.  By [place] and [place] with  decision makers for patient.      Patient recently changed her code status to [facility] no intubation.      Scheduled at time at 4 pm for 2 family members to see patient prior to family meeting and then at 200 pm we will have a family meeting.      Palliative care will continue to follow patient and provide emotional support and updates to family.     Updated [facility] patient nurse about family meeting     Length of Voluntary ACP Conversation in minutes:  12 minutes    Brandy Arellano

## 2021-04-26 NOTE — PROGRESS NOTES
Physical Therapy  DATE: 2021    NAME: Odette Jo  MRN: [id number]   : 1945    Patient not seen this date for Physical Therapy due to:  [] Blood transfusion in progress  [x] Cancel by RN (Hold PT today,pt not appropriate for PT at this time per Penelope Rios)  [] Hemodialysis  []  Refusal by Patient   [] Spine Precautions   [] Strict Bedrest  [] Surgery  [] Testing      [] Other        [] PT being discontinued at this time. Patient independent. No further needs. [] PT being discontinued at this time as the patient has been transferred to hospice care. No further needs.     Slim Marie, PT  11:56

## 2021-04-26 NOTE — CONSULTS
corneal abrasion     Left    History of dyspnea     History of pneumonia     Hyperlipidemia     Hypertension     Idiopathic gout     Morbid obesity (HCC)     BMI of 50.0-59.9, adult    Neck strain     Obstructive sleep apnea     on CPAP    Osteoarthritis     DJD of knees    Pain     bilateral shoulders, Lt hip    Shoulder strain     History of bilateral shoulder strain    Systemic hypertension     TIA (transient ischemic attack)     Type II or unspecified type diabetes mellitus without mention of complication, not stated as uncontrolled     Vitamin D deficiency     Wears dentures     upper and lower dentures    Wears glasses        PAST SURGICAL HISTORY  Past Surgical History:   Procedure Laterality Date    COLONOSCOPY  10/23/15    per Dr. Sheron Vicente  9/21/15    empi select    NERVE BLOCK Bilateral 2018    diamond shoulder injection, decadron 10 mg, lido 1%, isovue m-200, naropin 0.5%       SOCIAL HISTORY  Social History     Tobacco Use    Smoking status: Former Smoker     Packs/day: 1.00     Years: 30.00     Pack years: 30.00     Types: Cigarettes     Start date: 1960     Quit date: 3/6/1990     Years since quittin.1    Smokeless tobacco: Never Used   Substance Use Topics    Alcohol use: No     Alcohol/week: 0.0 standard drinks     Comment: occassional    Drug use: No       ALLERGIES  Allergies   Allergen Reactions    Nsaids Other (See Comments)     Chronic kidney disease    Proair Respiclick [Albuterol Sulfate]          MEDICATIONS  Current Medications    acetylcysteine  600 mg Inhalation BID    amLODIPine  5 mg Oral Daily    insulin glargine  30 Units Subcutaneous Nightly    dexamethasone  6 mg Intravenous Q24H    pantoprazole  40 mg Intravenous Daily    And    sodium chloride (PF)  10 mL Intravenous Daily    insulin lispro  0-12 Units Subcutaneous TID WC    insulin lispro  0-6 Units Subcutaneous Nightly    Arformoterol Tartrate  15 mcg Nebulization BID    allopurinol  100 mg Oral Nightly    aspirin  81 mg Oral QPM    carvedilol  12.5 mg Oral BID    polyethylene glycol  17 g Oral Daily    pravastatin  40 mg Oral Nightly    sodium chloride flush  5-40 mL Intravenous 2 times per day    Vitamin D  2,000 Units Oral Daily    ipratropium-albuterol  1 ampule Inhalation Q4H WA     heparin (porcine), heparin (porcine), morphine **OR** morphine, sodium chloride, albuterol sulfate HFA, glucose, dextrose, glucagon (rDNA), dextrose, sodium chloride flush, sodium chloride, promethazine **OR** ondansetron, acetaminophen **OR** acetaminophen, guaiFENesin-dextromethorphan  IV Drips/Infusions   dexmedetomidine HCl in NaCl 0.9 mcg/kg/hr (04/26/21 1000)    heparin (PORCINE) Infusion 7.3 Units/kg/hr (04/26/21 1129)    sodium chloride 75 mL/hr at 04/21/21 0800    dextrose      sodium chloride       Home Medications  No current facility-administered medications on file prior to encounter.       Current Outpatient Medications on File Prior to Encounter   Medication Sig Dispense Refill    pravastatin (PRAVACHOL) 40 MG tablet TAKE 1 TABLET AT BEDTIME 30 tablet 0    loratadine (CLARITIN) 10 MG tablet TAKE 1 TABLET EVERY MORNING 30 tablet 0    furosemide (LASIX) 20 MG tablet TAKE 1 TABLET EVERY MORNING 30 tablet 0    lisinopril (PRINIVIL;ZESTRIL) 30 MG tablet TAKE 1 TABLET BY MOUTH EVERY MORNING 30 tablet 0    carvedilol (COREG) 12.5 MG tablet TAKE 1 TABLET TWICE DAILY(AM/PM) 60 tablet 0    glimepiride (AMARYL) 4 MG tablet TAKE 1 TABLET TWICE DAILY(AM/PM) 26 tablet 4    Omega-3 Fatty Acids (GNP FISH OIL) 1000 MG CAPS TAKE 1 CAPSULE BY MOUTH DAILY IN THE EVENING 90 capsule 1    allopurinol (ZYLOPRIM) 100 MG tablet TAKE 1 TABLET BY MOUTH AT BEDTIME 28 tablet 11    amLODIPine (NORVASC) 10 MG tablet TAKE 1 TABLET BY MOUTH DAILY IN THE MORNING 28 tablet 11    aspirin (ASPIRIN LOW DOSE) 81 MG EC tablet TAKE 1 TABLET BY MOUTH DAILY IN THE EVENING 28 tablet 11    omeprazole (PRILOSEC) 40 MG delayed release capsule TAKE 1 CAPSULE BY MOUTH DAILY IN THE MORNING 28 capsule 11    ferrous sulfate (FEROSUL) 325 (65 Fe) MG tablet TAKE 1 TABLET BY MOUTH DAILY IN THE EVENING 28 tablet 11    diclofenac sodium (VOLTAREN) 1 % GEL Apply topically 2 times daily 100 g 3    polyethylene glycol (GLYCOLAX) 17 GM/SCOOP powder Take 17 g by mouth daily 116 g 3    albuterol sulfate  (90 Base) MCG/ACT inhaler Inhale 2 puffs into the lungs every 6 hours as needed for Wheezing or Shortness of Breath 1 Inhaler 6    Multiple Vitamins-Minerals (MULTIVITAMIN) tablet TAKE 1 TABLET EVERY MORNING 28 tablet 10    acetaminophen (APAP EXTRA STRENGTH) 500 MG tablet Take 1 tablet by mouth every 6 hours as needed for Pain 30 tablet 0    blood glucose test strips (ONETOUCH VERIO) strip TEST 3 TIMES A DAY & AS NEEDED FOR SYMPTOMS OF IRREGULAR BLOOD GLUCOSE 100 each 0    Blood Glucose Monitoring Suppl (ONETOUCH VERIO FLEX SYSTEM) w/Device KIT       ONETOUCH VERIO strip TEST BLOOD SUGAR 3 TIMES DAILY AS DIRECTED 100 each 3    Aimsco Ultra Thin Lancets MISC USE AS DIRECTED WITH INSULIN  each 2       Data         /87   Pulse 77   Temp 98.6 °F (37 °C) (Axillary)   Resp 24   Ht 5' 3\" (1.6 m)   Wt 268 lb 8.3 oz (121.8 kg)   SpO2 90%   BMI 47.57 kg/m²     Wt Readings from Last 3 Encounters:   04/25/21 268 lb 8.3 oz (121.8 kg)   04/06/20 262 lb (118.8 kg)   02/27/20 263 lb (119.3 kg)        Code Status: DNR-CCA     ADVANCED CARE PLANNING:  Patient has capacity for medical decisions: no  Health Care Power of : no  Living Will: no     Personal, Social, and Family History  Marital Status:   Living situation: alone  Importance of sawyer/Mandaeism/spiritual beliefs: [] Very [] Somewhat [] Not   Psychological Distress: mild  Does patient understand diagnosis/treatment? not asked  Does caregiver understand diagnosis/treatment?  yes    Past Medical History:   Diagnosis Date    Anemia, iron deficiency     Asthma     Carpal tunnel syndrome on right 2016    Chronic kidney disease     COPD (chronic obstructive pulmonary disease) (HCC)     Edema     unspecified type    Former smoker     GERD (gastroesophageal reflux disease)     Glucose intolerance (impaired glucose tolerance)     History of anemia     Normocytic    History of corneal abrasion     Left    History of dyspnea     History of pneumonia     Hyperlipidemia     Hypertension     Idiopathic gout     Morbid obesity (HCC)     BMI of 50.0-59.9, adult    Neck strain     Obstructive sleep apnea     on CPAP    Osteoarthritis     DJD of knees    Pain     bilateral shoulders, Lt hip    Shoulder strain     History of bilateral shoulder strain    Systemic hypertension     TIA (transient ischemic attack)     Type II or unspecified type diabetes mellitus without mention of complication, not stated as uncontrolled     Vitamin D deficiency     Wears dentures     upper and lower dentures    Wears glasses          Family History   Problem Relation Age of Onset    Asthma Mother     Stroke Sister     Diabetes Sister     Other Neg Hx         Sleep disorders or narcolepsy       Social History     Tobacco Use    Smoking status: Former Smoker     Packs/day: 1.00     Years: 30.00     Pack years: 30.00     Types: Cigarettes     Start date: 1960     Quit date: 3/6/1990     Years since quittin.1    Smokeless tobacco: Never Used   Substance Use Topics    Alcohol use: No     Alcohol/week: 0.0 standard drinks     Comment: occassional    Drug use: No           Assessment        REVIEW OF SYSTEMS and   PHYSICAL ASSESSMENT:  Unable to perform Physical assessment or assess ROS due to covid isolation protocol and preservation of PPE. I reviewed chart, viewed patient through door, spoke with patient nurse and spoke with patient family.     Palliative Performance Scale:  ___60%  Ambulation reduced; Significant disease; Can't do hobbies/housework; intake normal or reduced; occasional assist; LOC full/confusion  _x__50%  Mainly sit/lie; Extensive disease; Can't do any work; Considerable assist; intake normal or reduced; LOC full/confusion  ___40%  Mainly in bed; Extensive disease; Mainly assist; intake normal or reduced; LOC full/confusion   ___30%  Bed Bound; Extensive disease; Total care; intake reduced; LOC full/confusion  ___20%  Bed Bound; Extensive disease; Total care; intake minimal; Drowsy/coma  ___10%  Bed Bound; Extensive disease; Total care; Mouth care only; Drowsy/coma  ___0       Death      Plan      Palliative Interaction:  Called and spoke to patient family Geri Thomas, and Mimi and introduced myself and my palliative care role. I attempted to Call [facility] but he did not answer and messages not set up. I provided family with update on patient condition. I also provided them an opportunity to ask questions and discuss concerns. Emotional support provided to family. Patient is  and has 3 biological children. 1 is  and she has 1 daughter Antonino Guerra, and 1 son Kylie Cazares. Patient had not completed paperwork for DPOA or living will. By [place] and Consuelo Shane with be decision makers for patient. Patient recently changed her code status to [facility]. Scheduled at time at 4 pm for 2 family members to see patient prior to family meeting and then at 200 pm we will have a family meeting. Palliative care will continue to follow patient and provide emotional support and updates to family.     Updated Geoffery Letters patient nurse about family meeting   Education/support to family  Discharge planning/helping to coordinate care  Communications with primary service  Pharmacologic pain management  Providing support for coping/adaptation/distress of family  Discussing meaning/purpose   Caregiver support/education  Continue with current plan of care  Code status clarified: [facility]

## 2021-04-26 NOTE — PROGRESS NOTES
[facility]  Progress Note    Gil Lilly  MRN: [id number]  Date: 4/26/2021  LOS: 6     Reason for F/U :   Acute hypoxic respiratory failure, COVID-19 virus pneumonia    Impression :   1. Acute hypoxic respiratory failure due to COVID-19 virus pneumonia  · Currently BiPAP dependent   · Patient has impending respiratory failure  2. Possible bacterial coinfection  3. Acute kidney injury on chronic kidney diseaseimproving  4. Elevated inflammatory markers  5. Pancytopeniaresolving  6. Abnormal LFTs    Recommendations:   · The patient is not a candidate for remdesivir. · Continue on Decadron per COVID-19 protocol  · The patient is status post Actemra 4/20/2021  · Completed a 5-day course of azithromycin 4/24/21 and 7 days of Rocephin 4/26/2021  · The patient is BiPAP dependent and has increased work of breathing and has impending respiratory failure. · The CODE STATUS to DNR CC arrest with no intubation  · Palliative care will get involved today    Infection Control Recommendations:   Droplet plus precautions    Discharge Planning:   Estimated Length of IV antimicrobials: 5 days  Patient will need Midline Catheter Insertion/ PICC line Insertion: No  Patient will need: Home IV , [place],  SNF,  LTAC: Undetermined  Patient willneed outpatient wound care: No    Medical Decision making / Summary of Stay:   Gil Lilly is a 70y.o.-year-old female who was initially admitted on 4/20/2021. Brian Goss lives at home alone and does not know of any known COVID-19 virus exposures. She reports that last week on Tuesday she started feeling generalized malaise/fatigue, shortness of breath without any significant cough. She did have some intermittent fevers but no chills, no chest pains or palpitations, no abdominal pain nausea vomiting or diarrhea.   Her appetite did decrease and she continued to have progressive symptoms of shortness of breath which ended up prompting her to come into the Constitutional:       Appearance: She is well-developed. She is obese. HENT:      Head: Normocephalic and atraumatic. Neck:      Musculoskeletal: Neck supple. Cardiovascular:      Rate and Rhythm: Regular rhythm. Heart sounds: Murmur present. Pulmonary:      Effort: Respiratory distress present. Breath sounds: Rales present. Abdominal:      General: Bowel sounds are normal.      Palpations: Abdomen is soft. Skin:     General: Skin is warm and dry. Neurological:      Mental Status: She is alert and oriented to person, place, and time.          Laboratory data:   I have independently reviewed the followinglabs:  CBC with Differential:   Recent Labs     04/23/21  1223 04/24/21  0021 04/25/21  0753   WBC 9.1  --  9.2   HGB 12.1 11.6* 10.8*   HCT 37.8 35.5* 34.3*     --  157     BMP:   Recent Labs     04/25/21  0753 04/26/21  0323   * 144   K 4.4 4.3   * 110*   CO2 23 22   BUN 58* 55*   CREATININE 1.49* 1.32*     Hepatic Function Panel:   Recent Labs     04/25/21  0753 04/26/21  0323   PROT 5.6* 5.9*   LABALBU 2.8* 3.1*   BILITOT 0.33 0.41   ALKPHOS 112* 139*   ALT 56* 59*   * 108*         Lab Results   Component Value Date    PROCAL 0.51 04/24/2021    PROCAL 2.30 04/22/2021    PROCAL 3.87 04/21/2021     Lab Results   Component Value Date    CRP 44.3 04/23/2021    CRP 75.1 04/22/2021    .6 04/21/2021     Lab Results   Component Value Date    SEDRATE 25 (H) 03/30/2015         Lab Results   Component Value Date    DDIMER >20.00 04/23/2021    DDIMER 9.60 04/22/2021    DDIMER 7.18 04/21/2021    DDIMER 1.89 08/09/2015     Lab Results   Component Value Date    FERRITIN 2,860 04/26/2021    FERRITIN 3,230 04/24/2021    FERRITIN 5,870 04/22/2021    FERRITIN 6,232 04/20/2021     Lab Results   Component Value Date     04/20/2021     Lab Results   Component Value Date    FIBRINOGEN 598 04/21/2021       Results in Past 30 Days  Result Component Current Result Ref Range Previous Result Ref Range   SARS-CoV-2, Rapid DETECTED (A) (4/20/2021) Not Detected Not in Time Range      Lab Results   Component Value Date    COVID19 DETECTED 04/20/2021       No results for input(s): MIKE in the last 72 hours. Imaging Studies:   ONE XRAY VIEW OF THE CHEST 4/23/2021 5:10 am  Impression   Slight progression in moderate to marked diffuse bilateral alveolar   infiltrates related to progression in edema and/or multifocal pneumonia       Cultures:     Culture, Blood 2 [[id number]] Collected: 04/20/21 0356   Order Status: Completed Specimen: Blood Updated: 04/26/21 0549    Specimen Description . BLOOD    Special Requests LT [facility] [place]    Culture NO GROWTH 6 DAYS   Culture, Blood 1 [[id number]] Collected: 04/20/21 0326   Order Status: Completed Specimen: Blood Updated: 04/26/21 0549    Specimen Description . BLOOD    Special Requests RT HAND    Culture NO GROWTH 6 DAYS     Strep Pneumoniae Antigen [[id number]] Collected: 04/20/21 0331   Order Status: Completed Specimen: Urine, clean catch Updated: 04/20/21 2347    Source . CLEAN CATCH URINE    Strep pneumo Ag NEGATIVE    Comment: Strep pneumoniae antigen not detected      Legionella antigen, urine [[id number]] Collected: 04/20/21 0331   Order Status: Completed Specimen: Urine, clean catch Updated: 04/20/21 2346    Legionella Pneumophilia Ag, Urine NEGATIVE    Comment: L. pneumophila serogroup 1 antigen not detected. A negative result does not exclude infection with Leginella pnemophila serogroup 1 nor does   it rule out other microbial-caused respiratory infections of disease caused by other   serogroups of Legionella pneumophila. Strep Pneumoniae Antigen [[id number]] Collected: 04/20/21 0331   Order Status: Completed Specimen: Urine, clean catch Updated: 04/20/21 2347    Source . CLEAN CATCH URINE    Strep pneumo Ag NEGATIVE    Comment: Strep pneumoniae antigen not detected      Legionella antigen, urine [[id number]] Collected: 04/20/21 0331 Order Status: Completed Specimen: Urine, clean catch Updated: 04/20/21 7694    Legionella Pneumophilia Ag, Urine NEGATIVE    Comment: L. pneumophila serogroup 1 antigen not detected. A negative result does not exclude infection with Leginella pnemophila serogroup 1 nor does   it rule out other microbial-caused respiratory infections of disease caused by other   serogroups of Legionella pneumophila. COVID-19, Rapid [[id number]] (Abnormal) Collected: 04/20/21 0256   Order Status: Completed Specimen: Nasopharyngeal Swab Updated: 04/20/21 0333    Specimen Description . NASOPHARYNGEAL SWAB    SARS-CoV-2, Rapid DETECTEDAbnormal     Comment:        Rapid NAAT: The specimen is POSITIVE for SARS-Cov-2, the novel coronavirus associated with   COVID-19.         This test has been authorized by the FDA under an Emergency Use Authorization (EUA) for use   by authorized laboratories.         The ID NOW COVID-19 assay is designed to detect the virus that causes COVID-19 in patients   with signs and symptoms of infection who are suspected of COVID-19. An individual without symptoms of COVID-19 and who is not shedding SARS-CoV-2 virus would   expect to have a negative (not detected) result in this assay.    Fact sheet for Healthcare Providers: BuildHer.es   Fact sheet for Patients: BuildHer.es           Methodology: Isothermal Nucleic Acid Amplification         Results reported to the appropriate Health Department           Medications:      acetylcysteine  600 mg Inhalation BID    amLODIPine  5 mg Oral Daily    insulin glargine  30 Units Subcutaneous Nightly    dexamethasone  6 mg Intravenous Q24H    pantoprazole  40 mg Intravenous Daily    And    sodium chloride (PF)  10 mL Intravenous Daily    insulin lispro  0-12 Units Subcutaneous TID WC    insulin lispro  0-6 Units Subcutaneous Nightly    Arformoterol Tartrate  15 mcg Nebulization BID    allopurinol  100 mg Oral Nightly    aspirin  81 mg Oral QPM    carvedilol  12.5 mg Oral BID    polyethylene glycol  17 g Oral Daily    pravastatin  40 mg Oral Nightly    sodium chloride flush  5-40 mL Intravenous 2 times per day    cefTRIAXone (ROCEPHIN) IV  1,000 mg Intravenous Q24H    Vitamin D  2,000 Units Oral Daily    ipratropium-albuterol  1 ampule Inhalation Q4H WA           [facility]  Jerome Daley  Perfect [employer] messaging  OFFICE: [phone]      Electronically signed by Jerome Daley MD on 4/26/2021 at 9:41 AM  Thank you for allowing us to participate in the care of this patient. Please call with questions. This note iscreated with the assistance of a speech recognition program.  While intending to generate a document that actually reflects the content of the visit, the document can still have some errors including those of syntax andsound a like substitutions which may escape proof reading. In such instances, actual meaning can be extrapolated by contextual diversion.

## 2021-04-26 NOTE — PROGRESS NOTES
Patient son Vernon Vidales called and was requestig to talk to his mom. Myles Scruggs educated that patient is not doing well and is on bipap and will not tolerate talking on the phone or coming off of the mask. Myles Scruggs states he does not want her bills to get out of control and he needs to talk to her. RN reinforced the patient is not stable enough to talk on the phone and we will have to see how the day progresses. Myles Scruggs states his understanding at this time.

## 2021-04-26 NOTE — CARE COORDINATION
Spoke with RN, Lela López. Patient is a DNR-CCA with no intubation per the patient. D-dimer is high, so heparin gtt initiated. Palliative consulted. Continue to follow.

## 2021-04-26 NOTE — PROGRESS NOTES
Nephrology Progress Note      SUBJECTIVE      The patient was seen and examined in the ICU. The patient remains on BiPAP . Renal function continues to improve. She did receive a dose of IV Diuril yesterday in the setting of some possible pulmonary edema and hypernatremia. Labs today show sodium improved at 144, potassium 4.3, chloride 110 and CO2 22 with BUN 55 and creatinine 1.3-5 glucose 263 and albumin 3.1 with total protein 5.9. The patient is laying in a left lateral decubitus position on BiPAP. She says she is breathing a little better than yesterday. Intake 1525 and output 2700 mL ( urine). Insurance CODE STATUS is DNR-CCA. Note, cardiac echo shows EF 55% from 2021. OBJECTIVE      CURRENT TEMPERATURE:  Temp: 97.9 °F (36.6 °C)  MAXIMUM TEMPERATURE OVER 24HRS:  Temp (24hrs), Av.8 °F (36.6 °C), Min:97.4 °F (36.3 °C), Max:98 °F (36.7 °C)    CURRENT RESPIRATORY RATE:  Resp: 22  CURRENT PULSE:  Pulse: 54  CURRENT BLOOD PRESSURE:  BP: 127/84  24HR BLOOD PRESSURE RANGE:  Systolic (52XEO), UGQ:534 , Min:94 , TKJ:976   ; Diastolic (06VRX), DXE:76, Min:58, Max:97    24HR INTAKE/OUTPUT:      Intake/Output Summary (Last 24 hours) at 2021 0858  Last data filed at 2021 0500  Gross per 24 hour   Intake 1525 ml   Output 2700 ml   Net -1175 ml     WEIGHT :  Patient Vitals for the past 96 hrs (Last 3 readings):   Weight   21 0500 268 lb 8.3 oz (121.8 kg)     PHYSICAL EXAM      GENERAL APPEARANCE:Awake, alert, in some respiratory distress, on Bipap  SKIN: warm and dry, no rash or erythema  EYES: conjunctivae normal and sclera anicteric   NECK:   Positive accessory muscles of respiration used  PULMONARY: bilateral air entry with coarse rhonchi bilaterally and some wheezes   CARDIOVASCULAR: S1 and S2 normal   No rubs , no murmur.    ABDOMEN: obese and soft with positive bowel symptoms   EXTREMITIES: mild lower extremity edema bilaterally    CURRENT MEDICATIONS      acetylcysteine (MUCOMYST) 20 % solution 600 mg, BID  dexmedetomidine (PRECEDEX) 400 mcg in sodium chloride 0.9 % 100 mL infusion, Continuous  amLODIPine (NORVASC) tablet 5 mg, Daily  heparin (porcine) injection 9,710 Units, PRN  heparin (porcine) injection 4,860 Units, PRN  heparin 25,000 units in dextrose 5% 250 mL (premix) infusion, Continuous  morphine (PF) injection 2 mg, Q2H PRN    Or  morphine sulfate (PF) injection 4 mg, Q2H PRN  insulin glargine (LANTUS) injection vial 30 Units, Nightly  dexamethasone (PF) (DECADRON) injection 6 mg, Q24H  pantoprazole (PROTONIX) injection 40 mg, Daily    And  sodium chloride (PF) 0.9 % injection 10 mL, Daily  insulin lispro (HUMALOG) injection vial 0-12 Units, TID WC  insulin lispro (HUMALOG) injection vial 0-6 Units, Nightly  Arformoterol Tartrate (BROVANA) nebulizer solution 15 mcg, BID  0.9 % sodium chloride infusion, PRN  albuterol sulfate  (90 Base) MCG/ACT inhaler 2 puff, Q6H PRN  allopurinol (ZYLOPRIM) tablet 100 mg, Nightly  aspirin EC tablet 81 mg, QPM  carvedilol (COREG) tablet 12.5 mg, BID  polyethylene glycol (GLYCOLAX) packet 17 g, Daily  pravastatin (PRAVACHOL) tablet 40 mg, Nightly  glucose (GLUTOSE) 40 % oral gel 15 g, PRN  dextrose 50 % IV solution, PRN  glucagon (rDNA) injection 1 mg, PRN  dextrose 5 % solution, PRN  sodium chloride flush 0.9 % injection 5-40 mL, 2 times per day  sodium chloride flush 0.9 % injection 10 mL, PRN  0.9 % sodium chloride infusion, PRN  promethazine (PHENERGAN) tablet 12.5 mg, Q6H PRN    Or  ondansetron (ZOFRAN) injection 4 mg, Q6H PRN  acetaminophen (TYLENOL) tablet 650 mg, Q6H PRN    Or  acetaminophen (TYLENOL) suppository 650 mg, Q6H PRN  cefTRIAXone (ROCEPHIN) 1000 mg IVPB in 50 mL D5W minibag, Q24H  guaiFENesin-dextromethorphan (ROBITUSSIN DM) 100-10 MG/5ML syrup 5 mL, Q4H PRN  Vitamin D (CHOLECALCIFEROL) tablet 2,000 Units, Daily  ipratropium-albuterol (DUONEB) nebulizer solution 1 ampule, Q4H WA          LABS      CBC:   Recent Labs     04/23/21  1223 04/24/21  0021 04/25/21  0753   WBC 9.1  --  9.2   RBC 4.02  --  3.62*   HGB 12.1 11.6* 10.8*   HCT 37.8 35.5* 34.3*   MCV 94.0  --  94.8   MCH 30.1  --  29.8   MCHC 32.0  --  31.5   RDW 13.5  --  13.6     --  157   MPV 11.3  --  11.0      BMP:   Recent Labs     04/24/21  0640 04/25/21  0753 04/26/21  0323   * 149* 144   K 3.8 4.4 4.3   * 113* 110*   CO2 25 23 22   BUN 64* 58* 55*   CREATININE 1.81* 1.49* 1.32*   GLUCOSE 193* 287* 263*   CALCIUM 9.0 8.7 9.4      BNP:  Lab Results   Component Value Date    BNP 44 11/13/2012     PHOSPHORUS:  No results for input(s): PHOS in the last 72 hours. MAGNESIUM: No results for input(s): MG in the last 72 hours. ALBUMIN:   Recent Labs     04/24/21  0640 04/25/21  0753 04/26/21  0323   LABALBU 3.1* 2.8* 3.1*     IRON:    Lab Results   Component Value Date    IRON 69 03/30/2015     IRON SATURATION:    Lab Results   Component Value Date    LABIRON 29 03/30/2015     TIBC:    Lab Results   Component Value Date    TIBC 241 03/30/2015     FERRITIN:    Lab Results   Component Value Date    FERRITIN 2,860 04/26/2021     ADILIA: No results found for: ADILIA    SPEP:   Lab Results   Component Value Date    PROT 5.9 04/26/2021    ALBCAL 3.4 04/21/2021    ALBPCT 56 04/21/2021    LABALPH 0.2 04/21/2021    LABALPH 0.7 04/21/2021    A1PCT 4 04/21/2021    A2PCT 11 04/21/2021    LABBETA 1.0 04/21/2021    BETAPCT 16 04/21/2021    GAMGLOB 0.7 04/21/2021    GGPCT 12 04/21/2021    PATH ELECTRONICALLY SIGNED. Cruz Kaiser M.D. 04/21/2021    PATH ELECTRONICALLY SIGNED.  Cruz Kaiser M.D. 04/21/2021     UPEP: No results found for: TPU   HEPBSAG:  Lab Results   Component Value Date    HEPBSAG NONREACTIVE 04/21/2021     HEPCAB:  Lab Results   Component Value Date    HEPCAB NONREACTIVE 04/21/2021     C3:   Lab Results   Component Value Date    C3 130 04/21/2021     C4:   Lab Results   Component Value Date    C4 53 (H) 04/21/2021     MPO ANCA: No results

## 2021-04-26 NOTE — PROGRESS NOTES
Pulmonary Critical Care Progress Note       Patient seen for the follow up of acute hypoxic respiratory failure, Covid 19 pneumonia, asthma, COPD, YANA, Sepsis due to COVID-19 [facility])     Subjective:   she requires BiPAP  22/18 at 100% FiO2. She is labored and seems to be tired. She is not able to get off the facemask. She still declined intubation in front of family yesterday. She received Diuril. .  She has been NPO. Wolf Colvin She denies any chest pain. She  Has mostly dry cough. Examination:  Vitals: /87   Pulse 77   Temp 98.6 °F (37 °C) (Axillary)   Resp 27   Ht 5' 3\" (1.6 m)   Wt 268 lb 8.3 oz (121.8 kg)   SpO2 90%   BMI 47.57 kg/m²   General appearance:  alert and  Labored on BiPAP  Neck: No JVD  Lungs:  Decreased breath sound mild basilar crackles  Heart: regular rate and rhythm, S1, S2 normal, no gallop  Abdomen: Soft, non tender, + BS  Extremities: no cyanosis or clubbing.   Trace edema    LABs:  CBC:   Recent Labs     04/24/21  0021 04/25/21  0753   WBC  --  9.2   HGB 11.6* 10.8*   HCT 35.5* 34.3*   PLT  --  157     BMP:   Recent Labs     04/25/21  0753 04/26/21  0323   * 144   K 4.4 4.3   CO2 23 22   BUN 58* 55*   CREATININE 1.49* 1.32*   LABGLOM 34* 39*   GLUCOSE 287* 263*   LIVER PROFILE:  Recent Labs     04/25/21  0753 04/26/21  0323   * 108*   ALT 56* 59*   LABALBU 2.8* 3.1*     Radiology:    Chest x-ray 4/24  Progressive bilateral infiltrates suggesting bilateral pneumonia and possible   underlying congestive heart failure                   CXR 4/23              Impression:    · Acute hypoxic respiratory failure requiring oxygen by high flow nasal cannula  · COVID-19 viral pneumonia /ARDS  · Asthma  · COPD/30-pack-year smoking history quit 1990  · CKD  · Obstructive sleep apnea/morbid obesity on home CPAP  · Hypertension, DM II, GERD    Recommendations:    · Airborne isolation  · Oxygen via high flow nasal cannula 60L/80%, keep oxygen saturation greater than 92%  · BiPAP Increased setting to 24/18  · precedex drip for agitation  · Albuterol and Ipratropium Q 4 hours and prn  ·  budesonide aerosol treatment  · Continue Brovana aerosol treatment  ·  Mucomyst 20% 2 mL t.i.d.  · Finished IV  Rocephin on Zithromax  · Lasix 80 IV x1/ Nephrology on consult  · Decadron 6 mg p.o. daily/ solumedrol 60 IV x1   · S/p Actemra on 4/20/2021  · X-ray chest  In a.m.  · heparin drip empirically ; not a candidate for CTA of the chest secondary to her renal function to rule out PE and is unable to safely go for VQ scan at this time.    · Check bilateral lower extremity Dopplers  · Check echo  ·  patient wants to be DNR CCA and no intubation   ·  I previously called and discussed with grand daughter ; I advised him about poor prognosis  ·  poor prognosis  · Discussed with RN and RT  ·  DVT prophylaxis on heparin drip    Mally Mac MD, [facility]  Pulmonary Critical Care and Sleep Medicine,  [address] [place] [address] Dr dias  Office: [phone]

## 2021-04-27 PROBLEM — E87.1 DEHYDRATION WITH HYPONATREMIA: Status: ACTIVE | Noted: 2021-01-01

## 2021-04-27 PROBLEM — E86.0 DEHYDRATION WITH HYPONATREMIA: Status: ACTIVE | Noted: 2021-01-01

## 2021-04-27 NOTE — PROGRESS NOTES
Renal Progress Note    Patient :  Vernóica Sue; 70 y.o. MRN# [id number]  Location:  1105/1105-01  Attending:  Saulo Coats DO  Admit Date:  4/20/2021   Hospital Day: 7      Subjective:     Patient was seen and examined. Still having positive respiratory distress requiring BiPAP. Patient does not want intubation. CODE STATUS has been changed to [facility]. Pulmonology service on board. She does have a family meeting today at 4:30 PM to discuss further goals of care. She did receive Lasix 80 mg IV yesterday. Urine output documented as about 1.5 L today. Serum creatinine 1.66 mg/DL. Sodium did increase to 148, potassium 4.2, chloride 113, bicarb 21, calcium 9.3.     Outpatient Medications:     Medications Prior to Admission: pravastatin (PRAVACHOL) 40 MG tablet, TAKE 1 TABLET AT BEDTIME  loratadine (CLARITIN) 10 MG tablet, TAKE 1 TABLET EVERY MORNING  furosemide (LASIX) 20 MG tablet, TAKE 1 TABLET EVERY MORNING  lisinopril (PRINIVIL;ZESTRIL) 30 MG tablet, TAKE 1 TABLET BY MOUTH EVERY MORNING  carvedilol (COREG) 12.5 MG tablet, TAKE 1 TABLET TWICE DAILY(AM/PM)  glimepiride (AMARYL) 4 MG tablet, TAKE 1 TABLET TWICE DAILY(AM/PM)  Omega-3 Fatty Acids (GNP FISH OIL) 1000 MG CAPS, TAKE 1 CAPSULE BY MOUTH DAILY IN THE EVENING  allopurinol (ZYLOPRIM) 100 MG tablet, TAKE 1 TABLET BY MOUTH AT BEDTIME  amLODIPine (NORVASC) 10 MG tablet, TAKE 1 TABLET BY MOUTH DAILY IN THE MORNING  aspirin (ASPIRIN LOW DOSE) 81 MG EC tablet, TAKE 1 TABLET BY MOUTH DAILY IN THE EVENING  omeprazole (PRILOSEC) 40 MG delayed release capsule, TAKE 1 CAPSULE BY MOUTH DAILY IN THE MORNING  ferrous sulfate (FEROSUL) 325 (65 Fe) MG tablet, TAKE 1 TABLET BY MOUTH DAILY IN THE EVENING  diclofenac sodium (VOLTAREN) 1 % GEL, Apply topically 2 times daily  polyethylene glycol (GLYCOLAX) 17 GM/SCOOP powder, Take 17 g by mouth daily  albuterol sulfate  (90 Base) MCG/ACT inhaler, Inhale 2 puffs into the lungs every 6 hours as needed for Wheezing or Shortness of Breath  Multiple Vitamins-Minerals (MULTIVITAMIN) tablet, TAKE 1 TABLET EVERY MORNING  acetaminophen (APAP EXTRA STRENGTH) 500 MG tablet, Take 1 tablet by mouth every 6 hours as needed for Pain  blood glucose test strips (ONETOUCH VERIO) strip, TEST 3 TIMES A DAY & AS NEEDED FOR SYMPTOMS OF IRREGULAR BLOOD GLUCOSE  Blood Glucose Monitoring Suppl (ONETOUCH VERIO FLEX SYSTEM) w/Device KIT,   ONETOUCH VERIO strip, TEST BLOOD SUGAR 3 TIMES DAILY AS DIRECTED  [employer] Ultra Thin Lancets MISC, USE AS DIRECTED WITH INSULIN USE    Current Medications:     Scheduled Meds:    insulin glargine  40 Units Subcutaneous Nightly    budesonide  0.5 mg Nebulization BID    acetylcysteine  600 mg Inhalation BID    amLODIPine  5 mg Oral Daily    dexamethasone  6 mg Intravenous Q24H    pantoprazole  40 mg Intravenous Daily    And    sodium chloride (PF)  10 mL Intravenous Daily    insulin lispro  0-12 Units Subcutaneous TID WC    insulin lispro  0-6 Units Subcutaneous Nightly    Arformoterol Tartrate  15 mcg Nebulization BID    allopurinol  100 mg Oral Nightly    aspirin  81 mg Oral QPM    carvedilol  12.5 mg Oral BID    polyethylene glycol  17 g Oral Daily    pravastatin  40 mg Oral Nightly    sodium chloride flush  5-40 mL Intravenous 2 times per day    Vitamin D  2,000 Units Oral Daily    ipratropium-albuterol  1 ampule Inhalation Q4H WA     Continuous Infusions:    dexmedetomidine HCl in NaCl 0.9 mcg/kg/hr (04/27/21 0916)    heparin (PORCINE) Infusion 7.3 Units/kg/hr (04/26/21 1747)    sodium chloride 75 mL/hr at 04/21/21 0800    dextrose      sodium chloride       PRN Meds:  morphine, haloperidol lactate, heparin (porcine), heparin (porcine), sodium chloride, albuterol sulfate HFA, glucose, dextrose, glucagon (rDNA), dextrose, sodium chloride flush, sodium chloride, promethazine **OR** ondansetron, acetaminophen **OR** acetaminophen, guaiFENesin-dextromethorphan    Input/Output: I/O last 3 completed shifts: In: 908 [I.V.:908]  Out: 8068 [Urine:1550]. Patient Vitals for the past 96 hrs (Last 3 readings):   Weight   21 0423 269 lb 10 oz (122.3 kg)   21 0500 268 lb 8.3 oz (121.8 kg)       Vital Signs:   Temperature:  Temp: 98.4 °F (36.9 °C)  TMax:   Temp (24hrs), Av.2 °F (36.8 °C), Min:98 °F (36.7 °C), Max:98.7 °F (37.1 °C)    Respirations:  Resp: 29  Pulse:   Pulse: 105  BP:    BP: (!) 138/98  BP Range: Systolic (34UBZ), LJB:473 , Min:105 , QIO:629       Diastolic (93IGV), SRW:45, Min:62, Max:109      Physical Examination:     General:  On BiPAP, not able to answer any questions. HEENT: Atraumatic, normocephalic, no throat congestion, moist mucosa. Eyes:   Pupils equal, round and reactive to light, EOMI. Neck:   Supple. Chest:   Decreased breath sounds. Cardiac:  S1 S2 RR, no murmurs, gallops or rubs. Abdomen: Soft, no masses or organomegaly, BS audible. :   No suprapubic or flank tenderness. Neuro: On BiPAP, does have respiratory distress. SKIN:  No rashes, good skin turgor. Extremities:  Trace edema.     Labs:       Recent Labs     21  0753 21  0507   WBC 9.2 10.0   RBC 3.62* 3.85*   HGB 10.8* 11.5*   HCT 34.3* 37.1   MCV 94.8 96.4   MCH 29.8 29.9   MCHC 31.5 31.0   RDW 13.6 13.9    181   MPV 11.0 11.6      BMP:   Recent Labs     21  0753 21  0323 21  0507   * 144 148*   K 4.4 4.3 4.2   * 110* 113*   CO2 23 22 21   BUN 58* 55* 66*   CREATININE 1.49* 1.32* 1.66*   GLUCOSE 287* 263* 211*   CALCIUM 8.7 9.4 9.3      Albumin:    Recent Labs     21  0753 21  0323 21  0507   LABALBU 2.8* 3.1* 2.9*     BNP:      Lab Results   Component Value Date    BNP 44 2012     SPEP:  Lab Results   Component Value Date    PROT 5.9 2021    ALBCAL 3.4 2021    ALBPCT 56 2021    LABALPH 0.2 2021    LABALPH 0.7 2021    A1PCT 4 2021    A2PCT 11 2021    LABBETA 1.0 04/21/2021    BETAPCT 16 04/21/2021    GAMGLOB 0.7 04/21/2021    GGPCT 12 04/21/2021    PATH ELECTRONICALLY SIGNED. Will Goff M.D. 04/21/2021    PATH ELECTRONICALLY SIGNED. Will Goff M.D. 04/21/2021     C3:     Lab Results   Component Value Date    C3 130 04/21/2021     C4:     Lab Results   Component Value Date    C4 53 04/21/2021     Hep BsAg:         Lab Results   Component Value Date    HEPBSAG NONREACTIVE 04/21/2021     Hep C AB:          Lab Results   Component Value Date    HEPCAB NONREACTIVE 04/21/2021       Urinalysis/Chemistries:      Lab Results   Component Value Date    NITRU NEGATIVE 04/21/2021    COLORU YELLOW 04/21/2021    PHUR 5.5 04/21/2021    WBCUA 2 TO 5 04/21/2021    RBCUA 20 TO 50 04/21/2021    MUCUS NOT REPORTED 04/21/2021    TRICHOMONAS NOT REPORTED 04/21/2021    YEAST FEW 04/21/2021    BACTERIA NOT REPORTED 04/21/2021    SPECGRAV 1.015 04/21/2021    LEUKOCYTESUR NEGATIVE 04/21/2021    UROBILINOGEN Normal 04/21/2021    BILIRUBINUR NEGATIVE 04/21/2021    BILIRUBINUR NEGATIVE 12/12/2011    GLUCOSEU NEGATIVE 04/21/2021    GLUCOSEU NEGATIVE 12/12/2011    KETUA NEGATIVE 04/21/2021    AMORPHOUS NOT REPORTED 04/21/2021     Urine Sodium:     Lab Results   Component Value Date    SORAYA 42 04/21/2021     Urine Creatinine:     Lab Results   Component Value Date    LABCREA 75.9 04/21/2021     Radiology:     Reviewed. Assessment:     1. Acute Kidney Injury nonoliguric likely secondary to ATN from underlying COVID-19 infection and further aggravated by concomitant use of ACE inhibitor's and diuretics. Today's bump in creatinine is likely due to prerenal factors. Baseline data seems around 1 to 1.2 mg/DL. 2.  Pneumonia due to COVID-19 infection. 3.  Sepsis due to COVID-19 infection. 4.  Acute respiratory distress requiring Noninvasive positive pressure ventilation, refusing mechanical ventilation. 5.  Morbid obesity. 6.  Essential hypertension.   7.  Diabetes type 2.  8.  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 Demond Angeles. 9.  Hypernatremia. 10.  Hyperchloremia. 11.  Obstructive sleep apnea. Plan:   1. Will start her on D5W at 50 cc an hour. 2.  Continue monitor strict I's and O's and renal function. 3.  Family meeting has been scheduled for today afternoon to discuss further goals of care and CODE STATUS. 4.  Poor prognosis. 5.  Will follow. Nutrition   Please ensure that patient is on a renal diet/TF. Avoid nephrotoxic drugs/contrast exposure. We will continue to follow along with you. Arjun Kelley MD  [facility]     This note is created with the assistance of a speech-recognition program. While intending to generate a document that actually reflects the content of the visit, no guarantees can be provided that every mistake has been identified and corrected by editing.

## 2021-04-27 NOTE — PLAN OF CARE
Nutrition Problem #1: Inadequate protein-energy intake  Intervention: Food and/or Nutrient Delivery: Continue NPO status  Nutritional Goals: Nutrition support started within 24-48 hours depending on family decision

## 2021-04-27 NOTE — PROGRESS NOTES
of shortness of breath which ended up prompting her to come into the emergency room for evaluation yesterday. The patient was worked up at chest x-ray findings as suggested bilateral infiltrates and COVID-19 testing was done that was positive. The patient was placed on BiPAP/high flow O2 by nasal cannula actually did not tolerate the high flow O2 very well and is currently on BiPAP at 90% FiO2. The patient was started on antimicrobial therapy with Rocephin and azithromycin, also received Decadron, and 1 dose of Actemra given 2021. I was asked to evaluate and help with antibiotic management.     She is awake and alert currently on BiPAP at 90% and today did not tolerate high flow O2 by nasal cannula at all. She continues to have significant desaturations with minimal movement and she essentially has remained BiPAP dependent. The patient did change her CODE STATUS to a DNR CC arrest and did not want intubation. Current evaluation:2021    BP (!) 114/54   Pulse 105   Temp 98.4 °F (36.9 °C) (Axillary)   Resp 25   Ht 5' 3\" (1.6 m)   Wt 269 lb 10 oz (122.3 kg)   SpO2 (!) 85%   BMI 47.76 kg/m²     Temperature Range: Temp: 98.4 °F (36.9 °C) Temp  Av.2 °F (36.8 °C)  Min: 98 °F (36.7 °C)  Max: 98.7 °F (37.1 °C)  The patient is seen and evaluated at bedside she is BiPAP dependent at 100% FiO2 cannot tolerate taking the BiPAP off at all. She is on Precedex and is also getting morphine intermittently for her respiratory status. Review of Systems   Unable to perform ROS: Acuity of condition       Physical Examination :     Physical Exam  Constitutional:       Appearance: She is well-developed. She is obese. HENT:      Head: Normocephalic and atraumatic. Neck:      Musculoskeletal: Neck supple. Cardiovascular:      Rate and Rhythm: Regular rhythm. Heart sounds: Murmur present. Pulmonary:      Effort: Respiratory distress present. Breath sounds: Rales present.    Abdominal: General: Bowel sounds are normal.      Palpations: Abdomen is soft. Skin:     General: Skin is warm and dry. Neurological:      Mental Status: She is alert and oriented to person, place, and time. Laboratory data:   I have independently reviewed the followinglabs:  CBC with Differential:   Recent Labs     04/25/21  0753 04/27/21  0507   WBC 9.2 10.0   HGB 10.8* 11.5*   HCT 34.3* 37.1    181     BMP:   Recent Labs     04/26/21  0323 04/27/21  0507    148*   K 4.3 4.2   * 113*   CO2 22 21   BUN 55* 66*   CREATININE 1.32* 1.66*     Hepatic Function Panel:   Recent Labs     04/26/21  0323 04/27/21  0507   PROT 5.9* 5.9*   LABALBU 3.1* 2.9*   BILITOT 0.41 0.40   ALKPHOS 139* 134*   ALT 59* 58*   * 91*         Lab Results   Component Value Date    PROCAL 0.51 04/24/2021    PROCAL 2.30 04/22/2021    PROCAL 3.87 04/21/2021     Lab Results   Component Value Date    CRP 44.3 04/23/2021    CRP 75.1 04/22/2021    .6 04/21/2021     Lab Results   Component Value Date    SEDRATE 25 (H) 03/30/2015         Lab Results   Component Value Date    DDIMER >20.00 04/23/2021    DDIMER 9.60 04/22/2021    DDIMER 7.18 04/21/2021    DDIMER 1.89 08/09/2015     Lab Results   Component Value Date    FERRITIN 2,860 04/26/2021    FERRITIN 3,230 04/24/2021    FERRITIN 5,870 04/22/2021    FERRITIN 6,232 04/20/2021     Lab Results   Component Value Date     04/20/2021     Lab Results   Component Value Date    FIBRINOGEN 598 04/21/2021       Results in Past 30 Days  Result Component Current Result Ref Range Previous Result Ref Range   SARS-CoV-2, Rapid DETECTED (A) (4/20/2021) Not Detected Not in Time Range      Lab Results   Component Value Date    COVID19 DETECTED 04/20/2021       No results for input(s): [facility] in the last 72 hours.     Imaging Studies:   ONE XRAY VIEW OF THE CHEST 4/23/2021 5:10 am  Impression   Slight progression in moderate to marked diffuse bilateral alveolar   infiltrates related to progression in edema and/or multifocal pneumonia       Cultures:     Culture, Blood 2 [[id number]] Collected: 04/20/21 0356   Order Status: Completed Specimen: Blood Updated: 04/26/21 0549    Specimen Description . BLOOD    Special Requests [facility]    Culture NO GROWTH 6 DAYS   Culture, Blood 1 [[id number]] Collected: 04/20/21 0326   Order Status: Completed Specimen: Blood Updated: 04/26/21 0549    Specimen Description . BLOOD    Special Requests RT HAND    Culture NO GROWTH 6 DAYS     Strep Pneumoniae Antigen [[id number]] Collected: 04/20/21 0331   Order Status: Completed Specimen: Urine, clean catch Updated: 04/20/21 2347    Source . CLEAN CATCH URINE    Strep pneumo Ag NEGATIVE    Comment: Strep pneumoniae antigen not detected      Legionella antigen, urine [[id number]] Collected: 04/20/21 0331   Order Status: Completed Specimen: Urine, clean catch Updated: 04/20/21 2346    Legionella Pneumophilia Ag, Urine NEGATIVE    Comment: L. pneumophila serogroup 1 antigen not detected. A negative result does not exclude infection with Leginella pnemophila serogroup 1 nor does   it rule out other microbial-caused respiratory infections of disease caused by other   serogroups of Legionella pneumophila. Strep Pneumoniae Antigen [[id number]] Collected: 04/20/21 0331   Order Status: Completed Specimen: Urine, clean catch Updated: 04/20/21 2347    Source . CLEAN CATCH URINE    Strep pneumo Ag NEGATIVE    Comment: Strep pneumoniae antigen not detected      Legionella antigen, urine [[id number]] Collected: 04/20/21 0331   Order Status: Completed Specimen: Urine, clean catch Updated: 04/20/21 2346    Legionella Pneumophilia Ag, Urine NEGATIVE    Comment: L. pneumophila serogroup 1 antigen not detected.    A negative result does not exclude infection with Leginella pnemophila serogroup 1 nor does   it rule out other microbial-caused respiratory infections of disease caused by other   serogroups of Legionella pneumophila. COVID-19, Rapid [[id number]] (Abnormal) Collected: 04/20/21 0256   Order Status: Completed Specimen: Nasopharyngeal Swab Updated: 04/20/21 0333    Specimen Description . NASOPHARYNGEAL SWAB    SARS-CoV-2, Rapid DETECTEDAbnormal     Comment:        Rapid NAAT: The specimen is POSITIVE for SARS-Cov-2, the novel coronavirus associated with   COVID-19.         This test has been authorized by the FDA under an Emergency Use Authorization (EUA) for use   by authorized laboratories.         The ID NOW COVID-19 assay is designed to detect the virus that causes COVID-19 in patients   with signs and symptoms of infection who are suspected of COVID-19. An individual without symptoms of COVID-19 and who is not shedding SARS-CoV-2 virus would   expect to have a negative (not detected) result in this assay.    Fact sheet for Healthcare Providers: Chandni   Fact sheet for Patients: Grey.yazmin           Methodology: Isothermal Nucleic Acid Amplification         Results reported to the appropriate Health Department           Medications:      insulin glargine  40 Units Subcutaneous Nightly    budesonide  0.5 mg Nebulization BID    acetylcysteine  600 mg Inhalation BID    amLODIPine  5 mg Oral Daily    dexamethasone  6 mg Intravenous Q24H    pantoprazole  40 mg Intravenous Daily    And    sodium chloride (PF)  10 mL Intravenous Daily    insulin lispro  0-12 Units Subcutaneous TID     insulin lispro  0-6 Units Subcutaneous Nightly    Arformoterol Tartrate  15 mcg Nebulization BID    allopurinol  100 mg Oral Nightly    aspirin  81 mg Oral QPM    carvedilol  12.5 mg Oral BID    polyethylene glycol  17 g Oral Daily    pravastatin  40 mg Oral Nightly    sodium chloride flush  5-40 mL Intravenous 2 times per day    Vitamin D  2,000 Units Oral Daily    ipratropium-albuterol  1 ampule Inhalation Q4H WA           Infectious Disease [address] [place] [address]  [employer] messaging  OFFICE: [phone]      Electronically signed by Taj Ulloa MD on 4/27/2021 at 9:34 AM  Thank you for allowing us to participate in the care of this patient. Please call with questions. This note iscreated with the assistance of a speech recognition program.  While intending to generate a document that actually reflects the content of the visit, the document can still have some errors including those of syntax andsound a like substitutions which may escape proof reading. In such instances, actual meaning can be extrapolated by contextual diversion.

## 2021-04-27 NOTE — PROGRESS NOTES
Comprehensive Nutrition Assessment    Type and Reason for Visit:  Initial, RD Nutrition Re-Screen/LOS    Nutrition Recommendations/Plan:   1. Continue current NPO status  2. Monitor for nutrition support recommendations depending on family decisions  3. Monitor labs    Nutrition Assessment:  Patient admitted for pneumonia due to COVID-19 virus. Patient placed in droplet plus precuations. Patient seen today for LOS 7 days. Patient with decline nutritionally. Sepsis noted and respiratory status declining and requiring BiPAP full time. Unable to take off for meals. P.O intake 0% and was 1-25% x7 days. No weight changes noted per EHR. Unable to assess patient fat loss/muscle mass loss as she is in droplet plus precautions. Unable to assess for malnutrition. Patient refusing intubation. Family to meet today to discuss patient at 38. Will know more information after meeting today and will provide nutrition recommendations based on family decisions. Malnutrition Assessment:  Malnutrition Status:  Insufficient data    Context:  Acute Illness     Findings of the 6 clinical characteristics of malnutrition:  Energy Intake:  1 - 75% or less of estimated energy requirements for 7 or more days  Weight Loss:  No significant weight loss     Body Fat Loss:  Unable to assess     Muscle Mass Loss:  Unable to assess    Fluid Accumulation:  No significant fluid accumulation     Strength:  Not Performed    Estimated Daily Nutrient Needs:  Energy (kcal):  2036 kcal (1.2); Weight Used for Energy Requirements:  Current     Protein (g):  105-110 (2.0-2.2 g/kg IBW); Weight Used for Protein Requirements:  Ideal        Method Used for Fluid Requirements:  1 ml/kcal      Nutrition Related Findings: BiPAP 100%. Generalized trace edema and BLE +1 non-pitting edema. No oral intake.       Wounds:  None       Current Nutrition Therapies:    Diet NPO Effective Now Exceptions are: Sips of Water with Meds    Anthropometric Measures:  · Height: 5' 3\" (160 cm)  · Current Body Weight: 269 lb 10 oz (122.3 kg)   · Admission Body Weight: 263 lb 10.7 oz (119.6 kg)    · Ideal Body Weight: 115 lbs;  · BMI: 47.8  · Adjusted Body Weight:  ; No Adjustment   · BMI Categories: Obese Class 3 (BMI 40.0 or greater)       Nutrition Diagnosis:   · Inadequate protein-energy intake related to impaired respiratory function as evidenced by intake 0-25%    Nutrition Interventions:   Food and/or Nutrient Delivery:  Continue NPO status  Nutrition Education/Counseling:  Education not indicated   Coordination of Nutrition Care:  Continue to monitor while inpatient    Goals:  Nutrition support started within 24-48 hours depending on family decision       Nutrition Monitoring and Evaluation:   Behavioral-Environmental Outcomes:  None Identified   Food/Nutrient Intake Outcomes:  Food and Nutrient Intake  Physical Signs/Symptoms Outcomes:  Biochemical Data, Fluid Status or Edema, Weight     Discharge Planning:     Too soon to determine     Steven Hyde, 68 [address]  Office Number: [phone]

## 2021-04-27 NOTE — FLOWSHEET NOTE
Patient in COVID-19 isolation. Writer speaks with Palliative RN. Per RN, patient is not doing well; family will be in to visit today at 3:30pm; will meet with physicians and Palliative at 4:30pm to discuss goals/plan of care. Writer prays for patient from hallway; updates evening  about family meeting. Spiritual Care will follow as needed.      04/27/21 1250   Encounter Summary   Services provided to: Patient not available   Referral/Consult From: Sarah   Continue Visiting   (4/27/21 RN only)   Complexity of Encounter Low   Length of Encounter 15 minutes   Routine   Type Follow up   Assessment Unable to respond   Intervention Prayer

## 2021-04-27 NOTE — FLOWSHEET NOTE
[facility]  PROGRESS NOTE    Room # 1105/1105-01   Name: Belle Jordan               Reason for visit: Family Meeting on Pt Condition    I visited the family. Admit Date & Time: 4/20/2021  2:42 AM    Assessment:  Belle Jordan is a 70 y.o. female in the hospital because of COVID-19. Pt is currently on 100% oxygen with bipap. Pt expressed that she does not want to be on a ventilator at any time. According to the physician she is currently \"comfortable\" but this treatment will not be comfortable forever. He expresses that the pt could hang on to her currently treatment for a few more days or her condition could decline. Only time will tell. Palliative care facilitated this meeting. Yenni Ferris was present and offered support to family in what comes next. Family inquired about visiting again and she expressed that this was the only visit unless it is an end of life situation. Many family members were present; most importantly, her two children Denny Thao and Surinder Leiva. They both expressed grief and sadness over their mother's condition. They seem to be holding out the hope that their mother will get through this. The medical team reaffirmed that family will be involved with every decision and conversation, sans the ventilator because the pt expressed she would not want one. Family expressed that they have another family member being treated for [place] at [facility] and it has been an emotional hardship for them all. Intervention:  I introduced myself and my title as  I offered space for family  to express feelings, needs, and concerns and provided a ministry presence. I observed this meeting and offered spiritual care services. I let them know of Zoom services as well. Outcome: The family was grateful for the support of the multidisciplinary team.      Plan:  Mike Miranda will follow up by giving a follow up call to her children tomorrow.         04/27/21 7722 Encounter Summary   Services provided to: Family   Referral/Consult From: [address] [facility];Family members   Continue Visiting   (4/27/21)   Complexity of Encounter High   Length of Encounter 1 hour;15 minutes   Spiritual Assessment Completed Yes   Routine   Type Follow up   Assessment Approachable;Tearful;Spiritual struggle   Intervention Active listening;Explored feelings, thoughts, concerns;Explored coping resources;Nurtured hope;Sustaining presence/ Ministry of presence; Discussed illness/injury and it's impact; Develop care plan   Outcome Acceptance;Comfort;Expressed gratitude;Engaged in conversation;Expressed feelings/needs/concerns;Coping; Hopeful;Receptive;Venting emotion;Encouraged       Electronically signed by Octavia Garcia on 4/27/2021 at 5:14 PM.  [id number] [facility]

## 2021-04-27 NOTE — PROGRESS NOTES
evidence of dehydration. Brief History:     Kartik Sauer a 70 y.o. AA female who presents with Shortness of Breath   and is admitted to the hospital for the management of Sepsis due to COVID-19 [facility]).    This 70 yof presents with sob for about a week pta.  She has also had a cough with white phlegm and has had fevers, chills, sweats, shakes.  Denies change in taste or smell, n/v/d or abd pain. Review of Systems:     Unable to obtain, as patient is on BiPAP therapy  Reports that she is breathing \"easy. \"  Reports that she is not in any pain. Medications: Allergies:     Allergies   Allergen Reactions    Nsaids Other (See Comments)     Chronic kidney disease    Proair Respiclick [Albuterol Sulfate]        Current Meds:   Scheduled Meds:    insulin glargine  40 Units Subcutaneous Nightly    budesonide  0.5 mg Nebulization BID    acetylcysteine  600 mg Inhalation BID    amLODIPine  5 mg Oral Daily    dexamethasone  6 mg Intravenous Q24H    pantoprazole  40 mg Intravenous Daily    And    sodium chloride (PF)  10 mL Intravenous Daily    insulin lispro  0-12 Units Subcutaneous TID WC    insulin lispro  0-6 Units Subcutaneous Nightly    Arformoterol Tartrate  15 mcg Nebulization BID    allopurinol  100 mg Oral Nightly    aspirin  81 mg Oral QPM    carvedilol  12.5 mg Oral BID    polyethylene glycol  17 g Oral Daily    pravastatin  40 mg Oral Nightly    sodium chloride flush  5-40 mL Intravenous 2 times per day    Vitamin D  2,000 Units Oral Daily    ipratropium-albuterol  1 ampule Inhalation Q4H WA     Continuous Infusions:    dextrose 50 mL/hr at 04/27/21 1129    dexmedetomidine HCl in NaCl 0.8 mcg/kg/hr (04/27/21 1802)    heparin (PORCINE) Infusion 7.3 Units/kg/hr (04/26/21 1747)    sodium chloride 75 mL/hr at 04/21/21 0800    dextrose      sodium chloride       PRN Meds: morphine, haloperidol lactate, heparin (porcine), heparin (porcine), sodium chloride, albuterol sulfate HFA, Output 1200 ml   Net 523 ml       Labs:  Hematology:  Recent Labs     04/25/21 0753 04/27/21  0507   WBC 9.2 10.0   RBC 3.62* 3.85*   HGB 10.8* 11.5*   HCT 34.3* 37.1   MCV 94.8 96.4   MCH 29.8 29.9   MCHC 31.5 31.0   RDW 13.6 13.9    181   MPV 11.0 11.6     Chemistry:  Recent Labs     04/25/21 0753 04/26/21 0323 04/27/21  0507   * 144 148*   K 4.4 4.3 4.2   * 110* 113*   CO2 23 22 21   GLUCOSE 287* 263* 211*   BUN 58* 55* 66*   CREATININE 1.49* 1.32* 1.66*   ANIONGAP 13 12 14   LABGLOM 34* 39* 30*   GFRAA 42* 48* 37*   CALCIUM 8.7 9.4 9.3     Recent Labs     04/25/21 0753 04/25/21  0753 04/26/21  0323 04/26/21  0323 04/26/21  1103 04/26/21  1810 04/26/21  1852 04/27/21  0507 04/27/21  0728 04/27/21  1128 04/27/21  1708   PROT 5.6*  --  5.9*  --   --   --   --  5.9*  --   --   --    LABALBU 2.8*  --  3.1*  --   --   --   --  2.9*  --   --   --    *  --  108*  --   --   --   --  91*  --   --   --    ALT 56*  --  59*  --   --   --   --  58*  --   --   --    ALKPHOS 112*  --  139*  --   --   --   --  134*  --   --   --    BILITOT 0.33  --  0.41  --   --   --   --  0.40  --   --   --    POCGLU  --    < >  --    < > 285* 166* 158*  --  212* 234* 217*    < > = values in this interval not displayed.      ABG:  Lab Results   Component Value Date    POCPH 7.37 04/24/2021    POCPCO2 49 04/24/2021    POCPO2 78 04/24/2021    POCHCO3 28.2 04/24/2021    NBEA NOT REPORTED 04/24/2021    PBEA 3 04/24/2021    UFI3LOA 30 04/24/2021    PSMS7NDS 95 04/24/2021    FIO2 80.0 04/24/2021     Lab Results   Component Value Date/Time    SPECIAL LT [facility] 04/20/2021 03:56 AM     Lab Results   Component Value Date/Time    CULTURE NO GROWTH 6 DAYS 04/20/2021 03:56 AM       Radiology:  Janeen Coto Chest (single View Frontal)    Result Date: 4/24/2021  Progressive bilateral infiltrates suggesting bilateral pneumonia and possible underlying congestive heart failure     Us Renal Complete    Result Date: 4/21/2021  Unremarkable

## 2021-04-27 NOTE — PROGRESS NOTES
Communications with primary service  Providing support for coping/adaptation/distress of family  Discussing meaning/purpose   Continue with current plan of care  Clarification of medical condition to patient and family  Code status clarified: [facility]  Validating patient/family distress  Continued communication updates  Recognizing, reflecting, and empathizing with family members' anticipatory grief  Patient's 2 children visited with patient today. Family meeting with Primary service held after visit. Family's questions were answered. Emotional support offered. Will continue to follow for support. Family not making any end-of-life decisions today.       Palliative Care Coordinator  [facility] STEPHANIE Ramírez, JOSEY PEREZ [facility] Office: [address] Office: [phone]    For Symptom Management Clinic scheduling please call [phone]

## 2021-04-27 NOTE — PLAN OF CARE
Problem: Gas Exchange - Impaired  Goal: Absence of hypoxia  Outcome: Ongoing  Note: Pt educated on importance of proning, turning, and sitting up. BiPAP settings adjusted as needed. Will continue to monitor. Problem: Skin Integrity:  Goal: Will show no infection signs and symptoms  Description: Will show no infection signs and symptoms  Outcome: Ongoing  Note: Skin assessed, cleansed as needed, pt turned as tolerated. Will continue to monitor.

## 2021-04-27 NOTE — PROGRESS NOTES
Pulmonary Critical Care Progress Note       Patient seen for the follow up of acute hypoxic respiratory failure, Covid 19 pneumonia, asthma, COPD, YANA, Sepsis due to COVID-19 [facility])     Subjective:   she requires BiPAP  22/18 at 100% FiO2. She had desaturation down to 78%. I switch her to AVAPS mode tidal volume 500 PEEP of 18. Saturation improved to 86 to 87%. She is labored and seems to be tired. She has received 2 mg morphine as needed. I ordered Haldol since she had bradycardia on Precedex. She is not able to get off the facemask. She has been started on D5W. She has decreased urine output and did not response to Lasix. She has been NPO. [facility] She is more lethargic. Examination:  Vitals: /69   Pulse 78   Temp 99.1 °F (37.3 °C) (Axillary)   Resp 23   Ht 5' 3\" (1.6 m)   Wt 269 lb 10 oz (122.3 kg)   SpO2 (!) 89%   BMI 47.76 kg/m²   General appearance:  alert and  Labored on BiPAP  Neck: No JVD  Lungs:  Decreased breath sound mild basilar crackles  Heart: regular rate and rhythm, S1, S2 normal, no gallop  Abdomen: Soft, non tender, + BS  Extremities: no cyanosis or clubbing. Trace edema    LABs:  CBC:   Recent Labs     04/25/21  0753 04/27/21  0507   WBC 9.2 10.0   HGB 10.8* 11.5*   HCT 34.3* 37.1    181     BMP:   Recent Labs     04/26/21  0323 04/27/21  0507    148*   K 4.3 4.2   CO2 22 21   BUN 55* 66*   CREATININE 1.32* 1.66*   LABGLOM 39* 30*   GLUCOSE 263* 211*   LIVER PROFILE:  Recent Labs     04/26/21  0323 04/27/21  0507   * 91*   ALT 59* 58*   LABALBU 3.1* 2.9*     Radiology:    Chest x-ray 4/27    Confluent bilateral airspace infiltrates with increased opacity within the   lower lungs, raising suspicion of partial atelectasis within the lower lungs. There is increased opacity within the right lower lung, suggesting additional   partial atelectasis.                Impression:    · Acute hypoxic respiratory failure requiring oxygen by high flow nasal cannula  · COVID-19 viral pneumonia /ARDS  · Asthma  · COPD/30-pack-year smoking history quit 1990  · CKD  · Obstructive sleep apnea/morbid obesity on home CPAP  · Hypertension, DM II, GERD    Recommendations:    · Airborne isolation  · Oxygen via high flow nasal cannula 60L/80%, keep oxygen saturation greater than 92%  · BiPAP   Increased setting to 24/18  · precedex drip for agitation/Haldol as needed  · Morphine 2 mg as needed every 2 hours  · Albuterol and Ipratropium Q 4 hours and prn  ·  budesonide aerosol treatment  · Continue Brovana aerosol treatment  ·  Mucomyst 20% 2 mL t.i.d.  · Finished IV  Rocephin on Zithromax  · D5W/monitor sodium// Nephrology on consult  · Decadron 6 mg p.o. daily/ solumedrol 60 IV x1   · S/p Actemra on 4/20/2021  · X-ray chest  In a.m.  · heparin drip empirically ; not a candidate for CTA of the chest secondary to her renal function to rule out PE and is unable to safely go for VQ scan at this time.    · Check bilateral lower extremity Dopplers  · Check echo  ·  patient wants to be DNR CCA and no intubation   ·  I previously called and discussed with grand daughter ; I advised him about poor prognosis  · Family meeting today  · Discussed with RN and RT  · Discussed with primary  ·  DVT prophylaxis on heparin drip    Reji Ivan MD, [facility]  Pulmonary Critical Care and Sleep Medicine,  [address]  Office: [phone]

## 2021-04-28 PROBLEM — I48.91 ATRIAL FIBRILLATION WITH RVR (HCC): Status: ACTIVE | Noted: 2021-01-01

## 2021-04-28 NOTE — DISCHARGE SUMMARY
[facility]  Office: [address] [facility], DO, Demetra Fernando, DO, Selene Dary, DO, Sundar Leslie Ale, DO, Janice Lee MD, Mariana Vasques MD, Marisol Desir MD, Magdiel Bundy MD, Fatou Weinberg MD, Amy Stahl MD, Wayne Case MD, Katey Martinez MD, Cesar Panchal DO, Mica Musa MD, Eleazar Valentine DO, Car Martinez MD,  Mary Verduzco DO, Jaspreet Cummings MD, Desiree Loco MD, Mohamud Tan MD, Rosette Thakur MD, Katy Ortiz, [facility], [facility], [facility], Neel Tilley, CNP, Alise Brumfield, [facility], Marino Antunez, CNP, Dottie Moore, CNP, Kenzie Mosher, CNP, Selene Ratliff, CNP, Diana Moss, CNP, Emilee Monroy PA-C, Clayton Tabares, AMANDA, Nicky Arreola, CNP, Alexis, CNP, Messi Chatman, CNP, Kandice Eckert, CNP, Caitie Javed, [facility], [facility]    Discharge Summary     Patient ID: Mc Loco  :  1945   MRN: [id number]     ACCOUNT:  [de-identified]   Patient's PCP: Cherelle Aaron MD  Admit Date: 2021   Discharge Date: 2021    Length of Stay: 8  Code Status:  DNR-CCA  Admitting Physician: Krysta Brito DO  Discharge Physician: Mike Shaw DO    Active Discharge Diagnoses:     Hospital Problem Lists:  Principal Problem:    [facility]  Active Problems:    Pneumonia due to COVID-19 virus    SHANAE (acute kidney injury) ([place] [address].)    Acute hypoxemic respiratory failure [facility] [place] [address])    Dehydration with hyponatremia    Atrial fibrillation with RVR ([facility]    Obstructive sleep apnea    Obesity, morbid, BMI 40.0-49.9 ([place] [address].)    Type 2 diabetes mellitus with stage 3a chronic kidney disease, without long-term current use of insulin ([address]    Essential hypertension  Resolved Problems:    * No resolved hospital problems.  *      Admission Condition:  fair     Discharged Condition:     Hospital Stay:     Hospital Course:  Mc Loco is a 70 y.o. female who initially presented to [facility] on 4/20/2021 for evaluation of shortness of breath x 1 week. Associated symptoms included productive cough (white phlegm), fevers, chills, sweats. She was discovered to be COVID-19 positive. She did have contact with two granddaughters that were also positive for COVID. Infectious disease and pulmonology were involved in the patient's care. She was noted to have an SHANAE on initial presentation as well which initially resolved. Nephrology was involved with managing the patient's SHANAE and fluids. However, she decompensated from a respiratory standpoint, requiring NIPPV. Multiple discussions were had with the patient regarding CODE STATUS and need for possible mechanical ventilation. The patient, while competent and with capacity, made the decision to forego intubation. She continued to decline, despite our best medical treatments. Palliative care was involved with the patient's care and she ultimately succumbed to the disease on 4/28/2021. Family was able to visit with their loved one.      Significant therapeutic interventions:   - NIPPV  - Actemra, Decadron  - IVF  - Glycemic control  - IV abx    Significant Diagnostic Studies:   Labs / Micro:  CBC:   Lab Results   Component Value Date    WBC 10.0 04/27/2021    RBC 3.85 04/27/2021    RBC 3.89 12/12/2011    HGB 11.5 04/27/2021    HCT 37.1 04/27/2021    MCV 96.4 04/27/2021    MCH 29.9 04/27/2021    MCHC 31.0 04/27/2021    RDW 13.9 04/27/2021     04/27/2021     12/12/2011     CMP:    Lab Results   Component Value Date    GLUCOSE 121 04/28/2021    GLUCOSE 138 05/29/2012     04/28/2021    K 4.5 04/28/2021     04/28/2021    CO2 23 04/28/2021    BUN 71 04/28/2021    CREATININE 1.79 04/28/2021    ANIONGAP 13 04/28/2021    ALKPHOS 150 04/28/2021    ALT 50 04/28/2021    AST 70 04/28/2021    BILITOT 0.36 04/28/2021    LABALBU 2.9 04/28/2021    LABALBU 4.2 05/29/2012    ALBUMIN NOT REPORTED 04/28/2021    LABGLOM 28 2021    GFRAA 34 2021    GFR      2021    GFR NOT REPORTED 2021    PROT 5.9 2021    CALCIUM 9.2 2021          COVID-19 positive    Radiology:  Xr Chest (single View Frontal)    Result Date: 2021  Progressive bilateral infiltrates suggesting bilateral pneumonia and possible underlying congestive heart failure     Xr Chest Portable    Result Date: 2021  Confluent bilateral airspace infiltrates with increased opacity within the lower lungs, raising suspicion of partial atelectasis within the lower lungs. There is increased opacity within the right lower lung, suggesting additional partial atelectasis. Xr Chest Portable    Result Date: 2021  Multifocal airspace opacities worrisome for multifocal pneumonia. Findings most confluent within both lung bases. Suspect mild improved aeration right mid and left mid lung when compared to the 2021 exam.  Continue follow-up is recommended. Xr Chest Portable    Result Date: 2021  Slight progression in moderate to marked diffuse bilateral alveolar infiltrates related to progression in edema and/or multifocal pneumonia     Xr Chest Portable    Result Date: 2021  Stable chest Bilateral asymmetric infiltrates related to edema and/or pneumonia       Consultations:    Consults:     Final Specialist Recommendations/Findings:   IP CONSULT TO INFECTIOUS DISEASES  IP CONSULT TO PULMONOLOGY  IP CONSULT TO NEPHROLOGY  IP CONSULT TO PALLIATIVE CARE      The patient was seen and examined on day of discharge and this discharge summary is in conjunction with any daily progress note from day of discharge. Discharge plan:     Disposition:     No discharge procedures on file. Time Spent on discharge is  41 mins in patient examination, evaluation, counseling as well as medication reconciliation, prescriptions for required medications, discharge plan and follow up.     Electronically signed by   Gwynneth Canavan [facility],   4/28/2021  12:40 PM      Thank you Dr. David Martínez MD for the opportunity to be involved in this patient's care.

## 2021-04-28 NOTE — PLAN OF CARE
Problem: Airway Clearance - Ineffective  Goal: Achieve or maintain patent airway  Outcome: Completed     Problem: Gas Exchange - Impaired  Goal: Absence of hypoxia  Outcome: Completed  Goal: Promote optimal lung function  Outcome: Completed     Problem: Breathing Pattern - Ineffective  Goal: Ability to achieve and maintain a regular respiratory rate  Outcome: Completed     Problem:  Body Temperature -  Risk of, Imbalanced  Goal: Ability to maintain a body temperature within defined limits  Outcome: Completed  Goal: Will regain or maintain usual level of consciousness  Outcome: Completed  Goal: Complications related to the disease process, condition or treatment will be avoided or minimized  Outcome: Completed     Problem: Isolation Precautions - Risk of Spread of Infection  Goal: Prevent transmission of infection  Outcome: Completed     Problem: Nutrition Deficits  Goal: Optimize nutritional status  Outcome: Completed     Problem: Risk for Fluid Volume Deficit  Goal: Maintain normal heart rhythm  Outcome: Completed  Goal: Maintain absence of muscle cramping  Outcome: Completed  Goal: Maintain normal serum potassium, sodium, calcium, phosphorus, and pH  Outcome: Completed     Problem: Loneliness or Risk for Loneliness  Goal: Demonstrate positive use of time alone when socialization is not possible  Outcome: Completed     Problem: Fatigue  Goal: Verbalize increase energy and improved vitality  Outcome: Completed     Problem: Patient Education: Go to Patient Education Activity  Goal: Patient/Family Education  Outcome: Completed     Problem: Skin Integrity:  Goal: Will show no infection signs and symptoms  Description: Will show no infection signs and symptoms  Outcome: Completed  Goal: Absence of new skin breakdown  Description: Absence of new skin breakdown  Outcome: Completed     Problem: IP BALANCE  Goal: LTG - Patient will maintain balance to allow for safe/functional mobility  Outcome: Completed     Problem: Pain:  Goal: Pain level will decrease  Description: Pain level will decrease  Outcome: Completed  Goal: Control of acute pain  Description: Control of acute pain  Outcome: Completed  Goal: Control of chronic pain  Description: Control of chronic pain  Outcome: Completed     Problem: Nutrition  Goal: Optimal nutrition therapy  Outcome: Completed

## 2021-04-28 NOTE — PROGRESS NOTES
Patient time of death 21 at 12:20 pm. Dr. Armen García present. Family notified of patient's death by Dr. Armen García. Pastoral care notified, patient's children Kassie Kimball and Bettina Biswasr en route. Pastoral care to discuss  home arrangements with patient's children. Life connections and  notified of patient's death; body release form filled out appropriately. Patient's belongings placed in patient belonging bag in closet in patient's room.

## 2021-04-28 NOTE — FLOWSHEET NOTE
[address]   Patient Death Note  DEATH                  Room # 1105/1105-01   Name: Yvon Canales            Age: 70 y.o. Gender: female          Muslim: Zoroastrian        Admit Date & Time: 2021  2:42 AM        Actual date of death: 21   TOD: 1220 PM       Referral:  Unit: ICU  Nurse: Jayne Prater AT DEATH:    The pt was DNR-CCA with COVID-19. Pt declined and due to a no intubation order . IS THIS A 'S CASE? No    SPIRITUAL/EMOTIONAL INTERVENTION:    Writer was present for two children, Dmitriy López and America Bates, who were able to come to the hospital.  The granddaughter of pt is also being treated in the ED and was able to come down. Many family members arrived to hospital but they were not able to come back with the COVID protocols. DOCTOR SIGNING DEATH CERTIFICATE:  Name: Dr. Monique Carlos    Copy of COMPLETED Release of Body Form Received? Yes    Patient's belongings: Given to family     HOME:  Contacted Time 3:10 PM  Name: [facility]: [place], [place]  Phone Number: [phone]    NEXT OF KIN:  Name: Angel Conner  Relationship: son  Street Address: [facility]: [facility]  Zip code: [address]   Phone Number: [phone]    ANY FOLLOW-UP NEEDED? No    IF SO, WHAT? N/A       21 1558   Encounter Summary   Services provided to: Family   Referral/Consult From: Multi-disciplinary team   Support System Children;Family members   Continue Visiting   (21)   Complexity of Encounter High   Length of Encounter 3 hours;30 minutes   Spiritual Assessment Completed Yes   Grief and Life Adjustment   Type Death   Assessment Tearful; Approachable;Grieving; Anxious; Shock; Despair   Intervention Active listening;Explored feelings, thoughts, concerns;Explored coping resources;Nurtured hope;Sustaining presence/ Ministry of presence; Discussed death;Discussed afterlife; End of life care; Discussed meaning/purpose;Discussed relationship with God;Discussed belief system/Holiness practices/sawyer;Discussed illness/injury and it's impact; Complementary therapies ; Develop care plan   Outcome Connection/belonging;Comfort;Expressed gratitude;Engaged in conversation;Expressed feelings/needs/concerns; Tearful;Less anxious, less agitated;Grieving;Receptive;Venting emotion; De-escalated       Electronically signed by Og Vanegas on 4/28/2021 at 4:01 PM.  [address] [place] [address]  [phone]

## 2021-04-28 NOTE — PROGRESS NOTES
[facility]  Office: [address], DO, Jacobo Encinas, DO, Loren Forrester, DO, Kenisha Horse Blood, DO, Kristel Stout MD, Jayden Jasso MD, Mitch Mas MD, Pj Jaffe MD, Angélica Angela MD, Varghese Gonsalez MD, Amie Garcia MD, James Saxena MD, Michael Olmstead, DO, Joellen Powell MD, Ashish Boggs, DO, Cordelia Barthel, MD,  Kenny Brewster, DO, Gracia Townsend MD, Ernie Moya MD, Davon Lynn MD, Alfonso Salmeron MD, Kwesi Frias, CNP, Janine Morales, CNP, Charan Bone, CNS, Angela Johnson, CNP, Jame Boyer, CNP, Rosemary Keller, CNP, Evi Montaño, CNP, Alma Chamorro, CNP, CHITRA QuinonesC, Hugo Easley, AMANDA, Yue Morrow, CNP, Caleb Costa, CNP, Henri Mason, CNP, Gianna Zavala, CNP, Avelino Bowling, CNP, Sean Antonio, [facility]    Progress Note    4/28/2021    9:10 AM    Name:   Andrey Huerta  MRN:     [id number]     Acct:      [de-identified]   Room:   [facility] Day:  8  Admit Date:  4/20/2021  2:42 AM    PCP:   Toi Johnson MD  Code Status:  DNR-CCA    Subjective:     C/C:   Chief Complaint   Patient presents with    Shortness of Breath     Interval History Status: worsened. Patient seen and examined this morning. Heart rate is irregular and sustaining in the 130s to 160s. She is significantly uncomfortable on the BiPAP machine. She is breathing about 45 times per minute. She is essentially unresponsive. She does not awaken to voice or tactile stimuli. Still on 100% FiO2. O2 saturations in the 60s with a fair waveform. Brief History:     Trina Vallejo a 70 y.o. AA female who presents with Shortness of Breath   and is admitted to the hospital for the management of Sepsis due to COVID-19 [facility]).      This 70 yof presents with sob for about a week pta. Siva Hicks has also had a cough with white phlegm and has had fevers, chills, sweats, shakes.  Denies change in taste Former smoker, GERD (gastroesophageal reflux disease), Glucose intolerance (impaired glucose tolerance), History of anemia, History of corneal abrasion, History of dyspnea, History of pneumonia, Hyperlipidemia, Hypertension, Idiopathic gout, Morbid obesity ([address] Neck strain, Obstructive sleep apnea, Osteoarthritis, Pain, Shoulder strain, Systemic hypertension, TIA (transient ischemic attack), Type II or unspecified type diabetes mellitus without mention of complication, not stated as uncontrolled, Vitamin D deficiency, Wears dentures, and Wears glasses. Social History:   reports that she quit smoking about 31 years ago. Her smoking use included cigarettes. She started smoking about 61 years ago. She has a 30.00 pack-year smoking history. She has never used smokeless tobacco. She reports that she does not drink alcohol or use drugs. Family History:   Family History   Problem Relation Age of Onset    Asthma Mother     Stroke Sister     Diabetes Sister     Other Neg Hx         Sleep disorders or narcolepsy       Vitals:  BP 97/72   Pulse 134   Temp 98.8 °F (37.1 °C) (Axillary)   Resp (!) 36   Ht 5' 3\" (1.6 m)   Wt 269 lb 10 oz (122.3 kg)   SpO2 (!) 62%   BMI 47.76 kg/m²   Temp (24hrs), Av.6 °F (37 °C), Min:97.7 °F (36.5 °C), Max:99.1 °F (37.3 °C)    Recent Labs     21  1128 21  1708 21  1902 21  0757   POCGLU 234* 217* 213* 91       I/O (24Hr):     Intake/Output Summary (Last 24 hours) at 2021 0910  Last data filed at 2021 0504  Gross per 24 hour   Intake 815 ml   Output 850 ml   Net -35 ml       Labs:  Hematology:  Recent Labs     21  0507   WBC 10.0   RBC 3.85*   HGB 11.5*   HCT 37.1   MCV 96.4   MCH 29.9   MCHC 31.0   RDW 13.9      MPV 11.6     Chemistry:  Recent Labs     21  0323 21  0507 21  0354    148* 150*   K 4.3 4.2 4.5   * 113* 114*   CO2 22 21 23   GLUCOSE 263* 211* 121*   BUN 55* 66* 71*   CREATININE 1.32* 1.66* 1.79*   ANIONGAP 12 14 13   LABGLOM 39* 30* 28*   GFRAA 48* 37* 34*   CALCIUM 9.4 9.3 9.2     Recent Labs     04/26/21  0323 04/26/21  0323 04/26/21  1852 04/27/21  0507 04/27/21  0728 04/27/21  1128 04/27/21  1708 04/27/21  1902 04/28/21  0354 04/28/21  0757   PROT 5.9*  --   --  5.9*  --   --   --   --  5.9*  --    LABALBU 3.1*  --   --  2.9*  --   --   --   --  2.9*  --    *  --   --  91*  --   --   --   --  70*  --    ALT 59*  --   --  58*  --   --   --   --  50*  --    ALKPHOS 139*  --   --  134*  --   --   --   --  150*  --    BILITOT 0.41  --   --  0.40  --   --   --   --  0.36  --    POCGLU  --    < > 158*  --  212* 234* 217* 213*  --  91    < > = values in this interval not displayed. ABG:  Lab Results   Component Value Date    POCPH 7.37 04/24/2021    POCPCO2 49 04/24/2021    POCPO2 78 04/24/2021    POCHCO3 28.2 04/24/2021    NBEA NOT REPORTED 04/24/2021    PBEA 3 04/24/2021    DWH2XAU 30 04/24/2021    IMPO4SFR 95 04/24/2021    FIO2 80.0 04/24/2021     Lab Results   Component Value Date/Time    SPECIAL [facility] 04/20/2021 03:56 AM     Lab Results   Component Value Date/Time    CULTURE NO GROWTH 6 DAYS 04/20/2021 03:56 AM       Radiology:  Janice Cortes Chest (single View Frontal)    Result Date: 4/24/2021  Progressive bilateral infiltrates suggesting bilateral pneumonia and possible underlying congestive heart failure     Us Renal Complete    Result Date: 4/21/2021  Unremarkable sonographic appearance of the kidneys. Urinary bladder not seen due to catheterization. Xr Chest Portable    Result Date: 4/27/2021  Confluent bilateral airspace infiltrates with increased opacity within the lower lungs, raising suspicion of partial atelectasis within the lower lungs. There is increased opacity within the right lower lung, suggesting additional partial atelectasis. Xr Chest Portable    Result Date: 4/26/2021  Multifocal airspace opacities worrisome for multifocal pneumonia.   Findings most confluent within both lung bases. Suspect mild improved aeration right mid and left mid lung when compared to the 04/24/2021 exam.  Continue follow-up is recommended. Xr Chest Portable    Result Date: 4/23/2021  Slight progression in moderate to marked diffuse bilateral alveolar infiltrates related to progression in edema and/or multifocal pneumonia     Xr Chest Portable    Result Date: 4/22/2021  Stable chest Bilateral asymmetric infiltrates related to edema and/or pneumonia     Xr Chest Portable    Result Date: 4/21/2021  Stable exam since yesterday. Physical Examination:        General appearance: Obtunded, BiPAP dependent, obese -American female lying in the left lateral recumbent position in bed  Mental Status: Unable to obtain  HEENT: BiPAP in place  Lungs:  clear to auscultation bilaterally, coarse breath sounds scattered throughout bilateral upper and lower lung fields. Increased rate and effort  Heart: Tachycardic rate, irregular rhythm  Abdomen:  soft, nontender, protuberant, nondistended, normal bowel sounds, no masses, hepatomegaly, splenomegaly  Extremities:  no edema, redness, tenderness in the calves  Skin:  no gross lesions, rashes, induration    Assessment:        Hospital Problems           Last Modified POA    * (Principal) Sepsis due to COVID-19 ([address] 4/27/2021 Yes    Pneumonia due to COVID-19 virus 4/27/2021 Yes    SHANAE (acute kidney injury) ([address] 4/27/2021 Yes    Acute hypoxemic respiratory failure due to COVID-19 ([address] 4/27/2021 Yes    Dehydration with hyponatremia 4/27/2021 No    Atrial fibrillation with RVR ([address] 4/28/2021 Yes    Obstructive sleep apnea 4/28/2021 Yes    Obesity, morbid, BMI 40.0-49.9 ([address] 4/27/2021 Yes    Type 2 diabetes mellitus with stage 3a chronic kidney disease, without long-term current use of insulin ([address] 4/27/2021 Yes    Essential hypertension 4/27/2021 Yes          Plan:        1. COVID-19 pneumoniacurrently on Decadron day 9 of 10.   She received Actemra

## 2021-04-28 NOTE — PROGRESS NOTES
Stehpanie with security informed writer that personnel from [facility] was waiting to be let in by security but left due to waiting for too long. Stephanie with security stated she will let us know when he returns.

## 2021-04-28 NOTE — PROGRESS NOTES
Renal Progress Note    Patient :  Daymon Jun; 70 y.o. MRN# [id number]  Location:  1105/1105-01  Attending:  Refugio Spicer DO  Admit Date:  4/20/2021   Hospital Day: 8      Subjective:     Patient was seen and examined. Patient was in prone position with BiPAP. Her oxygen saturation was barely in 60s to 70s. She was tachycardic with heart rate of 140. Patient was not showing much response to physical stimuli, patient is on Precedex.   CODE STATUS noted    Outpatient Medications:     Medications Prior to Admission: pravastatin (PRAVACHOL) 40 MG tablet, TAKE 1 TABLET AT BEDTIME  loratadine (CLARITIN) 10 MG tablet, TAKE 1 TABLET EVERY MORNING  furosemide (LASIX) 20 MG tablet, TAKE 1 TABLET EVERY MORNING  lisinopril (PRINIVIL;ZESTRIL) 30 MG tablet, TAKE 1 TABLET BY MOUTH EVERY MORNING  carvedilol (COREG) 12.5 MG tablet, TAKE 1 TABLET TWICE DAILY(AM/PM)  glimepiride (AMARYL) 4 MG tablet, TAKE 1 TABLET TWICE DAILY(AM/PM)  Omega-3 Fatty Acids (GNP FISH OIL) 1000 MG CAPS, TAKE 1 CAPSULE BY MOUTH DAILY IN THE EVENING  allopurinol (ZYLOPRIM) 100 MG tablet, TAKE 1 TABLET BY MOUTH AT BEDTIME  amLODIPine (NORVASC) 10 MG tablet, TAKE 1 TABLET BY MOUTH DAILY IN THE MORNING  aspirin (ASPIRIN LOW DOSE) 81 MG EC tablet, TAKE 1 TABLET BY MOUTH DAILY IN THE EVENING  omeprazole (PRILOSEC) 40 MG delayed release capsule, TAKE 1 CAPSULE BY MOUTH DAILY IN THE MORNING  ferrous sulfate (FEROSUL) 325 (65 Fe) MG tablet, TAKE 1 TABLET BY MOUTH DAILY IN THE EVENING  diclofenac sodium (VOLTAREN) 1 % GEL, Apply topically 2 times daily  polyethylene glycol (GLYCOLAX) 17 GM/SCOOP powder, Take 17 g by mouth daily  albuterol sulfate  (90 Base) MCG/ACT inhaler, Inhale 2 puffs into the lungs every 6 hours as needed for Wheezing or Shortness of Breath  Multiple Vitamins-Minerals (MULTIVITAMIN) tablet, TAKE 1 TABLET EVERY MORNING  acetaminophen (APAP EXTRA STRENGTH) 500 MG tablet, Take 1 tablet by mouth every 6 hours as needed for Pain blood glucose test strips (ONETOUCH VERIO) strip, TEST 3 TIMES A DAY & AS NEEDED FOR SYMPTOMS OF IRREGULAR BLOOD GLUCOSE  Blood Glucose Monitoring Suppl (ONETOUCH VERIO FLEX SYSTEM) w/Device KIT,   ONETOUCH VERIO strip, TEST BLOOD SUGAR 3 TIMES DAILY AS DIRECTED  [employer] Ultra Thin Lancets MISC, USE AS DIRECTED WITH INSULIN USE    Current Medications:     Scheduled Meds:    metoprolol  5 mg Intravenous Once    insulin glargine  40 Units Subcutaneous Nightly    budesonide  0.5 mg Nebulization BID    acetylcysteine  600 mg Inhalation BID    amLODIPine  5 mg Oral Daily    dexamethasone  6 mg Intravenous Q24H    pantoprazole  40 mg Intravenous Daily    And    sodium chloride (PF)  10 mL Intravenous Daily    insulin lispro  0-12 Units Subcutaneous TID WC    insulin lispro  0-6 Units Subcutaneous Nightly    Arformoterol Tartrate  15 mcg Nebulization BID    allopurinol  100 mg Oral Nightly    aspirin  81 mg Oral QPM    carvedilol  12.5 mg Oral BID    polyethylene glycol  17 g Oral Daily    pravastatin  40 mg Oral Nightly    sodium chloride flush  5-40 mL Intravenous 2 times per day    Vitamin D  2,000 Units Oral Daily    ipratropium-albuterol  1 ampule Inhalation Q4H WA     Continuous Infusions:    dextrose 50 mL/hr at 04/27/21 1129    dexmedetomidine HCl in NaCl 1.4 mcg/kg/hr (04/28/21 0745)    heparin (PORCINE) Infusion 7.3 Units/kg/hr (04/27/21 2114)    sodium chloride 75 mL/hr at 04/21/21 0800    dextrose      sodium chloride       PRN Meds:  LORazepam, morphine, haloperidol lactate, heparin (porcine), heparin (porcine), sodium chloride, albuterol sulfate HFA, glucose, dextrose, glucagon (rDNA), dextrose, sodium chloride flush, sodium chloride, promethazine **OR** ondansetron, acetaminophen **OR** acetaminophen, guaiFENesin-dextromethorphan    Input/Output:       I/O last 3 completed shifts: In: 815 [I.V.:815]  Out: 850 [Urine:850].       Patient Vitals for the past 96 hrs (Last 3 readings): Results   Component Value Date    C4 53 04/21/2021     Hep BsAg:         Lab Results   Component Value Date    HEPBSAG NONREACTIVE 04/21/2021     Hep C AB:          Lab Results   Component Value Date    HEPCAB NONREACTIVE 04/21/2021       Urinalysis/Chemistries:      Lab Results   Component Value Date    NITRU NEGATIVE 04/21/2021    COLORU YELLOW 04/21/2021    PHUR 5.5 04/21/2021    WBCUA 2 TO 5 04/21/2021    RBCUA 20 TO 50 04/21/2021    MUCUS NOT REPORTED 04/21/2021    TRICHOMONAS NOT REPORTED 04/21/2021    YEAST FEW 04/21/2021    BACTERIA NOT REPORTED 04/21/2021    SPECGRAV 1.015 04/21/2021    LEUKOCYTESUR NEGATIVE 04/21/2021    UROBILINOGEN Normal 04/21/2021    BILIRUBINUR NEGATIVE 04/21/2021    BILIRUBINUR NEGATIVE 12/12/2011    GLUCOSEU NEGATIVE 04/21/2021    GLUCOSEU NEGATIVE 12/12/2011    KETUA NEGATIVE 04/21/2021    AMORPHOUS NOT REPORTED 04/21/2021     Urine Sodium:     Lab Results   Component Value Date    SORAYA 42 04/21/2021     Urine Creatinine:     Lab Results   Component Value Date    LABCREA 75.9 04/21/2021     Radiology:     Reviewed. Assessment:     1. Acute kidney injury: Creatinine plateau  Baseline data seems around 1 to 1.2 mg/DL. 2.  Pneumonia due to COVID-19 infection. With no improvement and patient is very ill  3. Sepsis due to COVID-19 infection. 4.  Acute respiratory distress requiring Noninvasive positive pressure ventilation, refusing mechanical ventilation. 5.  Hypernatremia    Plan:   1. Increase D5 W to 75 mL an hour   2. Extremely poor prognosis. 5.  Will follow. Nutrition   Please ensure that patient is on a renal diet/TF. Avoid nephrotoxic drugs/contrast exposure. We will continue to follow along with you.      Monique Roth MD  [facility]     This note is created with the assistance of a speech-recognition program. While intending to generate a document that actually reflects the content of the visit, no guarantees can be provided that every mistake has been identified and corrected by editing.

## 2021-04-28 NOTE — PROGRESS NOTES
Pulmonary Critical Care Progress Note  Domonique Eller MD     Patient seen for the follow up of acute hypoxic respiratory failure, Covid 19 pneumonia, asthma, COPD, YANA, Sepsis due to COVID-19 [facility])     Subjective:  Patient on BiPAP with FiO2 100% with oxygen saturation in 60s and 70s. Patient is not responding. She is semiprone. She is on Precedex drip. She has been n.p.o. Examination:  Vitals: BP 97/72   Pulse 134   Temp 98.8 °F (37.1 °C) (Axillary)   Resp (!) 36   Ht 5' 3\" (1.6 m)   Wt 269 lb 10 oz (122.3 kg)   SpO2 (!) 62%   BMI 47.76 kg/m²   General appearance: Lethargic, on BiPAP  Neck: No JVD  Lungs: Bilateral crackles, no wheezing, moderate air exchange  Heart: regular rate and rhythm, S1, S2 normal, no gallop  Abdomen: Soft, non tender, + BS  Extremities: no cyanosis or clubbing. Trace edema    LABs:  CBC:   Recent Labs     04/27/21  0507   WBC 10.0   HGB 11.5*   HCT 37.1        BMP:   Recent Labs     04/27/21  0507 04/28/21  0354   * 150*   K 4.2 4.5   CO2 21 23   BUN 66* 71*   CREATININE 1.66* 1.79*   LABGLOM 30* 28*   GLUCOSE 211* 121*     APTT:  No results for input(s): APTT in the last 72 hours.   LIVER PROFILE:  Recent Labs     04/27/21  0507 04/28/21  0354   AST 91* 70*   ALT 58* 50*   LABALBU 2.9* 2.9*     Radiology:  4/27/2021: Bilateral palm infiltrate      Impression:  · Acute hypoxic respiratory failure requiring oxygen by high flow nasal cannula  · COVID-19 viral pneumonia  · Asthma  · COPD/30-pack-year smoking history quit 1990  · CKD  · Obstructive sleep apnea/morbid obesity on home CPAP  · Hypertension, DM II, GERD    Recommendations:  · Airborne isolation  · Oxygen via high flow nasal cannula/BiPAP   · S/p a course of IV Zithromax/Rocephin  · Decadron 6 mg p.o. daily  · S/p Actemra on 4/20/2021  · Albuterol and Ipratropium Q 4 hours and prn  · Continue budesonide aerosol treatment  · Continue Brovana aerosol treatment  · Protonix for PUD prophylaxis  · DVT prophylaxis. Will discontinue subcu heparin and start heparin drip secondary to worsening oxygen needs and elevated D-dimer as she is not a candidate for CTA of the chest secondary to her renal function to rule out PE and is unable to safely go for VQ scan at this time. · Discussed with RN  · Discussed with Dr. Fabiano Grimes. · Patient is no intubation no CPR. Very poor prognosis.   · Will follow with you    Kenyatta Noel MD, [facility]  Pulmonary Critical Care and Sleep Medicine,  [facility]  Cell: [phone]  Office: [phone]

## 2021-04-28 NOTE — DEATH NOTES
Death Pronouncement Note  Patient's Name: Latia Plascencia   Patient's YOB: 1945  MRN Number: [id number]    Admitting Provider: Sb Segovia DO  Attending Provider: Sb Segovia DO    Patient was examined and the following were absent: Pulses, Blood Pressure and Respiratory effort    I declared the patient dead on 04/28/21 at 12:20 pm.     Preliminary Cause of Death: Pneumonia due to COVID-19. Acute respiratory failure.      Electronically signed by Parul Randolph DO on 4/28/21 at 12:32 PM EDT

## 2021-04-28 NOTE — PROGRESS NOTES
[facility]  Progress Note    Oni Garcia  MRN: [id number]  Date: 4/28/2021  LOS: 8     Reason for F/U :   Acute toxic respiratory failure, COVID-19 virus pneumonia    Impression :   1. Acute toxic respiratory failure secondary to COVID-19 virus pneumonia  · Remains BiPAP dependent  · Respiratory decompensation is worsening  2. Possible bacterial coinfection  3. Acute kidney injury on chronic kidney disease, improving  4. Elevated inflammatory markers  5. Pancytopenia, resolving  6. Abnormal LFTs    Recommendations:   · The patient is not a candidate for remdesivir  · Patient continues on dexamethasone per COVID-19 protocol  · Patient is status post Actemra 4/20/2021  · Patient completed a 5-day course of azithromycin 4/24/2021  · Patient completed a 7-day course of ceftriaxone 4/26/2021  · Patient remains BiPAP dependent with impending respiratory decompensation  · Her CODE STATUS is DNR CC arrest with no intubation  · Palliative care did meet with the family yesterday, no plans for end-of-life care at this time, the decision was made for continuing current support status. I did asked nursing staff to contact palliative care to reevaluate this decision with family  · Poor prognosis    Infection Control Recommendations:   Droplet plus precautions    Discharge Planning:   Estimated Length of IV antimicrobials:   Patient will need Midline Catheter Insertion/ PICC line Insertion: No  Patient will need: Home IV , [place],  SNF,  LTAC: Undetermined  Patient willneed outpatient wound care: No    Medical Decision making / Summary of Stay:   Jade Benítez a 70y.o.-year-old female who was initially admitted on 4/20/2021.   Maria T Hernández lives at home alone and does not know of any known COVID-19 virus exposures.   She reports that last week on Tuesday she started feeling generalized malaise/fatigue, shortness of breath without any significant cough.  She did have some intermittent fevers but no out on Precedex, she is tachypneic and tachycardic, not responding, moaning and appears to be uncomfortable and in distress  Nursing staff did just message palliative care to reevaluate the possibility of comfort care with family    Review of Systems   Unable to perform ROS: Severe respiratory distress       Physical Examination :     Physical Exam  Constitutional:       General: She is in acute distress. Appearance: She is obese. She is ill-appearing. Comments: Patient is on BiPAP, prone, obtunded   HENT:      Head: Normocephalic and atraumatic. Cardiovascular:      Rate and Rhythm: Tachycardia present. Pulmonary:      Effort: Tachypnea, accessory muscle usage and respiratory distress present.    Abdominal:      Comments: Unable to assess, patient is currently prone   Neurological:      Comments: Patient is currently obtunded, prone, on Precedex         Laboratory data:   I have independently reviewed the followinglabs:  CBC with Differential:   Recent Labs     04/27/21  0507   WBC 10.0   HGB 11.5*   HCT 37.1        BMP:   Recent Labs     04/27/21  0507 04/28/21  0354   * 150*   K 4.2 4.5   * 114*   CO2 21 23   BUN 66* 71*   CREATININE 1.66* 1.79*     Hepatic Function Panel:   Recent Labs     04/27/21  0507 04/28/21  0354   PROT 5.9* 5.9*   LABALBU 2.9* 2.9*   BILITOT 0.40 0.36   ALKPHOS 134* 150*   ALT 58* 50*   AST 91* 70*         Lab Results   Component Value Date    PROCAL 0.51 04/24/2021    PROCAL 2.30 04/22/2021    PROCAL 3.87 04/21/2021     Lab Results   Component Value Date    CRP 44.3 04/23/2021    CRP 75.1 04/22/2021    .6 04/21/2021     Lab Results   Component Value Date    SEDRATE 25 (H) 03/30/2015         Lab Results   Component Value Date    DDIMER >20.00 04/23/2021    DDIMER 9.60 04/22/2021    DDIMER 7.18 04/21/2021    DDIMER 1.89 08/09/2015     Lab Results   Component Value Date    FERRITIN 2,860 04/26/2021    FERRITIN 3,230 04/24/2021    FERRITIN 5,870 04/22/2021    FERRITIN 6,232 04/20/2021     Lab Results   Component Value Date     04/20/2021     Lab Results   Component Value Date    FIBRINOGEN 598 04/21/2021       Results in Past 30 Days  Result Component Current Result Ref Range Previous Result Ref Range   SARS-CoV-2, Rapid DETECTED (A) (4/20/2021) Not Detected Not in Time Range      Lab Results   Component Value Date    COVID19 DETECTED 04/20/2021       No results for input(s): [facility] in the last 72 hours. Imaging Studies:   No new imaging    Cultures:   No new culture data    Medications:      insulin glargine  40 Units Subcutaneous Nightly    budesonide  0.5 mg Nebulization BID    acetylcysteine  600 mg Inhalation BID    amLODIPine  5 mg Oral Daily    dexamethasone  6 mg Intravenous Q24H    pantoprazole  40 mg Intravenous Daily    And    sodium chloride (PF)  10 mL Intravenous Daily    insulin lispro  0-12 Units Subcutaneous TID WC    insulin lispro  0-6 Units Subcutaneous Nightly    Arformoterol Tartrate  15 mcg Nebulization BID    allopurinol  100 mg Oral Nightly    aspirin  81 mg Oral QPM    carvedilol  12.5 mg Oral BID    polyethylene glycol  17 g Oral Daily    pravastatin  40 mg Oral Nightly    sodium chloride flush  5-40 mL Intravenous 2 times per day    Vitamin D  2,000 Units Oral Daily    ipratropium-albuterol  1 ampule Inhalation Q4H WA           [facility]  [address] [place] [address]  [employer] messaging  OFFICE: [phone]      Electronically signed by [address] [place] [address], APRN - CNP on 4/28/2021 at 8:23 AM  Thank you for allowing us to participate in the care of this patient. Please call with questions.     This note iscreated with the assistance of a speech recognition program.  While intending to generate a document that actually reflects the content of the visit, the document can still have some errors including those of syntax andsound a like substitutions which may escape proof reading. In such instances, actual meaning can be extrapolated by contextual diversion.

## 2024-11-22 NOTE — PLAN OF CARE
"Subjective   Patient ID: Lynne Blake is a 81 y.o. female who presents for Follow-up.    Presents for follow up and med rec after returning to [place]. Did see a PCP once while moved away and is now on different meds than when she left here. Daughter reports doesn't take all meds as prescribed all the time. Denies any seizure activity. No current SOB/GARCÍA. Needs to see Cards at [facility].          Review of Systems   Constitutional:  Negative for chills and fever.   Respiratory:  Negative for shortness of breath.    Cardiovascular:  Negative for chest pain.   Gastrointestinal:  Negative for abdominal pain, nausea and vomiting.       Objective   /76   Temp 37.1 °C (98.8 °F) (Temporal)   Resp 16   Ht 1.575 m (5' 2\")   Wt 61.5 kg (135 lb 9.6 oz)   SpO2 99%   BMI 24.80 kg/m²     Physical Exam  Gen appearance: well groomed in NAD  A & O: alert and oriented x 3  CV: irreg irreg   Lungs: CTA bilaterally  Extr: no edema   Assessment/Plan   HTN: cont med  Afib: cont med. Will make appt with Cards  HF: follow up with Cards  H/o seizure: has not been on any meds and denies any seizure like activity in over a year.  CVA: cont meds  High chol: cont meds  RTO Jan for close follow up. Hopefully will have seen Cards by then  All meds renewed  Greater than 50 minutes were spent on the care and coordination of care for the patient.       " disease process, condition or treatment will be avoided or minimized  4/21/2021 1827 by Sadia Nagy RN  Outcome: Ongoing     Problem: Isolation Precautions - Risk of Spread of Infection  Goal: Prevent transmission of infection  4/21/2021 1827 by Sadia Nagy RN  Outcome: Ongoing     Problem: Nutrition Deficits  Goal: Optimize nutritional status  4/21/2021 1827 by Sadia Nagy RN  Outcome: Ongoing     Problem: Risk for Fluid Volume Deficit  Goal: Maintain normal heart rhythm  4/22/2021 0542 by Skye Reynaga RN  Outcome: Ongoing  4/21/2021 1827 by Sadia Nagy RN  Outcome: Ongoing  Goal: Maintain normal serum potassium, sodium, calcium, phosphorus, and pH  4/21/2021 1827 by Sadia Nagy RN  Outcome: Ongoing     Problem: Loneliness or Risk for Loneliness  Goal: Demonstrate positive use of time alone when socialization is not possible  4/21/2021 1827 by Sadia Nagy RN  Outcome: Ongoing     Problem: Fatigue  Goal: Verbalize increase energy and improved vitality  4/21/2021 1827 by Sadia Nagy RN  Outcome: Ongoing     Problem: Patient Education: Go to Patient Education Activity  Goal: Patient/Family Education  4/21/2021 1827 by Sadia Nagy RN  Outcome: Ongoing